# Patient Record
Sex: MALE | Race: WHITE | NOT HISPANIC OR LATINO | Employment: OTHER | URBAN - METROPOLITAN AREA
[De-identification: names, ages, dates, MRNs, and addresses within clinical notes are randomized per-mention and may not be internally consistent; named-entity substitution may affect disease eponyms.]

---

## 2018-04-02 ENCOUNTER — HISTORICAL (OUTPATIENT)
Dept: LAB | Facility: HOSPITAL | Age: 83
End: 2018-04-02

## 2018-04-02 LAB
ALBUMIN SERPL-MCNC: 3.3 GM/DL (ref 3.4–5)
ALP SERPL-CCNC: 84 UNIT/L (ref 46–116)
ALT SERPL-CCNC: 18 UNIT/L (ref 12–78)
AST SERPL-CCNC: 16 UNIT/L (ref 15–37)
BILIRUB SERPL-MCNC: 0.6 MG/DL (ref 0.2–1)
BILIRUBIN DIRECT+TOT PNL SERPL-MCNC: 0.19 MG/DL (ref 0–0.2)
BILIRUBIN DIRECT+TOT PNL SERPL-MCNC: 0.41 MG/DL (ref 0–0.8)
CHOLEST SERPL-MCNC: 168 MG/DL (ref 0–200)
CHOLEST/HDLC SERPL: 2.4 {RATIO} (ref 0–5)
HDLC SERPL-MCNC: 71 MG/DL (ref 40–60)
LDLC SERPL CALC-MCNC: 90 MG/DL (ref 0–129)
PROT SERPL-MCNC: 6.5 GM/DL (ref 6.4–8.2)
TRIGL SERPL-MCNC: 33 MG/DL
VLDLC SERPL CALC-MCNC: 7 MG/DL

## 2022-11-08 ENCOUNTER — HOSPITAL ENCOUNTER (EMERGENCY)
Facility: HOSPITAL | Age: 87
Discharge: HOME | End: 2022-11-08
Attending: FAMILY MEDICINE
Payer: MEDICARE

## 2022-11-08 VITALS
HEART RATE: 94 BPM | DIASTOLIC BLOOD PRESSURE: 77 MMHG | BODY MASS INDEX: 22.4 KG/M2 | RESPIRATION RATE: 18 BRPM | TEMPERATURE: 97 F | OXYGEN SATURATION: 97 % | WEIGHT: 160 LBS | SYSTOLIC BLOOD PRESSURE: 113 MMHG | HEIGHT: 71 IN

## 2022-11-08 DIAGNOSIS — S32.010A COMPRESSION FRACTURE OF L1 VERTEBRA, INITIAL ENCOUNTER: Primary | ICD-10-CM

## 2022-11-08 DIAGNOSIS — M47.9 SPINE DEGENERATION: Primary | ICD-10-CM

## 2022-11-08 LAB
APPEARANCE UR: CLEAR
BACTERIA #/AREA URNS AUTO: NORMAL /HPF
BILIRUB UR QL STRIP.AUTO: ABNORMAL MG/DL
COLOR UR AUTO: ABNORMAL
GLUCOSE UR QL STRIP.AUTO: NEGATIVE MG/DL
KETONES UR QL STRIP.AUTO: 15 MG/DL
LEUKOCYTE ESTERASE UR QL STRIP.AUTO: NEGATIVE UNIT/L
NITRITE UR QL STRIP.AUTO: NEGATIVE
PH UR STRIP.AUTO: 5.5 [PH]
PROT UR QL STRIP.AUTO: NEGATIVE MG/DL
RBC #/AREA URNS AUTO: NORMAL /HPF
RBC UR QL AUTO: NEGATIVE UNIT/L
SP GR UR STRIP.AUTO: >=1.03
SQUAMOUS #/AREA URNS AUTO: NORMAL /HPF
UROBILINOGEN UR STRIP-ACNC: 1 MG/DL
WBC #/AREA URNS AUTO: NORMAL /HPF

## 2022-11-08 PROCEDURE — 96372 THER/PROPH/DIAG INJ SC/IM: CPT | Performed by: FAMILY MEDICINE

## 2022-11-08 PROCEDURE — 63600175 PHARM REV CODE 636 W HCPCS: Performed by: FAMILY MEDICINE

## 2022-11-08 PROCEDURE — 99284 EMERGENCY DEPT VISIT MOD MDM: CPT | Mod: 25

## 2022-11-08 PROCEDURE — 81001 URINALYSIS AUTO W/SCOPE: CPT | Performed by: FAMILY MEDICINE

## 2022-11-08 PROCEDURE — 81003 URINALYSIS AUTO W/O SCOPE: CPT | Performed by: FAMILY MEDICINE

## 2022-11-08 RX ORDER — DICLOFENAC SODIUM 50 MG/1
50 TABLET, DELAYED RELEASE ORAL 3 TIMES DAILY
Qty: 9 TABLET | Refills: 0 | Status: SHIPPED | OUTPATIENT
Start: 2022-11-08 | End: 2022-11-11

## 2022-11-08 RX ORDER — KETOROLAC TROMETHAMINE 30 MG/ML
30 INJECTION, SOLUTION INTRAMUSCULAR; INTRAVENOUS
Status: COMPLETED | OUTPATIENT
Start: 2022-11-08 | End: 2022-11-08

## 2022-11-08 RX ORDER — CYCLOBENZAPRINE HCL 10 MG
10 TABLET ORAL 3 TIMES DAILY PRN
Qty: 15 TABLET | Refills: 0 | Status: SHIPPED | OUTPATIENT
Start: 2022-11-08 | End: 2022-11-13

## 2022-11-08 RX ADMIN — KETOROLAC TROMETHAMINE 30 MG: 30 INJECTION, SOLUTION INTRAMUSCULAR at 11:11

## 2022-11-08 NOTE — ED NOTES
Pt c/o lower back pain that has been going on for the past 7-8mths, Pt states the pain does not radiate, pt denies any recent falls, Pt describes the pain as constant/aching pain. Pt rates the pain as a  7/10, Pt not on blood thinners.

## 2022-11-09 NOTE — ED NOTES
11/09/22 1149Called Dr Alejandra office for referral since pt family called the office and they said they did not get the referal yet as they told Lillie Brooke/ I spoke to Carolin and she looked it up and said they just got the referral and will be calling for an appt / I spoke to Lillie Brooke and she is calling the family to inform

## 2022-11-14 ENCOUNTER — TELEPHONE (OUTPATIENT)
Dept: PRIMARY CARE CLINIC | Facility: CLINIC | Age: 87
End: 2022-11-14
Payer: MEDICARE

## 2022-11-18 DIAGNOSIS — M47.814 THORACIC SPONDYLOSIS: Primary | ICD-10-CM

## 2022-11-18 DIAGNOSIS — M47.816 LUMBAR SPONDYLOSIS: ICD-10-CM

## 2022-11-21 ENCOUNTER — HOSPITAL ENCOUNTER (OUTPATIENT)
Dept: RADIOLOGY | Facility: HOSPITAL | Age: 87
Discharge: HOME | End: 2022-11-21
Attending: NEUROLOGICAL SURGERY
Payer: MEDICARE

## 2022-11-21 DIAGNOSIS — M47.816 LUMBAR SPONDYLOSIS: ICD-10-CM

## 2022-11-21 DIAGNOSIS — M47.814 THORACIC SPONDYLOSIS: ICD-10-CM

## 2022-11-21 PROCEDURE — 72148 MRI LUMBAR SPINE W/O DYE: CPT | Mod: TC

## 2022-11-21 PROCEDURE — 72146 MRI CHEST SPINE W/O DYE: CPT | Mod: TC

## 2022-11-22 ENCOUNTER — OFFICE VISIT (OUTPATIENT)
Dept: PRIMARY CARE CLINIC | Facility: CLINIC | Age: 87
End: 2022-11-22
Payer: MEDICARE

## 2022-11-22 VITALS
OXYGEN SATURATION: 99 % | WEIGHT: 152 LBS | DIASTOLIC BLOOD PRESSURE: 88 MMHG | BODY MASS INDEX: 21.28 KG/M2 | HEIGHT: 71 IN | RESPIRATION RATE: 20 BRPM | TEMPERATURE: 97 F | HEART RATE: 98 BPM | SYSTOLIC BLOOD PRESSURE: 138 MMHG

## 2022-11-22 DIAGNOSIS — S22.000A COMPRESSION FRACTURE OF BODY OF THORACIC VERTEBRA: ICD-10-CM

## 2022-11-22 DIAGNOSIS — Z13.29 SCREENING FOR THYROID DISORDER: ICD-10-CM

## 2022-11-22 DIAGNOSIS — Z00.00 MEDICARE ANNUAL WELLNESS VISIT, SUBSEQUENT: ICD-10-CM

## 2022-11-22 DIAGNOSIS — Z85.46 HISTORY OF PROSTATE CANCER: ICD-10-CM

## 2022-11-22 DIAGNOSIS — R41.3 MEMORY IMPAIRMENT: ICD-10-CM

## 2022-11-22 DIAGNOSIS — R53.1 GENERALIZED WEAKNESS: ICD-10-CM

## 2022-11-22 DIAGNOSIS — C80.1 MALIGNANT NEOPLASM METASTATIC TO LUMBAR SPINE WITH UNKNOWN PRIMARY SITE: Primary | ICD-10-CM

## 2022-11-22 DIAGNOSIS — C79.51 MALIGNANT NEOPLASM METASTATIC TO LUMBAR SPINE WITH UNKNOWN PRIMARY SITE: Primary | ICD-10-CM

## 2022-11-22 DIAGNOSIS — Z23 NEED FOR INFLUENZA VACCINATION: ICD-10-CM

## 2022-11-22 PROCEDURE — 90694 FLU VACCINE - QUADRIVALENT - ADJUVANTED: ICD-10-PCS | Mod: ,,, | Performed by: STUDENT IN AN ORGANIZED HEALTH CARE EDUCATION/TRAINING PROGRAM

## 2022-11-22 PROCEDURE — 99204 OFFICE O/P NEW MOD 45 MIN: CPT | Mod: ,,, | Performed by: STUDENT IN AN ORGANIZED HEALTH CARE EDUCATION/TRAINING PROGRAM

## 2022-11-22 PROCEDURE — G0008 ADMIN INFLUENZA VIRUS VAC: HCPCS | Mod: ,,, | Performed by: STUDENT IN AN ORGANIZED HEALTH CARE EDUCATION/TRAINING PROGRAM

## 2022-11-22 PROCEDURE — G0008 FLU VACCINE - QUADRIVALENT - ADJUVANTED: ICD-10-PCS | Mod: ,,, | Performed by: STUDENT IN AN ORGANIZED HEALTH CARE EDUCATION/TRAINING PROGRAM

## 2022-11-22 PROCEDURE — 90694 VACC AIIV4 NO PRSRV 0.5ML IM: CPT | Mod: ,,, | Performed by: STUDENT IN AN ORGANIZED HEALTH CARE EDUCATION/TRAINING PROGRAM

## 2022-11-22 PROCEDURE — 99204 PR OFFICE/OUTPT VISIT, NEW, LEVL IV, 45-59 MIN: ICD-10-PCS | Mod: ,,, | Performed by: STUDENT IN AN ORGANIZED HEALTH CARE EDUCATION/TRAINING PROGRAM

## 2022-11-22 NOTE — PROGRESS NOTES
"Subjective:       Patient ID: Anthony Ibarra is a 88 y.o. male.    ----------------------------  Cancer     Chief Complaint: Establish Care and Facial Injury    Presents to establish care. Accompanied by daughter. Pt hasn't had primary care in many years. Only significant medical history reported is prostate cancer which patient reports "they took out". Daughter is concerned about his memory. Says when she arrived at his house (first time in months), she found unpaid bills, house hadn't been cleaned, pt hadn't shaved or been caring for himself, etc.     He recently veronica to the ED for back pain which was found to be a compression fracture on x-ray. Seems that the pt has had progressive generalized weakness which may have lead to a fall. He was referred to Neurosurgery (Dr. Mirza). Saw NP in that clinic who ordered MRI lumbar and thoracic spine. That MRI lumbar spine shows suspected metastatic disease; the pt is not aware yet.    ROS (+) for weight loss, about 20 lbs over past 1-2 years per pt. No tobacco use. Wine 3-4 times per week, 1-2 glasses each time.     Review of Systems   Constitutional:  Negative for chills and fever.   HENT:  Negative for sinus pressure/congestion and sore throat.    Eyes:  Negative for pain and redness.   Respiratory:  Negative for cough, shortness of breath and wheezing.    Cardiovascular:  Negative for chest pain and leg swelling.   Gastrointestinal:  Negative for abdominal pain, nausea and vomiting.   Endocrine: Negative for polydipsia and polyuria.   Genitourinary:  Negative for dysuria and hematuria.   Musculoskeletal:  Positive for back pain. Negative for arthralgias and myalgias.   Integumentary:  Negative for rash and mole/lesion.   Neurological:  Positive for weakness. Negative for dizziness, syncope and headaches.   Hematological:  Negative for adenopathy.   Psychiatric/Behavioral:  Positive for confusion. The patient is not nervous/anxious.          Objective:      Physical " Exam  Vitals and nursing note reviewed.   Constitutional:       General: He is not in acute distress.     Appearance: He is not ill-appearing.   HENT:      Head: Normocephalic.      Nose: No rhinorrhea.      Mouth/Throat:      Mouth: Mucous membranes are moist.      Pharynx: No oropharyngeal exudate or posterior oropharyngeal erythema.   Eyes:      Extraocular Movements: Extraocular movements intact.      Conjunctiva/sclera: Conjunctivae normal.      Pupils: Pupils are equal, round, and reactive to light.   Cardiovascular:      Rate and Rhythm: Normal rate and regular rhythm.   Pulmonary:      Effort: Pulmonary effort is normal.      Breath sounds: Normal breath sounds.   Abdominal:      Palpations: Abdomen is soft. There is no mass.      Tenderness: There is no abdominal tenderness.   Musculoskeletal:         General: No deformity.   Skin:     General: Skin is warm and dry.   Neurological:      General: No focal deficit present.      Mental Status: He is alert. Mental status is at baseline.   Psychiatric:         Mood and Affect: Mood normal.         Behavior: Behavior normal.         Assessment & Plan:   1. 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   Source is not obvious, but pt does have h/o prostate cancer which does not seem has been monitored recently.  Weight loss is concerning.  We'll search for primary neoplasm with CT chest/abd/pelvis w/contrast as well as CT head - especially in setting of confusion.  Will contact pt as soon as we have all results and likely will need referral to Oncology    Orders:  -     Cancel: CT Chest Abdomen Pelvis With Contrast (xpd); Future; Expected date: 11/22/2022  -     CBC Auto Differential; Future; Expected date: 11/22/2022  -     Comprehensive Metabolic Panel; Future; Expected date: 11/22/2022  -     Cancel: CT Head Without Contrast; Future; Expected date: 11/22/2022    2. Compression  fracture of body of thoracic vertebra  Assessment & Plan:  Following Dr. Mirza (neurosurgery). Pt was told he'd be called with an appointment date based on his MRI      3. Memory impairment  Assessment & Plan:  Daughter expressed concern. MMSE today 26/30, however pt is highly educated () so this may represent a mild cognitive deficit. He does seem particularly forgetful of recent events (doesn't recall getting MRI yesterday). Will await CT head results (see above) and labs to r/o other causes of memory impairment. If unrevealing will discuss treatment options at next visit      4. Generalized weakness  Assessment & Plan:  With recent fall  Consult home health for eval and treat and for home PT       5. Need for influenza vaccination  -     Influenza (FLUAD) - Quadrivalent (Adjuvanted) *Preferred* (65+) (PF)    6. Medicare annual wellness visit, subsequent  Comments:  ordered labs in prep for wellness at next visit  Orders:  -     Lipid Panel; Future; Expected date: 11/22/2022  -     TSH; Future; Expected date: 11/22/2022  -     Urinalysis, Reflex to Urine Culture Urine, Clean Catch    7. History of prostate cancer  -     PSA, Total (Diagnostic); Future; Expected date: 11/22/2022    8. Screening for thyroid disorder  -     TSH; Future; Expected date: 11/22/2022        Follow up in about 1 month (around 12/22/2022) for Wellness. In addition to their scheduled follow up, the patient has also been instructed to follow up on as needed basis.

## 2022-11-23 ENCOUNTER — PATIENT MESSAGE (OUTPATIENT)
Dept: PRIMARY CARE CLINIC | Facility: CLINIC | Age: 87
End: 2022-11-23
Payer: MEDICARE

## 2022-11-23 ENCOUNTER — LAB VISIT (OUTPATIENT)
Dept: LAB | Facility: HOSPITAL | Age: 87
End: 2022-11-23
Attending: STUDENT IN AN ORGANIZED HEALTH CARE EDUCATION/TRAINING PROGRAM
Payer: MEDICARE

## 2022-11-23 DIAGNOSIS — C80.1 MALIGNANT NEOPLASM METASTATIC TO LUMBAR SPINE WITH UNKNOWN PRIMARY SITE: ICD-10-CM

## 2022-11-23 DIAGNOSIS — Z85.46 HISTORY OF PROSTATE CANCER: ICD-10-CM

## 2022-11-23 DIAGNOSIS — C79.51 MALIGNANT NEOPLASM METASTATIC TO LUMBAR SPINE WITH UNKNOWN PRIMARY SITE: ICD-10-CM

## 2022-11-23 DIAGNOSIS — C80.1 MALIGNANT NEOPLASM METASTATIC TO LUMBAR SPINE WITH UNKNOWN PRIMARY SITE: Primary | ICD-10-CM

## 2022-11-23 DIAGNOSIS — Z00.00 MEDICARE ANNUAL WELLNESS VISIT, SUBSEQUENT: ICD-10-CM

## 2022-11-23 DIAGNOSIS — Z13.29 SCREENING FOR THYROID DISORDER: ICD-10-CM

## 2022-11-23 DIAGNOSIS — C79.51 MALIGNANT NEOPLASM METASTATIC TO LUMBAR SPINE WITH UNKNOWN PRIMARY SITE: Primary | ICD-10-CM

## 2022-11-23 PROBLEM — S22.000A COMPRESSION FRACTURE OF BODY OF THORACIC VERTEBRA: Status: ACTIVE | Noted: 2022-11-23

## 2022-11-23 PROBLEM — R53.1 GENERALIZED WEAKNESS: Status: ACTIVE | Noted: 2022-11-23

## 2022-11-23 PROBLEM — R41.3 MEMORY IMPAIRMENT: Status: ACTIVE | Noted: 2022-11-23

## 2022-11-23 LAB
ALBUMIN SERPL-MCNC: 2.9 GM/DL (ref 3.4–4.8)
ALBUMIN/GLOB SERPL: 0.7 RATIO (ref 1.1–2)
ALP SERPL-CCNC: 160 UNIT/L (ref 40–150)
ALT SERPL-CCNC: 9 UNIT/L (ref 0–55)
APPEARANCE UR: CLEAR
AST SERPL-CCNC: 15 UNIT/L (ref 5–34)
BACTERIA #/AREA URNS AUTO: NORMAL /HPF
BASOPHILS # BLD AUTO: 0.04 X10(3)/MCL (ref 0–0.2)
BASOPHILS NFR BLD AUTO: 0.6 %
BILIRUB UR QL STRIP.AUTO: NEGATIVE MG/DL
BILIRUBIN DIRECT+TOT PNL SERPL-MCNC: 0.6 MG/DL
BUN SERPL-MCNC: 11.3 MG/DL (ref 8.4–25.7)
CALCIUM SERPL-MCNC: 9.4 MG/DL (ref 8.8–10)
CHLORIDE SERPL-SCNC: 100 MMOL/L (ref 98–107)
CHOLEST SERPL-MCNC: 134 MG/DL
CHOLEST/HDLC SERPL: 2 {RATIO} (ref 0–5)
CO2 SERPL-SCNC: 31 MMOL/L (ref 23–31)
COLOR UR AUTO: YELLOW
CREAT SERPL-MCNC: 0.79 MG/DL (ref 0.73–1.18)
EOSINOPHIL # BLD AUTO: 0.04 X10(3)/MCL (ref 0–0.9)
EOSINOPHIL NFR BLD AUTO: 0.6 %
ERYTHROCYTE [DISTWIDTH] IN BLOOD BY AUTOMATED COUNT: 13.4 % (ref 11.5–17)
GFR SERPLBLD CREATININE-BSD FMLA CKD-EPI: >60 MLS/MIN/1.73/M2
GLOBULIN SER-MCNC: 4.4 GM/DL (ref 2.4–3.5)
GLUCOSE SERPL-MCNC: 86 MG/DL (ref 82–115)
GLUCOSE UR QL STRIP.AUTO: NEGATIVE MG/DL
HCT VFR BLD AUTO: 43 % (ref 42–52)
HDLC SERPL-MCNC: 56 MG/DL (ref 35–60)
HGB BLD-MCNC: 13.5 GM/DL (ref 14–18)
IMM GRANULOCYTES # BLD AUTO: 0.01 X10(3)/MCL (ref 0–0.04)
IMM GRANULOCYTES NFR BLD AUTO: 0.2 %
KETONES UR QL STRIP.AUTO: ABNORMAL MG/DL
LDLC SERPL CALC-MCNC: 66 MG/DL (ref 50–140)
LEUKOCYTE ESTERASE UR QL STRIP.AUTO: NEGATIVE UNIT/L
LYMPHOCYTES # BLD AUTO: 1.51 X10(3)/MCL (ref 0.6–4.6)
LYMPHOCYTES NFR BLD AUTO: 24.5 %
MCH RBC QN AUTO: 30.1 PG (ref 27–31)
MCHC RBC AUTO-ENTMCNC: 31.4 MG/DL (ref 33–36)
MCV RBC AUTO: 95.8 FL (ref 80–94)
MONOCYTES # BLD AUTO: 0.76 X10(3)/MCL (ref 0.1–1.3)
MONOCYTES NFR BLD AUTO: 12.3 %
NEUTROPHILS # BLD AUTO: 3.8 X10(3)/MCL (ref 2.1–9.2)
NEUTROPHILS NFR BLD AUTO: 61.8 %
NITRITE UR QL STRIP.AUTO: NEGATIVE
PH UR STRIP.AUTO: 5.5 [PH]
PLATELET # BLD AUTO: 196 X10(3)/MCL (ref 130–400)
PMV BLD AUTO: 10 FL (ref 7.4–10.4)
POTASSIUM SERPL-SCNC: 4.3 MMOL/L (ref 3.5–5.1)
PROT SERPL-MCNC: 7.3 GM/DL (ref 5.8–7.6)
PROT UR QL STRIP.AUTO: ABNORMAL MG/DL
PSA SERPL-MCNC: <0.02 NG/ML
RBC # BLD AUTO: 4.49 X10(6)/MCL (ref 4.7–6.1)
RBC #/AREA URNS AUTO: NORMAL /HPF
RBC UR QL AUTO: ABNORMAL UNIT/L
SODIUM SERPL-SCNC: 139 MMOL/L (ref 136–145)
SP GR UR STRIP.AUTO: 1.02
SQUAMOUS #/AREA URNS AUTO: NORMAL /HPF
TRIGL SERPL-MCNC: 58 MG/DL (ref 34–140)
TSH SERPL-ACNC: 0.93 UIU/ML (ref 0.35–4.94)
UROBILINOGEN UR STRIP-ACNC: 1 MG/DL
VLDLC SERPL CALC-MCNC: 12 MG/DL
WBC # SPEC AUTO: 6.2 X10(3)/MCL (ref 4.5–11.5)
WBC #/AREA URNS AUTO: NORMAL /HPF

## 2022-11-23 PROCEDURE — 80053 COMPREHEN METABOLIC PANEL: CPT

## 2022-11-23 PROCEDURE — 84443 ASSAY THYROID STIM HORMONE: CPT

## 2022-11-23 PROCEDURE — 84153 ASSAY OF PSA TOTAL: CPT

## 2022-11-23 PROCEDURE — 36415 COLL VENOUS BLD VENIPUNCTURE: CPT

## 2022-11-23 PROCEDURE — 80061 LIPID PANEL: CPT

## 2022-11-23 PROCEDURE — 85025 COMPLETE CBC W/AUTO DIFF WBC: CPT

## 2022-11-23 NOTE — ASSESSMENT & PLAN NOTE
Incidentally noted on lumbar MRI.   Source is not obvious, but pt does have h/o prostate cancer which does not seem has been monitored recently.  Weight loss is concerning.  We'll search for primary neoplasm with CT chest/abd/pelvis w/contrast as well as CT head - especially in setting of confusion.  Will contact pt as soon as we have all results and likely will need referral to Oncology

## 2022-11-23 NOTE — ASSESSMENT & PLAN NOTE
Daughter expressed concern. MMSE today 26/30, however pt is highly educated () so this may represent a mild cognitive deficit. He does seem particularly forgetful of recent events (doesn't recall getting MRI yesterday). Will await CT head results (see above) and labs to r/o other causes of memory impairment. If unrevealing will discuss treatment options at next visit

## 2022-11-23 NOTE — ASSESSMENT & PLAN NOTE
Following Dr. Mirza (neurosurgery). Pt was told he'd be called with an appointment date based on his MRI

## 2022-11-28 ENCOUNTER — TELEPHONE (OUTPATIENT)
Dept: HEMATOLOGY/ONCOLOGY | Facility: CLINIC | Age: 87
End: 2022-11-28
Payer: MEDICARE

## 2022-11-30 ENCOUNTER — ANESTHESIA (OUTPATIENT)
Dept: INTERVENTIONAL RADIOLOGY/VASCULAR | Facility: HOSPITAL | Age: 87
End: 2022-11-30
Payer: MEDICARE

## 2022-11-30 ENCOUNTER — HOSPITAL ENCOUNTER (OUTPATIENT)
Dept: INTERVENTIONAL RADIOLOGY/VASCULAR | Facility: HOSPITAL | Age: 87
Discharge: HOME | End: 2022-11-30
Attending: RADIOLOGY
Payer: MEDICARE

## 2022-11-30 VITALS
OXYGEN SATURATION: 100 % | WEIGHT: 160 LBS | TEMPERATURE: 98 F | HEIGHT: 71 IN | DIASTOLIC BLOOD PRESSURE: 79 MMHG | HEART RATE: 66 BPM | BODY MASS INDEX: 22.4 KG/M2 | RESPIRATION RATE: 15 BRPM | SYSTOLIC BLOOD PRESSURE: 144 MMHG

## 2022-11-30 DIAGNOSIS — S22.000A COMPRESSION FRACTURE OF THORACIC VERTEBRA, UNSPECIFIED THORACIC VERTEBRAL LEVEL, INITIAL ENCOUNTER: ICD-10-CM

## 2022-11-30 DIAGNOSIS — Z01.818 PRE-OP EVALUATION: ICD-10-CM

## 2022-11-30 LAB
ALBUMIN SERPL-MCNC: 2.9 GM/DL (ref 3.4–4.8)
ALBUMIN/GLOB SERPL: 0.7 RATIO (ref 1.1–2)
ALP SERPL-CCNC: 139 UNIT/L (ref 40–150)
ALT SERPL-CCNC: 14 UNIT/L (ref 0–55)
AST SERPL-CCNC: 16 UNIT/L (ref 5–34)
BASOPHILS # BLD AUTO: 0.03 X10(3)/MCL (ref 0–0.2)
BASOPHILS NFR BLD AUTO: 0.5 %
BILIRUBIN DIRECT+TOT PNL SERPL-MCNC: 0.5 MG/DL
BUN SERPL-MCNC: 12.2 MG/DL (ref 8.4–25.7)
CALCIUM SERPL-MCNC: 8.9 MG/DL (ref 8.8–10)
CHLORIDE SERPL-SCNC: 105 MMOL/L (ref 98–107)
CO2 SERPL-SCNC: 27 MMOL/L (ref 23–31)
CREAT SERPL-MCNC: 0.75 MG/DL (ref 0.73–1.18)
EOSINOPHIL # BLD AUTO: 0.07 X10(3)/MCL (ref 0–0.9)
EOSINOPHIL NFR BLD AUTO: 1.3 %
ERYTHROCYTE [DISTWIDTH] IN BLOOD BY AUTOMATED COUNT: 13.8 % (ref 11.5–17)
GFR SERPLBLD CREATININE-BSD FMLA CKD-EPI: >60 MLS/MIN/1.73/M2
GLOBULIN SER-MCNC: 4 GM/DL (ref 2.4–3.5)
GLUCOSE SERPL-MCNC: 90 MG/DL (ref 82–115)
HCT VFR BLD AUTO: 40.4 % (ref 42–52)
HGB BLD-MCNC: 13.2 GM/DL (ref 14–18)
IMM GRANULOCYTES # BLD AUTO: 0.02 X10(3)/MCL (ref 0–0.04)
IMM GRANULOCYTES NFR BLD AUTO: 0.4 %
INR BLD: 1.07 (ref 0–1.3)
LYMPHOCYTES # BLD AUTO: 0.96 X10(3)/MCL (ref 0.6–4.6)
LYMPHOCYTES NFR BLD AUTO: 17.1 %
MCH RBC QN AUTO: 31.3 PG (ref 27–31)
MCHC RBC AUTO-ENTMCNC: 32.7 MG/DL (ref 33–36)
MCV RBC AUTO: 95.7 FL (ref 80–94)
MONOCYTES # BLD AUTO: 0.65 X10(3)/MCL (ref 0.1–1.3)
MONOCYTES NFR BLD AUTO: 11.6 %
NEUTROPHILS # BLD AUTO: 3.9 X10(3)/MCL (ref 2.1–9.2)
NEUTROPHILS NFR BLD AUTO: 69.1 %
NRBC BLD AUTO-RTO: 0 %
PLATELET # BLD AUTO: 212 X10(3)/MCL (ref 130–400)
PMV BLD AUTO: 10.3 FL (ref 7.4–10.4)
POTASSIUM SERPL-SCNC: 3.7 MMOL/L (ref 3.5–5.1)
PROT SERPL-MCNC: 6.9 GM/DL (ref 5.8–7.6)
PROTHROMBIN TIME: 13.8 SECONDS (ref 12.5–14.5)
RBC # BLD AUTO: 4.22 X10(6)/MCL (ref 4.7–6.1)
SODIUM SERPL-SCNC: 139 MMOL/L (ref 136–145)
WBC # SPEC AUTO: 5.6 X10(3)/MCL (ref 4.5–11.5)

## 2022-11-30 PROCEDURE — 63600175 PHARM REV CODE 636 W HCPCS: Performed by: NURSE ANESTHETIST, CERTIFIED REGISTERED

## 2022-11-30 PROCEDURE — 88304 TISSUE EXAM BY PATHOLOGIST: CPT | Performed by: RADIOLOGY

## 2022-11-30 PROCEDURE — 25000003 PHARM REV CODE 250: Performed by: ANESTHESIOLOGY

## 2022-11-30 PROCEDURE — 80053 COMPREHEN METABOLIC PANEL: CPT | Performed by: RADIOLOGY

## 2022-11-30 PROCEDURE — 37000009 HC ANESTHESIA EA ADD 15 MINS: Performed by: RADIOLOGY

## 2022-11-30 PROCEDURE — 36415 COLL VENOUS BLD VENIPUNCTURE: CPT | Performed by: RADIOLOGY

## 2022-11-30 PROCEDURE — 93010 ELECTROCARDIOGRAM REPORT: CPT | Mod: GW,,, | Performed by: INTERNAL MEDICINE

## 2022-11-30 PROCEDURE — 22513 PERQ VERTEBRAL AUGMENTATION: CPT | Mod: GA

## 2022-11-30 PROCEDURE — 25000003 PHARM REV CODE 250: Performed by: NURSE ANESTHETIST, CERTIFIED REGISTERED

## 2022-11-30 PROCEDURE — 25000003 PHARM REV CODE 250: Performed by: RADIOLOGY

## 2022-11-30 PROCEDURE — 85610 PROTHROMBIN TIME: CPT | Performed by: RADIOLOGY

## 2022-11-30 PROCEDURE — 37000008 HC ANESTHESIA 1ST 15 MINUTES: Performed by: RADIOLOGY

## 2022-11-30 PROCEDURE — 93010 EKG 12-LEAD: ICD-10-PCS | Mod: GW,,, | Performed by: INTERNAL MEDICINE

## 2022-11-30 PROCEDURE — 85025 COMPLETE CBC W/AUTO DIFF WBC: CPT | Performed by: RADIOLOGY

## 2022-11-30 RX ORDER — HYDROMORPHONE HYDROCHLORIDE 2 MG/ML
0.2 INJECTION, SOLUTION INTRAMUSCULAR; INTRAVENOUS; SUBCUTANEOUS EVERY 5 MIN PRN
Status: CANCELLED | OUTPATIENT
Start: 2022-11-30

## 2022-11-30 RX ORDER — LIDOCAINE HYDROCHLORIDE 20 MG/ML
INJECTION INTRAVENOUS
Status: DISCONTINUED | OUTPATIENT
Start: 2022-11-30 | End: 2022-11-30

## 2022-11-30 RX ORDER — LIDOCAINE HYDROCHLORIDE 20 MG/ML
INJECTION, SOLUTION INFILTRATION; PERINEURAL
Status: COMPLETED | OUTPATIENT
Start: 2022-11-30 | End: 2022-11-30

## 2022-11-30 RX ORDER — DIPHENHYDRAMINE HYDROCHLORIDE 50 MG/ML
25 INJECTION INTRAMUSCULAR; INTRAVENOUS EVERY 6 HOURS PRN
Status: CANCELLED | OUTPATIENT
Start: 2022-11-30

## 2022-11-30 RX ORDER — ONDANSETRON 2 MG/ML
4 INJECTION INTRAMUSCULAR; INTRAVENOUS DAILY PRN
Status: CANCELLED | OUTPATIENT
Start: 2022-11-30

## 2022-11-30 RX ORDER — PROPOFOL 10 MG/ML
VIAL (ML) INTRAVENOUS
Status: DISCONTINUED | OUTPATIENT
Start: 2022-11-30 | End: 2022-11-30

## 2022-11-30 RX ORDER — ROCURONIUM BROMIDE 10 MG/ML
INJECTION, SOLUTION INTRAVENOUS
Status: DISCONTINUED | OUTPATIENT
Start: 2022-11-30 | End: 2022-11-30

## 2022-11-30 RX ORDER — LIDOCAINE HYDROCHLORIDE 10 MG/ML
1 INJECTION, SOLUTION EPIDURAL; INFILTRATION; INTRACAUDAL; PERINEURAL ONCE
Status: DISCONTINUED | OUTPATIENT
Start: 2022-11-30 | End: 2022-12-01 | Stop reason: HOSPADM

## 2022-11-30 RX ORDER — ACETAMINOPHEN 10 MG/ML
1000 INJECTION, SOLUTION INTRAVENOUS ONCE
Status: CANCELLED | OUTPATIENT
Start: 2022-11-30 | End: 2022-11-30

## 2022-11-30 RX ORDER — ETOMIDATE 2 MG/ML
INJECTION INTRAVENOUS
Status: DISCONTINUED | OUTPATIENT
Start: 2022-11-30 | End: 2022-11-30

## 2022-11-30 RX ORDER — SODIUM CHLORIDE, SODIUM GLUCONATE, SODIUM ACETATE, POTASSIUM CHLORIDE AND MAGNESIUM CHLORIDE 30; 37; 368; 526; 502 MG/100ML; MG/100ML; MG/100ML; MG/100ML; MG/100ML
INJECTION, SOLUTION INTRAVENOUS CONTINUOUS
Status: DISCONTINUED | OUTPATIENT
Start: 2022-11-30 | End: 2022-12-01 | Stop reason: HOSPADM

## 2022-11-30 RX ADMIN — PROPOFOL 30 MG: 10 INJECTION, EMULSION INTRAVENOUS at 09:11

## 2022-11-30 RX ADMIN — SUGAMMADEX 200 MG: 100 INJECTION, SOLUTION INTRAVENOUS at 10:11

## 2022-11-30 RX ADMIN — SODIUM CHLORIDE 1 G: 9 INJECTION, SOLUTION INTRAVENOUS at 09:11

## 2022-11-30 RX ADMIN — ROCURONIUM BROMIDE 50 MG: 50 INJECTION INTRAVENOUS at 09:11

## 2022-11-30 RX ADMIN — ETOMIDATE 20 MG: 2 INJECTION INTRAVENOUS at 09:11

## 2022-11-30 RX ADMIN — LIDOCAINE HYDROCHLORIDE 40 MG: 20 INJECTION INTRAVENOUS at 09:11

## 2022-11-30 RX ADMIN — LIDOCAINE HYDROCHLORIDE 5 ML: 20 INJECTION, SOLUTION INFILTRATION; PERINEURAL at 10:11

## 2022-11-30 RX ADMIN — SODIUM CHLORIDE, SODIUM GLUCONATE, SODIUM ACETATE, POTASSIUM CHLORIDE AND MAGNESIUM CHLORIDE 100 ML/HR: 526; 502; 368; 37; 30 INJECTION, SOLUTION INTRAVENOUS at 09:11

## 2022-11-30 NOTE — ANESTHESIA PREPROCEDURE EVALUATION
11/30/2022  Anthony Ibarra is a 88 y.o., male with multiple vertebral compression fractures and resultant back pain.  He does not remember any specific incident of injury.  He denies SOB, denies chest pain.  He answers questions appropriately.  He does not follow regularly with any medical provider.  NO EKG in chart.  Ordered 12 lead, pending...  Update: EKG is WNL.      Pre-op Assessment    I have reviewed the Patient Summary Reports.     I have reviewed the Nursing Notes. I have reviewed the NPO Status.   I have reviewed the Medications.     Review of Systems  Anesthesia Hx:  Denies Family Hx of Anesthesia complications.   Denies Personal Hx of Anesthesia complications.   Cardiovascular:  Cardiovascular Normal Exercise tolerance: poor     Pulmonary:  Pulmonary Normal        Physical Exam  General: Cooperative, Alert, Oriented and Cachexia    Airway:  Mallampati: II   Mouth Opening: Normal  TM Distance: Normal  Tongue: Normal  Neck ROM: Normal ROM    Dental:  Intact    Chest/Lungs:  Clear to auscultation, Normal Respiratory Rate    Heart:  Rate: Normal  Rhythm: Regular Rhythm        Anesthesia Plan  Type of Anesthesia, risks & benefits discussed:    Anesthesia Type: Gen ETT  Intra-op Monitoring Plan: Standard ASA Monitors  Post Op Pain Control Plan: multimodal analgesia and IV/PO Opioids PRN  Induction:  IV  Airway Plan: Direct, Post-Induction  Informed Consent: Informed consent signed with the Patient and all parties understand the risks and agree with anesthesia plan.  All questions answered.   ASA Score: 3  Day of Surgery Review of History & Physical: H&P Update referred to the surgeon/provider.    Ready For Surgery From Anesthesia Perspective.     .

## 2022-11-30 NOTE — DISCHARGE SUMMARY
Radiology Post-Procedure Note    Pre Op Diagnosis: T6 Compression Fracture    Post Op Diagnosis:  As Above    Procedure: Kyphoplasty and biopsy    Procedure performed by: Hector Zuleta M.D.    Written Informed Consent Obtained: Yes    Specimen Removed: YES     Estimated Blood Loss: Minimal    Findings:   Standard Kyphoplasty    Patient tolerated procedure well.    Hector Zuleta MD

## 2022-11-30 NOTE — H&P
Radiology History & Physical      SUBJECTIVE:     Chief Complaint: T6 Compression fracture    History of Present Illness:  Anthony Ibarra is a 88 y.o. male who presents for Kyphoplasty  Past Medical History:   Diagnosis Date    Cancer     prostate    Compression fracture of body of thoracic vertebra      Past Surgical History:   Procedure Laterality Date    PROSTATE SURGERY N/A        Home Meds:   Prior to Admission medications    Not on File     Anticoagulants/Antiplatelets: no anticoagulation    Allergies: Review of patient's allergies indicates:  No Known Allergies  Sedation History:  no adverse reactions    Review of Systems:   Hematological: no known coagulopathies  Respiratory: no shortness of breath  Cardiovascular: no chest pain  Gastrointestinal: no abdominal pain  Genito-Urinary: no dysuria  Musculoskeletal: negative  Neurological: no TIA or stroke symptoms         OBJECTIVE:     Vital Signs (Most Recent)  Temp: 98.5 °F (36.9 °C) (11/30/22 0811)  Pulse: 79 (11/30/22 0811)  BP: 133/82 (11/30/22 0811)  SpO2: 95 % (11/30/22 0811)    Physical Exam:  ASA: 2    General: no acute distress  Mental Status: alert and oriented to person, place and time  HEENT: normocephalic, atraumatic  Chest: unlabored breathing  Heart: regular heart rate  Abdomen: nondistended  Extremity: moves all extremities    Laboratory  Lab Results   Component Value Date    INR 1.07 11/30/2022       Lab Results   Component Value Date    WBC 5.6 11/30/2022    HGB 13.2 (L) 11/30/2022    HCT 40.4 (L) 11/30/2022    MCV 95.7 (H) 11/30/2022     11/30/2022      Lab Results   Component Value Date     11/30/2022    K 3.7 11/30/2022    CO2 27 11/30/2022    BUN 12.2 11/30/2022    CREATININE 0.75 11/30/2022    CALCIUM 8.9 11/30/2022    ALT 14 11/30/2022    AST 16 11/30/2022    ALBUMIN 2.9 (L) 11/30/2022    BILITOT 0.5 11/30/2022    BILIDIR 0.19 04/02/2018       ASSESSMENT/PLAN:     Sedation Plan: General  Patient will undergo  Kyphoplasty.    Hector Zuleta MD

## 2022-11-30 NOTE — ANESTHESIA PROCEDURE NOTES
Intubation    Date/Time: 11/30/2022 9:41 AM  Performed by: Jessica Kirby CRNA  Authorized by: Jessica Kirby CRNA     Intubation:     Induction:  Intravenous    Intubated:  Postinduction    Mask Ventilation:  Easy mask    Attempts:  1    Attempted By:  Staff anesthesiologist    Method of Intubation:  Direct    Blade:  Ponce 2    Laryngeal View Grade: Grade I - full view of cords      Difficult Airway Encountered?: No      Complications:  None    Airway Device:  Oral endotracheal tube    Airway Device Size:  7.5    Style/Cuff Inflation:  Cuffed (inflated to minimal occlusive pressure)    Inflation Amount (mL):  9    Tube secured:  20    Secured at:  The lips    Placement Verified By:  Capnometry    Complicating Factors:  None    Findings Post-Intubation:  BS equal bilateral

## 2022-11-30 NOTE — TRANSFER OF CARE
"Anesthesia Transfer of Care Note    Patient: Anthony Iabrra    Procedure(s) Performed: * No procedures listed *    Patient location: PACU    Anesthesia Type: general    Transport from OR: Transported from OR on room air with adequate spontaneous ventilation. Transported from OR on 2-3 L/min O2 by NC with adequate spontaneous ventilation    Post pain: adequate analgesia    Post assessment: no apparent anesthetic complications and tolerated procedure well    Post vital signs: stable    Level of consciousness: responds to stimulation    Nausea/Vomiting: no nausea/vomiting    Complications: none    Transfer of care protocol was followed      Last vitals:   Visit Vitals  /82   Pulse 79   Temp 36.9 °C (98.5 °F) (Oral)   Ht 5' 11" (1.803 m)   Wt 72.6 kg (160 lb)   SpO2 95%   BMI 22.32 kg/m²     "

## 2022-12-01 ENCOUNTER — PATIENT MESSAGE (OUTPATIENT)
Dept: HEMATOLOGY/ONCOLOGY | Facility: CLINIC | Age: 87
End: 2022-12-01
Payer: MEDICARE

## 2022-12-01 NOTE — ANESTHESIA POSTPROCEDURE EVALUATION
Anesthesia Post Evaluation    Patient: Anthony Ibarra    Procedure(s) Performed: * No procedures listed *    Final Anesthesia Type: general      Patient location during evaluation: PACU  Patient participation: Yes- Able to Participate  Level of consciousness: awake and alert and oriented  Post-procedure vital signs: reviewed and stable  Pain management: adequate  Airway patency: patent  BRIAN mitigation strategies: Intraoperative administration of CPAP, nasopharyngeal airway, or oral appliance during sedation, Multimodal analgesia, Verification of full reversal of neuromuscular block, Extubation and recovery carried out in lateral, semiupright, or other nonsupine position and Postoperative administration of CPAP, nasopharyngeal airway, or oral appliance in the postanesthesia care unit (PACU)  PONV status at discharge: No PONV  Anesthetic complications: no      Cardiovascular status: hemodynamically stable  Respiratory status: nasal cannula  Hydration status: euvolemic  Follow-up not needed.          Vitals Value Taken Time   /79 11/30/22 1121   Temp 36.6 °C (97.9 °F) 11/30/22 1100   Pulse 66 11/30/22 1130   Resp 15 11/30/22 1130   SpO2 100 % 11/30/22 1130         Event Time   Out of Recovery 11:23:00         Pain/Nini Score: Nini Score: 9 (11/30/2022 11:23 AM)

## 2022-12-02 LAB
ESTROGEN SERPL-MCNC: NORMAL PG/ML
INSULIN SERPL-ACNC: NORMAL U[IU]/ML
LAB AP CLINICAL INFORMATION: NORMAL
LAB AP GROSS DESCRIPTION: NORMAL
LAB AP REPORT FOOTNOTES: NORMAL
T3RU NFR SERPL: NORMAL %

## 2022-12-06 ENCOUNTER — PATIENT MESSAGE (OUTPATIENT)
Dept: PRIMARY CARE CLINIC | Facility: CLINIC | Age: 87
End: 2022-12-06
Payer: MEDICARE

## 2022-12-09 ENCOUNTER — PATIENT MESSAGE (OUTPATIENT)
Dept: HEMATOLOGY/ONCOLOGY | Facility: CLINIC | Age: 87
End: 2022-12-09
Payer: MEDICARE

## 2022-12-23 ENCOUNTER — DOCUMENTATION ONLY (OUTPATIENT)
Dept: HEMATOLOGY/ONCOLOGY | Facility: CLINIC | Age: 87
End: 2022-12-23
Payer: MEDICARE

## 2022-12-23 ENCOUNTER — PATIENT MESSAGE (OUTPATIENT)
Dept: PRIMARY CARE CLINIC | Facility: CLINIC | Age: 87
End: 2022-12-23
Payer: MEDICARE

## 2022-12-27 ENCOUNTER — OFFICE VISIT (OUTPATIENT)
Dept: HEMATOLOGY/ONCOLOGY | Facility: CLINIC | Age: 87
End: 2022-12-27
Payer: MEDICARE

## 2022-12-27 VITALS
WEIGHT: 158.81 LBS | OXYGEN SATURATION: 97 % | SYSTOLIC BLOOD PRESSURE: 125 MMHG | HEART RATE: 72 BPM | DIASTOLIC BLOOD PRESSURE: 79 MMHG | TEMPERATURE: 98 F | HEIGHT: 71 IN | BODY MASS INDEX: 22.23 KG/M2 | RESPIRATION RATE: 18 BRPM

## 2022-12-27 DIAGNOSIS — C79.51 MALIGNANT NEOPLASM METASTATIC TO LUMBAR SPINE WITH UNKNOWN PRIMARY SITE: ICD-10-CM

## 2022-12-27 DIAGNOSIS — R53.1 GENERALIZED WEAKNESS: ICD-10-CM

## 2022-12-27 DIAGNOSIS — S22.000A COMPRESSION FRACTURE OF BODY OF THORACIC VERTEBRA: ICD-10-CM

## 2022-12-27 DIAGNOSIS — R41.3 MEMORY IMPAIRMENT: Primary | ICD-10-CM

## 2022-12-27 DIAGNOSIS — C80.1 MALIGNANT NEOPLASM METASTATIC TO LUMBAR SPINE WITH UNKNOWN PRIMARY SITE: ICD-10-CM

## 2022-12-27 LAB
ALBUMIN SERPL-MCNC: 3.1 G/DL (ref 3.4–4.8)
ALBUMIN/GLOB SERPL: 0.8 RATIO (ref 1.1–2)
ALP SERPL-CCNC: 192 UNIT/L (ref 40–150)
ALT SERPL-CCNC: 11 UNIT/L (ref 0–55)
AST SERPL-CCNC: 15 UNIT/L (ref 5–34)
BASOPHILS # BLD AUTO: 0.04 X10(3)/MCL (ref 0–0.2)
BASOPHILS NFR BLD AUTO: 0.7 %
BILIRUBIN DIRECT+TOT PNL SERPL-MCNC: 0.4 MG/DL
BUN SERPL-MCNC: 14.2 MG/DL (ref 8.4–25.7)
CALCIUM SERPL-MCNC: 9.3 MG/DL (ref 8.8–10)
CHLORIDE SERPL-SCNC: 105 MMOL/L (ref 98–107)
CO2 SERPL-SCNC: 29 MMOL/L (ref 23–31)
CREAT SERPL-MCNC: 0.89 MG/DL (ref 0.73–1.18)
EOSINOPHIL # BLD AUTO: 0.03 X10(3)/MCL (ref 0–0.9)
EOSINOPHIL NFR BLD AUTO: 0.5 %
ERYTHROCYTE [DISTWIDTH] IN BLOOD BY AUTOMATED COUNT: 13.5 % (ref 11.6–14.4)
GFR SERPLBLD CREATININE-BSD FMLA CKD-EPI: >60 MLS/MIN/1.73/M2
GLOBULIN SER-MCNC: 3.8 GM/DL (ref 2.4–3.5)
GLUCOSE SERPL-MCNC: 95 MG/DL (ref 82–115)
HCT VFR BLD AUTO: 41.7 % (ref 42–52)
HGB BLD-MCNC: 13.3 GM/DL (ref 14–18)
IGA SERPL-MCNC: 114 MG/DL (ref 101–645)
IGG SERPL-MCNC: 911 MG/DL (ref 540–1822)
IGM SERPL-MCNC: 2019 MG/DL (ref 22–240)
IMM GRANULOCYTES # BLD AUTO: 0.02 X10(3)/MCL (ref 0–0.04)
IMM GRANULOCYTES NFR BLD AUTO: 0.3 %
LDH SERPL-CCNC: 122 U/L (ref 125–220)
LYMPHOCYTES # BLD AUTO: 1.88 X10(3)/MCL (ref 0.6–4.6)
LYMPHOCYTES NFR BLD AUTO: 31.7 %
MCH RBC QN AUTO: 31 PG
MCHC RBC AUTO-ENTMCNC: 31.9 MG/DL (ref 33–36)
MCV RBC AUTO: 97.2 FL (ref 80–94)
MONOCYTES # BLD AUTO: 0.66 X10(3)/MCL (ref 0.1–1.3)
MONOCYTES NFR BLD AUTO: 11.1 %
NEUTROPHILS # BLD AUTO: 3.3 X10(3)/MCL (ref 2.1–9.2)
NEUTROPHILS NFR BLD AUTO: 55.7 %
PLATELET # BLD AUTO: 192 X10(3)/MCL (ref 140–371)
PMV BLD AUTO: 9.5 FL (ref 9.4–12.4)
POTASSIUM SERPL-SCNC: 4.2 MMOL/L (ref 3.5–5.1)
PROT SERPL-MCNC: 6.9 GM/DL (ref 5.8–7.6)
PSA SERPL-MCNC: <0.1 NG/ML
RBC # BLD AUTO: 4.29 X10(6)/MCL (ref 4.7–6.1)
SODIUM SERPL-SCNC: 143 MMOL/L (ref 136–145)
WBC # SPEC AUTO: 5.9 X10(3)/MCL (ref 4.5–11.5)

## 2022-12-27 PROCEDURE — 99999 PR PBB SHADOW E&M-EST. PATIENT-LVL III: CPT | Mod: PBBFAC,,, | Performed by: STUDENT IN AN ORGANIZED HEALTH CARE EDUCATION/TRAINING PROGRAM

## 2022-12-27 PROCEDURE — 36415 COLL VENOUS BLD VENIPUNCTURE: CPT | Performed by: STUDENT IN AN ORGANIZED HEALTH CARE EDUCATION/TRAINING PROGRAM

## 2022-12-27 PROCEDURE — 84153 ASSAY OF PSA TOTAL: CPT | Performed by: STUDENT IN AN ORGANIZED HEALTH CARE EDUCATION/TRAINING PROGRAM

## 2022-12-27 PROCEDURE — 80053 COMPREHEN METABOLIC PANEL: CPT | Performed by: STUDENT IN AN ORGANIZED HEALTH CARE EDUCATION/TRAINING PROGRAM

## 2022-12-27 PROCEDURE — 83615 LACTATE (LD) (LDH) ENZYME: CPT | Performed by: STUDENT IN AN ORGANIZED HEALTH CARE EDUCATION/TRAINING PROGRAM

## 2022-12-27 PROCEDURE — 83521 IG LIGHT CHAINS FREE EACH: CPT | Mod: 59 | Performed by: STUDENT IN AN ORGANIZED HEALTH CARE EDUCATION/TRAINING PROGRAM

## 2022-12-27 PROCEDURE — 82784 ASSAY IGA/IGD/IGG/IGM EACH: CPT | Performed by: STUDENT IN AN ORGANIZED HEALTH CARE EDUCATION/TRAINING PROGRAM

## 2022-12-27 PROCEDURE — 99999 PR PBB SHADOW E&M-EST. PATIENT-LVL III: ICD-10-PCS | Mod: PBBFAC,,, | Performed by: STUDENT IN AN ORGANIZED HEALTH CARE EDUCATION/TRAINING PROGRAM

## 2022-12-27 PROCEDURE — 99213 OFFICE O/P EST LOW 20 MIN: CPT | Mod: PBBFAC | Performed by: STUDENT IN AN ORGANIZED HEALTH CARE EDUCATION/TRAINING PROGRAM

## 2022-12-27 PROCEDURE — 86334 IMMUNOFIX E-PHORESIS SERUM: CPT | Performed by: STUDENT IN AN ORGANIZED HEALTH CARE EDUCATION/TRAINING PROGRAM

## 2022-12-27 PROCEDURE — 99205 OFFICE O/P NEW HI 60 MIN: CPT | Mod: S$PBB,GV,ICN, | Performed by: STUDENT IN AN ORGANIZED HEALTH CARE EDUCATION/TRAINING PROGRAM

## 2022-12-27 PROCEDURE — 85025 COMPLETE CBC W/AUTO DIFF WBC: CPT | Performed by: STUDENT IN AN ORGANIZED HEALTH CARE EDUCATION/TRAINING PROGRAM

## 2022-12-27 PROCEDURE — 99205 PR OFFICE/OUTPT VISIT, NEW, LEVL V, 60-74 MIN: ICD-10-PCS | Mod: S$PBB,GV,ICN, | Performed by: STUDENT IN AN ORGANIZED HEALTH CARE EDUCATION/TRAINING PROGRAM

## 2022-12-27 NOTE — PROGRESS NOTES
Subjective:       Patient ID: Anthony Ibarra is a 88 y.o. male.    Chief Complaint: No chief complaint on file.    Diagnosis: Osseous Lumbar Lesions    Current Treatment: None    Treatment History: N/A    HPI:   88-year-old male who presents for evaluation of lumbar osseous lesions found in November 2022.  He had a fall at home, with subsequent imaging revealing concern for metastatic osseous lesions in the lumbar spine.  He underwent CT CAP as well as CT head which did not reveal any abnormalities or evidence of primary malignancy.  He had a bone biopsy of this lesion on 11/30/2022 which was unrevealing for malignancy.  He also had a kyphoplasty of that area done at that time.  He presents today for initial evaluation.  He is accompanied by his daughter as he appears very forgetful of his previous life events.  Per daughter he is at his baseline mentally.  He lives alone and is able to perform majority of his ADLs, although he does have people check in on him.  Daughter feels he has lost some weight over the last year but otherwise appears to be functionally okay.  Denies any large decrease in energy.  Eating and drinking without issue.  Denied any fevers chills chest pain, shortness breast.  He has a history of prostate cancer status post seed radiation.  PSA from November is undetectable.  We discussed his imaging, laboratory work, and recent pathology.  We will evaluate for a few other things that can cause metastatic osseous lesions and otherwise keep a close eye on him.  They were comfortable with this plan.  All questions were answered at the time of the visit.    Past Medical History:   Diagnosis Date    Cancer     prostate    Compression fracture of body of thoracic vertebra       Past Surgical History:   Procedure Laterality Date    PROSTATE SURGERY N/A      Social History     Socioeconomic History    Marital status:    Tobacco Use    Smoking status: Never     Passive exposure: Never     Smokeless tobacco: Never   Substance and Sexual Activity    Alcohol use: Yes     Alcohol/week: 2.0 standard drinks     Types: 2 Cans of beer per week     Comment: socially    Drug use: Not Currently    Sexual activity: Not Currently      Family History   Problem Relation Age of Onset    Diabetes Brother       Review of patient's allergies indicates:  No Known Allergies   Review of Systems   Constitutional:  Negative for activity change, appetite change, fatigue and fever.   HENT:  Negative for sore throat.    Eyes:  Negative for visual disturbance.   Respiratory:  Negative for shortness of breath.    Cardiovascular:  Negative for chest pain.   Gastrointestinal:  Negative for abdominal pain, nausea and vomiting.   Genitourinary:  Negative for dysuria.   Musculoskeletal:  Positive for back pain.   Integumentary:  Negative for rash.   Neurological:  Negative for dizziness and weakness.   Hematological:  Negative for adenopathy. Does not bruise/bleed easily.   Psychiatric/Behavioral:  Positive for confusion.        Objective:      Vitals:    12/27/22 1411   BP: 125/79   Pulse: 72   Resp: 18   Temp: 97.6 °F (36.4 °C)       Physical Exam  Constitutional:       General: He is not in acute distress.     Appearance: Normal appearance. He is not ill-appearing or toxic-appearing.      Comments: Slightly disheveled. Using rolling walker.    HENT:      Head: Normocephalic and atraumatic.      Nose: Nose normal.      Mouth/Throat:      Mouth: Mucous membranes are moist.      Pharynx: Oropharynx is clear.   Eyes:      Extraocular Movements: Extraocular movements intact.      Conjunctiva/sclera: Conjunctivae normal.      Pupils: Pupils are equal, round, and reactive to light.   Cardiovascular:      Rate and Rhythm: Normal rate and regular rhythm.      Pulses: Normal pulses.      Heart sounds: Normal heart sounds.   Pulmonary:      Effort: Pulmonary effort is normal. No respiratory distress.      Breath sounds: Normal breath sounds.    Abdominal:      General: There is no distension.      Palpations: Abdomen is soft.   Musculoskeletal:         General: No swelling.      Cervical back: Normal range of motion and neck supple.      Right lower leg: No edema.      Left lower leg: No edema.   Lymphadenopathy:      Cervical: No cervical adenopathy.   Skin:     General: Skin is warm and dry.   Neurological:      Mental Status: Mental status is at baseline.       LABS AND IMAGING REVIEWED IN EPIC    11/21/22 MRI Thoracic  Impression:  Acute compression fracture T6 with no significant posterior extension  Superior endplate compression at the level of T9-T11 which has some acute components  Old compression fracture T7    11/21/22 MRI Lumbar  Impression:  1. Osseous metastatic disease of the L3 and L4 vertebral bodies without evidence for pathologic fracture.  2. Questionable compression deformity of the L2 vertebral body which may be a compression deformity.  No overt evidence for metastatic disease.  Query with point tenderness.  3. Multilevel spondylotic changes of the lumbar spine with level by level discussion as above.    11/22/22 CT AP  Impression:  No primary malignancy or soft tissue metastatic disease identified at the abdomen or pelvis.  Chronic findings as detailed above including prostate gland radiation seeds in place.  Lower thoracic and lumbar compression fracture deformities.  Correlate with the recent lumbar spine MRI exam.    11/22/22 CT Chest  Impression:  No primary malignancy or soft tissue metastatic disease identified at the chest.  Indeterminate mild to moderate sclerosis diffusely throughout the T6 vertebral body where there is a compression fracture.  This could be related to healing response or marrow replacement process.    11/30/22 Lumbar Biopsy Pathology  T6 VERTEBRAL BODY:  CONSISTENT WITH HEMORRHAGIC BIOPSY SITE, FOCALLY ORGANIZING.  NO EVIDENCE OF MALIGNANCY.  IMMUNOREACTION RESULT:  CYTOKERATIN AE1/AE3:   NEGATIVE.  CONTROL REACTIONS ARE APPROPRIATE.    Assessment:   Lumbar Osseous Lesions - Discovered inicidentally in November '22. No evidence of malignancy on bone biopsy or on CT CAP. Has a hx of prostate cancer s/p prostate seed placement, however PSA is undetectable. Will screen for Multiple myeloma. Labs look okay - alk phos okay. While he has lost some weight over the last year per daughter he is overall functionally doing okay. Energy appears stable.       Plan:       - Labs today including screening for multiple myleoma.   - RTC 3 months for MD visit, labs      Elizabeth Kennedy LeJeune, MD  Hematology/Oncology   [facility]

## 2022-12-28 LAB
ALBUMIN % SPEP (OHS): 44.82
ALBUMIN SERPL-MCNC: 3.1 G/DL (ref 3.4–4.8)
ALBUMIN/GLOB SERPL: 0.8 RATIO (ref 1.1–2)
ALPHA 1 GLOB (OHS): 0.23 GM/DL
ALPHA 1 GLOB% (OHS): 3.32
ALPHA 2 GLOB % (OHS): 9.36
ALPHA 2 GLOB (OHS): 0.64 GM/DL
BETA GLOB (OHS): 0.73 GM/DL
BETA GLOB% (OHS): 10.77
GAMMA GLOBULIN % (OHS): 31.73
GAMMA GLOBULIN (OHS): 2.16 GM/DL
GLOBULIN SER-MCNC: 3.7 GM/DL (ref 2.4–3.5)
KAPPA LC FREE SER-MCNC: 3.43 MG/DL (ref 0.33–1.94)
KAPPA LC FREE/LAMBDA FREE SER: 1.75 {RATIO} (ref 0.26–1.65)
LAMBDA LC FREE SERPL-MCNC: 1.96 MG/DL (ref 0.57–2.63)
M SPIKE % (OHS): 14.24
M SPIKE (OHS): 0.97 GM/DL
PATH REV: NORMAL
PROT SERPL-MCNC: 6.8 GM/DL (ref 5.8–7.6)

## 2023-01-03 ENCOUNTER — TELEPHONE (OUTPATIENT)
Dept: HEMATOLOGY/ONCOLOGY | Facility: CLINIC | Age: 88
End: 2023-01-03
Payer: MEDICARE

## 2023-01-03 DIAGNOSIS — D47.2 MGUS (MONOCLONAL GAMMOPATHY OF UNKNOWN SIGNIFICANCE): Primary | ICD-10-CM

## 2023-01-03 NOTE — TELEPHONE ENCOUNTER
PET CT scheduled for 3/22/23 @ 0945, pt to arrive @ 0915.     Attempted to contact pt daughter with appt info, no answer, left detailed message with appt info and advised her I will put appt info and instructions he should follow prior to PET CT in the mail and instructed her to call office with any questions.      Pt daughter called back and requested information be emailed to her.

## 2023-01-03 NOTE — TELEPHONE ENCOUNTER
Pt daughter called requesting lab results. Please review results and advise me on what to information to give to daughter, thanks.

## 2023-02-06 ENCOUNTER — HOSPITAL ENCOUNTER (INPATIENT)
Facility: HOSPITAL | Age: 88
LOS: 3 days | Discharge: NURSING, REHAB OR LONG-TERM CARE | DRG: 536 | End: 2023-02-09
Attending: EMERGENCY MEDICINE | Admitting: FAMILY MEDICINE
Payer: MEDICARE

## 2023-02-06 DIAGNOSIS — R53.1 GENERALIZED WEAKNESS: ICD-10-CM
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
## 2023-02-06 DIAGNOSIS — R07.9 CHEST PAIN: ICD-10-CM

## 2023-02-06 DIAGNOSIS — S32.302A CLOSED FRACTURE OF LEFT ILIAC WING, INITIAL ENCOUNTER: Primary | ICD-10-CM

## 2023-02-06 LAB
ALBUMIN SERPL-MCNC: 3.1 G/DL (ref 3.4–4.8)
ALBUMIN/GLOB SERPL: 0.7 RATIO (ref 1.1–2)
ALP SERPL-CCNC: 151 UNIT/L (ref 40–150)
ALT SERPL-CCNC: 26 UNIT/L (ref 0–55)
AST SERPL-CCNC: 98 UNIT/L (ref 5–34)
BASOPHILS # BLD AUTO: 0.01 X10(3)/MCL (ref 0–0.2)
BASOPHILS NFR BLD AUTO: 0.1 %
BILIRUBIN DIRECT+TOT PNL SERPL-MCNC: 0.9 MG/DL
BUN SERPL-MCNC: 28.5 MG/DL (ref 8.4–25.7)
CALCIUM SERPL-MCNC: 10 MG/DL (ref 8.8–10)
CHLORIDE SERPL-SCNC: 103 MMOL/L (ref 98–107)
CK SERPL-CCNC: 3112 U/L (ref 30–200)
CO2 SERPL-SCNC: 26 MMOL/L (ref 23–31)
CREAT SERPL-MCNC: 1.09 MG/DL (ref 0.73–1.18)
EOSINOPHIL # BLD AUTO: 0.01 X10(3)/MCL (ref 0–0.9)
EOSINOPHIL NFR BLD AUTO: 0.1 %
ERYTHROCYTE [DISTWIDTH] IN BLOOD BY AUTOMATED COUNT: 13 % (ref 11.5–17)
GFR SERPLBLD CREATININE-BSD FMLA CKD-EPI: >60 MLS/MIN/1.73/M2
GLOBULIN SER-MCNC: 4.4 GM/DL (ref 2.4–3.5)
GLUCOSE SERPL-MCNC: 103 MG/DL (ref 82–115)
HCT VFR BLD AUTO: 44.9 % (ref 42–52)
HGB BLD-MCNC: 14.4 GM/DL (ref 14–18)
IMM GRANULOCYTES # BLD AUTO: 0.11 X10(3)/MCL (ref 0–0.04)
IMM GRANULOCYTES NFR BLD AUTO: 0.6 %
LYMPHOCYTES # BLD AUTO: 1.36 X10(3)/MCL (ref 0.6–4.6)
LYMPHOCYTES NFR BLD AUTO: 7.1 %
MCH RBC QN AUTO: 30.3 PG
MCHC RBC AUTO-ENTMCNC: 32.1 MG/DL (ref 33–36)
MCV RBC AUTO: 94.3 FL (ref 80–94)
MONOCYTES # BLD AUTO: 1.63 X10(3)/MCL (ref 0.1–1.3)
MONOCYTES NFR BLD AUTO: 8.5 %
NEUTROPHILS # BLD AUTO: 16.16 X10(3)/MCL (ref 2.1–9.2)
NEUTROPHILS NFR BLD AUTO: 83.6 %
PLATELET # BLD AUTO: 158 X10(3)/MCL (ref 130–400)
PMV BLD AUTO: 10.8 FL (ref 7.4–10.4)
POTASSIUM SERPL-SCNC: 4.8 MMOL/L (ref 3.5–5.1)
PROT SERPL-MCNC: 7.5 GM/DL (ref 5.8–7.6)
RBC # BLD AUTO: 4.76 X10(6)/MCL (ref 4.7–6.1)
SODIUM SERPL-SCNC: 143 MMOL/L (ref 136–145)
WBC # SPEC AUTO: 19.3 X10(3)/MCL (ref 4.5–11.5)

## 2023-02-06 PROCEDURE — 85025 COMPLETE CBC W/AUTO DIFF WBC: CPT | Performed by: EMERGENCY MEDICINE

## 2023-02-06 PROCEDURE — 82550 ASSAY OF CK (CPK): CPT | Performed by: EMERGENCY MEDICINE

## 2023-02-06 PROCEDURE — 80053 COMPREHEN METABOLIC PANEL: CPT | Performed by: EMERGENCY MEDICINE

## 2023-02-06 PROCEDURE — 99285 EMERGENCY DEPT VISIT HI MDM: CPT | Mod: 25

## 2023-02-06 PROCEDURE — 11000001 HC ACUTE MED/SURG PRIVATE ROOM

## 2023-02-06 PROCEDURE — 25000003 PHARM REV CODE 250: Performed by: EMERGENCY MEDICINE

## 2023-02-06 RX ORDER — TRAMADOL HYDROCHLORIDE 50 MG/1
50 TABLET ORAL EVERY 6 HOURS PRN
Status: DISCONTINUED | OUTPATIENT
Start: 2023-02-06 | End: 2023-02-08

## 2023-02-06 RX ADMIN — TRAMADOL HYDROCHLORIDE 50 MG: 50 TABLET, COATED ORAL at 08:02

## 2023-02-06 NOTE — ED PROVIDER NOTES
Encounter Date: 2/6/2023       History     Chief Complaint   Patient presents with    Fall     Pt fell at home/. Hospice nurse found him on the floor and waited 1 hr for aasi to get to home to get through window/ pt denies pain/ nausea/ weakness/ he says he was picking something up off the floor when he fell down/ rt eye  swollen/ back pain/  marilynn elbow  abrasions/ redness noted to  rt forehead      Awake alert 88-year-old male presents to the ER by [facility] for possible fall.  Patient reports that he sat down on the ground and was unable to get up pain to left hip right elbow back.    The history is provided by the patient. No  was used.   Review of patient's allergies indicates:  No Known Allergies  Past Medical History:   Diagnosis Date    Cancer     prostate    Compression fracture of body of thoracic vertebra      Past Surgical History:   Procedure Laterality Date    PROSTATE SURGERY N/A      Family History   Problem Relation Age of Onset    Diabetes Brother      Social History     Tobacco Use    Smoking status: Never     Passive exposure: Never    Smokeless tobacco: Never   Substance Use Topics    Alcohol use: Yes     Alcohol/week: 2.0 standard drinks     Types: 2 Cans of beer per week     Comment: socially    Drug use: Not Currently     Review of Systems   Constitutional: Negative.    HENT: Negative.     Eyes:         Bruising to the right eye   Respiratory: Negative.     Cardiovascular: Negative.    Gastrointestinal: Negative.    Endocrine: Negative.    Genitourinary: Negative.    Musculoskeletal:  Positive for back pain.        Back pain, left hip pain, right elbow pain.     Skin:  Positive for wound.        Right elbow abrasion.  Bruising to right eye.    Allergic/Immunologic: Negative.    Neurological:         Dementia   Hematological: Negative.    Psychiatric/Behavioral: Negative.     All other systems reviewed and are negative.    Physical Exam     Initial Vitals [02/06/23  1050]   BP Pulse Resp Temp SpO2   (!) 126/90 98 20 98 °F (36.7 °C) 98 %      MAP       --         Physical Exam    Constitutional: He appears well-developed and well-nourished.   HENT:   Head: Normocephalic and atraumatic.   Right Ear: External ear normal.   Left Ear: External ear normal.   Nose: Nose normal.   Mouth/Throat: Oropharynx is clear and moist.   Eyes: Conjunctivae and EOM are normal. Pupils are equal, round, and reactive to light.   Neck: Neck supple.   Normal range of motion.  Cardiovascular:  Normal rate, regular rhythm, normal heart sounds and intact distal pulses.           Pulmonary/Chest: Breath sounds normal.   Abdominal: Abdomen is soft. Bowel sounds are normal.   Musculoskeletal:         General: Tenderness present.      Cervical back: Normal range of motion and neck supple.     Neurological: He is alert. He has normal strength and normal reflexes.   Skin: Skin is warm and dry. Capillary refill takes less than 2 seconds.   Psychiatric: He has a normal mood and affect.       ED Course   Procedures  Labs Reviewed   CBC W/ AUTO DIFFERENTIAL    Narrative:     The following orders were created for panel order CBC auto differential.  Procedure                               Abnormality         Status                     ---------                               -----------         ------                     CBC with Differential[[id number]]                            In process                   Please view results for these tests on the individual orders.   COMPREHENSIVE METABOLIC PANEL   CBC WITH DIFFERENTIAL          Imaging Results              CT Chest Abdomen Pelvis Without Contrast (XPD) (Final result)  Result time 02/06/23 14:32:07      Final result by Jenny Lama MD (02/06/23 14:32:07)                   Impression:      Acute fracture of the iliac wing on the left side    Multiple compression fractures seen in the spine which are mostly stable when compared to prior examination with  slight increase in the inferior compression of L5 when compared to the prior.    Other incidental findings as outlined above      Electronically signed by: Jenny Lama  Date:    02/06/2023  Time:    14:32               Narrative:    EXAMINATION:  CT CHEST ABDOMEN PELVIS WITHOUT CONTRAST(XPD)    CLINICAL HISTORY:  fall;    TECHNIQUE:  Low dose axial images, sagittal and coronal reformations were obtained from the thoracic inlet to the pubic symphysis without contrast administration..  Automatic exposure control is utilized to reduce patient radiation exposure.    COMPARISON:  11/22/2022    FINDINGS:  There are changes seen consistent with COPD and emphysema in the lungs bilaterally.  Some scarring is seen in the lung bases bilaterally.  No pneumothorax is seen.  No pulmonary contusion is seen..  No mass is seen.  No nodule is seen.  No pleural thickening is seen.  No pleural effusion is seen.  No infiltrate is seen.    The thoracic aorta is normal in caliber..    No mediastinal adenopathy is seen.  No obvious retrosternal hematoma is seen.    The heart appears normal in size..    The liver appears normal.  There is a simple cyst in segment 2 of the liver.  No obvious liver laceration is seen..  Portal and hepatic veins are not well visualized on this noncontrast CT...    The gallbladder appears normal.  No gallstones are seen.    The spleen appears normal.  No splenic mass or lesion is seen.  No obvious splenic laceration is seen.    The pancreas appears grossly unremarkable.  No pancreatic mass or lesion is seen.  No inflammation is seen.    No adrenal abnormality is seen.  No adrenal nodule is seen.    The kidneys are normal in size.  There is a simple appearing cyst in the midpole the left kidney.  No hydronephrosis is seen.  No hydroureter is seen.  No retroperitoneal abnormality is seen..    Visualized portions of the bowel shows no acute abnormality.  No colitis is seen.  No diverticulitis is seen.  No  colonic mass is seen.    No free air is seen.  No free fluid is seen.    Urinary bladder appears unremarkable.  There are multiple brachytherapy seeds in the prostate.    There is a comminuted fracture of the iliac wing on the left side.  Ischium is intact bilaterally.  Iliac bone on the right side is intact.  Sacrum is intact.  There are multiple vertebral body compression fracture seen.  Compression fracture seen at the level of L1 through L5 as well as T12 and T11.  Similar changes were seen previously however the L5 compression appears to be slightly more prominent than the prior examination from 11/22/2022.  The main fracture component is seen along the inferior endplate.  There is a compression fracture at the level of T9 and T7.  There is also vertebroplasty defects seen at T6.  These were seen on the prior examination as well.                                       X-Ray Lumbar Spine Ap And Lateral (Final result)  Result time 02/06/23 13:10:00      Final result by Larry Marlow MD (02/06/23 13:10:00)                   Impression:      Mild central L4 vertebral body compression deformity appears new since 11/08/2022.      Electronically signed by: Larry Marlow  Date:    02/06/2023  Time:    13:10               Narrative:    EXAMINATION:  XR LUMBAR SPINE AP AND LATERAL    CLINICAL HISTORY:  pain;    TECHNIQUE:  Frontal and lateral radiographs of the lumbar spine    COMPARISON:  Radiography 11/08/2022    FINDINGS:  For the purposes of this report, there are 5 lumbar vertebral bodies. Mild central compression deformity of the L4 vertebral body not appreciated on prior radiographs.  Compression deformities of other lumbar vertebral bodies appear similar since November.  No significant listhesis.  Lower lumbar facet arthropathy.  Aortic calcifications noted.                                       X-Ray Elbow Complete Right (Final result)  Result time 02/06/23 12:41:27      Final result by Gokul Turner MD  (02/06/23 12:41:27)                   Impression:      No acute osseous abnormality.      Electronically signed by: Gokul Turner  Date:    02/06/2023  Time:    12:41               Narrative:    EXAMINATION:  XR ELBOW COMPLETE 3 VIEW RIGHT    CLINICAL HISTORY:  Unspecified fall, initial encounter    COMPARISON:  None.    FINDINGS:  No acute displaced fractures or dislocations.    Joint spaces preserved.    No blastic or lytic lesions.    Soft tissues within normal limits.                                       X-Ray Hip 2 or 3 views Left (with Pelvis when performed) (Final result)  Result time 02/06/23 12:52:45      Final result by Gokul Turner MD (02/06/23 12:52:45)                   Impression:      Degenerative changes.      Electronically signed by: Gokul Turner  Date:    02/06/2023  Time:    12:52               Narrative:    EXAMINATION:  XR HIP WITH PELVIS WHEN PERFORMED, 2 OR 3 VIEWS LEFT    CLINICAL HISTORY:  Unspecified fall, initial encounter    COMPARISON:  None.    FINDINGS:  No acute displaced fractures or dislocations.    There is some narrowing of the inferior medial aspect of the left hip joint with some degenerative changes of the lung post spine articular spaces small articular surfaces    No blastic or lytic lesions.    Soft tissues within normal limits.                                       CT Head Without Contrast (Final result)  Result time 02/06/23 11:51:41      Final result by Jenny Lama MD (02/06/23 11:51:41)                   Impression:      Chronic age-related changes.  No acute process      Electronically signed by: Jenny Lama  Date:    02/06/2023  Time:    11:51               Narrative:    EXAMINATION:  CT HEAD WITHOUT CONTRAST    CLINICAL HISTORY:  Head trauma, minor, normal mental status (Age 19-64y);    TECHNIQUE:  Multiple axial images were obtained from the base of the brain to the vertex without contrast administration.  Sagittal and coronal  reconstructions were performed..Automatic exposure control is utilized to reduce patient radiation exposure.    COMPARISON:  11/22/2022    FINDINGS:  There is no intracranial mass or lesion seen.  No hemorrhage is seen.  No acute infarct is seen.  There is some cerebral atrophy seen.  There is some compensatory ventricular dilatation and periventricular white matter changes consistent with the patient's age.  Calvarium is intact.  The posterior fossa is unremarkable..  The visualized portions of the paranasal sinuses show no acute abnormality.                                       Medications - No data to display  Medical Decision Making:   Initial Assessment:   88-year-old male presents to the ER by [facility] for possible fall. Normal mental status per family member. Skin cool.   Patient reports that he placed himself on the ground but was unable to get up.  Patient elicits pain with rotation of left leg to the hip.  Right elbow, without obvious deformity or soft tissue swelling reports pain. Normal flexion and equal  strength.  abrasion noted.  Right orbital bruising no tenderness or crepitus appreciated.  No pain or tenderness with palpation of head.  PERRLA.  No nystagmus or ptosis.  Bilateral ear canals clear with patent TMs.  Face symmetrical, sensation noted to touch.  MMM, posterior oropharynx clear. Neck supple, FROM.  Chest CTA, abd non tender, non distended.   Differential Diagnosis:   Fall  Left hip fx.    Clinical Tests:   Radiological Study: Ordered  Attempted to ambulate patient, pt unable to put any pressure on the left hip.  Dr. Sanchez at bedside for evaluation and agree on a CT scan.  Pt will be turned over to Dr. Sanchez.       APC / Resident Notes:   Care assumed from from Kinga Clemons RN at 13:45. This 87 y/o fell this AM and injured his left side. He was unable to get up and was found by home health aide. On arrival he is unable to walk. Xrays were negative but CT shows a left iliac  wing fracture. I discussed the patient with Ian Harrison MD (Ortho) who reviewed the CT and confirmed it was non operative. I also discussed the patient with DR Dickson [facility] medicine and a decision was made to admit the patient for rehabilitation. The patient has been on hospice however the Oncology notes at present suggest [facility]. He does have some early dementia. He lives alone. His daughter is in [place]. I have examined the patient and agree with the history and physical.                    Clinical Impression:   Final diagnoses:  [W19.XXXA] Fall  [S32.302A] Closed fracture of left iliac wing, initial encounter (Primary)        ED Disposition Condition    Admit Stable                Clarke Sanchez MD  02/06/23 1907

## 2023-02-07 LAB
ANION GAP SERPL CALC-SCNC: 9 MEQ/L
BASOPHILS # BLD AUTO: 0.01 X10(3)/MCL (ref 0–0.2)
BASOPHILS NFR BLD AUTO: 0.1 %
BUN SERPL-MCNC: 43.1 MG/DL (ref 8.4–25.7)
CALCIUM SERPL-MCNC: 9.2 MG/DL (ref 8.8–10)
CHLORIDE SERPL-SCNC: 105 MMOL/L (ref 98–107)
CK SERPL-CCNC: 1670 U/L (ref 30–200)
CO2 SERPL-SCNC: 27 MMOL/L (ref 23–31)
CREAT SERPL-MCNC: 1.67 MG/DL (ref 0.73–1.18)
CREAT/UREA NIT SERPL: 26
EOSINOPHIL # BLD AUTO: 0 X10(3)/MCL (ref 0–0.9)
EOSINOPHIL NFR BLD AUTO: 0 %
ERYTHROCYTE [DISTWIDTH] IN BLOOD BY AUTOMATED COUNT: 13.2 % (ref 11.5–17)
GFR SERPLBLD CREATININE-BSD FMLA CKD-EPI: 39 MLS/MIN/1.73/M2
GLUCOSE SERPL-MCNC: 119 MG/DL (ref 82–115)
HCT VFR BLD AUTO: 41.6 % (ref 42–52)
HGB BLD-MCNC: 13.5 GM/DL (ref 14–18)
IMM GRANULOCYTES # BLD AUTO: 0.06 X10(3)/MCL (ref 0–0.04)
IMM GRANULOCYTES NFR BLD AUTO: 0.4 %
LYMPHOCYTES # BLD AUTO: 1.17 X10(3)/MCL (ref 0.6–4.6)
LYMPHOCYTES NFR BLD AUTO: 7.4 %
MCH RBC QN AUTO: 30.2 PG
MCHC RBC AUTO-ENTMCNC: 32.5 MG/DL (ref 33–36)
MCV RBC AUTO: 93.1 FL (ref 80–94)
MONOCYTES # BLD AUTO: 1.51 X10(3)/MCL (ref 0.1–1.3)
MONOCYTES NFR BLD AUTO: 9.6 %
NEUTROPHILS # BLD AUTO: 13.03 X10(3)/MCL (ref 2.1–9.2)
NEUTROPHILS NFR BLD AUTO: 82.5 %
PLATELET # BLD AUTO: 149 X10(3)/MCL (ref 130–400)
PMV BLD AUTO: 10.8 FL (ref 7.4–10.4)
POTASSIUM SERPL-SCNC: 4.9 MMOL/L (ref 3.5–5.1)
RBC # BLD AUTO: 4.47 X10(6)/MCL (ref 4.7–6.1)
SODIUM SERPL-SCNC: 141 MMOL/L (ref 136–145)
WBC # SPEC AUTO: 15.8 X10(3)/MCL (ref 4.5–11.5)

## 2023-02-07 PROCEDURE — 25000003 PHARM REV CODE 250: Performed by: FAMILY MEDICINE

## 2023-02-07 PROCEDURE — 80048 BASIC METABOLIC PNL TOTAL CA: CPT | Performed by: FAMILY MEDICINE

## 2023-02-07 PROCEDURE — 85025 COMPLETE CBC W/AUTO DIFF WBC: CPT | Performed by: FAMILY MEDICINE

## 2023-02-07 PROCEDURE — 11000001 HC ACUTE MED/SURG PRIVATE ROOM

## 2023-02-07 PROCEDURE — 97166 OT EVAL MOD COMPLEX 45 MIN: CPT

## 2023-02-07 PROCEDURE — 25000003 PHARM REV CODE 250: Performed by: EMERGENCY MEDICINE

## 2023-02-07 PROCEDURE — 82550 ASSAY OF CK (CPK): CPT | Performed by: FAMILY MEDICINE

## 2023-02-07 PROCEDURE — 97110 THERAPEUTIC EXERCISES: CPT

## 2023-02-07 PROCEDURE — 97162 PT EVAL MOD COMPLEX 30 MIN: CPT

## 2023-02-07 RX ORDER — SODIUM CHLORIDE 450 MG/100ML
INJECTION, SOLUTION INTRAVENOUS CONTINUOUS
Status: DISCONTINUED | OUTPATIENT
Start: 2023-02-07 | End: 2023-02-08

## 2023-02-07 RX ADMIN — TRAMADOL HYDROCHLORIDE 50 MG: 50 TABLET, COATED ORAL at 09:02

## 2023-02-07 RX ADMIN — TRAMADOL HYDROCHLORIDE 50 MG: 50 TABLET, COATED ORAL at 03:02

## 2023-02-07 RX ADMIN — TRAMADOL HYDROCHLORIDE 50 MG: 50 TABLET, COATED ORAL at 10:02

## 2023-02-07 RX ADMIN — SODIUM CHLORIDE: 4.5 INJECTION, SOLUTION INTRAVENOUS at 03:02

## 2023-02-07 NOTE — PLAN OF CARE
Problem: Adult Inpatient Plan of Care  Goal: Plan of Care Review  Outcome: Ongoing, Progressing  Flowsheets (Taken 2/7/2023 1600)  Plan of Care Reviewed With: patient  Goal: Patient-Specific Goal (Individualized)  Outcome: Ongoing, Progressing  Goal: Absence of Hospital-Acquired Illness or Injury  Outcome: Ongoing, Progressing  Intervention: Identify and Manage Fall Risk  Flowsheets (Taken 2/7/2023 1600)  Safety Promotion/Fall Prevention:   assistive device/personal item within reach   bed alarm set   nonskid shoes/socks when out of bed   side rails raised x 3   instructed to call staff for mobility  Intervention: Prevent Skin Injury  Flowsheets (Taken 2/7/2023 1600)  Body Position:   position changed independently   sitting up in bed  Skin Protection:   adhesive use limited   incontinence pads utilized  Intervention: Prevent and Manage VTE (Venous Thromboembolism) Risk  Flowsheets (Taken 2/7/2023 1600)  Activity Management: Up in chair - L3  VTE Prevention/Management:   ambulation promoted   bleeding precations maintained   fluids promoted  Range of Motion: active ROM (range of motion) encouraged  Intervention: Prevent Infection  Flowsheets (Taken 2/7/2023 1600)  Infection Prevention:   equipment surfaces disinfected   hand hygiene promoted   single patient room provided   rest/sleep promoted   personal protective equipment utilized  Goal: Optimal Comfort and Wellbeing  Outcome: Ongoing, Progressing  Intervention: Monitor Pain and Promote Comfort  Flowsheets (Taken 2/7/2023 1600)  Pain Management Interventions:   medication offered   quiet environment facilitated  Intervention: Provide Person-Centered Care  Flowsheets (Taken 2/7/2023 1600)  Trust Relationship/Rapport:   care explained   questions answered   reassurance provided  Goal: Readiness for Transition of Care  Outcome: Ongoing, Progressing     Problem: Fall Injury Risk  Goal: Absence of Fall and Fall-Related Injury  Outcome: Ongoing,  Progressing  Intervention: Identify and Manage Contributors  Flowsheets (Taken 2/7/2023 1600)  Self-Care Promotion: independence encouraged  Medication Review/Management: medications reviewed  Intervention: Promote Injury-Free Environment  Flowsheets (Taken 2/7/2023 1600)  Safety Promotion/Fall Prevention:   assistive device/personal item within reach   bed alarm set   nonskid shoes/socks when out of bed   side rails raised x 3   instructed to call staff for mobility     Problem: Skin Injury Risk Increased  Goal: Skin Health and Integrity  Outcome: Ongoing, Progressing  Intervention: Optimize Skin Protection  Flowsheets (Taken 2/7/2023 1600)  Pressure Reduction Techniques:   frequent weight shift encouraged   weight shift assistance provided  Pressure Reduction Devices: positioning supports utilized  Skin Protection:   adhesive use limited   incontinence pads utilized  Head of Bed (HOB) Positioning: HOB at 20-30 degrees  Intervention: Promote and Optimize Oral Intake  Flowsheets (Taken 2/7/2023 1600)  Oral Nutrition Promotion:   calorie-dense foods provided   calorie-dense liquids provided     Problem: Pain Acute  Goal: Acceptable Pain Control and Functional Ability  Outcome: Ongoing, Progressing  Intervention: Develop Pain Management Plan  Flowsheets (Taken 2/7/2023 1600)  Pain Management Interventions:   medication offered   quiet environment facilitated  Intervention: Prevent or Manage Pain  Flowsheets (Taken 2/7/2023 1600)  Sleep/Rest Enhancement:   awakenings minimized   consistent schedule promoted   regular sleep/rest pattern promoted  Sensory Stimulation Regulation: television on  Bowel Elimination Promotion:   adequate fluid intake promoted   ambulation promoted  Medication Review/Management: medications reviewed  Intervention: Optimize Psychosocial Wellbeing  Flowsheets (Taken 2/7/2023 1600)  Supportive Measures: self-care encouraged  Diversional Activities: television     Problem: Impaired Wound  Healing  Goal: Optimal Wound Healing  Outcome: Ongoing, Progressing  Intervention: Promote Wound Healing  Flowsheets (Taken 2/7/2023 1600)  Oral Nutrition Promotion:   calorie-dense foods provided   calorie-dense liquids provided  Sleep/Rest Enhancement:   awakenings minimized   consistent schedule promoted   regular sleep/rest pattern promoted  Activity Management: Up in chair - L3  Pain Management Interventions:   medication offered   quiet environment facilitated

## 2023-02-07 NOTE — PLAN OF CARE
[facility]  Initial Discharge Assessment       Primary Care Provider: Jossue Martinez MD    Admission Diagnosis: Fall [W19.XXXA]  Closed fracture of left iliac wing, initial encounter [S32.302A]    Admission Date: 2/6/2023  Expected Discharge Date:     Discharge Barriers Identified: None    Payor: MEDICARE / Plan: MEDICARE PART A & B / Product Type: Government /     Extended Emergency Contact Information  Primary Emergency Contact: Sarah Pringle  Mobile Phone: [phone]  Relation: Daughter   needed? No  Secondary Emergency Contact: CHRIS PRINGLE  Mobile Phone: [phone]  Relation: Relative  Preferred language: English   needed? No    Discharge Plan A: Home with family, Home Health         [facility] [address] [facility] [address] [facility]  [address] [place] [id number]  Phone: [phone] Fax: [phone]      Initial Assessment (most recent)       Adult Discharge Assessment - 02/07/23 0821          Discharge Assessment    Assessment Type Discharge Planning Assessment     Confirmed/corrected address, phone number and insurance Yes     Confirmed Demographics Correct on Facesheet     Source of Information family     If unable to respond/provide information was family/caregiver contacted? Yes     Contact Name/Number Sarah handy      Communicated MAUREEN with patient/caregiver Date not available/Unable to determine     Reason For Admission Fall     People in Home alone     Facility Arrived From: home     Do you expect to return to your current living situation? Yes     Do you have help at home or someone to help you manage your care at home? Yes     Who are your caregiver(s) and their phone number(s)? Sarah handy      Prior to hospitilization cognitive status: Alert/Oriented     Current cognitive status: Alert/Oriented     Walking or Climbing Stairs ambulation difficulty, requires equipment      Mobility Management walker     Dressing/Bathing bathing difficulty, requires equipment     Home Accessibility wheelchair accessible     Home Layout Able to live on 1st floor     Equipment Currently Used at Home bedside commode     Readmission within 30 days? No     Patient currently being followed by outpatient case management? No     Do you currently have service(s) that help you manage your care at home? No     Do you take prescription medications? Yes     Do you have prescription coverage? Yes     Do you have any problems affording any of your prescribed medications? TBD     Is the patient taking medications as prescribed? yes     Who is going to help you get home at discharge? Sarah Ibarra daughter      How do you get to doctors appointments? family or friend will provide     Are you on dialysis? No     Do you take coumadin? No     Discharge Plan A Home with family;Home Health     DME Needed Upon Discharge  wheelchair;walker, standard     Discharge Plan discussed with: Adult children     Discharge Barriers Identified None        Physical Activity    On average, how many days per week do you engage in moderate to strenuous exercise (like a brisk walk)? 0 days     On average, how many minutes do you engage in exercise at this level? 0 min        Financial Resource Strain    How hard is it for you to pay for the very basics like food, housing, medical care, and heating? Not hard at all        Housing Stability    In the last 12 months, was there a time when you were not able to pay the mortgage or rent on time? No     In the last 12 months, how many places have you lived? 1     In the last 12 months, was there a time when you did not have a steady place to sleep or slept in a shelter (including now)? No        Transportation Needs    In the past 12 months, has lack of transportation kept you from medical appointments or from getting medications? No     In the past 12 months, has lack of transportation  kept you from meetings, work, or from getting things needed for daily living? No        Food Insecurity    Within the past 12 months, you worried that your food would run out before you got the money to buy more. Never true     Within the past 12 months, the food you bought just didn't last and you didn't have money to get more. Never true        Stress    Do you feel stress - tense, restless, nervous, or anxious, or unable to sleep at night because your mind is troubled all the time - these days? Only a little        Social Connections    In a typical week, how many times do you talk on the phone with family, friends, or neighbors? More than three times a week     How often do you get together with friends or relatives? More than three times a week     How often do you attend Druze or Buddhism services? More than 4 times per year     Do you belong to any clubs or organizations such as Druze groups, unions, fraternal or athletic groups, or school groups? No     How often do you attend meetings of the clubs or organizations you belong to? 1 to 4 times per year     Are you , , , , never , or living with a partner?         Alcohol Use    Q1: How often do you have a drink containing alcohol? 2-4 times a month     Q2: How many drinks containing alcohol do you have on a typical day when you are drinking? 3 or 4     Q3: How often do you have six or more drinks on one occasion? Monthly

## 2023-02-07 NOTE — H&P
Hospital Medicine  History & Physical Examination       Patient: Anthony Ibarra  MRN: [id number]  STATUS: IP- Inpatient   DOS: 2/7/2023   PCP: Jossue Martinez MD      CC: fall       HISTORY OF PRESENT ILLNESS   88 y.o. male presented to ED with c/o of fall and being found down at home by friend. C/O pelviclow back pain found to have acute left iliac wing fx non displaced and nonoperative by othropedic surgery dr antonio. T11-12 and lumbar 1-5 compression fractures. CPK > 3,000.       REVIEW OF SYSTEMS   Except as documented, all other systems reviewed and negative       PAST MEDICAL HISTORY     Past Medical History:   Diagnosis Date    Cancer     prostate    Compression fracture of body of thoracic vertebra           PAST SURGICAL HISTORY     Past Surgical History:   Procedure Laterality Date    PROSTATE SURGERY N/A           FAMILY HISTORY   Reviewed, negative in relation to patient's current condition.      SOCIAL HISTORY     Social History     Tobacco Use    Smoking status: Never     Passive exposure: Never    Smokeless tobacco: Never   Substance Use Topics    Alcohol use: Yes     Alcohol/week: 2.0 standard drinks     Types: 2 Cans of beer per week     Comment: socially          ALLERGIES   Patient has no known allergies.      HOME MEDICATIONS   No current outpatient medications       PHYSICAL EXAM   VITALS: T 97.8 °F (36.6 °C)   /76   P 98   RR 16   O2 (!) 93 %    GENERAL: Awake and in NAD  HEENT: NC/AT, EOMI, PERRL.  NECK: Supple,  No JVD  LUNGS: CTA B/L  CVS: RRR, S1S2 positive  GI/: Soft, NT/ND, bowel sounds positive.  EXTREMITIES: Peripheral pulses 2+, no peripheral edema low back and pelvic pain with palpation any movement makes worse   DERM: Warm, dry.  No rashes or lesions noted.  NEURO: AAOx3, no focal neurologic deficit  PSYCH: Cooperative, appropriate mood and affect       DIAGNOSTICS     Recent Labs     02/06/23  1549   WBC 19.3*   RBC 4.76   HGB 14.4   HCT 44.9   MCV 94.3*   MCH 30.3    MCHC 32.1*   RDW 13.0        No results for input(s): LACTIC in the last 72 hours.  No results for input(s): INR, APTT, D-DIMER in the last 72 hours.  No results for input(s): HGBA1C, CHOL, TRIG, LDL, VLDL, HDL in the last 72 hours.   Recent Labs     02/06/23  1549      K 4.8   CHLORIDE 103   CO2 26   BUN 28.5*   CREATININE 1.09   GLUCOSE 103   CALCIUM 10.0   ALBUMIN 3.1*   GLOBULIN 4.4*   ALKPHOS 151*   ALT 26   AST 98*   BILITOT 0.9     Recent Labs     02/06/23  1708   CPK 3,112*          CT Chest Abdomen Pelvis Without Contrast (XPD)  Narrative: EXAMINATION:  CT CHEST ABDOMEN PELVIS WITHOUT CONTRAST(XPD)    CLINICAL HISTORY:  fall;    TECHNIQUE:  Low dose axial images, sagittal and coronal reformations were obtained from the thoracic inlet to the pubic symphysis without contrast administration..  Automatic exposure control is utilized to reduce patient radiation exposure.    COMPARISON:  11/22/2022    FINDINGS:  There are changes seen consistent with COPD and emphysema in the lungs bilaterally.  Some scarring is seen in the lung bases bilaterally.  No pneumothorax is seen.  No pulmonary contusion is seen..  No mass is seen.  No nodule is seen.  No pleural thickening is seen.  No pleural effusion is seen.  No infiltrate is seen.    The thoracic aorta is normal in caliber..    No mediastinal adenopathy is seen.  No obvious retrosternal hematoma is seen.    The heart appears normal in size..    The liver appears normal.  There is a simple cyst in segment 2 of the liver.  No obvious liver laceration is seen..  Portal and hepatic veins are not well visualized on this noncontrast CT...    The gallbladder appears normal.  No gallstones are seen.    The spleen appears normal.  No splenic mass or lesion is seen.  No obvious splenic laceration is seen.    The pancreas appears grossly unremarkable.  No pancreatic mass or lesion is seen.  No inflammation is seen.    No adrenal abnormality is seen.  No adrenal  nodule is seen.    The kidneys are normal in size.  There is a simple appearing cyst in the midpole the left kidney.  No hydronephrosis is seen.  No hydroureter is seen.  No retroperitoneal abnormality is seen..    Visualized portions of the bowel shows no acute abnormality.  No colitis is seen.  No diverticulitis is seen.  No colonic mass is seen.    No free air is seen.  No free fluid is seen.    Urinary bladder appears unremarkable.  There are multiple brachytherapy seeds in the prostate.    There is a comminuted fracture of the iliac wing on the left side.  Ischium is intact bilaterally.  Iliac bone on the right side is intact.  Sacrum is intact.  There are multiple vertebral body compression fracture seen.  Compression fracture seen at the level of L1 through L5 as well as T12 and T11.  Similar changes were seen previously however the L5 compression appears to be slightly more prominent than the prior examination from 11/22/2022.  The main fracture component is seen along the inferior endplate.  There is a compression fracture at the level of T9 and T7.  There is also vertebroplasty defects seen at T6.  These were seen on the prior examination as well.  Impression: Acute fracture of the iliac wing on the left side    Multiple compression fractures seen in the spine which are mostly stable when compared to prior examination with slight increase in the inferior compression of L5 when compared to the prior.    Other incidental findings as outlined above    Electronically signed by: Jenny Lama  Date:    02/06/2023  Time:    14:32  X-Ray Lumbar Spine Ap And Lateral  Narrative: EXAMINATION:  XR LUMBAR SPINE AP AND LATERAL    CLINICAL HISTORY:  pain;    TECHNIQUE:  Frontal and lateral radiographs of the lumbar spine    COMPARISON:  Radiography 11/08/2022    FINDINGS:  For the purposes of this report, there are 5 lumbar vertebral bodies. Mild central compression deformity of the L4 vertebral body not  appreciated on prior radiographs.  Compression deformities of other lumbar vertebral bodies appear similar since November.  No significant listhesis.  Lower lumbar facet arthropathy.  Aortic calcifications noted.  Impression: Mild central L4 vertebral body compression deformity appears new since 11/08/2022.    Electronically signed by: Larry Marlow  Date:    02/06/2023  Time:    13:10  X-Ray Hip 2 or 3 views Left (with Pelvis when performed)  Narrative: EXAMINATION:  XR HIP WITH PELVIS WHEN PERFORMED, 2 OR 3 VIEWS LEFT    CLINICAL HISTORY:  Unspecified fall, initial encounter    COMPARISON:  None.    FINDINGS:  No acute displaced fractures or dislocations.    There is some narrowing of the inferior medial aspect of the left hip joint with some degenerative changes of the lung post spine articular spaces small articular surfaces    No blastic or lytic lesions.    Soft tissues within normal limits.  Impression: Degenerative changes.    Electronically signed by: Gokul Turner  Date:    02/06/2023  Time:    12:52  X-Ray Elbow Complete Right  Narrative: EXAMINATION:  XR ELBOW COMPLETE 3 VIEW RIGHT    CLINICAL HISTORY:  Unspecified fall, initial encounter    COMPARISON:  None.    FINDINGS:  No acute displaced fractures or dislocations.    Joint spaces preserved.    No blastic or lytic lesions.    Soft tissues within normal limits.  Impression: No acute osseous abnormality.    Electronically signed by: Gokul Turner  Date:    02/06/2023  Time:    12:41  CT Head Without Contrast  Narrative: EXAMINATION:  CT HEAD WITHOUT CONTRAST    CLINICAL HISTORY:  Head trauma, minor, normal mental status (Age 19-64y);    TECHNIQUE:  Multiple axial images were obtained from the base of the brain to the vertex without contrast administration.  Sagittal and coronal reconstructions were performed..Automatic exposure control is utilized to reduce patient radiation exposure.    COMPARISON:  11/22/2022    FINDINGS:  There is no  intracranial mass or lesion seen.  No hemorrhage is seen.  No acute infarct is seen.  There is some cerebral atrophy seen.  There is some compensatory ventricular dilatation and periventricular white matter changes consistent with the patient's age.  Calvarium is intact.  The posterior fossa is unremarkable..  The visualized portions of the paranasal sinuses show no acute abnormality.  Impression: Chronic age-related changes.  No acute process    Electronically signed by: Jenny Lama  Date:    02/06/2023  Time:    11:51        ASSESSMENT   Acute iliac wing fracture left- non surgical  L1-L5 compression fractures  T11-T12 compression fractures  Rhabdomyolisis  Leukocytosis    PLAN   Admit to observation  PT/OT eval and tx  Pain control  Case mgmt for d/c planning   Get morning labs    Prophylaxis: lovenox   Code Status: full code      Noe Jung MD  Hospital Medicine        If the patient has been admitted under observation status, it is at my discretion and under our care with hospital medicine services. [TWA]

## 2023-02-07 NOTE — PROGRESS NOTES
Hospital Medicine  Progress Note    Patient Name: Anthony Ibarra  MRN: [id number]  Status: IP- Inpatient   Admission Date: 2/6/2023  Length of Stay: 1      CC: hospital follow-up for        SUBJECTIVE   88 y.o. male presented to ED with c/o of fall and being found down at home by friend. C/O pelviclow back pain found to have acute left iliac wing fx non displaced and nonoperative by othropedic surgery dr antonio. T11-12 and lumbar 1-5 compression fractures. CPK > 3,000.      02/07/2023 tolerated some PT this AM, RW out of bed to chair balance some assistance   CPK dwon to 1600    Today: Patient seen and examined at bedside, and chart reviewed.       MEDICATIONS   Scheduled    Continuous Infusions   sodium chloride 0.45%           PHYSICAL EXAM   VITALS: T 97.6 °F (36.4 °C)   /74   P 98   RR 18   O2 95 %    GENERAL: Awake and in NAD  LUNGS: CTA B/L  CVS: Normal rate  GI/: Soft, NT, bowel sounds positive.  EXTREMITIES: No peripheral edema  NEURO: AAOx3  PSYCH: Cooperative      LABS   CBC  Recent Labs     02/06/23  1549 02/07/23  1057   WBC 19.3* 15.8*   RBC 4.76 4.47*   HGB 14.4 13.5*   HCT 44.9 41.6*   MCV 94.3* 93.1   MCH 30.3 30.2   MCHC 32.1* 32.5*   RDW 13.0 13.2    149     CHEM  Recent Labs     02/06/23  1549 02/07/23  1057    141   K 4.8 4.9   CHLORIDE 103 105   CO2 26 27   BUN 28.5* 43.1*   CREATININE 1.09 1.67*   GLUCOSE 103 119*   CALCIUM 10.0 9.2   ALBUMIN 3.1*  --    GLOBULIN 4.4*  --    ALKPHOS 151*  --    ALT 26  --    AST 98*  --    BILITOT 0.9  --          MICROBIOLOGY     Microbiology Results (last 7 days)       ** No results found for the last 168 hours. **              DIAGNOSTICS   CT Chest Abdomen Pelvis Without Contrast (XPD)  Narrative: EXAMINATION:  CT CHEST ABDOMEN PELVIS WITHOUT CONTRAST(XPD)    CLINICAL HISTORY:  fall;    TECHNIQUE:  Low dose axial images, sagittal and coronal reformations were obtained from the thoracic inlet to the pubic symphysis without contrast  administration..  Automatic exposure control is utilized to reduce patient radiation exposure.    COMPARISON:  11/22/2022    FINDINGS:  There are changes seen consistent with COPD and emphysema in the lungs bilaterally.  Some scarring is seen in the lung bases bilaterally.  No pneumothorax is seen.  No pulmonary contusion is seen..  No mass is seen.  No nodule is seen.  No pleural thickening is seen.  No pleural effusion is seen.  No infiltrate is seen.    The thoracic aorta is normal in caliber..    No mediastinal adenopathy is seen.  No obvious retrosternal hematoma is seen.    The heart appears normal in size..    The liver appears normal.  There is a simple cyst in segment 2 of the liver.  No obvious liver laceration is seen..  Portal and hepatic veins are not well visualized on this noncontrast CT...    The gallbladder appears normal.  No gallstones are seen.    The spleen appears normal.  No splenic mass or lesion is seen.  No obvious splenic laceration is seen.    The pancreas appears grossly unremarkable.  No pancreatic mass or lesion is seen.  No inflammation is seen.    No adrenal abnormality is seen.  No adrenal nodule is seen.    The kidneys are normal in size.  There is a simple appearing cyst in the midpole the left kidney.  No hydronephrosis is seen.  No hydroureter is seen.  No retroperitoneal abnormality is seen..    Visualized portions of the bowel shows no acute abnormality.  No colitis is seen.  No diverticulitis is seen.  No colonic mass is seen.    No free air is seen.  No free fluid is seen.    Urinary bladder appears unremarkable.  There are multiple brachytherapy seeds in the prostate.    There is a comminuted fracture of the iliac wing on the left side.  Ischium is intact bilaterally.  Iliac bone on the right side is intact.  Sacrum is intact.  There are multiple vertebral body compression fracture seen.  Compression fracture seen at the level of L1 through L5 as well as T12 and T11.   Similar changes were seen previously however the L5 compression appears to be slightly more prominent than the prior examination from 11/22/2022.  The main fracture component is seen along the inferior endplate.  There is a compression fracture at the level of T9 and T7.  There is also vertebroplasty defects seen at T6.  These were seen on the prior examination as well.  Impression: Acute fracture of the iliac wing on the left side    Multiple compression fractures seen in the spine which are mostly stable when compared to prior examination with slight increase in the inferior compression of L5 when compared to the prior.    Other incidental findings as outlined above    Electronically signed by: Jenny Lama  Date:    02/06/2023  Time:    14:32  X-Ray Lumbar Spine Ap And Lateral  Narrative: EXAMINATION:  XR LUMBAR SPINE AP AND LATERAL    CLINICAL HISTORY:  pain;    TECHNIQUE:  Frontal and lateral radiographs of the lumbar spine    COMPARISON:  Radiography 11/08/2022    FINDINGS:  For the purposes of this report, there are 5 lumbar vertebral bodies. Mild central compression deformity of the L4 vertebral body not appreciated on prior radiographs.  Compression deformities of other lumbar vertebral bodies appear similar since November.  No significant listhesis.  Lower lumbar facet arthropathy.  Aortic calcifications noted.  Impression: Mild central L4 vertebral body compression deformity appears new since 11/08/2022.    Electronically signed by: Larry Marlow  Date:    02/06/2023  Time:    13:10  X-Ray Hip 2 or 3 views Left (with Pelvis when performed)  Narrative: EXAMINATION:  XR HIP WITH PELVIS WHEN PERFORMED, 2 OR 3 VIEWS LEFT    CLINICAL HISTORY:  Unspecified fall, initial encounter    COMPARISON:  None.    FINDINGS:  No acute displaced fractures or dislocations.    There is some narrowing of the inferior medial aspect of the left hip joint with some degenerative changes of the lung post spine articular  spaces small articular surfaces    No blastic or lytic lesions.    Soft tissues within normal limits.  Impression: Degenerative changes.    Electronically signed by: Gokul Turner  Date:    02/06/2023  Time:    12:52  X-Ray Elbow Complete Right  Narrative: EXAMINATION:  XR ELBOW COMPLETE 3 VIEW RIGHT    CLINICAL HISTORY:  Unspecified fall, initial encounter    COMPARISON:  None.    FINDINGS:  No acute displaced fractures or dislocations.    Joint spaces preserved.    No blastic or lytic lesions.    Soft tissues within normal limits.  Impression: No acute osseous abnormality.    Electronically signed by: Gokul Turner  Date:    02/06/2023  Time:    12:41  CT Head Without Contrast  Narrative: EXAMINATION:  CT HEAD WITHOUT CONTRAST    CLINICAL HISTORY:  Head trauma, minor, normal mental status (Age 19-64y);    TECHNIQUE:  Multiple axial images were obtained from the base of the brain to the vertex without contrast administration.  Sagittal and coronal reconstructions were performed..Automatic exposure control is utilized to reduce patient radiation exposure.    COMPARISON:  11/22/2022    FINDINGS:  There is no intracranial mass or lesion seen.  No hemorrhage is seen.  No acute infarct is seen.  There is some cerebral atrophy seen.  There is some compensatory ventricular dilatation and periventricular white matter changes consistent with the patient's age.  Calvarium is intact.  The posterior fossa is unremarkable..  The visualized portions of the paranasal sinuses show no acute abnormality.  Impression: Chronic age-related changes.  No acute process    Electronically signed by: Jenny Lama  Date:    02/06/2023  Time:    11:51        ASSESSMENT   Acute iliac wing fracture left- non surgical  L1-L5 compression fractures  T11-T12 compression fractures  Rhabdomyolysis   Leukocytosis  COLLIN- r/o retention    PLAN   Admit to observation  Continue PT/OT eval and tx  IVF hydration 1/2 NS  Pain control  Case mgmt  follow for d/c planning   Get morning labs     Prophylaxis: lovenox   Code Status: full code        Noe Jung MD  [facility]

## 2023-02-07 NOTE — CONSULTS
Inpatient Nutrition Assessment     Admit Date: 2/6/2023   Total duration of encounter: 1 day      Nutrition Recommendation/Prescription      Continue regular diet. 2. Add Arginaid 1 pk daily for wound healing.      Communication of Recommendations: reviewed with physician  And patient.   Nutrition Assessment      Malnutrition Assessment/Nutrition-Focused Physical Exam     Malnutrition in the context of acute illness or injury  Degree of Malnutrition: non-severe (moderate) malnutrition  Energy Intake: unable to obtain  Interpretation of Weight Loss: unable to obtain  Body Fat:unable to obtain  Area of Body Fat Loss: unable to obtain  Muscle Mass Loss: unable to obtain  Area of Muscle Mass Loss: unable to obtain  Fluid Accumulation: unable to obtain  Edema: unable to obtain   Reduced  Strength: unable to obtain  A minimum of two characteristics is recommended for diagnosis of either severe or non-severe malnutrition.     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 11/23/2022       Compression fracture of body of thoracic vertebra 11/23/2022       Memory impairment 11/23/2022       Generalized weakness           Relevant Medical History:   Cancer   Date Unknown  Compression fracture of body of thoracic vertebra  Nutrition-Related Medications: tramadol  Calorie Containing IV Medications: no significant kcals from medications at this time and 0.45%NaCl infusion 1,000 mL IV at 100mL/hr     Nutrition-Related Labs:    Latest Reference Range & Units 02/07/23 10:57   Sodium 136 - 145 mmol/L 141   Potassium 3.5 - 5.1 mmol/L 4.9   Chloride 98 - 107 mmol/L 105   CO2 23 - 31 mmol/L 27   Anion Gap mEq/L 9.0   BUN 8.4 - 25.7 mg/dL  "43.1 (H)   Creatinine 0.73 - 1.18 mg/dL 1.67 (H)   BUN/CREAT RATIO   26   eGFR mls/min/1.73/m2 39   Glucose 82 - 115 mg/dL 119 (H)   Calcium 8.8 - 10.0 mg/dL 9.2   (H): Data is abnormally high     Diet/PN Order: Diet Adult Regular  Oral Supplement Order:  Arginaid  Tube Feeding Order: none  Appetite/Oral Intake: fair/% of meals  Factors Affecting Nutritional Intake: decreased appetite and wound on back  Food/[employer]ist/Cultural Preferences: none reported  Food Allergies: none reported     Skin Integrity: abrasion, wound  Wound(s):        Comments     Pt reports intake "so,so." Discussed food preferences. Pt likes sandwich. Nursing reports pt has wound to back. Discussed nutrition supplement for wound healing. Notified MD. Provide verbal order to ordered the supplement, arginaid. Marked menus with patient. Pt ate 100% of ham sandwich at lunch.      Anthropometrics     Height: 5' 11" (180.3 cm) Height Method: Stated  Last Weight: 65.5 kg (144 lb 6.4 oz) (02/06/23 1715) Weight Method: Bed Scale  BMI (Calculated): 20.1  BMI Classification: normal (BMI 18.5-24.9)  Ideal Body Weight (IBW), Male: 172 lb  % Ideal Body Weight, Male (lb): 83.95 %  Usual Weight Provided By: unable to obtain usual weight         Wt Readings from Last 5 Encounters:   02/06/23 65.5 kg (144 lb 6.4 oz)   12/27/22 72 kg (158 lb 12.8 oz)   11/30/22 72.6 kg (160 lb)   11/22/22 68.9 kg (152 lb)   11/08/22 72.6 kg (160 lb)      Weight Change(s) Since Admission:  Admit Weight: 72.6 kg (160 lb) (02/06/23 1050)        Estimated Needs      1965 kcal (30 kcal/kg/CBW)  85 gm protein (1.3 gm/kg/CBW)  1,965 mL (1 mL/kcal)  Temp: 97.3 °F (36.3 °C)     Enteral Nutrition     Patient not receiving enteral nutrition at this time.     Parenteral Nutrition     Patient not receiving parenteral nutrition support at this time.     Evaluation of Received Nutrient Intake           Patient Education     Not applicable.     Nutrition Diagnosis      PES: Increased " nutrient needs related to wound healing as evidenced by wound to back area. (new)     Interventions/Goals      Intervention(s): general/healthful diet, commercial beverage, and collaboration with other providers  Goal: Consume % of meals/snacks by follow-up. (new)     Monitoring & Evaluation      Dietitian will monitor food and beverage intake, weight, weight change, electrolyte/renal panel, glucose/endocrine profile, and gastrointestinal profile.  Nutrition Risk/Follow-Up: moderate (follow-up in 3-5 days)   Please consult if re-assessment needed sooner.

## 2023-02-07 NOTE — PT/OT/SLP PROGRESS
Occupational Therapy  Treatment    Name: Anthony Ibarra    : 1934 (88 y.o.)  MRN: [id number]           TREATMENT SUMMARY AND RECOMMENDATIONS:      OT Date of Treatment: 23  OT Start Time: 1440  OT Stop Time: 1500  OT Total Time (min): 20 min      Subjective Assessment:   No complaints  Lethargic   x Awake, alert, cooperative  Impulsive    Uncooperative   Flat affect    Agitated x c/o pain    Appropriate x c/o fatigue    Confused x Treated at bedside     Emotionally labile  Treated in gym/dept.     x Other: pt refusing to get up at this time however agreed to bed exercises       Therapy Precautions:    Cognitive deficits  Spinal precautions    Collar - hard  Sternal precautions    Collar - soft   TLSO   x Fall risk  LSO    Hip precautions - posterior  Knee immobilizer    Hip precautions - anterior  WBAT    Impaired communication x Partial weightbearing    Oxygen  TTWB    PEG tube  NWB    Visual deficits      Hearing deficits   Other:        Treatment Objectives:     Therapeutic Exercise:   Exercise Sets Reps Comments   BUE strengthening 1 10 Chest press, straight arm raises, bicep curls  *Pt c/o pain in abdominal area therefore performed to pain tolerance   BLE exercises 1 10 LLE AAROM - Hip abd/add, SLR; RLE AROM  AROM BLEs - ankle pumps, quad sets                    Additional Comments:      Assessment: Patient tolerated session fair.    OT Plan: Continue POC  Revisions made to plan of care: No    GOALS:   Multidisciplinary Problems       Occupational Therapy Goals          Problem: Occupational Therapy    Goal Priority Disciplines Outcome Interventions   Occupational Therapy Goal     OT, PT/OT Ongoing, Progressing    Description: Goals to be met by: 2/10/23     Patient will increase functional independence with ADLs by performing:    Toileting from bedside commode with Contact Guard Assistance for hygiene and clothing management.   Toilet transfer to bedside commode with Contact Guard  Assistance.                         Skilled OT Minutes Provided: 20  Communication with Treatment Team:     Equipment recommendations:       At end of treatment, patient remained:   Up in chair     Up in wheelchair in room   x In bed   x With alarm activated   x Bed rails up   x Call bell in reach     Family/friends present    Restraints secured properly    In bathroom with CNA/RN notified    In gym with PT/PTA/tech    Nurse aware    Other:

## 2023-02-07 NOTE — PT/OT/SLP EVAL
Physical Therapy Evaluation    Patient Name:  Anthony Ibarra   MRN:  [id number]    Recommendations:     Discharge Recommendations: nursing facility, basic   Discharge Equipment Recommendations:     Barriers to discharge: Decreased caregiver support    Assessment:     Anthony Ibarra is a 88 y.o. male admitted with a medical diagnosis of <principal problem not specified>.  He presents with the following impairments/functional limitations: weakness, impaired endurance, impaired functional mobility, gait instability, impaired balance, impaired cognition, decreased lower extremity function, pain, orthopedic precautions . Patient may be poor historian as noted during cognitive testing.     Rehab Prognosis: Fair; patient would benefit from acute skilled PT services to address these deficits and reach maximum level of function.    Recent Surgery: * No surgery found *      Plan:     During this hospitalization, patient to be seen 6 x/week to address the identified rehab impairments via gait training, therapeutic activities, therapeutic exercises and progress toward the following goals:    Plan of Care Expires:  03/03/23    Subjective     Chief Complaint: pain in left hip area   Patient/Family Comments/goals: return home to [facility]   Pain/Comfort:  Pain Rating 1: 8/10  Location - Side 1: Left  Location - Orientation 1: upper  Location 1: leg  Pain Addressed 1: Pre-medicate for activity, Nurse notified  Pain Rating Post-Intervention 1: 7/10    Patients cultural, spiritual, Druze conflicts given the current situation:      Living Environment:  Lives alone in single story home . Nephews check on him weekly   Prior to admission, patients level of function was IND .  Equipment used at home: walker, rolling, shower chair.  DME owned (not currently used): none.  Upon discharge, patient will have assistance from hospice .    Objective:     Communicated with nurse Coronado  prior to session.  Patient found supine with    upon  PT entry to room.    General Precautions: Standard, fall  Orthopedic Precautions:LLE partial weight bearing   Braces:    Respiratory Status: Room air    Exams:  Cognitive Exam:  Patient is oriented to Person and Place  RLE ROM: WFL  RLE Strength: WFL  LLE ROM: WFL except hip flexion limited by pain   LLE Strength: Hip:  2+/5   Knee : 3/5    Functional Mobility:  Bed Mobility:     Supine to Sit: moderate assistance  Sit to Supine: moderate assistance  Transfers:     Bed to Chair: minimum assistance with  rolling walker  using  Stand Pivot  Gait: n/a  Balance: sit : good at bedside Stand : fair with RW and Assist x 2      AM-PAC 6 CLICK MOBILITY  Total Score:11       Treatment & Education:  - fall precautions     Patient left up in chair with call button in reach, chair alarm on, nurse  notified, and friend  present.    GOALS:   Multidisciplinary Problems       Physical Therapy Goals          Problem: Physical Therapy    Goal Priority Disciplines Outcome Goal Variances Interventions   Physical Therapy Goal     PT, PT/OT Ongoing, Progressing     Description: Goals to be met by: 3/3/23     Patient will increase functional independence with mobility by performin. Supine to sit with Modified [place]  2. Sit to supine with Modified [place]  3. Sit to stand transfer with Modified [place]  4. Bed to chair transfer with Modified [place] using Rolling Walker  5. Gait  x 100 feet with Supervision using Rolling Walker.                          History:     Past Medical History:   Diagnosis Date    Cancer     prostate    Compression fracture of body of thoracic vertebra        Past Surgical History:   Procedure Laterality Date    PROSTATE SURGERY N/A        Time Tracking:     PT Received On:    PT Start Time: 0900     PT Stop Time: 30  PT Total Time (min): 30 min     Billable Minutes: Evaluation MOD       2023

## 2023-02-07 NOTE — PLAN OF CARE
Problem: Occupational Therapy  Goal: Occupational Therapy Goal  Description: Goals to be met by: 2/10/23     Patient will increase functional independence with ADLs by performing:    Toileting from bedside commode with Contact Guard Assistance for hygiene and clothing management.   Toilet transfer to bedside commode with Contact Guard Assistance.    Outcome: Ongoing, Progressing

## 2023-02-07 NOTE — PLAN OF CARE
Problem: Physical Therapy  Goal: Physical Therapy Goal  Description: Goals to be met by: 3/3/23     Patient will increase functional independence with mobility by performin. Supine to sit with Modified [place]  2. Sit to supine with Modified [place]  3. Sit to stand transfer with Modified [place]  4. Bed to chair transfer with Modified [place] using Rolling Walker  5. Gait  x 100 feet with Supervision using Rolling Walker.     Outcome: Ongoing, Progressing

## 2023-02-07 NOTE — PLAN OF CARE
Problem: Adult Inpatient Plan of Care  Goal: Plan of Care Review  2/7/2023 0132 by Iván Saucedo RN  Outcome: Ongoing, Progressing  Flowsheets (Taken 2/7/2023 0132)  Plan of Care Reviewed With: patient  2/7/2023 0131 by Iván Saucedo RN  Outcome: Ongoing, Progressing  Goal: Patient-Specific Goal (Individualized)  2/7/2023 0132 by Iván Saucedo RN  Outcome: Ongoing, Progressing  Flowsheets (Taken 2/7/2023 0132)  Anxieties, Fears or Concerns: pain and discomfort  Individualized Care Needs: pain control  2/7/2023 0131 by Iván Saucedo RN  Outcome: Ongoing, Progressing     Problem: Pain Acute  Goal: Acceptable Pain Control and Functional Ability  Outcome: Ongoing, Progressing  Intervention: Develop Pain Management Plan  Flowsheets (Taken 2/7/2023 0132)  Pain Management Interventions:   pain management plan reviewed with patient/caregiver   medication offered

## 2023-02-07 NOTE — PROGRESS NOTES
Inpatient Nutrition Assessment    Admit Date: 2/6/2023   Total duration of encounter: 1 day     Nutrition Recommendation/Prescription     Continue regular diet. 2. Add Arginaid 1 pk daily for wound healing.     Communication of Recommendations: reviewed with physician  And patient.   Nutrition Assessment     Malnutrition Assessment/Nutrition-Focused Physical Exam    Malnutrition in the context of acute illness or injury  Degree of Malnutrition: non-severe (moderate) malnutrition  Energy Intake: unable to obtain  Interpretation of Weight Loss: unable to obtain  Body Fat:unable to obtain  Area of Body Fat Loss: unable to obtain  Muscle Mass Loss: unable to obtain  Area of Muscle Mass Loss: unable to obtain  Fluid Accumulation: unable to obtain  Edema: unable to obtain   Reduced  Strength: unable to obtain  A minimum of two characteristics is recommended for diagnosis of either severe or non-severe malnutrition.    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 11/23/2022      Compression fracture of body of thoracic vertebra 11/23/2022      Memory impairment 11/23/2022      Generalized weakness        Relevant Medical History:   Cancer   Date Unknown  Compression fracture of body of thoracic vertebra  Nutrition-Related Medications: tramadol  Calorie Containing IV Medications: no significant kcals from medications at this time and 0.45%NaCl infusion 1,000 mL IV at 100mL/hr    Nutrition-Related Labs:   Latest Reference Range & Units 02/07/23 10:57   Sodium 136 - 145 mmol/L 141   Potassium 3.5 - 5.1 mmol/L 4.9   Chloride 98 - 107 mmol/L 105   CO2 23 - 31 mmol/L 27   Anion Gap mEq/L 9.0   BUN 8.4 - 25.7 mg/dL 43.1 (H)   Creatinine  "0.73 - 1.18 mg/dL 1.67 (H)   BUN/CREAT RATIO  26   eGFR mls/min/1.73/m2 39   Glucose 82 - 115 mg/dL 119 (H)   Calcium 8.8 - 10.0 mg/dL 9.2   (H): Data is abnormally high    Diet/PN Order: Diet Adult Regular  Oral Supplement Order:  Arginaid  Tube Feeding Order: none  Appetite/Oral Intake: fair/% of meals  Factors Affecting Nutritional Intake: decreased appetite and wound on back  Food/Holiness/Cultural Preferences: none reported  Food Allergies: none reported    Skin Integrity: abrasion, wound  Wound(s):       Comments    Pt reports intake "so,so." Discussed food preferences. Pt likes sandwich. Nursing reports pt has wound to back. Discussed nutrition supplement for wound healing. Notified MD. Provide verbal order to ordered the supplement, arginaid. Marked menus with patient. Pt ate 100% of ham sandwich at lunch.     Anthropometrics    Height: 5' 11" (180.3 cm) Height Method: Stated  Last Weight: 65.5 kg (144 lb 6.4 oz) (02/06/23 1715) Weight Method: Bed Scale  BMI (Calculated): 20.1  BMI Classification: normal (BMI 18.5-24.9)        Ideal Body Weight (IBW), Male: 172 lb     % Ideal Body Weight, Male (lb): 83.95 %                          Usual Weight Provided By: unable to obtain usual weight    Wt Readings from Last 5 Encounters:   02/06/23 65.5 kg (144 lb 6.4 oz)   12/27/22 72 kg (158 lb 12.8 oz)   11/30/22 72.6 kg (160 lb)   11/22/22 68.9 kg (152 lb)   11/08/22 72.6 kg (160 lb)     Weight Change(s) Since Admission:  Admit Weight: 72.6 kg (160 lb) (02/06/23 1050)      Estimated Needs     1965 kcal (30 kcal/kg/CBW)  85 gm protein (1.3 gm/kg/CBW)  1,965 mL (1 mL/kcal)                 Temp: 97.3 °F (36.3 °C)       Enteral Nutrition    Patient not receiving enteral nutrition at this time.    Parenteral Nutrition    Patient not receiving parenteral nutrition support at this time.    Evaluation of Received Nutrient Intake        Patient Education    Not applicable.    Nutrition Diagnosis     PES: Increased " nutrient needs related to wound healing as evidenced by wound to back area. (new)    Interventions/Goals     Intervention(s): general/healthful diet, commercial beverage, and collaboration with other providers  Goal: Consume % of meals/snacks by follow-up. (new)    Monitoring & Evaluation     Dietitian will monitor food and beverage intake, weight, weight change, electrolyte/renal panel, glucose/endocrine profile, and gastrointestinal profile.  Nutrition Risk/Follow-Up: moderate (follow-up in 3-5 days)   Please consult if re-assessment needed sooner.

## 2023-02-07 NOTE — PT/OT/SLP EVAL
Occupational Therapy   Acute Care Evaluation    Name: Anthony Ibarra  MRN: [id number]  Admitting Diagnosis:  <principal problem not specified>      History:   Anthony Ibarra is a 88 y.o. male with a medical diagnosis of <principal problem not specified>.    Past Medical History:   Diagnosis Date    Cancer     prostate    Compression fracture of body of thoracic vertebra          Past Surgical History:   Procedure Laterality Date    PROSTATE SURGERY N/A        Subjective     Chief Complaint: LLE pain with movement   Patient/Family Comments/goals: Safe return to PLOF     Occupational Profile:  Lives with: alone   Home Environment: single level home with walk in shower and shower chair  Previous level of function: Pt reports he was Mod I for all ADL using a RW for mobility.   Equipment Used at Home:  walker, rolling, shower chair  ** OT noted Pt to be possible poor historian comparing to history reported in ED versus Pt's report. Pt reports he does not have     Pain/Comfort:  Pain Rating 1: 8/10  Location - Side 1: Left  Location 1: hip  Pain Addressed 1: Cessation of Activity, Pre-medicate for activity, Nurse notified    Objective:     Communicated with: Nursing prior to session.  Patient found supine with bed alarm, peripheral IV upon OT entry to room.    General Precautions: Standard, fall   Orthopedic Precautions:LLE partial weight bearing   Braces: N/A  Respiratory Status: Room air    Occupational Performance:    Mobility  Assist level Comments    Bed mobility moderate assist From supine to sit with Pt presenting extremely fearful to move LLE due pain.   Transfer minimal assist x2 With use of RW from EOB to bedside chair   Functional mobility N/A    Sit to stand transitions minimal assist From EOB    Other:          Activities of Daily Living Assist level Comments    Feeding independent    Grooming/hygiene independent    Bathing moderate assist    Upper body dressing supervision    Lower body dressing  maximal assist    Toileting maximal assist    Toilet transfer minimal assistx2  Would benefit from BSC transfers at this time    Adaptive equipment training     Other:       Physical Exam:  Upper Extremity Strength:    -       Right Upper Extremity: 4-/5  -       Left Upper Extremity: 4-/5    Treatment & Education:  Pt educated to use call bell when wanting to get back to bed for decreased fall risk.     Patient left up in chair with call button in reach, chair alarm on, and Pt's friend present    Assessment:     He presents with decreased BUE strength, poor safety awareness, decreased functional mobility, and decreased self-care ability. .      Rehab Prognosis: Good; patient would benefit from acute skilled OT services to address these deficits and reach maximum level of function.       Plan:     Patient to be seen 6 x/week, 5 x/week to address the above listed problems via self-care/home management, therapeutic exercises, therapeutic activities  Plan of Care Reviewed with: patient      GOALS:   Multidisciplinary Problems       Occupational Therapy Goals          Problem: Occupational Therapy    Goal Priority Disciplines Outcome Interventions   Occupational Therapy Goal     OT, PT/OT Ongoing, Progressing    Description: Goals to be met by: 2/10/23     Patient will increase functional independence with ADLs by performing:    Toileting from bedside commode with Contact Guard Assistance for hygiene and clothing management.   Toilet transfer to bedside commode with Contact Guard Assistance.                           Recommendations:     Discharge Recommendations: nursing facility, basic  Discharge Equipment Recommendations:  none      Time Tracking:     OT Date of Treatment: 02/07/23  OT Start Time: 0900  OT Stop Time: 0930  OT Total Time (min): 30 min    Billable Minutes:Evaluation of 30 minute Mod Complexity with PT Reid    2/7/2023

## 2023-02-07 NOTE — PLAN OF CARE
Problem: Adult Inpatient Plan of Care  Goal: Plan of Care Review  Outcome: Ongoing, Progressing  Flowsheets (Taken 2/6/2023 1816)  Plan of Care Reviewed With: patient  Goal: Patient-Specific Goal (Individualized)  Outcome: Ongoing, Progressing  Goal: Absence of Hospital-Acquired Illness or Injury  Outcome: Ongoing, Progressing  Intervention: Identify and Manage Fall Risk  Flowsheets (Taken 2/6/2023 1816)  Safety Promotion/Fall Prevention:   assistive device/personal item within reach   bed alarm set   nonskid shoes/socks when out of bed   side rails raised x 3   instructed to call staff for mobility  Intervention: Prevent Skin Injury  Flowsheets (Taken 2/6/2023 1816)  Body Position: sitting up in bed  Skin Protection:   adhesive use limited   incontinence pads utilized  Intervention: Prevent and Manage VTE (Venous Thromboembolism) Risk  Flowsheets (Taken 2/6/2023 1816)  Activity Management: Patient unable to perform activities  VTE Prevention/Management: fluids promoted  Range of Motion: active ROM (range of motion) encouraged  Intervention: Prevent Infection  Flowsheets (Taken 2/6/2023 1816)  Infection Prevention:   equipment surfaces disinfected   hand hygiene promoted   single patient room provided   rest/sleep promoted   personal protective equipment utilized  Goal: Optimal Comfort and Wellbeing  Outcome: Ongoing, Progressing  Intervention: Monitor Pain and Promote Comfort  Flowsheets (Taken 2/6/2023 1816)  Pain Management Interventions: quiet environment facilitated  Intervention: Provide Person-Centered Care  Flowsheets (Taken 2/6/2023 1816)  Trust Relationship/Rapport:   care explained   questions answered   reassurance provided  Goal: Readiness for Transition of Care  Outcome: Ongoing, Progressing     Problem: Fall Injury Risk  Goal: Absence of Fall and Fall-Related Injury  Outcome: Ongoing, Progressing  Intervention: Identify and Manage Contributors  Flowsheets (Taken 2/6/2023 1816)  Self-Care Promotion:  independence encouraged  Medication Review/Management: medications reviewed  Intervention: Promote Injury-Free Environment  Flowsheets (Taken 2/6/2023 1816)  Safety Promotion/Fall Prevention:   assistive device/personal item within reach   bed alarm set   nonskid shoes/socks when out of bed   side rails raised x 3   instructed to call staff for mobility     Problem: Skin Injury Risk Increased  Goal: Skin Health and Integrity  Outcome: Ongoing, Progressing  Intervention: Optimize Skin Protection  Flowsheets (Taken 2/6/2023 1816)  Pressure Reduction Techniques: frequent weight shift encouraged  Pressure Reduction Devices: positioning supports utilized  Skin Protection:   adhesive use limited   incontinence pads utilized  Head of Bed (HOB) Positioning: HOB at 30-45 degrees  Intervention: Promote and Optimize Oral Intake  Flowsheets (Taken 2/6/2023 1816)  Oral Nutrition Promotion:   calorie-dense foods provided   calorie-dense liquids provided

## 2023-02-08 PROBLEM — M62.82 RHABDOMYOLYSIS: Status: ACTIVE | Noted: 2023-02-08

## 2023-02-08 PROBLEM — S32.302A CLOSED FRACTURE OF LEFT ILIAC WING: Status: ACTIVE | Noted: 2023-02-08

## 2023-02-08 LAB
ANION GAP SERPL CALC-SCNC: 7 MEQ/L
BASOPHILS # BLD AUTO: 0.02 X10(3)/MCL (ref 0–0.2)
BASOPHILS NFR BLD AUTO: 0.2 %
BUN SERPL-MCNC: 46.1 MG/DL (ref 8.4–25.7)
CALCIUM SERPL-MCNC: 8.5 MG/DL (ref 8.8–10)
CHLORIDE SERPL-SCNC: 103 MMOL/L (ref 98–107)
CO2 SERPL-SCNC: 30 MMOL/L (ref 23–31)
CREAT SERPL-MCNC: 1.26 MG/DL (ref 0.73–1.18)
CREAT/UREA NIT SERPL: 37
EOSINOPHIL # BLD AUTO: 0.03 X10(3)/MCL (ref 0–0.9)
EOSINOPHIL NFR BLD AUTO: 0.3 %
ERYTHROCYTE [DISTWIDTH] IN BLOOD BY AUTOMATED COUNT: 13.3 % (ref 11.5–17)
GFR SERPLBLD CREATININE-BSD FMLA CKD-EPI: 55 MLS/MIN/1.73/M2
GLUCOSE SERPL-MCNC: 91 MG/DL (ref 82–115)
HCT VFR BLD AUTO: 38.5 % (ref 42–52)
HGB BLD-MCNC: 12.4 GM/DL (ref 14–18)
IMM GRANULOCYTES # BLD AUTO: 0.02 X10(3)/MCL (ref 0–0.04)
IMM GRANULOCYTES NFR BLD AUTO: 0.2 %
LYMPHOCYTES # BLD AUTO: 1.91 X10(3)/MCL (ref 0.6–4.6)
LYMPHOCYTES NFR BLD AUTO: 18 %
MCH RBC QN AUTO: 30.6 PG
MCHC RBC AUTO-ENTMCNC: 32.2 MG/DL (ref 33–36)
MCV RBC AUTO: 95.1 FL (ref 80–94)
MONOCYTES # BLD AUTO: 0.97 X10(3)/MCL (ref 0.1–1.3)
MONOCYTES NFR BLD AUTO: 9.2 %
NEUTROPHILS # BLD AUTO: 7.64 X10(3)/MCL (ref 2.1–9.2)
NEUTROPHILS NFR BLD AUTO: 72.1 %
PLATELET # BLD AUTO: 125 X10(3)/MCL (ref 130–400)
PMV BLD AUTO: 11.1 FL (ref 7.4–10.4)
POTASSIUM SERPL-SCNC: 4.9 MMOL/L (ref 3.5–5.1)
RBC # BLD AUTO: 4.05 X10(6)/MCL (ref 4.7–6.1)
SODIUM SERPL-SCNC: 140 MMOL/L (ref 136–145)
WBC # SPEC AUTO: 10.6 X10(3)/MCL (ref 4.5–11.5)

## 2023-02-08 PROCEDURE — 80048 BASIC METABOLIC PNL TOTAL CA: CPT | Performed by: FAMILY MEDICINE

## 2023-02-08 PROCEDURE — 85025 COMPLETE CBC W/AUTO DIFF WBC: CPT | Performed by: FAMILY MEDICINE

## 2023-02-08 PROCEDURE — 11000001 HC ACUTE MED/SURG PRIVATE ROOM

## 2023-02-08 PROCEDURE — 25000003 PHARM REV CODE 250: Performed by: FAMILY MEDICINE

## 2023-02-08 PROCEDURE — 25000003 PHARM REV CODE 250: Performed by: EMERGENCY MEDICINE

## 2023-02-08 PROCEDURE — 25000003 PHARM REV CODE 250: Performed by: STUDENT IN AN ORGANIZED HEALTH CARE EDUCATION/TRAINING PROGRAM

## 2023-02-08 PROCEDURE — 63600175 PHARM REV CODE 636 W HCPCS: Performed by: STUDENT IN AN ORGANIZED HEALTH CARE EDUCATION/TRAINING PROGRAM

## 2023-02-08 PROCEDURE — 94760 N-INVAS EAR/PLS OXIMETRY 1: CPT

## 2023-02-08 RX ORDER — NALOXONE HCL 0.4 MG/ML
0.02 VIAL (ML) INJECTION
Status: DISCONTINUED | OUTPATIENT
Start: 2023-02-08 | End: 2023-02-09 | Stop reason: HOSPADM

## 2023-02-08 RX ORDER — GLUCAGON 1 MG
1 KIT INJECTION
Status: DISCONTINUED | OUTPATIENT
Start: 2023-02-08 | End: 2023-02-09 | Stop reason: HOSPADM

## 2023-02-08 RX ORDER — SIMETHICONE 80 MG
1 TABLET,CHEWABLE ORAL 4 TIMES DAILY PRN
Status: DISCONTINUED | OUTPATIENT
Start: 2023-02-08 | End: 2023-02-09 | Stop reason: HOSPADM

## 2023-02-08 RX ORDER — BISACODYL 10 MG
10 SUPPOSITORY, RECTAL RECTAL DAILY PRN
Status: DISCONTINUED | OUTPATIENT
Start: 2023-02-08 | End: 2023-02-09 | Stop reason: HOSPADM

## 2023-02-08 RX ORDER — HYDROCODONE BITARTRATE AND ACETAMINOPHEN 5; 325 MG/1; MG/1
1 TABLET ORAL EVERY 6 HOURS PRN
Status: DISCONTINUED | OUTPATIENT
Start: 2023-02-08 | End: 2023-02-09 | Stop reason: HOSPADM

## 2023-02-08 RX ORDER — TALC
9 POWDER (GRAM) TOPICAL NIGHTLY PRN
Status: DISCONTINUED | OUTPATIENT
Start: 2023-02-08 | End: 2023-02-09 | Stop reason: HOSPADM

## 2023-02-08 RX ORDER — MAG HYDROX/ALUMINUM HYD/SIMETH 200-200-20
30 SUSPENSION, ORAL (FINAL DOSE FORM) ORAL 4 TIMES DAILY PRN
Status: DISCONTINUED | OUTPATIENT
Start: 2023-02-08 | End: 2023-02-09 | Stop reason: HOSPADM

## 2023-02-08 RX ORDER — POLYETHYLENE GLYCOL 3350 17 G/17G
17 POWDER, FOR SOLUTION ORAL 3 TIMES DAILY PRN
Status: DISCONTINUED | OUTPATIENT
Start: 2023-02-08 | End: 2023-02-09 | Stop reason: HOSPADM

## 2023-02-08 RX ORDER — MORPHINE SULFATE 4 MG/ML
2 INJECTION, SOLUTION INTRAMUSCULAR; INTRAVENOUS EVERY 6 HOURS PRN
Status: DISCONTINUED | OUTPATIENT
Start: 2023-02-08 | End: 2023-02-09 | Stop reason: HOSPADM

## 2023-02-08 RX ORDER — IPRATROPIUM BROMIDE AND ALBUTEROL SULFATE 2.5; .5 MG/3ML; MG/3ML
3 SOLUTION RESPIRATORY (INHALATION) EVERY 6 HOURS PRN
Status: DISCONTINUED | OUTPATIENT
Start: 2023-02-08 | End: 2023-02-09 | Stop reason: HOSPADM

## 2023-02-08 RX ORDER — ONDANSETRON 4 MG/1
8 TABLET, ORALLY DISINTEGRATING ORAL EVERY 8 HOURS PRN
Status: DISCONTINUED | OUTPATIENT
Start: 2023-02-08 | End: 2023-02-09 | Stop reason: HOSPADM

## 2023-02-08 RX ORDER — ENOXAPARIN SODIUM 100 MG/ML
40 INJECTION SUBCUTANEOUS EVERY 24 HOURS
Status: DISCONTINUED | OUTPATIENT
Start: 2023-02-08 | End: 2023-02-09 | Stop reason: HOSPADM

## 2023-02-08 RX ORDER — IBUPROFEN 200 MG
24 TABLET ORAL
Status: DISCONTINUED | OUTPATIENT
Start: 2023-02-08 | End: 2023-02-09 | Stop reason: HOSPADM

## 2023-02-08 RX ORDER — ACETAMINOPHEN 325 MG/1
650 TABLET ORAL EVERY 4 HOURS PRN
Status: DISCONTINUED | OUTPATIENT
Start: 2023-02-08 | End: 2023-02-09 | Stop reason: HOSPADM

## 2023-02-08 RX ORDER — SODIUM CHLORIDE 0.9 % (FLUSH) 0.9 %
10 SYRINGE (ML) INJECTION
Status: DISCONTINUED | OUTPATIENT
Start: 2023-02-08 | End: 2023-02-09 | Stop reason: HOSPADM

## 2023-02-08 RX ORDER — ONDANSETRON 2 MG/ML
4 INJECTION INTRAMUSCULAR; INTRAVENOUS EVERY 8 HOURS PRN
Status: DISCONTINUED | OUTPATIENT
Start: 2023-02-08 | End: 2023-02-09 | Stop reason: HOSPADM

## 2023-02-08 RX ORDER — SODIUM CHLORIDE, SODIUM LACTATE, POTASSIUM CHLORIDE, CALCIUM CHLORIDE 600; 310; 30; 20 MG/100ML; MG/100ML; MG/100ML; MG/100ML
INJECTION, SOLUTION INTRAVENOUS CONTINUOUS
Status: ACTIVE | OUTPATIENT
Start: 2023-02-08 | End: 2023-02-09

## 2023-02-08 RX ORDER — IBUPROFEN 200 MG
16 TABLET ORAL
Status: DISCONTINUED | OUTPATIENT
Start: 2023-02-08 | End: 2023-02-09 | Stop reason: HOSPADM

## 2023-02-08 RX ADMIN — HYDROCODONE BITARTRATE AND ACETAMINOPHEN 1 TABLET: 5; 325 TABLET ORAL at 11:02

## 2023-02-08 RX ADMIN — SODIUM CHLORIDE: 4.5 INJECTION, SOLUTION INTRAVENOUS at 10:02

## 2023-02-08 RX ADMIN — TRAMADOL HYDROCHLORIDE 50 MG: 50 TABLET, COATED ORAL at 02:02

## 2023-02-08 RX ADMIN — SODIUM CHLORIDE, POTASSIUM CHLORIDE, SODIUM LACTATE AND CALCIUM CHLORIDE: 600; 310; 30; 20 INJECTION, SOLUTION INTRAVENOUS at 06:02

## 2023-02-08 RX ADMIN — SODIUM CHLORIDE: 4.5 INJECTION, SOLUTION INTRAVENOUS at 12:02

## 2023-02-08 RX ADMIN — ENOXAPARIN SODIUM 40 MG: 40 INJECTION SUBCUTANEOUS at 05:02

## 2023-02-08 NOTE — SUBJECTIVE & OBJECTIVE
Interval History: Complaining of low back pain    Review of Systems   Musculoskeletal:  Positive for back pain.   Objective:     Vital Signs (Most Recent):  Temp: 97.5 °F (36.4 °C) (02/08/23 0943)  Pulse: 96 (02/08/23 1455)  Resp: 20 (02/08/23 1431)  BP: 94/63 (02/08/23 1455)  SpO2: (!) 94 % (02/08/23 1455)   Vital Signs (24h Range):  Temp:  [97.5 °F (36.4 °C)] 97.5 °F (36.4 °C)  Pulse:  [71-96] 96  Resp:  [16-20] 20  SpO2:  [93 %-95 %] 94 %  BP: ()/(63-79) 94/63     Weight: 65.5 kg (144 lb 6.4 oz)  Body mass index is 20.14 kg/m².    Intake/Output Summary (Last 24 hours) at 2/8/2023 1721  Last data filed at 2/8/2023 1522  Gross per 24 hour   Intake 720 ml   Output 650 ml   Net 70 ml      Physical Exam  Constitutional:       General: He is not in acute distress.     Appearance: Normal appearance. He is cachectic.   HENT:      Head: Normocephalic and atraumatic.      Nose: No congestion or rhinorrhea.      Mouth/Throat:      Mouth: Mucous membranes are moist.   Eyes:      Conjunctiva/sclera: Conjunctivae normal.      Pupils: Pupils are equal, round, and reactive to light.   Cardiovascular:      Rate and Rhythm: Normal rate and regular rhythm.      Heart sounds: No murmur heard.  Pulmonary:      Effort: Pulmonary effort is normal.      Breath sounds: Normal breath sounds. No wheezing.   Abdominal:      General: Bowel sounds are normal. There is no distension.      Palpations: Abdomen is soft.      Tenderness: There is no abdominal tenderness.   Musculoskeletal:         General: No swelling. Normal range of motion.      Cervical back: Normal range of motion and neck supple.      Right lower leg: No edema.      Left lower leg: No edema.   Skin:     General: Skin is warm and dry.      Findings: No rash.   Neurological:      General: No focal deficit present.      Mental Status: He is alert and oriented to person, place, and time.   Psychiatric:         Mood and Affect: Mood normal.         Behavior: Behavior normal.        Significant Labs: All pertinent labs within the past 24 hours have been reviewed.    Significant Imaging: I have reviewed all pertinent imaging results/findings within the past 24 hours.

## 2023-02-08 NOTE — PROGRESS NOTES
[facility] - [facility]  [facility] Medicine  Progress Note    Patient Name: Anthony Ibarra  MRN: [id number]  Patient Class: IP- Inpatient   Admission Date: 2/6/2023  Length of Stay: 2 days  Attending Physician: Jossue Martinez MD  Primary Care Provider: Jossue Martinez MD        Subjective:     Principal Problem:Closed fracture of left iliac wing        HPI:  88 y.o. male presented to ED with c/o of fall and being found down at home by friend. C/O pelvic and low back pain found to have acute left iliac wing fx non displaced and nonoperative by othropedic surgery Dr. Harrison. T11-12 and lumbar 1-5 compression fractures. CPK > 3,000.          Overview/Hospital Course:  Admitted for rehab after a fall resulting in L iliac fx. Pt has been refusing therapy.       Interval History: Complaining of low back pain    Review of Systems   Musculoskeletal:  Positive for back pain.   Objective:     Vital Signs (Most Recent):  Temp: 97.5 °F (36.4 °C) (02/08/23 0943)  Pulse: 96 (02/08/23 1455)  Resp: 20 (02/08/23 1431)  BP: 94/63 (02/08/23 1455)  SpO2: (!) 94 % (02/08/23 1455)   Vital Signs (24h Range):  Temp:  [97.5 °F (36.4 °C)] 97.5 °F (36.4 °C)  Pulse:  [71-96] 96  Resp:  [16-20] 20  SpO2:  [93 %-95 %] 94 %  BP: ()/(63-79) 94/63     Weight: 65.5 kg (144 lb 6.4 oz)  Body mass index is 20.14 kg/m².    Intake/Output Summary (Last 24 hours) at 2/8/2023 1721  Last data filed at 2/8/2023 1522  Gross per 24 hour   Intake 720 ml   Output 650 ml   Net 70 ml      Physical Exam  Constitutional:       General: He is not in acute distress.     Appearance: Normal appearance. He is cachectic.   HENT:      Head: Normocephalic and atraumatic.      Nose: No congestion or rhinorrhea.      Mouth/Throat:      Mouth: Mucous membranes are moist.   Eyes:      Conjunctiva/sclera: Conjunctivae normal.      Pupils: Pupils are equal, round, and reactive to light.   Cardiovascular:      Rate and Rhythm: Normal rate and  regular rhythm.      Heart sounds: No murmur heard.  Pulmonary:      Effort: Pulmonary effort is normal.      Breath sounds: Normal breath sounds. No wheezing.   Abdominal:      General: Bowel sounds are normal. There is no distension.      Palpations: Abdomen is soft.      Tenderness: There is no abdominal tenderness.   Musculoskeletal:         General: No swelling. Normal range of motion.      Cervical back: Normal range of motion and neck supple.      Right lower leg: No edema.      Left lower leg: No edema.   Skin:     General: Skin is warm and dry.      Findings: No rash.   Neurological:      General: No focal deficit present.      Mental Status: He is alert and oriented to person, place, and time.   Psychiatric:         Mood and Affect: Mood normal.         Behavior: Behavior normal.       Significant Labs: All pertinent labs within the past 24 hours have been reviewed.    Significant Imaging: I have reviewed all pertinent imaging results/findings within the past 24 hours.      Assessment/Plan:      * Closed fracture of left iliac wing  -non-operative  -PT/OT  -pain control      Generalized weakness  -PT/OT      Rhabdomyolysis  -downtrending CK        VTE Risk Mitigation (From admission, onward)         Ordered     enoxaparin injection 40 mg  Daily         02/08/23 1718     IP VTE HIGH RISK PATIENT  Once         02/08/23 1718     Place sequential compression device  Until discontinued         02/08/23 1718     Place NISHA hose  Until discontinued         02/06/23 1742                Discharge Planning   MAUREEN:      Code Status: Full Code   Is the patient medically ready for discharge?:     Reason for patient still in hospital (select all that apply): Treatment and PT / OT recommendations  Discharge Plan A: Home with family, Home Health                  Thairy G Reyes, DO  Department of Hospital Medicine   [facility]

## 2023-02-08 NOTE — PROGRESS NOTES
Name: Anthony Ibarra    : 1934 (88 y.o.)  MRN: [id number]           Patient Unavailable      Patient unable to be seen at this time secondary to: Attempted multiple times to work with patient today and patient refusing each attempt. Provided encouragement and education about importance of participating - pt still adamantly refusing. Will attempt again tomorrow.

## 2023-02-08 NOTE — HOSPITAL COURSE
Admitted for rehabilitation after a fall resulting in L iliac fracture. Pt has been refusing therapy. Prior to this admission patient was previously on hospice, reason is unclear to me. He did have osseous lumbar lesions found on November 2022 that were concerning for metastasis. Subsequent biopsy was unrevealing for malignancy and he did have kyphoplasty of that region. He is undergoing outpatient screening for multiple myeloma with Dr. Elizabeth LeJeune. Patient has poor insight, he can not recall having had a kyphoplasty and does not know why he was on hospice. He is full code at this point in time however really does need a POA to make that determination given his poor insight.

## 2023-02-08 NOTE — PLAN OF CARE
Problem: Adult Inpatient Plan of Care  Goal: Plan of Care Review  Outcome: Ongoing, Progressing  Flowsheets (Taken 2/7/2023 2242)  Plan of Care Reviewed With: patient  Goal: Patient-Specific Goal (Individualized)  Outcome: Ongoing, Progressing  Flowsheets (Taken 2/7/2023 2242)  Anxieties, Fears or Concerns: pain  Individualized Care Needs: maintain pain control     Problem: Pain Acute  Goal: Acceptable Pain Control and Functional Ability  Outcome: Ongoing, Progressing  Intervention: Develop Pain Management Plan  Flowsheets (Taken 2/7/2023 2242)  Pain Management Interventions: medication offered

## 2023-02-08 NOTE — HPI
88 y.o. male presented to ED with c/o of fall and being found down at home by friend. C/O pelvic and low back pain found to have acute left iliac wing fx non displaced and nonoperative by othropedic surgery Dr. Harrison. T11-12 and lumbar 1-5 compression fractures. CPK > 3,000.

## 2023-02-08 NOTE — PT/OT/SLP PROGRESS
Name: Anthony Ibarra    : 1934 (88 y.o.)  MRN: [id number]           Patient Unavailable      Patient unable to be seen at this time secondary to: OT attempted Pt x4 today with Pt refusing all attempts. Pt reports he is just fine watching TV and does not need therapy due to his ears working just fine. OT provided education that Pt's reported back pain is a probable cause of remaining in bed, despite max encouragement Pt continued to refuse.

## 2023-02-09 ENCOUNTER — HOSPITAL ENCOUNTER (INPATIENT)
Facility: HOSPITAL | Age: 88
LOS: 15 days | Discharge: NURSING, REHAB OR LONG-TERM CARE | DRG: 536 | End: 2023-02-24
Attending: STUDENT IN AN ORGANIZED HEALTH CARE EDUCATION/TRAINING PROGRAM | Admitting: STUDENT IN AN ORGANIZED HEALTH CARE EDUCATION/TRAINING PROGRAM
Payer: MEDICARE

## 2023-02-09 VITALS
RESPIRATION RATE: 18 BRPM | BODY MASS INDEX: 20.21 KG/M2 | HEIGHT: 71 IN | WEIGHT: 144.38 LBS | TEMPERATURE: 97 F | HEART RATE: 76 BPM | DIASTOLIC BLOOD PRESSURE: 64 MMHG | SYSTOLIC BLOOD PRESSURE: 102 MMHG | OXYGEN SATURATION: 97 %

## 2023-02-09 DIAGNOSIS — S32.302A: ICD-10-CM

## 2023-02-09 DIAGNOSIS — S32.302A CLOSED FRACTURE OF LEFT ILIAC WING, INITIAL ENCOUNTER: ICD-10-CM

## 2023-02-09 DIAGNOSIS — R07.9 CHEST PAIN: ICD-10-CM

## 2023-02-09 DIAGNOSIS — R53.1 GENERALIZED WEAKNESS: ICD-10-CM

## 2023-02-09 PROBLEM — M62.82 RHABDOMYOLYSIS: Status: RESOLVED | Noted: 2023-02-08 | Resolved: 2023-02-09

## 2023-02-09 LAB
ANION GAP SERPL CALC-SCNC: 5 MEQ/L
BUN SERPL-MCNC: 37.7 MG/DL (ref 8.4–25.7)
CALCIUM SERPL-MCNC: 8 MG/DL (ref 8.8–10)
CHLORIDE SERPL-SCNC: 103 MMOL/L (ref 98–107)
CK SERPL-CCNC: 381 U/L (ref 30–200)
CO2 SERPL-SCNC: 30 MMOL/L (ref 23–31)
CREAT SERPL-MCNC: 1.04 MG/DL (ref 0.73–1.18)
CREAT/UREA NIT SERPL: 36
GFR SERPLBLD CREATININE-BSD FMLA CKD-EPI: >60 MLS/MIN/1.73/M2
GLUCOSE SERPL-MCNC: 85 MG/DL (ref 82–115)
POCT GLUCOSE: 98 MG/DL (ref 70–110)
POTASSIUM SERPL-SCNC: 4.5 MMOL/L (ref 3.5–5.1)
SODIUM SERPL-SCNC: 138 MMOL/L (ref 136–145)

## 2023-02-09 PROCEDURE — 97116 GAIT TRAINING THERAPY: CPT | Mod: CQ

## 2023-02-09 PROCEDURE — 97530 THERAPEUTIC ACTIVITIES: CPT

## 2023-02-09 PROCEDURE — 25000003 PHARM REV CODE 250: Performed by: STUDENT IN AN ORGANIZED HEALTH CARE EDUCATION/TRAINING PROGRAM

## 2023-02-09 PROCEDURE — 63600175 PHARM REV CODE 636 W HCPCS: Performed by: STUDENT IN AN ORGANIZED HEALTH CARE EDUCATION/TRAINING PROGRAM

## 2023-02-09 PROCEDURE — 97116 GAIT TRAINING THERAPY: CPT

## 2023-02-09 PROCEDURE — 94760 N-INVAS EAR/PLS OXIMETRY 1: CPT

## 2023-02-09 PROCEDURE — 80048 BASIC METABOLIC PNL TOTAL CA: CPT | Performed by: STUDENT IN AN ORGANIZED HEALTH CARE EDUCATION/TRAINING PROGRAM

## 2023-02-09 PROCEDURE — 97162 PT EVAL MOD COMPLEX 30 MIN: CPT

## 2023-02-09 PROCEDURE — 97110 THERAPEUTIC EXERCISES: CPT | Mod: CQ

## 2023-02-09 PROCEDURE — 97166 OT EVAL MOD COMPLEX 45 MIN: CPT

## 2023-02-09 PROCEDURE — 11000004 HC SNF PRIVATE

## 2023-02-09 PROCEDURE — 82550 ASSAY OF CK (CPK): CPT | Performed by: STUDENT IN AN ORGANIZED HEALTH CARE EDUCATION/TRAINING PROGRAM

## 2023-02-09 RX ORDER — ONDANSETRON 2 MG/ML
4 INJECTION INTRAMUSCULAR; INTRAVENOUS EVERY 8 HOURS PRN
Status: CANCELLED | OUTPATIENT
Start: 2023-02-09

## 2023-02-09 RX ORDER — POLYETHYLENE GLYCOL 3350 17 G/17G
17 POWDER, FOR SOLUTION ORAL 3 TIMES DAILY PRN
Status: DISCONTINUED | OUTPATIENT
Start: 2023-02-09 | End: 2023-02-24 | Stop reason: HOSPADM

## 2023-02-09 RX ORDER — GLUCAGON 1 MG
1 KIT INJECTION
Status: DISCONTINUED | OUTPATIENT
Start: 2023-02-09 | End: 2023-02-24 | Stop reason: HOSPADM

## 2023-02-09 RX ORDER — ACETAMINOPHEN 325 MG/1
650 TABLET ORAL EVERY 4 HOURS PRN
Status: DISCONTINUED | OUTPATIENT
Start: 2023-02-09 | End: 2023-02-24 | Stop reason: HOSPADM

## 2023-02-09 RX ORDER — GLUCAGON 1 MG
1 KIT INJECTION
Status: CANCELLED | OUTPATIENT
Start: 2023-02-09

## 2023-02-09 RX ORDER — MORPHINE SULFATE 4 MG/ML
2 INJECTION, SOLUTION INTRAMUSCULAR; INTRAVENOUS EVERY 6 HOURS PRN
Status: CANCELLED | OUTPATIENT
Start: 2023-02-09

## 2023-02-09 RX ORDER — TALC
9 POWDER (GRAM) TOPICAL NIGHTLY PRN
Status: DISCONTINUED | OUTPATIENT
Start: 2023-02-09 | End: 2023-02-24 | Stop reason: HOSPADM

## 2023-02-09 RX ORDER — ENOXAPARIN SODIUM 100 MG/ML
40 INJECTION SUBCUTANEOUS EVERY 24 HOURS
Status: DISCONTINUED | OUTPATIENT
Start: 2023-02-09 | End: 2023-02-24 | Stop reason: HOSPADM

## 2023-02-09 RX ORDER — NALOXONE HCL 0.4 MG/ML
0.02 VIAL (ML) INJECTION
Status: DISCONTINUED | OUTPATIENT
Start: 2023-02-09 | End: 2023-02-24 | Stop reason: HOSPADM

## 2023-02-09 RX ORDER — SODIUM CHLORIDE 0.9 % (FLUSH) 0.9 %
10 SYRINGE (ML) INJECTION
Status: CANCELLED | OUTPATIENT
Start: 2023-02-09

## 2023-02-09 RX ORDER — TALC
9 POWDER (GRAM) TOPICAL NIGHTLY PRN
Status: CANCELLED | OUTPATIENT
Start: 2023-02-09

## 2023-02-09 RX ORDER — IBUPROFEN 200 MG
16 TABLET ORAL
Status: CANCELLED | OUTPATIENT
Start: 2023-02-09

## 2023-02-09 RX ORDER — IBUPROFEN 200 MG
24 TABLET ORAL
Status: CANCELLED | OUTPATIENT
Start: 2023-02-09

## 2023-02-09 RX ORDER — MAG HYDROX/ALUMINUM HYD/SIMETH 200-200-20
30 SUSPENSION, ORAL (FINAL DOSE FORM) ORAL 4 TIMES DAILY PRN
Status: CANCELLED | OUTPATIENT
Start: 2023-02-09

## 2023-02-09 RX ORDER — ACETAMINOPHEN 325 MG/1
650 TABLET ORAL EVERY 4 HOURS PRN
Status: CANCELLED | OUTPATIENT
Start: 2023-02-09

## 2023-02-09 RX ORDER — HYDROCODONE BITARTRATE AND ACETAMINOPHEN 5; 325 MG/1; MG/1
1 TABLET ORAL EVERY 6 HOURS PRN
Status: CANCELLED | OUTPATIENT
Start: 2023-02-09

## 2023-02-09 RX ORDER — BISACODYL 10 MG
10 SUPPOSITORY, RECTAL RECTAL DAILY PRN
Status: DISCONTINUED | OUTPATIENT
Start: 2023-02-09 | End: 2023-02-24 | Stop reason: HOSPADM

## 2023-02-09 RX ORDER — IPRATROPIUM BROMIDE AND ALBUTEROL SULFATE 2.5; .5 MG/3ML; MG/3ML
3 SOLUTION RESPIRATORY (INHALATION) EVERY 6 HOURS PRN
Status: DISCONTINUED | OUTPATIENT
Start: 2023-02-09 | End: 2023-02-13

## 2023-02-09 RX ORDER — SODIUM CHLORIDE 0.9 % (FLUSH) 0.9 %
10 SYRINGE (ML) INJECTION
Status: DISCONTINUED | OUTPATIENT
Start: 2023-02-09 | End: 2023-02-24 | Stop reason: HOSPADM

## 2023-02-09 RX ORDER — ONDANSETRON 4 MG/1
8 TABLET, ORALLY DISINTEGRATING ORAL EVERY 8 HOURS PRN
Status: CANCELLED | OUTPATIENT
Start: 2023-02-09

## 2023-02-09 RX ORDER — ONDANSETRON 2 MG/ML
4 INJECTION INTRAMUSCULAR; INTRAVENOUS EVERY 8 HOURS PRN
Status: DISCONTINUED | OUTPATIENT
Start: 2023-02-09 | End: 2023-02-24 | Stop reason: HOSPADM

## 2023-02-09 RX ORDER — HYDROCODONE BITARTRATE AND ACETAMINOPHEN 5; 325 MG/1; MG/1
1 TABLET ORAL EVERY 6 HOURS PRN
Status: DISCONTINUED | OUTPATIENT
Start: 2023-02-09 | End: 2023-02-24 | Stop reason: HOSPADM

## 2023-02-09 RX ORDER — IBUPROFEN 200 MG
16 TABLET ORAL
Status: DISCONTINUED | OUTPATIENT
Start: 2023-02-09 | End: 2023-02-24 | Stop reason: HOSPADM

## 2023-02-09 RX ORDER — ONDANSETRON 4 MG/1
8 TABLET, ORALLY DISINTEGRATING ORAL EVERY 8 HOURS PRN
Status: DISCONTINUED | OUTPATIENT
Start: 2023-02-09 | End: 2023-02-24 | Stop reason: HOSPADM

## 2023-02-09 RX ORDER — ENOXAPARIN SODIUM 100 MG/ML
40 INJECTION SUBCUTANEOUS EVERY 24 HOURS
Status: CANCELLED | OUTPATIENT
Start: 2023-02-09

## 2023-02-09 RX ORDER — SIMETHICONE 80 MG
1 TABLET,CHEWABLE ORAL 4 TIMES DAILY PRN
Status: DISCONTINUED | OUTPATIENT
Start: 2023-02-09 | End: 2023-02-24 | Stop reason: HOSPADM

## 2023-02-09 RX ORDER — BISACODYL 10 MG
10 SUPPOSITORY, RECTAL RECTAL DAILY PRN
Status: CANCELLED | OUTPATIENT
Start: 2023-02-09

## 2023-02-09 RX ORDER — IPRATROPIUM BROMIDE AND ALBUTEROL SULFATE 2.5; .5 MG/3ML; MG/3ML
3 SOLUTION RESPIRATORY (INHALATION) EVERY 6 HOURS PRN
Status: CANCELLED | OUTPATIENT
Start: 2023-02-09

## 2023-02-09 RX ORDER — MORPHINE SULFATE 4 MG/ML
2 INJECTION, SOLUTION INTRAMUSCULAR; INTRAVENOUS EVERY 6 HOURS PRN
Status: DISCONTINUED | OUTPATIENT
Start: 2023-02-09 | End: 2023-02-24 | Stop reason: HOSPADM

## 2023-02-09 RX ORDER — MAG HYDROX/ALUMINUM HYD/SIMETH 200-200-20
30 SUSPENSION, ORAL (FINAL DOSE FORM) ORAL 4 TIMES DAILY PRN
Status: DISCONTINUED | OUTPATIENT
Start: 2023-02-09 | End: 2023-02-24 | Stop reason: HOSPADM

## 2023-02-09 RX ORDER — POLYETHYLENE GLYCOL 3350 17 G/17G
17 POWDER, FOR SOLUTION ORAL 3 TIMES DAILY PRN
Status: CANCELLED | OUTPATIENT
Start: 2023-02-09

## 2023-02-09 RX ORDER — NALOXONE HCL 0.4 MG/ML
0.02 VIAL (ML) INJECTION
Status: CANCELLED | OUTPATIENT
Start: 2023-02-09

## 2023-02-09 RX ORDER — SIMETHICONE 80 MG
1 TABLET,CHEWABLE ORAL 4 TIMES DAILY PRN
Status: CANCELLED | OUTPATIENT
Start: 2023-02-09

## 2023-02-09 RX ORDER — IBUPROFEN 200 MG
24 TABLET ORAL
Status: DISCONTINUED | OUTPATIENT
Start: 2023-02-09 | End: 2023-02-24 | Stop reason: HOSPADM

## 2023-02-09 RX ADMIN — HYDROCODONE BITARTRATE AND ACETAMINOPHEN 1 TABLET: 5; 325 TABLET ORAL at 01:02

## 2023-02-09 RX ADMIN — ENOXAPARIN SODIUM 40 MG: 40 INJECTION SUBCUTANEOUS at 05:02

## 2023-02-09 RX ADMIN — SODIUM CHLORIDE, POTASSIUM CHLORIDE, SODIUM LACTATE AND CALCIUM CHLORIDE: 600; 310; 30; 20 INJECTION, SOLUTION INTRAVENOUS at 02:02

## 2023-02-09 NOTE — SUBJECTIVE & OBJECTIVE
Past Medical History:   Diagnosis Date    Cancer     prostate    Compression fracture of body of thoracic vertebra        Past Surgical History:   Procedure Laterality Date    PROSTATE SURGERY N/A        Review of patient's allergies indicates:  No Known Allergies    Current Facility-Administered Medications on File Prior to Encounter   Medication    [] lactated ringers infusion    [DISCONTINUED] 0.45% NaCl infusion    [DISCONTINUED] acetaminophen tablet 650 mg    [DISCONTINUED] albuterol-ipratropium 2.5 mg-0.5 mg/3 mL nebulizer solution 3 mL    [DISCONTINUED] aluminum-magnesium hydroxide-simethicone 200-200-20 mg/5 mL suspension 30 mL    [DISCONTINUED] bisacodyL suppository 10 mg    [DISCONTINUED] dextrose 50% injection 12.5 g    [DISCONTINUED] dextrose 50% injection 25 g    [DISCONTINUED] enoxaparin injection 40 mg    [DISCONTINUED] glucagon (human recombinant) injection 1 mg    [DISCONTINUED] glucose chewable tablet 16 g    [DISCONTINUED] glucose chewable tablet 24 g    [DISCONTINUED] HYDROcodone-acetaminophen 5-325 mg per tablet 1 tablet    [DISCONTINUED] melatonin tablet 9 mg    [DISCONTINUED] morphine injection 2 mg    [DISCONTINUED] naloxone 0.4 mg/mL injection 0.02 mg    [DISCONTINUED] ondansetron disintegrating tablet 8 mg    [DISCONTINUED] ondansetron injection 4 mg    [DISCONTINUED] polyethylene glycol packet 17 g    [DISCONTINUED] simethicone chewable tablet 80 mg    [DISCONTINUED] sodium chloride 0.9% flush 10 mL    [DISCONTINUED] traMADoL tablet 50 mg     No current outpatient medications on file prior to encounter.     Family History       Problem Relation (Age of Onset)    Diabetes Brother          Tobacco Use    Smoking status: Never     Passive exposure: Never    Smokeless tobacco: Never   Substance and Sexual Activity    Alcohol use: Yes     Alcohol/week: 2.0 standard drinks     Types: 2 Cans of beer per week     Comment: socially    Drug use: Not Currently    Sexual activity: Not Currently      Review of Systems   Musculoskeletal:  Positive for arthralgias and back pain.   Neurological:  Positive for weakness.   Objective:     Vital Signs (Most Recent):    Vital Signs (24h Range):  Temp:  [97.4 °F (36.3 °C)-98.2 °F (36.8 °C)] 97.4 °F (36.3 °C)  Pulse:  [] 76  Resp:  [16-20] 18  SpO2:  [93 %-97 %] 97 %  BP: ()/(63-95) 102/64        There is no height or weight on file to calculate BMI.    Physical Exam  Constitutional:       General: He is not in acute distress.     Appearance: Normal appearance. He is underweight.   HENT:      Mouth/Throat:      Mouth: Mucous membranes are moist.      Pharynx: Oropharynx is clear. No oropharyngeal exudate or posterior oropharyngeal erythema.   Eyes:      Extraocular Movements: Extraocular movements intact.      Conjunctiva/sclera: Conjunctivae normal.      Pupils: Pupils are equal, round, and reactive to light.   Neck:      Thyroid: No thyromegaly.   Cardiovascular:      Rate and Rhythm: Normal rate and regular rhythm.      Heart sounds: No murmur heard.    No friction rub. No gallop.   Pulmonary:      Breath sounds: Normal breath sounds.   Abdominal:      General: Bowel sounds are normal. There is no distension.      Palpations: Abdomen is soft.      Tenderness: There is no abdominal tenderness.   Lymphadenopathy:      Cervical: No cervical adenopathy.   Skin:     General: Skin is warm.      Findings: No rash.   Neurological:      General: No focal deficit present.      Mental Status: Mental status is at baseline. He is confused.      Cranial Nerves: No cranial nerve deficit.   Psychiatric:         Mood and Affect: Mood is not anxious or depressed. Affect is not inappropriate.         Cognition and Memory: Memory is impaired.         CRANIAL NERVES     CN III, IV, VI   Pupils are equal, round, and reactive to light.     Significant Labs: All pertinent labs within the past 24 hours have been reviewed.    Significant Imaging: I have reviewed all pertinent  imaging results/findings within the past 24 hours.

## 2023-02-09 NOTE — PT/OT/SLP PROGRESS
Physical Therapy Treatment Note           Name: Anthony Ibarra    : 1934 (88 y.o.)  MRN: [id number]           TREATMENT SUMMARY AND RECOMMENDATIONS:    PT Received On: 23  PT Start Time: 930     PT Stop Time: 955  PT Total Time (min): 25 min     Subjective Assessment:   No complaints  Lethargic    Awake, alert, cooperative  Uncooperative    Agitated  c/o pain    Appropriate  c/o fatigue    Confused x Treated at bedside     Emotionally labile  Treated in gym/dept.    Impulsive  Other:    Flat affect       Therapy Precautions:    Cognitive deficits  Spinal precautions    Collar - hard  Sternal precautions    Collar - soft   TLSO   x Fall risk  LSO    Hip precautions - posterior  Knee immobilizer    Hip precautions - anterior  WBAT    Impaired communication x Partial weightbearing    Oxygen  TTWB    PEG tube  NWB    Visual deficits  Other:    Hearing deficits          Treatment Objectives:     Mobility Training:   Assist level Comments    Bed mobility     Transfer     Gait [place] Amb on firm surface with RW 5 ft    Sit to stand transitions [place] Sit-stand x 5 with B UE use   Sitting balance     Standing balance      Wheelchair mobility     Car transfer     Other:          Therapeutic Exercise:   Exercise Sets Reps Comments   B LE exer short sitting  10 reps x 2  Ankle pumps, TKE, abd/add, knee lifts                          Additional Comments:  No c/o today    Assessment: Patient tolerated session better    PT Plan: continue  Revisions made to plan of care: No    GOALS:   Multidisciplinary Problems       Physical Therapy Goals          Problem: Physical Therapy    Goal Priority Disciplines Outcome Goal Variances Interventions   Physical Therapy Goal     PT, PT/OT Ongoing, Progressing     Description: Goals to be met by: 3/3/23     Patient will increase functional independence with mobility by performin. Supine to sit with Modified [place]  2. Sit to supine with Modified  [place]  3. Sit to stand transfer with Modified [place]  4. Bed to chair transfer with Modified [place] using Rolling Walker  5. Gait  x 100 feet with Supervision using Rolling Walker.                          Skilled PT Minutes Provided: 25   Communication with Treatment Team:     Equipment recommendations:       At end of treatment, patient remained:  x Up in chair     Up in wheelchair in room    In bed   x With alarm activated    Bed rails up   x Call bell in reach     Family/friends present    Restraints secured properly    In bathroom with CNA/RN notified    Nurse aware    In gym with therapist/tech    Other:

## 2023-02-09 NOTE — PROGRESS NOTES
[facility] - Medical Surgical Unit  Wound Care    Patient Name:  Anthony Ibarra   MRN:  [id number]  Date: 2/9/2023  Diagnosis: Closed fracture of left iliac wing    History:     Past Medical History:   Diagnosis Date    Cancer     prostate    Compression fracture of body of thoracic vertebra        Social History     Socioeconomic History    Marital status:    Tobacco Use    Smoking status: Never     Passive exposure: Never    Smokeless tobacco: Never   Substance and Sexual Activity    Alcohol use: Yes     Alcohol/week: 2.0 standard drinks     Types: 2 Cans of beer per week     Comment: socially    Drug use: Not Currently    Sexual activity: Not Currently     Social Determinants of Health     Financial Resource Strain: Low Risk     Difficulty of Paying Living Expenses: Not hard at all   Food Insecurity: No Food Insecurity    Worried About Running Out of Food in the Last Year: Never true    Ran Out of Food in the Last Year: Never true   Transportation Needs: No Transportation Needs    Lack of Transportation (Medical): No    Lack of Transportation (Non-Medical): No   Physical Activity: Inactive    Days of Exercise per Week: 0 days    Minutes of Exercise per Session: 0 min   Stress: No Stress Concern Present    Feeling of Stress : Only a little   Social Connections: Moderately Integrated    Frequency of Communication with Friends and Family: More than three times a week    Frequency of Social Gatherings with Friends and Family: More than three times a week    Attends Congregation Services: More than 4 times per year    Active Member of Clubs or Organizations: No    Attends Club or Organization Meetings: 1 to 4 times per year    Marital Status:    Housing Stability: Low Risk     Unable to Pay for Housing in the Last Year: No    Number of Places Lived in the Last Year: 1    Unstable Housing in the Last Year: No       Precautions:     Allergies as of 02/06/2023    (No Known Allergies)       [facility]  Assessment Details/Treatment     Wound care consult obtained for back abrasion.  Pt admitted on 2/6/22 after a fall (non operable) resulting in iliac fracture.   Kyphoplasty of T6 area performed in Nov of 2022 per pt and family member report at bedside during assessment.   Upon examination, family reported this was the area operated on in the kyphoplasty, records are unavailable to confirm.   Small open area noted with ecchymotic tissue/superficial in appearance.  No fluctuance or depth noted with probing or palpation.   Can't rule out non healing surgical site vs open abrasion s/p falls.  Recommend Aquacel  foam every other day to site.   Dry adherent scab noted to left arm, open to air.   Cachexia noted.   Recommend pressure prevention measures to prevent skin breakdown during hospitalization.  Discussed importance of repositioning with patient, moisture management, nutrition for healing.  Both patient and family member verbalized understanding.      02/08/23 1541        Altered Skin Integrity 02/07/23 1011 Right upper Thoracic spine #1 Abrasion(s) Partial thickness tissue loss. Shallow open ulcer with a red or pink wound bed, without slough. Intact or Open/Ruptured Serum-filled blister.   Date First Assessed/Time First Assessed: 02/07/23 1011   Altered Skin Integrity Present on Admission: yes  Side: Right  Orientation: upper  Location: Thoracic spine  Wound Number: #1  Is this injury device related?: No  Primary Wound Type: Abrasion(s)...   Wound Image    Dressing Appearance Intact;Moist drainage   Drainage Amount Small   Drainage Characteristics/Odor Tan;No odor   Appearance Maroon;Man;Not granulating   Periwound Area Denuded   Wound Edges Defined   Wound Length (cm) 3.8 cm   Wound Width (cm) 2.5 cm   Wound Depth (cm) 0.1 cm   Wound Volume (cm^3) 0.95 cm^3   Wound Surface Area (cm^2) 9.5 cm^2   Care Cleansed with:;Sterile normal saline   Dressing Applied;Foam;Other (comment)  (Aquacel Foam)          Recommendations made to primary team for Aquacel Foam to back every other day and pressure prevention management per protocol. Orders placed.     02/09/2023

## 2023-02-09 NOTE — PT/OT/SLP EVAL
Physical Therapy Swingbed Evaluation      Patient Name:  Anthony Ibarra   MRN:  [id number]    History:     Past Medical History:   Diagnosis Date    Cancer     prostate    Compression fracture of body of thoracic vertebra     Rhabdomyolysis 2/8/2023       Past Surgical History:   Procedure Laterality Date    PROSTATE SURGERY N/A          Subjective     Chief Complaint: Pain  Patient/Family Comments/goals: Return home  Pain/Comfort:  Pain Rating 1: 8/10  Location - Side 1: Bilateral  Location - Orientation 1: lower  Location 1: back  Pain Addressed 1: Nurse notified, Cessation of Activity, Reposition  Pain Rating Post-Intervention 1: 8/10      Living Environment:  Lives with: Alone  Home Environment: [facility], 4 inch threshold to enter home. Walk in shower where he was standing.   Previous level of function: MOD I  Equipment used at home: walker, rolling.  DME owned (not currently used): none.        Objective:     Communicated with Nursing prior to session.  Patient found up in chair with    chair alarm on upon PT entry to room.    General Precautions: Standard,     Orthopedic Precautions:LLE partial weight bearing   Braces:    Respiratory Status: Room air     Vitals Value    Room air      Oxygen (L)     Blood pressure     Heart rate         Exams:  Cognitive Exam:  Patient is oriented to Person, Place, Time, and Situation  RLE Strength: WNL  LLE Strength: Deficits: 2+/5 demonstrated globally, impacted by pain.     Functional Mobility:  Transfers:     Sit to Stand:  moderate assistance and of 2 persons with rolling walker  Gait: Attempted using RW x 2 assistance, but pt was only able to slide feet forward x 1 foot, impacted by 8/10 pain. Nursing was called in for medication.   Balance: Poor+ in standing with RW, impacted by pain.   Bed Mobility: MAX A x 2    Therapeutic Activities and Exercises:   B LE isometrics completed in long sitting; quad sets, hip abd and hip add. Along with B ankle pumps, all x 20 reps.      Additional information: Pt very limited by pain this PM. Pt appeared to be tolerating movement better with OT due to pain medication kicking in. PT to continue progressing as able. PT checked with MD regarding use of back brace, but It was reported compression fractures were chronic.     Patient left up in chair with chair alarm on and OT present.      Assessment:     Anthony Ibarra is a 88 y.o. male admitted with a medical diagnosis of fall at home, leading to acute left iliac wing fx non displaced and nonoperative by othropedic surgery Dr. Harrison. T11-12 and lumbar 1-5 compression fractures per H&P.  He presents with the following impairments/functional limitations:  weakness, impaired endurance, impaired functional mobility, gait instability, decreased lower extremity function, pain, orthopedic precautions .    Rehab Prognosis: Fair; patient would benefit from acute skilled PT services to address these deficits and reach maximum level of function.    Recent Surgery: * No surgery found *        Recommendations:     Discharge Recommendations:  nursing facility, basic, nursing facility, skilled   Discharge Equipment Recommendations:   Pending progress with PT      Plan:     During this hospitalization, patient to be seen 6 x/week to address the identified rehab impairments via gait training, therapeutic activities, therapeutic exercises and progress toward the following goals:    GOALS:   Multidisciplinary Problems       Physical Therapy Goals          Problem: Physical Therapy    Goal Priority Disciplines Outcome Goal Variances Interventions   Physical Therapy Goal     PT, PT/OT Ongoing, Progressing     Description: Goals to be met by: Discharge    Patient will increase functional independence with mobility by performin. Supine to sit with Modified [place]  2. Sit to supine with Modified [place]  3. Sit to stand transfer with Modified [place]  4. Bed to chair transfer with Modified  [place] using Rolling Walker  5. Gait  x 100 feet with Supervision using Rolling Walker.                        Time Tracking:       PT Start Time: 1328     PT Stop Time: 1353  PT Total Time (min): 25 min     Billable Minutes: Evaluation Moderate complexity       02/09/2023

## 2023-02-09 NOTE — PT/OT/SLP PROGRESS
Occupational Therapy  Treatment    Name: Anthony Ibarra    : 1934 (88 y.o.)  MRN: [id number]           TREATMENT SUMMARY AND RECOMMENDATIONS:      OT Date of Treatment: 23  OT Start Time: 910  OT Stop Time: 930  OT Total Time (min): 20 min      Subjective Assessment:   No complaints  Lethargic   X Awake, alert, cooperative  Impulsive    Uncooperative   Flat affect    Agitated  c/o pain    Appropriate  c/o fatigue    Confused X Treated at bedside     Emotionally labile  Treated in gym/dept.      Other:        Therapy Precautions:    Cognitive deficits  Spinal precautions    Collar - hard  Sternal precautions    Collar - soft   TLSO   X Fall risk  LSO    Hip precautions - posterior  Knee immobilizer    Hip precautions - anterior  WBAT    Impaired communication X Partial weightbearing    Oxygen  TTWB    PEG tube  NWB    Visual deficits      Hearing deficits   Other:        Treatment Objectives:     Mobility Training:    Mobility task Assist level Comments    Bed mobility Mod A  OT assisted with moving LLE to EOB. Pt used OT to pull self up into upright sitting.   Transfer Min A  From EOB to bedside chair with use of RW   Sit to stands transitions CGA  From EOB    Functional mobility     Sitting balance     Standing balance      Other:            Additional Comments:  Pt attempted to refuse OT reporting he was fine remaining in bed. OT educated Pt that he refused x4 yesterday and would highly benefit from getting OOB to prepare for discharge. With max encouragement Pt agreed to transfer into bedside chair     Assessment: Patient tolerated session fair.    OT Plan: Continue with current POC   Revisions made to plan of care: No    GOALS:   Multidisciplinary Problems       Occupational Therapy Goals          Problem: Occupational Therapy    Goal Priority Disciplines Outcome Interventions   Occupational Therapy Goal     OT, PT/OT Ongoing, Progressing    Description: Goals to be met by: 2/10/23     Patient  will increase functional independence with ADLs by performing:    Toileting from bedside commode with Contact Guard Assistance for hygiene and clothing management.   Toilet transfer to bedside commode with Contact Guard Assistance.                         Skilled OT Minutes Provided: 20  Communication with Treatment Team:     Equipment recommendations:       At end of treatment, patient remained:  x Up in chair     Up in wheelchair in room    In bed    With alarm activated    Bed rails up    Call bell in reach     Family/friends present    Restraints secured properly    In bathroom with CNA/RN notified    In gym with PT/PTA/tech    Nurse aware   X Other: with tech to begin leg exercises

## 2023-02-09 NOTE — DISCHARGE SUMMARY
[facility] - [facility]  Hospital Medicine  Discharge Summary      Patient Name: Anthony Ibarra  MRN: [id number]  TONE: [id number]  Patient Class: IP- Inpatient  Admission Date: 2/6/2023  Hospital Length of Stay: 3 days  Discharge Date and Time:  02/09/2023 10:25 AM  Attending Physician: Jossue Martinez MD   Discharging Provider: Thairy G Reyes, DO  Primary Care Provider: Jossue Martinez MD    Primary Care Team: Networked reference to record PCT     HPI:   88 y.o. male presented to ED with c/o of fall and being found down at home by friend. C/O pelvic and low back pain found to have acute left iliac wing fx non displaced and nonoperative by othropedic surgery Dr. Harrison. T11-12 and lumbar 1-5 compression fractures. CPK > 3,000.          * No surgery found *      Hospital Course:   Admitted for rehabilitation after a fall resulting in L iliac fracture. Pt has been refusing therapy. Prior to this admission patient was previously on hospice, reason is unclear to me. He did have osseous lumbar lesions found on November 2022 that were concerning for metastasis. Subsequent biopsy was unrevealing for malignancy and he did have kyphoplasty of that region. He is undergoing outpatient screening for multiple myeloma with Dr. Elizabeth LeJeune. Patient has poor insight, he can not recall having had a kyphoplasty and does not know why he was on hospice. He is full code at this point in time however really does need a POA to make that determination given his poor insight.        Goals of Care Treatment Preferences:  Code Status: Full Code      Consults:   Consults (From admission, onward)        Status Ordering Provider     Inpatient consult to Registered Dietitian/Nutritionist  Once        Provider:  (Not yet assigned)    Completed PAMELA HURST R          * Closed fracture of left iliac wing  -non-operative  -PT/OT  -pain control  -pending evaluation with Dr. Harrison on Monday      Generalized  weakness  -PT/OT      Rhabdomyolysis  -resolving        Final Active Diagnoses:    Diagnosis Date Noted POA    PRINCIPAL PROBLEM:  Closed fracture of left iliac wing [S32.302A] 02/08/2023 Yes    Generalized weakness [R53.1] 11/23/2022 Yes    Rhabdomyolysis [M62.82] 02/08/2023 Yes      Problems Resolved During this Admission:       Discharged Condition: stable    Disposition: Skilled Nursing Facility    Follow Up:    Patient Instructions:   No discharge procedures on file.    Significant Diagnostic Studies: Labs:   BMP:   Recent Labs   Lab 02/07/23  1057 02/08/23  0320 02/09/23  0330    140 138   K 4.9 4.9 4.5   CO2 27 30 30   BUN 43.1* 46.1* 37.7*   CREATININE 1.67* 1.26* 1.04   CALCIUM 9.2 8.5* 8.0*       Pending Diagnostic Studies:     None         Medications:  Transfer Medications (for Discharge Readmit only):   Current Facility-Administered Medications   Medication Dose Route Frequency Provider Last Rate Last Admin    acetaminophen tablet 650 mg  650 mg Oral Q4H PRN Thairy G Reyes, DO        albuterol-ipratropium 2.5 mg-0.5 mg/3 mL nebulizer solution 3 mL  3 mL Nebulization Q6H PRN Thairy G Reyes, DO        aluminum-magnesium hydroxide-simethicone 200-200-20 mg/5 mL suspension 30 mL  30 mL Oral QID PRN Thairy G Reyes, DO        bisacodyL suppository 10 mg  10 mg Rectal Daily PRN Thairy G Reyes, DO        dextrose 50% injection 12.5 g  12.5 g Intravenous PRN Thairy G Reyes, DO        dextrose 50% injection 25 g  25 g Intravenous PRN Thairy G Reyes, DO        enoxaparin injection 40 mg  40 mg Subcutaneous Daily Thairy G Reyes, DO   40 mg at 02/08/23 1736    glucagon (human recombinant) injection 1 mg  1 mg Intramuscular PRN Thairy G Reyes, DO        glucose chewable tablet 16 g  16 g Oral PRN Thairy G Reyes, DO        glucose chewable tablet 24 g  24 g Oral PRN Thairy G Reyes, DO        HYDROcodone-acetaminophen 5-325 mg per tablet 1 tablet  1 tablet Oral Q6H PRN Thairy G Reyes, DO   1 tablet  at 02/08/23 2355    melatonin tablet 9 mg  9 mg Oral Nightly PRN Thairy G Reyes, DO        morphine injection 2 mg  2 mg Intravenous Q6H PRN Thairy G Reyes, DO        naloxone 0.4 mg/mL injection 0.02 mg  0.02 mg Intravenous PRN Thairy G Reyes, DO        ondansetron disintegrating tablet 8 mg  8 mg Oral Q8H PRN Thairy G Reyes, DO        ondansetron injection 4 mg  4 mg Intravenous Q8H PRN Thairy G Reyes, DO        polyethylene glycol packet 17 g  17 g Oral TID PRN Thairy G Reyes, DO        simethicone chewable tablet 80 mg  1 tablet Oral QID PRN Thairy G Reyes, DO        sodium chloride 0.9% flush 10 mL  10 mL Intravenous PRN Thairy G Reyes, DO           Indwelling Lines/Drains at time of discharge:   Lines/Drains/Airways     None                 Time spent on the discharge of patient: 40 minutes         Thairy G Reyes, DO  Department of Hospital Medicine  [facility] - [facility]

## 2023-02-09 NOTE — PLAN OF CARE
Problem: Adult Inpatient Plan of Care  Goal: Plan of Care Review  Outcome: Ongoing, Progressing  Flowsheets (Taken 2/9/2023 1134)  Plan of Care Reviewed With: patient  Goal: Patient-Specific Goal (Individualized)  Outcome: Ongoing, Progressing  Flowsheets (Taken 2/9/2023 1134)  Anxieties, Fears or Concerns: Pain with mobility  Individualized Care Needs: Pain control and PT/OT  Goal: Absence of Hospital-Acquired Illness or Injury  Outcome: Ongoing, Progressing  Intervention: Identify and Manage Fall Risk  Flowsheets (Taken 2/9/2023 1134)  Safety Promotion/Fall Prevention:   assistive device/personal item within reach   bed alarm set   gait belt with ambulation   lighting adjusted   room near unit station  Intervention: Prevent Skin Injury  Flowsheets (Taken 2/9/2023 1134)  Body Position: (Sitting in chair) --  Skin Protection:   adhesive use limited   incontinence pads utilized  Intervention: Prevent and Manage VTE (Venous Thromboembolism) Risk  Flowsheets (Taken 2/9/2023 1134)  Activity Management: Up in chair - L3  VTE Prevention/Management:   ambulation promoted   fluids promoted  Range of Motion: active ROM (range of motion) encouraged  Intervention: Prevent Infection  Flowsheets (Taken 2/9/2023 1134)  Infection Prevention:   equipment surfaces disinfected   hand hygiene promoted   personal protective equipment utilized   rest/sleep promoted   single patient room provided   environmental surveillance performed  Goal: Optimal Comfort and Wellbeing  Outcome: Ongoing, Progressing  Intervention: Monitor Pain and Promote Comfort  Flowsheets (Taken 2/9/2023 1134)  Pain Management Interventions: care clustered  Intervention: Provide Person-Centered Care  Flowsheets (Taken 2/9/2023 1134)  Trust Relationship/Rapport:   care explained   choices provided   emotional support provided  Goal: Readiness for Transition of Care  Outcome: Ongoing, Progressing     Problem: Fall Injury Risk  Goal: Absence of Fall and Fall-Related  Injury  Outcome: Ongoing, Progressing  Intervention: Identify and Manage Contributors  Flowsheets (Taken 2/9/2023 1134)  Self-Care Promotion: independence encouraged  Medication Review/Management: medications reviewed  Intervention: Promote Injury-Free Environment  Flowsheets (Taken 2/9/2023 1134)  Safety Promotion/Fall Prevention:   assistive device/personal item within reach   bed alarm set   gait belt with ambulation   lighting adjusted   room near unit station     Problem: Skin Injury Risk Increased  Goal: Skin Health and Integrity  Outcome: Ongoing, Progressing  Intervention: Optimize Skin Protection  Flowsheets (Taken 2/9/2023 1134)  Pressure Reduction Techniques:   frequent weight shift encouraged   positioned off wounds   weight shift assistance provided  Pressure Reduction Devices: foam padding utilized  Skin Protection:   adhesive use limited   incontinence pads utilized  Head of Bed (HOB) Positioning: HOB elevated  Intervention: Promote and Optimize Oral Intake  Flowsheets (Taken 2/9/2023 1134)  Oral Nutrition Promotion: physical activity promoted     Problem: Pain Acute  Goal: Acceptable Pain Control and Functional Ability  Outcome: Ongoing, Progressing  Intervention: Develop Pain Management Plan  Flowsheets (Taken 2/9/2023 1134)  Pain Management Interventions: care clustered  Intervention: Prevent or Manage Pain  Flowsheets (Taken 2/9/2023 1134)  Sensory Stimulation Regulation: care clustered  Bowel Elimination Promotion:   adequate fluid intake promoted   ambulation promoted  Medication Review/Management: medications reviewed  Intervention: Optimize Psychosocial Wellbeing  Flowsheets (Taken 2/9/2023 1134)  Supportive Measures:   active listening utilized   self-care encouraged   verbalization of feelings encouraged  Diversional Activities: television     Problem: Impaired Wound Healing  Goal: Optimal Wound Healing  Outcome: Ongoing, Progressing  Intervention: Promote Wound Healing  Flowsheets (Taken  2/9/2023 1134)  Oral Nutrition Promotion: physical activity promoted  Activity Management: Up in chair - L3  Pain Management Interventions: care clustered     Problem: Infection  Goal: Absence of Infection Signs and Symptoms  Outcome: Ongoing, Progressing  Intervention: Prevent or Manage Infection  Flowsheets (Taken 2/9/2023 1134)  Fever Reduction/Comfort Measures: fluid intake increased  Infection Management: aseptic technique maintained  Isolation Precautions:   protective   precautions maintained

## 2023-02-09 NOTE — PLAN OF CARE
Problem: Occupational Therapy  Goal: Occupational Therapy Goal  Description: Goals to be met by: 02/23/23     Patient will increase functional independence with ADLs by performing:    LE Dressing with Supervision.  Grooming while standing at sink with Stand-by Assistance.  Toileting from bedside commode with Contact Guard Assistance for hygiene and clothing management.   Bathing from  shower chair/bench with Minimal Assistance.  Toilet transfer to toilet with Stand-by Assistance.    Outcome: Ongoing, Progressing

## 2023-02-09 NOTE — PLAN OF CARE
Problem: Adult Inpatient Plan of Care  Goal: Plan of Care Review  Outcome: Ongoing, Progressing  Goal: Patient-Specific Goal (Individualized)  Outcome: Ongoing, Progressing  Goal: Absence of Hospital-Acquired Illness or Injury  Outcome: Ongoing, Progressing  Goal: Optimal Comfort and Wellbeing  Outcome: Ongoing, Progressing  Goal: Readiness for Transition of Care  Outcome: Ongoing, Progressing     Problem: Fall Injury Risk  Goal: Absence of Fall and Fall-Related Injury  Outcome: Ongoing, Progressing  Intervention: Identify and Manage Contributors  Flowsheets (Taken 2/8/2023 1850)  Self-Care Promotion:   independence encouraged   safe use of adaptive equipment encouraged  Medication Review/Management: medications reviewed  Intervention: Promote Injury-Free Environment  Flowsheets (Taken 2/8/2023 1850)  Safety Promotion/Fall Prevention:   assistive device/personal item within reach   bed alarm set   room near unit station   instructed to call staff for mobility     Problem: Skin Injury Risk Increased  Goal: Skin Health and Integrity  Outcome: Ongoing, Progressing  Intervention: Optimize Skin Protection  Flowsheets (Taken 2/8/2023 1850)  Pressure Reduction Techniques:   frequent weight shift encouraged   pressure points protected   weight shift assistance provided  Pressure Reduction Devices: positioning supports utilized  Skin Protection:   adhesive use limited   skin sealant/moisture barrier applied   skin-to-device areas padded   skin-to-skin areas padded   tubing/devices free from skin contact  Head of Bed (HOB) Positioning: HOB elevated  Intervention: Promote and Optimize Oral Intake  Flowsheets (Taken 2/8/2023 1850)  Oral Nutrition Promotion: rest periods promoted     Problem: Pain Acute  Goal: Acceptable Pain Control and Functional Ability  Outcome: Ongoing, Progressing  Intervention: Develop Pain Management Plan  Flowsheets (Taken 2/8/2023 1850)  Pain Management Interventions: care clustered  Intervention:  Prevent or Manage Pain  Flowsheets (Taken 2/8/2023 1850)  Sleep/Rest Enhancement: relaxation techniques promoted  Sensory Stimulation Regulation: care clustered  Bowel Elimination Promotion:   adequate fluid intake promoted   ambulation promoted  Medication Review/Management: medications reviewed  Intervention: Optimize Psychosocial Wellbeing  Flowsheets (Taken 2/8/2023 1850)  Supportive Measures:   active listening utilized   self-responsibility promoted   self-care encouraged   verbalization of feelings encouraged  Diversional Activities: television     Problem: Impaired Wound Healing  Goal: Optimal Wound Healing  Outcome: Ongoing, Progressing  Intervention: Promote Wound Healing  Flowsheets (Taken 2/8/2023 1850)  Oral Nutrition Promotion: rest periods promoted  Sleep/Rest Enhancement: relaxation techniques promoted  Activity Management: Rolling - L1  Pain Management Interventions: care clustered     Problem: Infection  Goal: Absence of Infection Signs and Symptoms  Outcome: Ongoing, Progressing  Intervention: Prevent or Manage Infection  Flowsheets (Taken 2/8/2023 1850)  Fever Reduction/Comfort Measures: lightweight bedding  Infection Management: aseptic technique maintained  Isolation Precautions:   protective   precautions maintained

## 2023-02-09 NOTE — PT/OT/SLP PROGRESS
Physical Therapy Treatment Note           Name: Anthony Ibarra    : 1934 (88 y.o.)  MRN: [id number]           TREATMENT SUMMARY AND RECOMMENDATIONS:    PT Received On: 23  PT Start Time: 1445     PT Stop Time: 1525  PT Total Time (min): 40 min     Subjective Assessment:   No complaints  Lethargic   x Awake, alert, cooperative  Uncooperative    Agitated x c/o pain    Appropriate x c/o fatigue    Confused x Treated at bedside     Emotionally labile  Treated in gym/dept.    Impulsive  Other:    Flat affect       Therapy Precautions:    Cognitive deficits  Spinal precautions    Collar - hard  Sternal precautions    Collar - soft   TLSO   x Fall risk  LSO    Hip precautions - posterior  Knee immobilizer    Hip precautions - anterior  WBAT    Impaired communication x Partial weightbearing LLE    Oxygen  TTWB    PEG tube  NWB    Visual deficits  Other:    Hearing deficits          Treatment Objectives:     Mobility Training:   Assist level Comments    Bed mobility MAX A x 2 Scooting and rolling   Transfer MAX A x 2 Stand pivot from recliner>BSC and BSC>Bed   Gait MAX A x 2 RW with lateral, posterior and anterior steps x 4 each direction.    Sit to stand transitions MAX A x 2 From recliner, BSC and EOB   Sitting balance     Standing balance  MIN A x 2 Prolonged standing x 5 min   Wheelchair mobility     Car transfer     Other:          Therapeutic Exercise:   Exercise Sets Reps Comments                               Additional Comments:  Pt needed extra time for all tasks this afternoon, constantly yelling wait. Pt would become anxious and report pain with all movement, asking PT to please go slow. Pt refused to let PT move the blankets in the bed or move his legs so nursing was informed on why the pt looked how he did in the bed. PT to progress as able. Not recommending pt go home alone.   Also, pt did not use the bathroom on BSC.     Assessment: Patient tolerated session well.    PT Plan:  Continue per POC  Revisions made to plan of care: No    GOALS:   Multidisciplinary Problems       Physical Therapy Goals          Problem: Physical Therapy    Goal Priority Disciplines Outcome Goal Variances Interventions   Physical Therapy Goal     PT, PT/OT Ongoing, Progressing     Description: Goals to be met by: Discharge    Patient will increase functional independence with mobility by performin. Supine to sit with Modified [facility]  2. Sit to supine with Modified [facility]  3. Sit to stand transfer with Modified [facility]  4. Bed to chair transfer with Modified [facility] using Rolling Walker  5. Gait  x 100 feet with Supervision using Rolling Walker.                        Skilled PT Minutes Provided: 40   Communication with Treatment Team:     Equipment recommendations:       At end of treatment, patient remained:   Up in chair     Up in wheelchair in room   x In bed   x With alarm activated   x Bed rails up   x Call bell in reach     Family/friends present    Restraints secured properly    In bathroom with CNA/RN notified   x Nurse aware    In gym with therapist/tech    Other:

## 2023-02-09 NOTE — PT/OT/SLP EVAL
Occupational Therapy   Swingbed Evaluation    Name: Anthony Ibarra  MRN: [id number]  Admitting Diagnosis:  s/p fall       History:   Anthony Ibarra is a 88 y.o. male who presented to ED with c/o of fall and being found down at home by friend. C/O pelvic and low back pain found to have acute left iliac wing fx non displaced and nonoperative by othropedic surgery Dr. Harrison. T11-12 and lumbar 1-5 compression fractures.    Past Medical History:   Diagnosis Date    Cancer     prostate    Compression fracture of body of thoracic vertebra     Rhabdomyolysis 2/8/2023         Past Surgical History:   Procedure Laterality Date    PROSTATE SURGERY N/A        Subjective     Chief Complaint: pain in back  Patient/Family Comments/goals: return to PLOF    Occupational Profile:  Lives with: alone   Home Environment: single level home with walk in shower and shower chair  Previous level of function: Pt reports he was Mod I for all ADL using a RW for mobility.   Equipment Used at Home:  walker, rolling, shower chair    Pain/Comfort:  Pain Rating 1: 8/10  Location 1: back  Pain Addressed 1: Pre-medicate for activity  Pain Rating Post-Intervention 1: 4/10  Location - Side 2: Left  Location 2: leg  Pain Addressed 2: Pre-medicate for activity      Objective:     Communicated with: PT prior to session.  Patient found up in chair in gym environment upon OT entry to room.    General Precautions: Standard, fall   Orthopedic Precautions:LLE partial weight bearing   Braces: N/A  Respiratory Status: Room air    Occupational Performance:    Mobility  Assist level Comments    Bed mobility Moderate assist     Transfer Minimal assist x2    Functional mobility N/A    Sit to stand transitions Minimal assist x2 Pt very fearful in anticipation of pain in back/LLE   Other:          Activities of Daily Living Assist level Comments    Feeding set up    Grooming/hygiene set up    Bathing moderate assist    Upper body dressing set up    Lower body  dressing minimal assist Demo'd use of reacher to jemal pants; pt able to thread BLEs with touching assist and able to manage over hips in standing using 1UE at a time with balance assist   Toileting moderate assist    Toilet transfer minimal assist x2    Adaptive equipment training     Other:       Physical Exam:  Upper Extremity Range of Motion:     -       Right Upper Extremity: WFL  -       Left Upper Extremity: WFL  Upper Extremity Strength:    -       Right Upper Extremity: 4-/5  -       Left Upper Extremity: 4-/5      Patient left up in chair with call button in reach and chair alarm on    Assessment:     He presents with decreased strength and endurance affecting his ADL performance. Performance deficits affecting function: weakness, impaired endurance, orthopedic precautions, impaired self care skills, decreased upper extremity function, impaired functional mobility, decreased lower extremity function, pain, impaired balance.      Rehab Prognosis: Fair; patient would benefit from acute skilled OT services to address these deficits and reach maximum level of function.       Plan:     Patient to be seen 5 x/week, 6 x/week to address the above listed problems via self-care/home management, therapeutic activities, therapeutic exercises  Plan of Care Reviewed with: patient      GOALS:   Multidisciplinary Problems       Occupational Therapy Goals          Problem: Occupational Therapy    Goal Priority Disciplines Outcome Interventions   Occupational Therapy Goal     OT, PT/OT Ongoing, Progressing    Description: Goals to be met by: 02/23/23     Patient will increase functional independence with ADLs by performing:    LE Dressing with Supervision.  Grooming while standing at sink with Stand-by Assistance.  Toileting from bedside commode with Contact Guard Assistance for hygiene and clothing management.   Bathing from  shower chair/bench with Minimal Assistance.  Toilet transfer to toilet with Stand-by Assistance.                            Recommendations:     Discharge Recommendations: nursing facility, skilled  Discharge Equipment Recommendations:  other (see comments) (pending placement)      Time Tracking:     OT Date of Treatment: 02/09/23  OT Start Time: 1358  OT Stop Time: 1420  OT Total Time (min): 22 min    Billable Minutes:Evaluation 22 min    2/9/2023

## 2023-02-09 NOTE — HOSPITAL COURSE
Admitted for rehabilitation after a fall resulting in L iliac fracture. Pt has been refusing therapy. Prior to this admission patient was previously on hospice, reason is unclear to me. He did have osseous lumbar lesions found on November 2022 that were concerning for metastasis. Subsequent biopsy was unrevealing for malignancy and he did have kyphoplasty of that region. He is undergoing outpatient screening for multiple myeloma with Dr. Elizabeth LeJeune. Patient has poor insight, he can not recall having had a kyphoplasty and does not know why he was on hospice. Discussed with pt's daughter 2/9 she revealed pt was on hospice secondary to dementia and was able to provide a living will that requests comfort measures, DNR and DNI.  Patient was admitted to swing bed on 02/09/2023 for continued rehabilitation however refusal has been a problem. Had an extensive conversation with pt's daughter today regarding his cognition and how that is contributing the most to his ability to safely live alone. Pt has poor insight and safety awareness in addition to his physical deficits. She agrees with placement inpatient is being discharged to nursing home today.

## 2023-02-09 NOTE — H&P
[facility] Medicine  History & Physical    Patient Name: Anthony Ibarra  MRN: [id number]  Patient Class: IP- Swing  Admission Date: 2023  Attending Physician: Thairy G Reyes, DO   Primary Care Provider: Jossue Martinez MD         Patient information was obtained from patient, past medical records and ER records.     Subjective:     Principal Problem:<principal problem not specified>    Chief Complaint: No chief complaint on file.       HPI: 88 y.o. male presented to ED with c/o of fall and being found down at home by friend. C/O pelvic and low back pain found to have acute left iliac wing fx non displaced and nonoperative by othropedic surgery Dr. Harrison. T11-12 and lumbar 1-5 compression fractures. CPK > 3,000.          Past Medical History:   Diagnosis Date    Cancer     prostate    Compression fracture of body of thoracic vertebra        Past Surgical History:   Procedure Laterality Date    PROSTATE SURGERY N/A        Review of patient's allergies indicates:  No Known Allergies    Current Facility-Administered Medications on File Prior to Encounter   Medication    [] lactated ringers infusion    [DISCONTINUED] 0.45% NaCl infusion    [DISCONTINUED] acetaminophen tablet 650 mg    [DISCONTINUED] albuterol-ipratropium 2.5 mg-0.5 mg/3 mL nebulizer solution 3 mL    [DISCONTINUED] aluminum-magnesium hydroxide-simethicone 200-200-20 mg/5 mL suspension 30 mL    [DISCONTINUED] bisacodyL suppository 10 mg    [DISCONTINUED] dextrose 50% injection 12.5 g    [DISCONTINUED] dextrose 50% injection 25 g    [DISCONTINUED] enoxaparin injection 40 mg    [DISCONTINUED] glucagon (human recombinant) injection 1 mg    [DISCONTINUED] glucose chewable tablet 16 g    [DISCONTINUED] glucose chewable tablet 24 g    [DISCONTINUED] HYDROcodone-acetaminophen 5-325 mg per tablet 1 tablet    [DISCONTINUED] melatonin tablet 9 mg    [DISCONTINUED] morphine injection 2 mg     [DISCONTINUED] naloxone 0.4 mg/mL injection 0.02 mg    [DISCONTINUED] ondansetron disintegrating tablet 8 mg    [DISCONTINUED] ondansetron injection 4 mg    [DISCONTINUED] polyethylene glycol packet 17 g    [DISCONTINUED] simethicone chewable tablet 80 mg    [DISCONTINUED] sodium chloride 0.9% flush 10 mL    [DISCONTINUED] traMADoL tablet 50 mg     No current outpatient medications on file prior to encounter.     Family History       Problem Relation (Age of Onset)    Diabetes Brother          Tobacco Use    Smoking status: Never     Passive exposure: Never    Smokeless tobacco: Never   Substance and Sexual Activity    Alcohol use: Yes     Alcohol/week: 2.0 standard drinks     Types: 2 Cans of beer per week     Comment: socially    Drug use: Not Currently    Sexual activity: Not Currently     Review of Systems   Musculoskeletal:  Positive for arthralgias and back pain.   Neurological:  Positive for weakness.   Objective:     Vital Signs (Most Recent):    Vital Signs (24h Range):  Temp:  [97.4 °F (36.3 °C)-98.2 °F (36.8 °C)] 97.4 °F (36.3 °C)  Pulse:  [] 76  Resp:  [16-20] 18  SpO2:  [93 %-97 %] 97 %  BP: ()/(63-95) 102/64        There is no height or weight on file to calculate BMI.    Physical Exam  Constitutional:       General: He is not in acute distress.     Appearance: Normal appearance. He is underweight.   HENT:      Mouth/Throat:      Mouth: Mucous membranes are moist.      Pharynx: Oropharynx is clear. No oropharyngeal exudate or posterior oropharyngeal erythema.   Eyes:      Extraocular Movements: Extraocular movements intact.      Conjunctiva/sclera: Conjunctivae normal.      Pupils: Pupils are equal, round, and reactive to light.   Neck:      Thyroid: No thyromegaly.   Cardiovascular:      Rate and Rhythm: Normal rate and regular rhythm.      Heart sounds: No murmur heard.    No friction rub. No gallop.   Pulmonary:      Breath sounds: Normal breath sounds.   Abdominal:       General: Bowel sounds are normal. There is no distension.      Palpations: Abdomen is soft.      Tenderness: There is no abdominal tenderness.   Lymphadenopathy:      Cervical: No cervical adenopathy.   Skin:     General: Skin is warm.      Findings: No rash.   Neurological:      General: No focal deficit present.      Mental Status: Mental status is at baseline. He is confused.      Cranial Nerves: No cranial nerve deficit.   Psychiatric:         Mood and Affect: Mood is not anxious or depressed. Affect is not inappropriate.         Cognition and Memory: Memory is impaired.         CRANIAL NERVES     CN III, IV, VI   Pupils are equal, round, and reactive to light.     Significant Labs: All pertinent labs within the past 24 hours have been reviewed.    Significant Imaging: I have reviewed all pertinent imaging results/findings within the past 24 hours.    Assessment/Plan:     Closed fracture of left iliac wing  -non-operative  -PT/OT  -pain control  -pending evaluation with Dr. Harrison on Monday      Generalized weakness  -PT/OT        VTE Risk Mitigation (From admission, onward)         Ordered     enoxaparin injection 40 mg  Daily         02/09/23 1202     Place NISHA hose  Until discontinued         02/09/23 1202     IP VTE HIGH RISK PATIENT  Once         02/09/23 1202     Place sequential compression device  Until discontinued         02/09/23 1202                   Thairy G Reyes, DO  Department of Hospital Medicine   [facility]

## 2023-02-09 NOTE — PLAN OF CARE
Problem: Adult Inpatient Plan of Care  Goal: Plan of Care Review  Outcome: Ongoing, Progressing  Flowsheets (Taken 2/8/2023 2139)  Plan of Care Reviewed With: patient  Goal: Patient-Specific Goal (Individualized)  Outcome: Ongoing, Progressing  Flowsheets (Taken 2/8/2023 2139)  Anxieties, Fears or Concerns: none  Individualized Care Needs: pain control     Problem: Pain Acute  Goal: Acceptable Pain Control and Functional Ability  Outcome: Ongoing, Progressing  Intervention: Develop Pain Management Plan  Flowsheets (Taken 2/8/2023 2139)  Pain Management Interventions: pain management plan reviewed with patient/caregiver

## 2023-02-09 NOTE — PLAN OF CARE
[facility]  Discharge Final Note    Primary Care Provider: oJssue Martinez MD    Expected Discharge Date:     Final Discharge Note (most recent)       Final Note - 02/09/23 0759          Final Note    Assessment Type Final Discharge Note     Anticipated Discharge Disposition [facility] Hospital with Planned Readmission     What phone number can be called within the next 1-3 days to see how you are doing after discharge? [phone]     Hospital Resources/Appts/Education Provided Provided patient/caregiver with written discharge plan information        Post-Acute Status    Post-Acute Authorization Hospice;Home Health     Home Health Status Pending medical clearance/testing     Hospice Status Pending medical clearance/testing     Discharge Delays None known at this time                     Important Message from Medicare

## 2023-02-09 NOTE — PLAN OF CARE
Goals to be met by: Discharge    Patient will increase functional independence with mobility by performin. Supine to sit with Modified [place]  2. Sit to supine with Modified [place]  3. Sit to stand transfer with Modified [place]  4. Bed to chair transfer with Modified [place] using Rolling Walker  5. Gait  x 100 feet with Supervision using Rolling Walker.

## 2023-02-10 PROBLEM — F03.918 DEMENTIA WITH BEHAVIORAL DISTURBANCE: Status: ACTIVE | Noted: 2023-02-10

## 2023-02-10 LAB
POCT GLUCOSE: 110 MG/DL (ref 70–110)
POCT GLUCOSE: 111 MG/DL (ref 70–110)
POCT GLUCOSE: 127 MG/DL (ref 70–110)
POCT GLUCOSE: 97 MG/DL (ref 70–110)

## 2023-02-10 PROCEDURE — 97530 THERAPEUTIC ACTIVITIES: CPT

## 2023-02-10 PROCEDURE — 94760 N-INVAS EAR/PLS OXIMETRY 1: CPT

## 2023-02-10 PROCEDURE — 97535 SELF CARE MNGMENT TRAINING: CPT

## 2023-02-10 PROCEDURE — 97110 THERAPEUTIC EXERCISES: CPT

## 2023-02-10 PROCEDURE — 11000004 HC SNF PRIVATE

## 2023-02-10 PROCEDURE — 25000003 PHARM REV CODE 250: Performed by: STUDENT IN AN ORGANIZED HEALTH CARE EDUCATION/TRAINING PROGRAM

## 2023-02-10 PROCEDURE — 63600175 PHARM REV CODE 636 W HCPCS: Performed by: STUDENT IN AN ORGANIZED HEALTH CARE EDUCATION/TRAINING PROGRAM

## 2023-02-10 RX ADMIN — ACETAMINOPHEN 650 MG: 325 TABLET, FILM COATED ORAL at 08:02

## 2023-02-10 RX ADMIN — ENOXAPARIN SODIUM 40 MG: 40 INJECTION SUBCUTANEOUS at 04:02

## 2023-02-10 NOTE — PLAN OF CARE
Problem: Adult Inpatient Plan of Care  Goal: Plan of Care Review  Outcome: Ongoing, Progressing  Flowsheets (Taken 2/10/2023 0500)  Plan of Care Reviewed With: patient  Goal: Patient-Specific Goal (Individualized)  Outcome: Ongoing, Progressing  Goal: Absence of Hospital-Acquired Illness or Injury  Outcome: Ongoing, Progressing  Intervention: Identify and Manage Fall Risk  Flowsheets (Taken 2/10/2023 0500)  Safety Promotion/Fall Prevention:   assistive device/personal item within reach   bed alarm set   nonskid shoes/socks when out of bed   side rails raised x 3  Intervention: Prevent Skin Injury  Flowsheets (Taken 2/10/2023 0500)  Body Position: position changed independently  Skin Protection:   adhesive use limited   incontinence pads utilized  Intervention: Prevent and Manage VTE (Venous Thromboembolism) Risk  Flowsheets (Taken 2/10/2023 0500)  Activity Management: Patient unable to perform activities  VTE Prevention/Management:   bleeding precations maintained   bleeding risk assessed  Range of Motion: active ROM (range of motion) encouraged  Intervention: Prevent Infection  Flowsheets (Taken 2/10/2023 0500)  Infection Prevention:   cohorting utilized   hand hygiene promoted   rest/sleep promoted   equipment surfaces disinfected  Goal: Optimal Comfort and Wellbeing  Outcome: Ongoing, Progressing  Intervention: Monitor Pain and Promote Comfort  Flowsheets (Taken 2/10/2023 0500)  Pain Management Interventions: position adjusted  Intervention: Provide Person-Centered Care  Flowsheets (Taken 2/10/2023 0500)  Trust Relationship/Rapport:   care explained   choices provided   questions answered   questions encouraged  Goal: Readiness for Transition of Care  Outcome: Ongoing, Progressing     Problem: Infection  Goal: Absence of Infection Signs and Symptoms  Outcome: Ongoing, Progressing  Intervention: Prevent or Manage Infection  Flowsheets (Taken 2/10/2023 0500)  Infection Management: aseptic technique  maintained  Isolation Precautions: protective     Problem: Impaired Wound Healing  Goal: Optimal Wound Healing  Outcome: Ongoing, Progressing  Intervention: Promote Wound Healing  Flowsheets (Taken 2/10/2023 0500)  Activity Management: Patient unable to perform activities  Pain Management Interventions: position adjusted     Problem: Fall Injury Risk  Goal: Absence of Fall and Fall-Related Injury  Outcome: Ongoing, Progressing  Intervention: Identify and Manage Contributors  Flowsheets (Taken 2/10/2023 0500)  Self-Care Promotion: independence encouraged  Medication Review/Management: medications reviewed  Intervention: Promote Injury-Free Environment  Flowsheets (Taken 2/10/2023 0500)  Safety Promotion/Fall Prevention:   assistive device/personal item within reach   bed alarm set   nonskid shoes/socks when out of bed   side rails raised x 3     Problem: Skin Injury Risk Increased  Goal: Skin Health and Integrity  Outcome: Ongoing, Progressing  Intervention: Optimize Skin Protection  Flowsheets (Taken 2/10/2023 0500)  Pressure Reduction Techniques:   frequent weight shift encouraged   pressure points protected   positioned off wounds  Pressure Reduction Devices: positioning supports utilized  Skin Protection:   adhesive use limited   incontinence pads utilized  Head of Bed (HOB) Positioning: HOB at 20-30 degrees

## 2023-02-10 NOTE — PLAN OF CARE
Problem: Physical Therapy  Goal: Physical Therapy Goal  Description: Goals to be met by: Discharge    Patient will increase functional independence with mobility by performin. Supine to sit with Modified [place]  2. Sit to supine with Modified [place]  3. Sit to stand transfer with Modified [place]  4. Bed to chair transfer with Modified [place] using Rolling Walker  5. Gait  x 100 feet with Supervision using Rolling Walker.   Outcome: Ongoing, Progressing

## 2023-02-10 NOTE — PROGRESS NOTES
"Inpatient Nutrition Evaluation    Admit Date: 2/9/2023   Total duration of encounter: 1 day    Nutrition Recommendation/Prescription     Continue regular diet. 2. Continue arginaid daily. 3. Continue daily weight as ordered.     Nutrition Assessment     Chart Review    Reason Seen: continuous nutrition monitoring, length of stay, and follow-up    Malnutrition Screening Tool Results   Have you recently lost weight without trying?: No  Have you been eating poorly because of a decreased appetite?: No   MST Score: 0     Diagnosis:    Generalized weakness 11/23/2022      Closed fracture of left iliac wing 2/8/2023      Dementia with behavioral disturbance      Relevant Medical History:   Rhabdomyolysis  Date Unknown  Cancer   Date Unknown  Compression fracture of body of thoracic vertebra  Nutrition-Related Medications:     Nutrition-Related Labs:   Latest Reference Range & Units 02/09/23 03:30   Sodium 136 - 145 mmol/L 138   Potassium 3.5 - 5.1 mmol/L 4.5   Chloride 98 - 107 mmol/L 103   CO2 23 - 31 mmol/L 30   Anion Gap mEq/L 5.0   BUN 8.4 - 25.7 mg/dL 37.7 (H)   Creatinine 0.73 - 1.18 mg/dL 1.04   BUN/CREAT RATIO  36   eGFR mls/min/1.73/m2 >60   Glucose 82 - 115 mg/dL 85   Calcium 8.8 - 10.0 mg/dL 8.0 (L)   (H): Data is abnormally high  (L): Data is abnormally low      Diet Order: Diet Adult Regular  Oral Supplement Order:  Arginaid  Appetite/Oral Intake: good/% of meals  Factors Affecting Nutritional Intake: none identified  Food/Episcopalian/Cultural Preferences: none reported  Food Allergies: none reported    Skin Integrity: abrasion  Wound(s):       Comments    Pt intake is good. 100% of lunch. Marked menus with patient. Will monitor. Pt drinking arginaid.     Anthropometrics    Height: 5' 11" (180.3 cm) Height Method: Stated  Last Weight: 65.5 kg (144 lb 6.4 oz) (02/09/23 1234) Weight Method: Bed Scale  BMI (Calculated): 20.1  BMI Classification: underweight (BMI less than 22 if >65 years of age)        Ideal " Body Weight (IBW), Male: 172 lb     % Ideal Body Weight, Male (lb): 83.95 %                          Usual Weight Provided By: unable to obtain usual weight    Wt Readings from Last 3 Encounters:   02/09/23 1234 65.5 kg (144 lb 6.4 oz)   02/06/23 1715 65.5 kg (144 lb 6.4 oz)   02/06/23 1050 72.6 kg (160 lb)   12/27/22 1411 72 kg (158 lb 12.8 oz)      Weight Change(s) Since Admission:  Admit Weight: 65.5 kg (144 lb 6.4 oz) (02/09/23 1234)      Patient Education    Not applicable.    Monitoring & Evaluation     Dietitian will monitor food and beverage intake, weight, weight change, glucose/endocrine profile, and gastrointestinal profile.  Nutrition Risk/Follow-Up: low (follow-up in 5-7 days)  Patients assigned 'low nutrition risk' status do not qualify for a full nutritional assessment but will be monitored and re-evaluated in a 5-7 day time period. Please consult if re-evaluation needed sooner.

## 2023-02-10 NOTE — PLAN OF CARE
Problem: Adult Inpatient Plan of Care  Goal: Plan of Care Review  Outcome: Ongoing, Progressing  Flowsheets (Taken 2/10/2023 1630)  Plan of Care Reviewed With: patient  Goal: Patient-Specific Goal (Individualized)  Outcome: Ongoing, Progressing  Goal: Absence of Hospital-Acquired Illness or Injury  Outcome: Ongoing, Progressing  Intervention: Identify and Manage Fall Risk  Flowsheets (Taken 2/10/2023 1630)  Safety Promotion/Fall Prevention:   assistive device/personal item within reach   bed alarm set   chair alarm set   gait belt with ambulation   in recliner, wheels locked   nonskid shoes/socks when out of bed   room near unit station   instructed to call staff for mobility  Intervention: Prevent Skin Injury  Flowsheets (Taken 2/10/2023 1630)  Body Position: position maintained  Skin Protection:   adhesive use limited   incontinence pads utilized  Intervention: Prevent and Manage VTE (Venous Thromboembolism) Risk  Flowsheets (Taken 2/10/2023 1630)  Activity Management: Up in chair - L3  VTE Prevention/Management:   bleeding precations maintained   bleeding risk assessed  Range of Motion: active ROM (range of motion) encouraged  Goal: Optimal Comfort and Wellbeing  Outcome: Ongoing, Progressing  Goal: Readiness for Transition of Care  Outcome: Ongoing, Progressing     Problem: Infection  Goal: Absence of Infection Signs and Symptoms  Outcome: Ongoing, Progressing     Problem: Impaired Wound Healing  Goal: Optimal Wound Healing  Outcome: Ongoing, Progressing     Problem: Fall Injury Risk  Goal: Absence of Fall and Fall-Related Injury  Outcome: Ongoing, Progressing     Problem: Skin Injury Risk Increased  Goal: Skin Health and Integrity  Outcome: Ongoing, Progressing

## 2023-02-10 NOTE — PT/OT/SLP PROGRESS
Occupational Therapy  Treatment    Name: Anthony Ibarra    : 1934 (88 y.o.)  MRN: [id number]           TREATMENT SUMMARY AND RECOMMENDATIONS:      OT Date of Treatment: 02/10/23  OT Start Time:   OT Stop Time: 134  OT Total Time (min): 10 min      Subjective Assessment:   No complaints  Lethargic    Awake, alert, cooperative  Impulsive   x Uncooperative   Flat affect    Agitated  c/o pain    Appropriate  c/o fatigue    Confused  Treated at bedside     Emotionally labile x Treated in gym/dept.      Other:        Therapy Precautions:   x Cognitive deficits  Spinal precautions    Collar - hard  Sternal precautions    Collar - soft   TLSO   x Fall risk  LSO    Hip precautions - posterior  Knee immobilizer    Hip precautions - anterior  WBAT    Impaired communication  Partial weightbearing    Oxygen  TTWB    PEG tube  NWB    Visual deficits      Hearing deficits   Other:        Treatment Objectives:     Mobility Training:    Mobility task Assist level Comments    Bed mobility     Transfer     Sit to stands transitions Min A X1 rep from [place]    Functional mobility     Sitting balance     Standing balance      Other:          Therapeutic Exercise:   Exercise Sets Reps Comments   1# dowel 1 12 chest press, straight arm raises, bicep curls, forward rows, backwards rows                         Additional Comments:  Pt refusing to perform any further exercises despite education and motivation.     Assessment: Patient tolerated session fair.    OT Plan: Continue POC  Revisions made to plan of care: No    GOALS:   Multidisciplinary Problems       Occupational Therapy Goals          Problem: Occupational Therapy    Goal Priority Disciplines Outcome Interventions   Occupational Therapy Goal     OT, PT/OT Ongoing, Progressing    Description: Goals to be met by: 23     Patient will increase functional independence with ADLs by performing:    LE Dressing with Supervision.  Grooming while standing at sink with  Stand-by Assistance.  Toileting from bedside commode with Contact Guard Assistance for hygiene and clothing management.   Bathing from  shower chair/bench with Minimal Assistance.  Toilet transfer to toilet with Stand-by Assistance.                         Skilled OT Minutes Provided: 10  Communication with Treatment Team:     Equipment recommendations:       At end of treatment, patient remained:  x Up in chair     Up in wheelchair in room    In bed   x With alarm activated    Bed rails up   x Call bell in reach     Family/friends present    Restraints secured properly    In bathroom with CNA/RN notified    In gym with PT/PTA/tech    Nurse aware    Other:

## 2023-02-10 NOTE — SUBJECTIVE & OBJECTIVE
Interval History: Complaining of low back pain however refuses pain medication. Educated on taking prior to therapy.  Patient is max assistance x2    Review of Systems   Musculoskeletal:  Positive for back pain.   Objective:     Vital Signs (Most Recent):  Temp: 97.6 °F (36.4 °C) (02/10/23 1124)  Pulse: 88 (02/10/23 1124)  Resp: 18 (02/10/23 0329)  BP: 99/67 (02/10/23 1124)  SpO2: 98 % (02/10/23 1124)   Vital Signs (24h Range):  Temp:  [97.4 °F (36.3 °C)-98.6 °F (37 °C)] 97.6 °F (36.4 °C)  Pulse:  [] 88  Resp:  [18-20] 18  SpO2:  [95 %-98 %] 98 %  BP: ()/(57-81) 99/67     Weight: 65.5 kg (144 lb 6.4 oz)  Body mass index is 20.14 kg/m².    Intake/Output Summary (Last 24 hours) at 2/10/2023 1309  Last data filed at 2/10/2023 0914  Gross per 24 hour   Intake 360 ml   Output 650 ml   Net -290 ml        Physical Exam  Constitutional:       General: He is not in acute distress.     Appearance: Normal appearance. He is cachectic.   HENT:      Head: Normocephalic and atraumatic.      Nose: No congestion or rhinorrhea.      Mouth/Throat:      Mouth: Mucous membranes are moist.   Eyes:      Conjunctiva/sclera: Conjunctivae normal.      Pupils: Pupils are equal, round, and reactive to light.   Cardiovascular:      Rate and Rhythm: Normal rate and regular rhythm.      Heart sounds: No murmur heard.  Pulmonary:      Effort: Pulmonary effort is normal.      Breath sounds: Normal breath sounds. No wheezing.   Chest:      Comments:  Pectus excavatum  Abdominal:      General: Bowel sounds are normal. There is no distension.      Palpations: Abdomen is soft.      Tenderness: There is no abdominal tenderness.   Musculoskeletal:         General: No swelling. Normal range of motion.      Cervical back: Normal range of motion and neck supple.      Right lower leg: No edema.      Left lower leg: No edema.   Skin:     General: Skin is warm and dry.      Findings: No rash.   Neurological:      General: No focal deficit present.       Mental Status: He is alert and oriented to person, place, and time.   Psychiatric:         Mood and Affect: Mood normal.         Behavior: Behavior normal.       Significant Labs: All pertinent labs within the past 24 hours have been reviewed.    Significant Imaging: I have reviewed all pertinent imaging results/findings within the past 24 hours.

## 2023-02-10 NOTE — PT/OT/SLP PROGRESS
Physical Therapy Treatment Note           Name: Anthony Ibarra    : 1934 (88 y.o.)  MRN: [id number]           TREATMENT SUMMARY AND RECOMMENDATIONS:    PT Received On: 02/10/23  PT Start Time: 1035     PT Stop Time: 1057  PT Total Time (min): 22 min     Subjective Assessment:   No complaints  Lethargic   x Awake, alert, cooperative  Uncooperative    Agitated x c/o pain    Appropriate  c/o fatigue    Confused  Treated at bedside     Emotionally labile x Treated in gym/dept.    Impulsive  Other:    Flat affect       Therapy Precautions:    Cognitive deficits  Spinal precautions    Collar - hard  Sternal precautions    Collar - soft   TLSO   x Fall risk  LSO    Hip precautions - posterior  Knee immobilizer    Hip precautions - anterior  WBAT    Impaired communication x Partial weightbearing    Oxygen  TTWB    PEG tube  NWB    Visual deficits  Other:    Hearing deficits          Treatment Objectives:     Mobility Training:   Assist level Comments    Bed mobility     Transfer     Gait     Sit to stand transitions     Sitting balance     Standing balance      Wheelchair mobility     Car transfer     Other:          Therapeutic Exercise:   Exercise Sets Reps Comments   LE exercises 1 20 Performed short sitting  Assistance with RLE   Chair puships 1 5 Performed from bedside chair surface; unable to fully lift bottom from chair                   Additional Comments:      Assessment: Patient tolerated session fair. Patient is refusing to take medicine for pain, thus hindering his tolerance to therapy.     PT Plan: continue POC  Revisions made to plan of care: No    GOALS:   Multidisciplinary Problems       Physical Therapy Goals          Problem: Physical Therapy    Goal Priority Disciplines Outcome Goal Variances Interventions   Physical Therapy Goal     PT, PT/OT Ongoing, Progressing     Description: Goals to be met by: Discharge    Patient will increase functional independence with mobility by  performin. Supine to sit with Modified [place]  2. Sit to supine with Modified [place]  3. Sit to stand transfer with Modified [place]  4. Bed to chair transfer with Modified [place] using Rolling Walker  5. Gait  x 100 feet with Supervision using Rolling Walker.                        Skilled PT Minutes Provided: 22 minutes   Communication with Treatment Team:     Equipment recommendations:       At end of treatment, patient remained:  x Up in chair     Up in wheelchair in room    In bed   x With alarm activated    Bed rails up   x Call bell in reach     Family/friends present    Restraints secured properly    In bathroom with CNA/RN notified    Nurse aware    In gym with therapist/tech    Other:

## 2023-02-10 NOTE — PLAN OF CARE
[facility]  Initial Discharge Assessment       Primary Care Provider: Jossue Martinez MD    Admission Diagnosis: Fracture of left iliac wing [S32.302A]    Admission Date: 2/9/2023  Expected Discharge Date: 2/9/2023    Discharge Barriers Identified: None    Payor: MEDICARE / Plan: MEDICARE PART A & B / Product Type: Government /     Extended Emergency Contact Information  Primary Emergency Contact: jadon Calzada  Mobile Phone: [phone]  Relation: Friend  Secondary Emergency Contact: Sarah Ibarra  Mobile Phone: [phone]  Relation: Daughter   needed? No    Discharge Plan A: Home Health, Home with family  Discharge Plan B: Hospice/home      [facility] [address] [place] [address] [address] [facility]  [address] [facility] [address]  Phone: [phone] Fax: [phone]      Initial Assessment (most recent)       Adult Discharge Assessment - 02/10/23 0649          Discharge Assessment    Assessment Type Discharge Planning Assessment     Confirmed/corrected address, phone number and insurance Yes     Confirmed Demographics Correct on Facesheet     Source of Information family     If unable to respond/provide information was family/caregiver contacted? Yes     Contact Name/Number Sarah Ibarra daughter [phone]     Communicated MAUREEN with patient/caregiver Date not available/Unable to determine     Reason For Admission S/p Fall FX     People in Home alone     Facility Arrived From: home     Do you expect to return to your current living situation? Yes     Do you have help at home or someone to help you manage your care at home? Yes     Who are your caregiver(s) and their phone number(s)? Sarah handy [phone]     Prior to hospitilization cognitive status: Alert/Oriented     Current cognitive status: Alert/Oriented     Walking or Climbing Stairs ambulation difficulty, requires equipment     Mobility Management walker     Dressing/Bathing bathing  difficulty, requires equipment     Home Accessibility wheelchair accessible     Home Layout Able to live on 1st floor     Equipment Currently Used at Home bedside commode     Readmission within 30 days? No     Patient currently being followed by outpatient case management? No     Do you currently have service(s) that help you manage your care at home? No     Do you take prescription medications? Yes     Do you have prescription coverage? Yes     Do you have any problems affording any of your prescribed medications? TBD     Is the patient taking medications as prescribed? yes     Who is going to help you get home at discharge? Sarah Ibarra daughter [phone]     How do you get to doctors appointments? family or friend will provide     Are you on dialysis? No     Do you take coumadin? No     Discharge Plan A Home Health;Home with family     Discharge Plan B Hospice/home     DME Needed Upon Discharge  walker, rolling     Discharge Plan discussed with: Adult children     Discharge Barriers Identified None        Physical Activity    On average, how many days per week do you engage in moderate to strenuous exercise (like a brisk walk)? 0 days     On average, how many minutes do you engage in exercise at this level? 0 min        Financial Resource Strain    How hard is it for you to pay for the very basics like food, housing, medical care, and heating? Not hard at all        Housing Stability    In the last 12 months, was there a time when you were not able to pay the mortgage or rent on time? No     In the last 12 months, how many places have you lived? 1     In the last 12 months, was there a time when you did not have a steady place to sleep or slept in a shelter (including now)? No        Transportation Needs    In the past 12 months, has lack of transportation kept you from medical appointments or from getting medications? No     In the past 12 months, has lack of transportation kept you from meetings, work, or  from getting things needed for daily living? No        Food Insecurity    Within the past 12 months, you worried that your food would run out before you got the money to buy more. Never true     Within the past 12 months, the food you bought just didn't last and you didn't have money to get more. Never true        Stress    Do you feel stress - tense, restless, nervous, or anxious, or unable to sleep at night because your mind is troubled all the time - these days? Only a little        Social Connections    In a typical week, how many times do you talk on the phone with family, friends, or neighbors? More than three times a week     How often do you get together with friends or relatives? More than three times a week     How often do you attend Hindu or Lutheran services? More than 4 times per year     Do you belong to any clubs or organizations such as Hindu groups, unions, fraternal or athletic groups, or school groups? No     How often do you attend meetings of the clubs or organizations you belong to? 1 to 4 times per year     Are you , , , , never , or living with a partner?         Alcohol Use    Q1: How often do you have a drink containing alcohol? Never     Q2: How many drinks containing alcohol do you have on a typical day when you are drinking? Patient does not drink     Q3: How often do you have six or more drinks on one occasion? Never

## 2023-02-10 NOTE — PROGRESS NOTES
[facility] Medicine  Progress Note    Patient Name: Anthony Ibarra  MRN: [id number]  Patient Class: IP- Swing   Admission Date: 2/9/2023  Length of Stay: 1 days  Attending Physician: Thairy G Reyes, DO  Primary Care Provider: Jossue Martinez MD        Subjective:     Principal Problem:<principal problem not specified>        HPI:  88 y.o. male presented to ED with c/o of fall and being found down at home by friend. C/O pelvic and low back pain found to have acute left iliac wing fx non displaced and nonoperative by othropedic surgery Dr. Harrison. T11-12 and lumbar 1-5 compression fractures. CPK > 3,000.          Overview/Hospital Course:  Admitted for rehabilitation after a fall resulting in L iliac fracture. Pt has been refusing therapy. Prior to this admission patient was previously on hospice, reason is unclear to me. He did have osseous lumbar lesions found on November 2022 that were concerning for metastasis. Subsequent biopsy was unrevealing for malignancy and he did have kyphoplasty of that region. He is undergoing outpatient screening for multiple myeloma with Dr. Elizabeth LeJeune. Patient has poor insight, he can not recall having had a kyphoplasty and does not know why he was on hospice. Discussed with pt's daughter 2/9 she revealed pt was on hospice secondary to dementia and was able to provide a living will that requests comfort measures, DNR and DNI.  Patient was admitted to swing bed on 02/09/2023 for continued rehabilitation.      Interval History: Complaining of low back pain however refuses pain medication. Educated on taking prior to therapy.  Patient is max assistance x2    Review of Systems   Musculoskeletal:  Positive for back pain.   Objective:     Vital Signs (Most Recent):  Temp: 97.6 °F (36.4 °C) (02/10/23 1124)  Pulse: 88 (02/10/23 1124)  Resp: 18 (02/10/23 0329)  BP: 99/67 (02/10/23 1124)  SpO2: 98 % (02/10/23 1124)   Vital Signs (24h  Range):  Temp:  [97.4 °F (36.3 °C)-98.6 °F (37 °C)] 97.6 °F (36.4 °C)  Pulse:  [] 88  Resp:  [18-20] 18  SpO2:  [95 %-98 %] 98 %  BP: ()/(57-81) 99/67     Weight: 65.5 kg (144 lb 6.4 oz)  Body mass index is 20.14 kg/m².    Intake/Output Summary (Last 24 hours) at 2/10/2023 1309  Last data filed at 2/10/2023 0914  Gross per 24 hour   Intake 360 ml   Output 650 ml   Net -290 ml        Physical Exam  Constitutional:       General: He is not in acute distress.     Appearance: Normal appearance. He is cachectic.   HENT:      Head: Normocephalic and atraumatic.      Nose: No congestion or rhinorrhea.      Mouth/Throat:      Mouth: Mucous membranes are moist.   Eyes:      Conjunctiva/sclera: Conjunctivae normal.      Pupils: Pupils are equal, round, and reactive to light.   Cardiovascular:      Rate and Rhythm: Normal rate and regular rhythm.      Heart sounds: No murmur heard.  Pulmonary:      Effort: Pulmonary effort is normal.      Breath sounds: Normal breath sounds. No wheezing.   Chest:      Comments:  Pectus excavatum  Abdominal:      General: Bowel sounds are normal. There is no distension.      Palpations: Abdomen is soft.      Tenderness: There is no abdominal tenderness.   Musculoskeletal:         General: No swelling. Normal range of motion.      Cervical back: Normal range of motion and neck supple.      Right lower leg: No edema.      Left lower leg: No edema.   Skin:     General: Skin is warm and dry.      Findings: No rash.   Neurological:      General: No focal deficit present.      Mental Status: He is alert and oriented to person, place, and time.   Psychiatric:         Mood and Affect: Mood normal.         Behavior: Behavior normal.       Significant Labs: All pertinent labs within the past 24 hours have been reviewed.    Significant Imaging: I have reviewed all pertinent imaging results/findings within the past 24 hours.      Assessment/Plan:      Closed fracture of left iliac  wing  -non-operative  -PT/OT  -pain control  -pending evaluation with Dr. Harrison on Monday      Generalized weakness  -PT/OT      Dementia with behavioral disturbance   -patient was on hospice secondary to advancing dementia           VTE Risk Mitigation (From admission, onward)         Ordered     enoxaparin injection 40 mg  Daily         02/09/23 1202     Place NISHA hose  Until discontinued         02/09/23 1202     IP VTE HIGH RISK PATIENT  Once         02/09/23 1202     Place sequential compression device  Until discontinued         02/09/23 1202                Discharge Planning   MAUREEN: 2/9/2023     Code Status: DNR   Is the patient medically ready for discharge?:     Reason for patient still in hospital (select all that apply): Treatment and PT / OT recommendations  Discharge Plan A: Home Health, Home with family                  Thairy G Reyes, DO  Department of Hospital Medicine   [facility]

## 2023-02-10 NOTE — PT/OT/SLP PROGRESS
Occupational Therapy  Treatment    Name: Anthony Ibarra    : 1934 (88 y.o.)  MRN: [id number]           TREATMENT SUMMARY AND RECOMMENDATIONS:      OT Date of Treatment: 02/10/23  OT Start Time: 1000  OT Stop Time: 1030  OT Total Time (min): 30 min      Subjective Assessment:   No complaints  Lethargic   x Awake, alert, cooperative  Impulsive    Uncooperative   Flat affect    Agitated  c/o pain    Appropriate  c/o fatigue    Confused x Treated at bedside     Emotionally labile x Treated in gym/dept.     x Other: Pt c/o 9/10 pain in back however refusing pain meds       Therapy Precautions:   x Cognitive deficits  Spinal precautions    Collar - hard  Sternal precautions    Collar - soft   TLSO   x Fall risk  LSO    Hip precautions - posterior  Knee immobilizer    Hip precautions - anterior  WBAT    Impaired communication  Partial weightbearing    Oxygen  TTWB    PEG tube  NWB    Visual deficits      Hearing deficits   Other:        Treatment Objectives:     Mobility Training:    Mobility task Assist level Comments    Bed mobility Min A  Supine>EOB min A for LLE management; very slow and Pt very fearful with even just touching leg   Transfer Min A Stand/pivot t/f with RW EOB>BSchair   Sit to stands transitions     Functional mobility     Sitting balance     Standing balance      Other:        ADL Training:    ADL Assist level Comments    Feeding     Grooming/hygiene     Bathing     Upper body dressing CGA Pt donned pullover shirt sitting EOB with CGA d/t R lateral lean   Lower body dressing Max A  Pt required assist to jemal briefs/pants with  no reacher. Assist to manage over R hip; pt able to do L side.    Toileting Mod A  Pt diaper soiled on entry; changed in standing. Pt able to wipe anterior; OT assisted with posterior. Required assist for clothing management and balance.    Toilet transfer     Adaptive equipment training     Other:           Therapeutic Exercise:   Exercise Sets Reps Comments   1# dowel  1 15 Chest press, straight arm raises                         Additional Comments: Pt demo'd cognitive deficits during session and was unable to recall going to therapy gym yesterday. Upon sitting EOB, Pt attempted to lie back down already forgetting we were getting up to go back to therapy gym this session. Pt is very unsafe to return home alone. Recommend NH placement or 24 hour sitters on DC.       Assessment: Patient tolerated session fair.    OT Plan: Continue POC  Revisions made to plan of care: No    GOALS:   Multidisciplinary Problems       Occupational Therapy Goals          Problem: Occupational Therapy    Goal Priority Disciplines Outcome Interventions   Occupational Therapy Goal     OT, PT/OT Ongoing, Progressing    Description: Goals to be met by: 02/23/23     Patient will increase functional independence with ADLs by performing:    LE Dressing with Supervision.  Grooming while standing at sink with Stand-by Assistance.  Toileting from bedside commode with Contact Guard Assistance for hygiene and clothing management.   Bathing from  shower chair/bench with Minimal Assistance.  Toilet transfer to toilet with Stand-by Assistance.                         Skilled OT Minutes Provided: 30  Communication with Treatment Team:     Equipment recommendations:       At end of treatment, patient remained:   Up in chair     Up in wheelchair in room    In bed    With alarm activated    Bed rails up    Call bell in reach     Family/friends present    Restraints secured properly    In bathroom with CNA/RN notified   x In gym with PT/PTA/tech    Nurse aware    Other:

## 2023-02-11 LAB
ANION GAP SERPL CALC-SCNC: 9 MEQ/L
BASOPHILS # BLD AUTO: 0.03 X10(3)/MCL (ref 0–0.2)
BASOPHILS NFR BLD AUTO: 0.5 %
BUN SERPL-MCNC: 22.6 MG/DL (ref 8.4–25.7)
CALCIUM SERPL-MCNC: 8.4 MG/DL (ref 8.8–10)
CHLORIDE SERPL-SCNC: 103 MMOL/L (ref 98–107)
CO2 SERPL-SCNC: 24 MMOL/L (ref 23–31)
CREAT SERPL-MCNC: 0.67 MG/DL (ref 0.73–1.18)
CREAT/UREA NIT SERPL: 34
EOSINOPHIL # BLD AUTO: 0.09 X10(3)/MCL (ref 0–0.9)
EOSINOPHIL NFR BLD AUTO: 1.5 %
ERYTHROCYTE [DISTWIDTH] IN BLOOD BY AUTOMATED COUNT: 13 % (ref 11.5–17)
GFR SERPLBLD CREATININE-BSD FMLA CKD-EPI: >60 MLS/MIN/1.73/M2
GLUCOSE SERPL-MCNC: 92 MG/DL (ref 82–115)
HCT VFR BLD AUTO: 35.6 % (ref 42–52)
HGB BLD-MCNC: 11.5 GM/DL (ref 14–18)
IMM GRANULOCYTES # BLD AUTO: 0.03 X10(3)/MCL (ref 0–0.04)
IMM GRANULOCYTES NFR BLD AUTO: 0.5 %
LYMPHOCYTES # BLD AUTO: 1.57 X10(3)/MCL (ref 0.6–4.6)
LYMPHOCYTES NFR BLD AUTO: 26.8 %
MCH RBC QN AUTO: 30.5 PG
MCHC RBC AUTO-ENTMCNC: 32.3 MG/DL (ref 33–36)
MCV RBC AUTO: 94.4 FL (ref 80–94)
MONOCYTES # BLD AUTO: 0.76 X10(3)/MCL (ref 0.1–1.3)
MONOCYTES NFR BLD AUTO: 13 %
NEUTROPHILS # BLD AUTO: 3.38 X10(3)/MCL (ref 2.1–9.2)
NEUTROPHILS NFR BLD AUTO: 57.7 %
PLATELET # BLD AUTO: 139 X10(3)/MCL (ref 130–400)
PMV BLD AUTO: 10.8 FL (ref 7.4–10.4)
POCT GLUCOSE: 108 MG/DL (ref 70–110)
POCT GLUCOSE: 89 MG/DL (ref 70–110)
POCT GLUCOSE: 96 MG/DL (ref 70–110)
POTASSIUM SERPL-SCNC: 3.8 MMOL/L (ref 3.5–5.1)
RBC # BLD AUTO: 3.77 X10(6)/MCL (ref 4.7–6.1)
SODIUM SERPL-SCNC: 136 MMOL/L (ref 136–145)
WBC # SPEC AUTO: 5.9 X10(3)/MCL (ref 4.5–11.5)

## 2023-02-11 PROCEDURE — 80048 BASIC METABOLIC PNL TOTAL CA: CPT | Performed by: STUDENT IN AN ORGANIZED HEALTH CARE EDUCATION/TRAINING PROGRAM

## 2023-02-11 PROCEDURE — 63600175 PHARM REV CODE 636 W HCPCS: Performed by: STUDENT IN AN ORGANIZED HEALTH CARE EDUCATION/TRAINING PROGRAM

## 2023-02-11 PROCEDURE — 11000004 HC SNF PRIVATE

## 2023-02-11 PROCEDURE — 94760 N-INVAS EAR/PLS OXIMETRY 1: CPT

## 2023-02-11 PROCEDURE — 25000003 PHARM REV CODE 250: Performed by: STUDENT IN AN ORGANIZED HEALTH CARE EDUCATION/TRAINING PROGRAM

## 2023-02-11 PROCEDURE — 85025 COMPLETE CBC W/AUTO DIFF WBC: CPT | Performed by: STUDENT IN AN ORGANIZED HEALTH CARE EDUCATION/TRAINING PROGRAM

## 2023-02-11 RX ADMIN — ENOXAPARIN SODIUM 40 MG: 40 INJECTION SUBCUTANEOUS at 04:02

## 2023-02-11 RX ADMIN — HYDROCODONE BITARTRATE AND ACETAMINOPHEN 1 TABLET: 5; 325 TABLET ORAL at 09:02

## 2023-02-11 RX ADMIN — ACETAMINOPHEN 650 MG: 325 TABLET, FILM COATED ORAL at 04:02

## 2023-02-11 RX ADMIN — ACETAMINOPHEN 650 MG: 325 TABLET, FILM COATED ORAL at 08:02

## 2023-02-11 RX ADMIN — HYDROCODONE BITARTRATE AND ACETAMINOPHEN 1 TABLET: 5; 325 TABLET ORAL at 08:02

## 2023-02-11 NOTE — PT/OT/SLP PROGRESS
Physical Therapy Treatment Note           Name: Anthony Ibarra    : 1934 (88 y.o.)  MRN: [id number]           TREATMENT SUMMARY AND RECOMMENDATIONS:    PT Received On: 02/10/23  PT Start Time: 1324     PT Stop Time: 1440  PT Total Time (min): 76 min     Subjective Assessment:   No complaints  Lethargic    Awake, alert, cooperative x Uncooperative    Agitated  c/o pain    Appropriate  c/o fatigue   x Confused  Treated at bedside     Emotionally labile x Treated in gym/dept.   x Impulsive  Other:    Flat affect       Therapy Precautions:    Cognitive deficits  Spinal precautions    Collar - hard  Sternal precautions    Collar - soft   TLSO   x Fall risk  LSO    Hip precautions - posterior  Knee immobilizer    Hip precautions - anterior  WBAT    Impaired communication x Partial  LLE    Oxygen  TTWB    PEG tube  NWB    Visual deficits  Other:    Hearing deficits          Treatment Objectives:     Mobility Training:   Assist level Comments    Bed mobility     Transfer MOD A x 2 Stand pivot with RW   Gait MOD A x 2 3 x 5 feet with max cues using RW   Sit to stand transitions MOD A x 2 X 5 reps from recliner   Sitting balance     Standing balance  MIN A Prolonged standing with RW including postural education and wt shifting   Wheelchair mobility     Car transfer     Other:          Therapeutic Exercise:   Exercise Sets Reps Comments                               Additional Comments:  Pt was very confused this PM, trying to get up in his room/thinking he was at his house. PT brought pt down to the gym to keep him occupied and from getting up on his own. Pt did better with keeping conversation with fellow patients. Overall PT treated pt for 25 min throughout his time in the gym. Pt had a visitor upon return to the room so he wanted to stay in the chair, ileana pad placed to make transfers with staff easier.     Assessment: Patient tolerated session Fair, with increased confusion. .    PT Plan: Continue per  POC  Revisions made to plan of care: No    GOALS:   Multidisciplinary Problems       Physical Therapy Goals          Problem: Physical Therapy    Goal Priority Disciplines Outcome Goal Variances Interventions   Physical Therapy Goal     PT, PT/OT Ongoing, Progressing     Description: Goals to be met by: Discharge    Patient will increase functional independence with mobility by performin. Supine to sit with Modified [place]  2. Sit to supine with Modified [place]  3. Sit to stand transfer with Modified [place]  4. Bed to chair transfer with Modified [place]undy using Rolling Walker  5. Gait  x 100 feet with Supervision using Rolling Walker.                        Skilled PT Minutes Provided: 25   Communication with Treatment Team:     Equipment recommendations:       At end of treatment, patient remained:  x Up in chair     Up in wheelchair in room    In bed    With alarm activated    Bed rails up   x Call bell in reach    x Family/friends present    Restraints secured properly    In bathroom with CNA/RN notified    Nurse aware    In gym with therapist/tech   x Other: Vani pad in place for staff

## 2023-02-11 NOTE — NURSING
19:05 pt reported pain, refused pain medication, agreed to tylenol at bedtime for pain, pt gave permission for all 4 railing to up and locked position, flushed IV, leaked, removed iv, reported to phy., per phy. Do not restart iv access.

## 2023-02-11 NOTE — PLAN OF CARE
Problem: Adult Inpatient Plan of Care  Goal: Plan of Care Review  Outcome: Ongoing, Progressing  Flowsheets (Taken 2/11/2023 1650)  Plan of Care Reviewed With: patient  Goal: Patient-Specific Goal (Individualized)  Outcome: Ongoing, Progressing  Flowsheets (Taken 2/11/2023 1650)  Anxieties, Fears or Concerns: pain with mobility  Individualized Care Needs: patient high fall risk/impulsive,  in place  Goal: Absence of Hospital-Acquired Illness or Injury  Outcome: Ongoing, Progressing  Intervention: Identify and Manage Fall Risk  Flowsheets (Taken 2/11/2023 1650)  Safety Promotion/Fall Prevention:   assistive device/personal item within reach   bed alarm set   lighting adjusted   nonskid shoes/socks when out of bed   side rails raised x 3   supervised activity   Supervised toileting - stay within arms reach   toileting scheduled   gait belt with ambulation   Fall Risk reviewed with patient/family   high risk medications identified   muscle strengthening facilitated   medications reviewed  Intervention: Prevent Skin Injury  Flowsheets (Taken 2/11/2023 1650)  Body Position: right  Skin Protection: transparent dressing maintained  Intervention: Prevent and Manage VTE (Venous Thromboembolism) Risk  Flowsheets (Taken 2/11/2023 1650)  VTE Prevention/Management: remove, assess skin, and reapply sequential compression device  Intervention: Prevent Infection  Flowsheets (Taken 2/11/2023 1650)  Infection Prevention:   hand hygiene promoted   rest/sleep promoted  Goal: Optimal Comfort and Wellbeing  Outcome: Ongoing, Progressing  Intervention: Monitor Pain and Promote Comfort  Flowsheets (Taken 2/11/2023 1650)  Pain Management Interventions:   care clustered   position adjusted   premedicated for activity   quiet environment facilitated   relaxation techniques promoted   medication offered  Intervention: Provide Person-Centered Care  Flowsheets (Taken 2/11/2023 1650)  Trust Relationship/Rapport:   care explained   choices  provided   emotional support provided  Goal: Readiness for Transition of Care  Outcome: Ongoing, Progressing     Problem: Infection  Goal: Absence of Infection Signs and Symptoms  Outcome: Ongoing, Progressing     Problem: Impaired Wound Healing  Goal: Optimal Wound Healing  Outcome: Ongoing, Progressing     Problem: Fall Injury Risk  Goal: Absence of Fall and Fall-Related Injury  Outcome: Ongoing, Progressing  Intervention: Identify and Manage Contributors  Flowsheets (Taken 2/11/2023 1650)  Self-Care Promotion:   BADL personal objects within reach   safe use of adaptive equipment encouraged   meal set-up provided  Medication Review/Management:   medications reviewed   high-risk medications identified  Intervention: Promote Injury-Free Environment  Flowsheets (Taken 2/11/2023 1650)  Safety Promotion/Fall Prevention:   assistive device/personal item within reach   bed alarm set   lighting adjusted   nonskid shoes/socks when out of bed   side rails raised x 3   supervised activity   Supervised toileting - stay within arms reach   toileting scheduled   gait belt with ambulation   Fall Risk reviewed with patient/family   high risk medications identified   muscle strengthening facilitated   medications reviewed     Problem: Skin Injury Risk Increased  Goal: Skin Health and Integrity  Outcome: Ongoing, Progressing  Intervention: Optimize Skin Protection  Flowsheets (Taken 2/11/2023 1650)  Pressure Reduction Techniques: rest period provided between sit times  Skin Protection: transparent dressing maintained  Head of Bed (HOB) Positioning: HOB at 30 degrees

## 2023-02-11 NOTE — PROGRESS NOTES
[facility] Medicine  Progress Note    Patient Name: Anthony Ibarra  MRN: [id number]  Patient Class: IP- Swing   Admission Date: 2/9/2023  Length of Stay: 2 days  Attending Physician: Thairy G Reyes, DO  Primary Care Provider: Jossue Martinez MD        Subjective:     Principal Problem:<principal problem not specified>        HPI:  88 y.o. male presented to ED with c/o of fall and being found down at home by friend. C/O pelvic and low back pain found to have acute left iliac wing fx non displaced and nonoperative by othropedic surgery Dr. Harrison. T11-12 and lumbar 1-5 compression fractures. CPK > 3,000.          Overview/Hospital Course:  Admitted for rehabilitation after a fall resulting in L iliac fracture. Pt has been refusing therapy. Prior to this admission patient was previously on hospice, reason is unclear to me. He did have osseous lumbar lesions found on November 2022 that were concerning for metastasis. Subsequent biopsy was unrevealing for malignancy and he did have kyphoplasty of that region. He is undergoing outpatient screening for multiple myeloma with Dr. Elizabeth LeJeune. Patient has poor insight, he can not recall having had a kyphoplasty and does not know why he was on hospice. Discussed with pt's daughter 2/9 she revealed pt was on hospice secondary to dementia and was able to provide a living will that requests comfort measures, DNR and DNI.  Patient was admitted to swing bed on 02/09/2023 for continued rehabilitation.      Interval History: Complaining of low back pain however refuses pain medication. Educated on taking prior to therapy.  Patient is max assistance x2    Review of Systems   Musculoskeletal:  Positive for back pain.   Objective:     Vital Signs (Most Recent):  Temp: 98 °F (36.7 °C) (02/11/23 0723)  Pulse: 62 (02/11/23 0802)  Resp: 18 (02/11/23 0947)  BP: 109/76 (02/11/23 0723)  SpO2: 96 % (02/11/23 0802)   Vital Signs (24h  Range):  Temp:  [97.5 °F (36.4 °C)-98 °F (36.7 °C)] 98 °F (36.7 °C)  Pulse:  [] 62  Resp:  [18] 18  SpO2:  [95 %-98 %] 96 %  BP: (102-113)/(67-76) 109/76     Weight: 68.4 kg (150 lb 12.7 oz)  Body mass index is 21.03 kg/m².    Intake/Output Summary (Last 24 hours) at 2/11/2023 1139  Last data filed at 2/11/2023 1038  Gross per 24 hour   Intake 840 ml   Output 725 ml   Net 115 ml        Physical Exam  Constitutional:       General: He is not in acute distress.     Appearance: Normal appearance. He is cachectic.   HENT:      Head: Normocephalic and atraumatic.      Nose: No congestion or rhinorrhea.      Mouth/Throat:      Mouth: Mucous membranes are moist.   Eyes:      Conjunctiva/sclera: Conjunctivae normal.      Pupils: Pupils are equal, round, and reactive to light.   Cardiovascular:      Rate and Rhythm: Normal rate and regular rhythm.      Heart sounds: No murmur heard.  Pulmonary:      Effort: Pulmonary effort is normal.      Breath sounds: Normal breath sounds. No wheezing.   Chest:      Comments:  Pectus excavatum  Abdominal:      General: Bowel sounds are normal. There is no distension.      Palpations: Abdomen is soft.      Tenderness: There is no abdominal tenderness.   Musculoskeletal:         General: No swelling. Normal range of motion.      Cervical back: Normal range of motion and neck supple.      Right lower leg: No edema.      Left lower leg: No edema.   Skin:     General: Skin is warm and dry.      Findings: No rash.   Neurological:      General: No focal deficit present.      Mental Status: He is alert and oriented to person, place, and time.   Psychiatric:         Mood and Affect: Mood normal.         Behavior: Behavior normal.       Significant Labs: All pertinent labs within the past 24 hours have been reviewed.    Significant Imaging: I have reviewed all pertinent imaging results/findings within the past 24 hours.      Assessment/Plan:      Closed fracture of left iliac  wing  -non-operative  -PT/OT  -pain control  -pending evaluation with Dr. Harrison on Monday      Generalized weakness  -PT/OT      Dementia with behavioral disturbance  -patient was on hospice secondary to advancing dementia           VTE Risk Mitigation (From admission, onward)         Ordered     enoxaparin injection 40 mg  Daily         02/09/23 1202     Place NISHA hose  Until discontinued         02/09/23 1202     IP VTE HIGH RISK PATIENT  Once         02/09/23 1202     Place sequential compression device  Until discontinued         02/09/23 1202                Discharge Planning   MAUREEN: 2/9/2023     Code Status: DNR   Is the patient medically ready for discharge?:     Reason for patient still in hospital (select all that apply): Treatment and PT / OT recommendations  Discharge Plan A: Home Health, Home with family                  Thairy G Reyes, DO  Department of Hospital Medicine   [facility]

## 2023-02-11 NOTE — SUBJECTIVE & OBJECTIVE
Interval History: Complaining of low back pain however refuses pain medication. Educated on taking prior to therapy.  Patient is max assistance x2    Review of Systems   Musculoskeletal:  Positive for back pain.   Objective:     Vital Signs (Most Recent):  Temp: 98 °F (36.7 °C) (02/11/23 0723)  Pulse: 62 (02/11/23 0802)  Resp: 18 (02/11/23 0947)  BP: 109/76 (02/11/23 0723)  SpO2: 96 % (02/11/23 0802)   Vital Signs (24h Range):  Temp:  [97.5 °F (36.4 °C)-98 °F (36.7 °C)] 98 °F (36.7 °C)  Pulse:  [] 62  Resp:  [18] 18  SpO2:  [95 %-98 %] 96 %  BP: (102-113)/(67-76) 109/76     Weight: 68.4 kg (150 lb 12.7 oz)  Body mass index is 21.03 kg/m².    Intake/Output Summary (Last 24 hours) at 2/11/2023 1139  Last data filed at 2/11/2023 1038  Gross per 24 hour   Intake 840 ml   Output 725 ml   Net 115 ml        Physical Exam  Constitutional:       General: He is not in acute distress.     Appearance: Normal appearance. He is cachectic.   HENT:      Head: Normocephalic and atraumatic.      Nose: No congestion or rhinorrhea.      Mouth/Throat:      Mouth: Mucous membranes are moist.   Eyes:      Conjunctiva/sclera: Conjunctivae normal.      Pupils: Pupils are equal, round, and reactive to light.   Cardiovascular:      Rate and Rhythm: Normal rate and regular rhythm.      Heart sounds: No murmur heard.  Pulmonary:      Effort: Pulmonary effort is normal.      Breath sounds: Normal breath sounds. No wheezing.   Chest:      Comments:  Pectus excavatum  Abdominal:      General: Bowel sounds are normal. There is no distension.      Palpations: Abdomen is soft.      Tenderness: There is no abdominal tenderness.   Musculoskeletal:         General: No swelling. Normal range of motion.      Cervical back: Normal range of motion and neck supple.      Right lower leg: No edema.      Left lower leg: No edema.   Skin:     General: Skin is warm and dry.      Findings: No rash.   Neurological:      General: No focal deficit present.       Mental Status: He is alert and oriented to person, place, and time.   Psychiatric:         Mood and Affect: Mood normal.         Behavior: Behavior normal.       Significant Labs: All pertinent labs within the past 24 hours have been reviewed.    Significant Imaging: I have reviewed all pertinent imaging results/findings within the past 24 hours.

## 2023-02-11 NOTE — PLAN OF CARE
Problem: Impaired Wound Healing  Goal: Optimal Wound Healing  Outcome: Ongoing, Progressing  Intervention: Promote Wound Healing  Flowsheets (Taken 2/11/2023 0022)  Oral Nutrition Promotion:   rest periods promoted   medicated  Sleep/Rest Enhancement: awakenings minimized  Activity Management: Arm raise - L1  Pain Management Interventions:   care clustered   medication offered     Problem: Fall Injury Risk  Goal: Absence of Fall and Fall-Related Injury  Outcome: Ongoing, Progressing  Intervention: Identify and Manage Contributors  Flowsheets (Taken 2/11/2023 0022)  Self-Care Promotion:   independence encouraged   BADL personal objects within reach   BADL personal routines maintained  Medication Review/Management: medications reviewed  Intervention: Promote Injury-Free Environment  Flowsheets (Taken 2/11/2023 0022)  Safety Promotion/Fall Prevention:   assistive device/personal item within reach   bed alarm set

## 2023-02-11 NOTE — PROGRESS NOTES
PT had held tx at 10 AM due to pain and getting pain medication. Upon second attempt pt was sleeping in his bed, refusing to get up, stating he was still in pain and didn't want to move. OT will be checking on pt later. Nursing is aware.

## 2023-02-12 LAB
GLUCOSE SERPL-MCNC: 101 MG/DL (ref 70–110)
GLUCOSE SERPL-MCNC: 87 MG/DL (ref 70–110)
POCT GLUCOSE: 101 MG/DL (ref 70–110)
POCT GLUCOSE: 114 MG/DL (ref 70–110)
POCT GLUCOSE: 121 MG/DL (ref 70–110)
POCT GLUCOSE: 77 MG/DL (ref 70–110)
POCT GLUCOSE: 87 MG/DL (ref 70–110)

## 2023-02-12 PROCEDURE — 25000003 PHARM REV CODE 250: Performed by: STUDENT IN AN ORGANIZED HEALTH CARE EDUCATION/TRAINING PROGRAM

## 2023-02-12 PROCEDURE — 94760 N-INVAS EAR/PLS OXIMETRY 1: CPT

## 2023-02-12 PROCEDURE — 11000004 HC SNF PRIVATE

## 2023-02-12 PROCEDURE — 63600175 PHARM REV CODE 636 W HCPCS: Performed by: STUDENT IN AN ORGANIZED HEALTH CARE EDUCATION/TRAINING PROGRAM

## 2023-02-12 RX ADMIN — HYDROCODONE BITARTRATE AND ACETAMINOPHEN 1 TABLET: 5; 325 TABLET ORAL at 08:02

## 2023-02-12 RX ADMIN — ENOXAPARIN SODIUM 40 MG: 40 INJECTION SUBCUTANEOUS at 04:02

## 2023-02-12 NOTE — PLAN OF CARE
Problem: Adult Inpatient Plan of Care  Goal: Plan of Care Review  Outcome: Ongoing, Progressing  Flowsheets (Taken 2/11/2023 1952)  Plan of Care Reviewed With: patient  Goal: Patient-Specific Goal (Individualized)  Outcome: Ongoing, Progressing  Flowsheets (Taken 2/11/2023 1952)  Individualized Care Needs: Pt will be fall free by discharge     Problem: Infection  Goal: Absence of Infection Signs and Symptoms  Outcome: Ongoing, Progressing  Intervention: Prevent or Manage Infection  Flowsheets (Taken 2/11/2023 1952)  Infection Management: aseptic technique maintained     Problem: Impaired Wound Healing  Goal: Optimal Wound Healing  Outcome: Ongoing, Progressing  Intervention: Promote Wound Healing  Flowsheets (Taken 2/11/2023 1952)  Sleep/Rest Enhancement: awakenings minimized     Problem: Fall Injury Risk  Goal: Absence of Fall and Fall-Related Injury  Outcome: Ongoing, Progressing  Intervention: Identify and Manage Contributors  Flowsheets (Taken 2/11/2023 1952)  Self-Care Promotion: independence encouraged  Medication Review/Management: medications reviewed     Problem: Skin Injury Risk Increased  Goal: Skin Health and Integrity  Outcome: Ongoing, Progressing  Intervention: Optimize Skin Protection  Flowsheets (Taken 2/11/2023 1952)  Pressure Reduction Techniques: frequent weight shift encouraged      Report received from night rn. Patient is c/o sharp left chest pain that worsens when coughing and taking deep breaths. Tender in the area. Patient given IS and educated on how to use it. patient correctly demonstrated use. Pain medication to be adminstered per MAR. Bed in low locked position, call bell within reach. Continue to monitor.

## 2023-02-12 NOTE — PROGRESS NOTES
[facility] Medicine  Progress Note    Patient Name: Anthony Ibarra  MRN: [id number]  Patient Class: IP- Swing   Admission Date: 2/9/2023  Length of Stay: 3 days  Attending Physician: Thairy G Reyes, DO  Primary Care Provider: Jossue Martinez MD        Subjective:     Principal Problem:<principal problem not specified>        HPI:  88 y.o. male presented to ED with c/o of fall and being found down at home by friend. C/O pelvic and low back pain found to have acute left iliac wing fx non displaced and nonoperative by othropedic surgery Dr. Harrison. T11-12 and lumbar 1-5 compression fractures. CPK > 3,000.          Overview/Hospital Course:  Admitted for rehabilitation after a fall resulting in L iliac fracture. Pt has been refusing therapy. Prior to this admission patient was previously on hospice, reason is unclear to me. He did have osseous lumbar lesions found on November 2022 that were concerning for metastasis. Subsequent biopsy was unrevealing for malignancy and he did have kyphoplasty of that region. He is undergoing outpatient screening for multiple myeloma with Dr. Elizabeth LeJeune. Patient has poor insight, he can not recall having had a kyphoplasty and does not know why he was on hospice. Discussed with pt's daughter 2/9 she revealed pt was on hospice secondary to dementia and was able to provide a living will that requests comfort measures, DNR and DNI.  Patient was admitted to swing bed on 02/09/2023 for continued rehabilitation.      Interval History: Complaining of low back pain however refuses pain medication. Educated on taking prior to therapy.  Patient is max assistance x2    Review of Systems   Musculoskeletal:  Positive for back pain.   Objective:     Vital Signs (Most Recent):  Temp: 97.6 °F (36.4 °C) (02/12/23 0735)  Pulse: 104 (02/12/23 0735)  Resp: 18 (02/12/23 0819)  BP: 128/68 (02/12/23 0735)  SpO2: 98 % (02/12/23 0735)   Vital Signs (24h  Range):  Temp:  [97.2 °F (36.2 °C)-98.3 °F (36.8 °C)] 97.6 °F (36.4 °C)  Pulse:  [] 104  Resp:  [18] 18  SpO2:  [95 %-98 %] 98 %  BP: (102-128)/(66-75) 128/68     Weight: 66.7 kg (147 lb 0.8 oz)  Body mass index is 20.51 kg/m².    Intake/Output Summary (Last 24 hours) at 2/12/2023 1102  Last data filed at 2/12/2023 0814  Gross per 24 hour   Intake 1800 ml   Output 1100 ml   Net 700 ml        Physical Exam  Constitutional:       General: He is not in acute distress.     Appearance: Normal appearance. He is cachectic.   HENT:      Head: Normocephalic and atraumatic.      Nose: No congestion or rhinorrhea.      Mouth/Throat:      Mouth: Mucous membranes are moist.   Eyes:      Conjunctiva/sclera: Conjunctivae normal.      Pupils: Pupils are equal, round, and reactive to light.   Cardiovascular:      Rate and Rhythm: Normal rate and regular rhythm.      Heart sounds: No murmur heard.  Pulmonary:      Effort: Pulmonary effort is normal.      Breath sounds: Normal breath sounds. No wheezing.   Chest:      Comments:  Pectus excavatum  Abdominal:      General: Bowel sounds are normal. There is no distension.      Palpations: Abdomen is soft.      Tenderness: There is no abdominal tenderness.   Musculoskeletal:         General: No swelling. Normal range of motion.      Cervical back: Normal range of motion and neck supple.      Right lower leg: No edema.      Left lower leg: No edema.   Skin:     General: Skin is warm and dry.      Findings: No rash.   Neurological:      General: No focal deficit present.      Mental Status: He is alert and oriented to person, place, and time.   Psychiatric:         Mood and Affect: Mood normal.         Behavior: Behavior normal.       Significant Labs: All pertinent labs within the past 24 hours have been reviewed.    Significant Imaging: I have reviewed all pertinent imaging results/findings within the past 24 hours.      Assessment/Plan:      Closed fracture of left iliac  wing  -non-operative  -PT/OT  -pain control  -pending evaluation with Dr. Harrison on Monday      Generalized weakness  -PT/OT      Dementia with behavioral disturbance  -patient was on hospice secondary to advancing dementia           VTE Risk Mitigation (From admission, onward)         Ordered     enoxaparin injection 40 mg  Daily         02/09/23 1202     Place NISHA hose  Until discontinued         02/09/23 1202     IP VTE HIGH RISK PATIENT  Once         02/09/23 1202     Place sequential compression device  Until discontinued         02/09/23 1202                Discharge Planning   MAUREEN: 2/9/2023     Code Status: DNR   Is the patient medically ready for discharge?:     Reason for patient still in hospital (select all that apply): Treatment and PT / OT recommendations  Discharge Plan A: Home Health, Home with family                  Thairy G Reyes, DO  Department of Hospital Medicine   [facility]

## 2023-02-12 NOTE — SUBJECTIVE & OBJECTIVE
Interval History: Complaining of low back pain however refuses pain medication. Educated on taking prior to therapy.  Patient is max assistance x2    Review of Systems   Musculoskeletal:  Positive for back pain.   Objective:     Vital Signs (Most Recent):  Temp: 97.6 °F (36.4 °C) (02/12/23 0735)  Pulse: 104 (02/12/23 0735)  Resp: 18 (02/12/23 0819)  BP: 128/68 (02/12/23 0735)  SpO2: 98 % (02/12/23 0735)   Vital Signs (24h Range):  Temp:  [97.2 °F (36.2 °C)-98.3 °F (36.8 °C)] 97.6 °F (36.4 °C)  Pulse:  [] 104  Resp:  [18] 18  SpO2:  [95 %-98 %] 98 %  BP: (102-128)/(66-75) 128/68     Weight: 66.7 kg (147 lb 0.8 oz)  Body mass index is 20.51 kg/m².    Intake/Output Summary (Last 24 hours) at 2/12/2023 1102  Last data filed at 2/12/2023 0814  Gross per 24 hour   Intake 1800 ml   Output 1100 ml   Net 700 ml        Physical Exam  Constitutional:       General: He is not in acute distress.     Appearance: Normal appearance. He is cachectic.   HENT:      Head: Normocephalic and atraumatic.      Nose: No congestion or rhinorrhea.      Mouth/Throat:      Mouth: Mucous membranes are moist.   Eyes:      Conjunctiva/sclera: Conjunctivae normal.      Pupils: Pupils are equal, round, and reactive to light.   Cardiovascular:      Rate and Rhythm: Normal rate and regular rhythm.      Heart sounds: No murmur heard.  Pulmonary:      Effort: Pulmonary effort is normal.      Breath sounds: Normal breath sounds. No wheezing.   Chest:      Comments:  Pectus excavatum  Abdominal:      General: Bowel sounds are normal. There is no distension.      Palpations: Abdomen is soft.      Tenderness: There is no abdominal tenderness.   Musculoskeletal:         General: No swelling. Normal range of motion.      Cervical back: Normal range of motion and neck supple.      Right lower leg: No edema.      Left lower leg: No edema.   Skin:     General: Skin is warm and dry.      Findings: No rash.   Neurological:      General: No focal deficit  present.      Mental Status: He is alert and oriented to person, place, and time.   Psychiatric:         Mood and Affect: Mood normal.         Behavior: Behavior normal.       Significant Labs: All pertinent labs within the past 24 hours have been reviewed.    Significant Imaging: I have reviewed all pertinent imaging results/findings within the past 24 hours.

## 2023-02-12 NOTE — PLAN OF CARE
Problem: Adult Inpatient Plan of Care  Goal: Plan of Care Review  2/12/2023 1541 by Diane Lopez RN  Outcome: Ongoing, Progressing  2/12/2023 1504 by Diane Lopez RN  Outcome: Ongoing, Progressing  Goal: Patient-Specific Goal (Individualized)  2/12/2023 1541 by Diane Lopez RN  Outcome: Ongoing, Progressing  2/12/2023 1504 by Diane Lopez RN  Flowsheets (Taken 2/12/2023 0800)  Anxieties, Fears or Concerns: pain associated with mobility  Individualized Care Needs: Pt to remain fall free by discharge  Goal: Absence of Hospital-Acquired Illness or Injury  Outcome: Ongoing, Progressing  Intervention: Identify and Manage Fall Risk  Flowsheets (Taken 2/12/2023 0800)  Safety Promotion/Fall Prevention: ()   assistive device/personal item within reach   bed alarm set   Fall Risk signage in place   side rails raised x 3   room near unit station   /camera at bedside  Intervention: Prevent Skin Injury  Flowsheets (Taken 2/12/2023 0800)  Skin Protection:   transparent dressing maintained   adhesive use limited   incontinence pads utilized  Intervention: Prevent and Manage VTE (Venous Thromboembolism) Risk  2/12/2023 1541 by Diane Lopez RN  Flowsheets (Taken 2/12/2023 0800)  VTE Prevention/Management: (ROM encouraged)   remove, assess skin, and reapply compression stockings   other (see comments)   ambulation promoted   fluids promoted  2/12/2023 1504 by Diane Lopez RN  Flowsheets (Taken 2/12/2023 0800)  VTE Prevention/Management: (ROM encouraged)   remove, assess skin, and reapply compression stockings   other (see comments)  Intervention: Prevent Infection  Flowsheets (Taken 2/12/2023 0800)  Infection Prevention:   cohorting utilized   equipment surfaces disinfected   hand hygiene promoted  Goal: Optimal Comfort and Wellbeing  Outcome: Ongoing, Progressing  Intervention: Monitor Pain and Promote Comfort  Flowsheets (Taken 2/12/2023 0800)  Pain Management  Interventions:   medication offered   medication offered but refused   position adjusted  Goal: Readiness for Transition of Care  Outcome: Ongoing, Progressing     Problem: Infection  Goal: Absence of Infection Signs and Symptoms  Outcome: Ongoing, Progressing  Intervention: Prevent or Manage Infection  Flowsheets (Taken 2/12/2023 0800)  Isolation Precautions: protective     Problem: Impaired Wound Healing  Goal: Optimal Wound Healing  Outcome: Ongoing, Progressing  Intervention: Promote Wound Healing  Flowsheets (Taken 2/12/2023 0800)  Oral Nutrition Promotion:   rest periods promoted   safe use of adaptive equipment encouraged  Sleep/Rest Enhancement:   regular sleep/rest pattern promoted   relaxation techniques promoted  Pain Management Interventions:   medication offered   medication offered but refused   position adjusted     Problem: Fall Injury Risk  Goal: Absence of Fall and Fall-Related Injury  Outcome: Ongoing, Progressing  Intervention: Identify and Manage Contributors  Flowsheets (Taken 2/12/2023 0800)  Self-Care Promotion:   independence encouraged   BADL personal objects within reach   BADL personal routines maintained   meal set-up provided   safe use of adaptive equipment encouraged  Medication Review/Management: medications reviewed  Intervention: Promote Injury-Free Environment  Flowsheets (Taken 2/12/2023 0800)  Safety Promotion/Fall Prevention: ()   assistive device/personal item within reach   bed alarm set   Fall Risk signage in place   side rails raised x 3   room near unit station   /camera at bedside     Problem: Skin Injury Risk Increased  Goal: Skin Health and Integrity  Outcome: Ongoing, Progressing  Intervention: Optimize Skin Protection  Flowsheets (Taken 2/12/2023 0800)  Pressure Reduction Techniques:   frequent weight shift encouraged   weight shift assistance provided  Skin Protection:   transparent dressing maintained   adhesive use limited   incontinence pads  utilized  Intervention: Promote and Optimize Oral Intake  Flowsheets (Taken 2/12/2023 0800)  Oral Nutrition Promotion:   rest periods promoted   safe use of adaptive equipment encouraged

## 2023-02-13 LAB
GLUCOSE SERPL-MCNC: 87 MG/DL (ref 70–110)
POCT GLUCOSE: 105 MG/DL (ref 70–110)
POCT GLUCOSE: 122 MG/DL (ref 70–110)
POCT GLUCOSE: 85 MG/DL (ref 70–110)
POCT GLUCOSE: 87 MG/DL (ref 70–110)

## 2023-02-13 PROCEDURE — 63600175 PHARM REV CODE 636 W HCPCS: Performed by: STUDENT IN AN ORGANIZED HEALTH CARE EDUCATION/TRAINING PROGRAM

## 2023-02-13 PROCEDURE — 97530 THERAPEUTIC ACTIVITIES: CPT

## 2023-02-13 PROCEDURE — 94760 N-INVAS EAR/PLS OXIMETRY 1: CPT

## 2023-02-13 PROCEDURE — 11000004 HC SNF PRIVATE

## 2023-02-13 PROCEDURE — 97535 SELF CARE MNGMENT TRAINING: CPT

## 2023-02-13 RX ADMIN — ENOXAPARIN SODIUM 40 MG: 40 INJECTION SUBCUTANEOUS at 05:02

## 2023-02-13 NOTE — SUBJECTIVE & OBJECTIVE
Interval History: Complaining of low back pain however refuses pain medication. Educated on taking prior to therapy.  Patient is max assistance x2    Review of Systems   Musculoskeletal:  Positive for back pain.   Objective:     Vital Signs (Most Recent):  Temp: 97.6 °F (36.4 °C) (02/13/23 0801)  Pulse: 74 (02/13/23 0801)  Resp: 18 (02/13/23 0307)  BP: 114/76 (02/13/23 0801)  SpO2: 96 % (02/13/23 0801)   Vital Signs (24h Range):  Temp:  [97.3 °F (36.3 °C)-98.7 °F (37.1 °C)] 97.6 °F (36.4 °C)  Pulse:  [] 74  Resp:  [18] 18  SpO2:  [94 %-96 %] 96 %  BP: (102-129)/(69-76) 114/76     Weight: 66.7 kg (147 lb 0.8 oz)  Body mass index is 20.51 kg/m².    Intake/Output Summary (Last 24 hours) at 2/13/2023 1204  Last data filed at 2/12/2023 1755  Gross per 24 hour   Intake 1440 ml   Output 1100 ml   Net 340 ml        Physical Exam  Constitutional:       General: He is not in acute distress.     Appearance: Normal appearance. He is cachectic.   HENT:      Head: Normocephalic and atraumatic.      Nose: No congestion or rhinorrhea.      Mouth/Throat:      Mouth: Mucous membranes are moist.   Eyes:      Conjunctiva/sclera: Conjunctivae normal.      Pupils: Pupils are equal, round, and reactive to light.   Cardiovascular:      Rate and Rhythm: Normal rate and regular rhythm.      Heart sounds: No murmur heard.  Pulmonary:      Effort: Pulmonary effort is normal.      Breath sounds: Normal breath sounds. No wheezing.   Chest:      Comments:  Pectus excavatum  Abdominal:      General: Bowel sounds are normal. There is no distension.      Palpations: Abdomen is soft.      Tenderness: There is no abdominal tenderness.   Musculoskeletal:         General: No swelling. Normal range of motion.      Cervical back: Normal range of motion and neck supple.      Right lower leg: No edema.      Left lower leg: No edema.   Skin:     General: Skin is warm and dry.      Findings: No rash.   Neurological:      General: No focal deficit  present.      Mental Status: He is alert and oriented to person, place, and time.   Psychiatric:         Mood and Affect: Mood normal.         Behavior: Behavior normal.       Significant Labs: All pertinent labs within the past 24 hours have been reviewed.    Significant Imaging: I have reviewed all pertinent imaging results/findings within the past 24 hours.

## 2023-02-13 NOTE — PROGRESS NOTES
Nursing had to help pt back to bed due to him trying to get up on his own. PT added roll belt to chair for next transfers. Pt refused to get back up with therapy this PM despite OT and PT motivation. PT to return tomorrow.

## 2023-02-13 NOTE — PLAN OF CARE
Problem: Adult Inpatient Plan of Care  Goal: Plan of Care Review  Outcome: Ongoing, Progressing  Flowsheets (Taken 2/12/2023 1936)  Plan of Care Reviewed With: patient  Goal: Patient-Specific Goal (Individualized)  Outcome: Ongoing, Progressing  Flowsheets (Taken 2/12/2023 1936)  Individualized Care Needs: pt will remail fall free by discharge     Problem: Infection  Goal: Absence of Infection Signs and Symptoms  Outcome: Ongoing, Progressing  Intervention: Prevent or Manage Infection  Flowsheets (Taken 2/12/2023 1936)  Infection Management: aseptic technique maintained     Problem: Impaired Wound Healing  Goal: Optimal Wound Healing  Outcome: Ongoing, Progressing  Intervention: Promote Wound Healing  Flowsheets (Taken 2/12/2023 1936)  Sleep/Rest Enhancement: awakenings minimized     Problem: Fall Injury Risk  Goal: Absence of Fall and Fall-Related Injury  Outcome: Ongoing, Progressing  Intervention: Identify and Manage Contributors  Flowsheets (Taken 2/12/2023 1936)  Self-Care Promotion: independence encouraged  Medication Review/Management: medications reviewed     Problem: Skin Injury Risk Increased  Goal: Skin Health and Integrity  Outcome: Ongoing, Progressing  Intervention: Optimize Skin Protection  Flowsheets (Taken 2/12/2023 1936)  Pressure Reduction Techniques: frequent weight shift encouraged

## 2023-02-13 NOTE — PROGRESS NOTES
Follow up assessment performed.   Excoriation to back improved from last assessment.  Decreased drainage and improvement with new epithelialization noted.  Recommend continuing with every other day dressing changes to area.   Ecchymosis remains to L hip region.  Area soft to touch.  Due to prominent bony prominences, recommend continued pressure prevention measures while hospitalized.   Pt verbalized understanding.         02/13/23 1125   WOCN Assessment   WOCN Total Time (mins) 10   Visit Date 02/13/23   Visit Time 1125   Consult Type Follow Up   WOCN Speciality Wound   Number of Wounds 1   Intervention assessed   Teaching on-going        Altered Skin Integrity 02/07/23 1011 Right upper Thoracic spine #1 Abrasion(s) Partial thickness tissue loss. Shallow open ulcer with a red or pink wound bed, without slough. Intact or Open/Ruptured Serum-filled blister.   Date First Assessed/Time First Assessed: 02/07/23 1011   Altered Skin Integrity Present on Admission: yes  Side: Right  Orientation: upper  Location: Thoracic spine  Wound Number: #1  Is this injury device related?: No  Primary Wound Type: Abrasion(s)...   Dressing Appearance Moist drainage;Intact   Drainage Amount Moderate   Drainage Characteristics/Odor Serous;No odor   Appearance Pink;Red;Maroon;White;Gray   Periwound Area Redness   Wound Edges Defined   Wound Length (cm) 3.2 cm   Wound Width (cm) 1.9 cm   Wound Depth (cm) 0.1 cm   Wound Volume (cm^3) 0.608 cm^3   Wound Surface Area (cm^2) 6.08 cm^2   Care Cleansed with:;Sterile normal saline   Dressing Applied;Hydrofiber;Silicone;Foam   Dressing Change Due 02/15/23

## 2023-02-13 NOTE — PROGRESS NOTES
"Inpatient Nutrition Evaluation    Admit Date: 2/9/2023   Total duration of encounter: 4 days    Nutrition Recommendation/Prescription     Continue regular diet. 2. Continue Arginaid daily.     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  Diet Order: Diet Adult Regular  Oral Supplement Order: none  Appetite/Oral Intake: good/0-100% of meals  Factors Affecting Nutritional Intake:  wound to back areal  Food/Nondenominational/Cultural Preferences: none reported  Food Allergies: none reported    Skin Integrity: scab  Wound(s):       Comments    Pt reports intake is good. Marked menus and discussed food preferences. Encourage good PO intake. Noted pt -refused lunch, overrall over intakes are % of meals    Anthropometrics    Height: 5' 11" (180.3 cm) Height Method: Stated  Last Weight: 66.7 kg (147 lb 0.8 oz) (02/12/23 0500) Weight Method: Bed Scale  BMI (Calculated): 20.5  BMI Classification: normal (BMI 18.5-24.9)        Ideal Body Weight (IBW), Male: 172 lb     % Ideal Body Weight, Male (lb): 83.95 %                          Usual Weight Provided By: unable to obtain usual weight    Wt Readings from Last 3 " Encounters:   02/12/23 0500 66.7 kg (147 lb 0.8 oz)   02/11/23 0500 68.4 kg (150 lb 12.7 oz)   02/09/23 1234 65.5 kg (144 lb 6.4 oz)   02/06/23 1715 65.5 kg (144 lb 6.4 oz)   02/06/23 1050 72.6 kg (160 lb)   12/27/22 1411 72 kg (158 lb 12.8 oz)      Weight Change(s) Since Admission:  Admit Weight: 65.5 kg (144 lb 6.4 oz) (02/09/23 1234)  Wt stable.    Patient Education    Not applicable.    Monitoring & Evaluation     Dietitian will monitor food and beverage intake, weight, weight change, electrolyte/renal panel, glucose/endocrine profile, and gastrointestinal profile.  Nutrition Risk/Follow-Up: low (follow-up in 5-7 days)  Patients assigned 'low nutrition risk' status do not qualify for a full nutritional assessment but will be monitored and re-evaluated in a 5-7 day time period. Please consult if re-evaluation needed sooner.

## 2023-02-13 NOTE — PROGRESS NOTES
[facility] Medicine  Progress Note    Patient Name: Anthony Ibarra  MRN: [id number]  Patient Class: IP- Swing   Admission Date: 2/9/2023  Length of Stay: 4 days  Attending Physician: Thairy G Reyes, DO  Primary Care Provider: Jossue Martinez MD        Subjective:     Principal Problem:<principal problem not specified>        HPI:  88 y.o. male presented to ED with c/o of fall and being found down at home by friend. C/O pelvic and low back pain found to have acute left iliac wing fx non displaced and nonoperative by othropedic surgery Dr. Harrison. T11-12 and lumbar 1-5 compression fractures. CPK > 3,000.          Overview/Hospital Course:  Admitted for rehabilitation after a fall resulting in L iliac fracture. Pt has been refusing therapy. Prior to this admission patient was previously on hospice, reason is unclear to me. He did have osseous lumbar lesions found on November 2022 that were concerning for metastasis. Subsequent biopsy was unrevealing for malignancy and he did have kyphoplasty of that region. He is undergoing outpatient screening for multiple myeloma with Dr. Elizabeth LeJeune. Patient has poor insight, he can not recall having had a kyphoplasty and does not know why he was on hospice. Discussed with pt's daughter 2/9 she revealed pt was on hospice secondary to dementia and was able to provide a living will that requests comfort measures, DNR and DNI.  Patient was admitted to swing bed on 02/09/2023 for continued rehabilitation.      Interval History: Complaining of low back pain however refuses pain medication. Educated on taking prior to therapy.  Patient is max assistance x2    Review of Systems   Musculoskeletal:  Positive for back pain.   Objective:     Vital Signs (Most Recent):  Temp: 97.6 °F (36.4 °C) (02/13/23 0801)  Pulse: 74 (02/13/23 0801)  Resp: 18 (02/13/23 0307)  BP: 114/76 (02/13/23 0801)  SpO2: 96 % (02/13/23 0801)   Vital Signs (24h  Range):  Temp:  [97.3 °F (36.3 °C)-98.7 °F (37.1 °C)] 97.6 °F (36.4 °C)  Pulse:  [] 74  Resp:  [18] 18  SpO2:  [94 %-96 %] 96 %  BP: (102-129)/(69-76) 114/76     Weight: 66.7 kg (147 lb 0.8 oz)  Body mass index is 20.51 kg/m².    Intake/Output Summary (Last 24 hours) at 2/13/2023 1204  Last data filed at 2/12/2023 1755  Gross per 24 hour   Intake 1440 ml   Output 1100 ml   Net 340 ml        Physical Exam  Constitutional:       General: He is not in acute distress.     Appearance: Normal appearance. He is cachectic.   HENT:      Head: Normocephalic and atraumatic.      Nose: No congestion or rhinorrhea.      Mouth/Throat:      Mouth: Mucous membranes are moist.   Eyes:      Conjunctiva/sclera: Conjunctivae normal.      Pupils: Pupils are equal, round, and reactive to light.   Cardiovascular:      Rate and Rhythm: Normal rate and regular rhythm.      Heart sounds: No murmur heard.  Pulmonary:      Effort: Pulmonary effort is normal.      Breath sounds: Normal breath sounds. No wheezing.   Chest:      Comments:  Pectus excavatum  Abdominal:      General: Bowel sounds are normal. There is no distension.      Palpations: Abdomen is soft.      Tenderness: There is no abdominal tenderness.   Musculoskeletal:         General: No swelling. Normal range of motion.      Cervical back: Normal range of motion and neck supple.      Right lower leg: No edema.      Left lower leg: No edema.   Skin:     General: Skin is warm and dry.      Findings: No rash.   Neurological:      General: No focal deficit present.      Mental Status: He is alert and oriented to person, place, and time.   Psychiatric:         Mood and Affect: Mood normal.         Behavior: Behavior normal.       Significant Labs: All pertinent labs within the past 24 hours have been reviewed.    Significant Imaging: I have reviewed all pertinent imaging results/findings within the past 24 hours.      Assessment/Plan:      Closed fracture of left iliac  wing  -non-operative  -PT/OT  -pain control  -pending evaluation with Dr. Harrison on Monday      Generalized weakness  -PT/OT      Dementia with behavioral disturbance  -patient was on hospice secondary to advancing dementia           VTE Risk Mitigation (From admission, onward)         Ordered     enoxaparin injection 40 mg  Daily         02/09/23 1202     Place NISHA hose  Until discontinued         02/09/23 1202     IP VTE HIGH RISK PATIENT  Once         02/09/23 1202     Place sequential compression device  Until discontinued         02/09/23 1202                Discharge Planning   MAUREEN: 2/9/2023     Code Status: DNR   Is the patient medically ready for discharge?:     Reason for patient still in hospital (select all that apply): Treatment, Consult recommendations and PT / OT recommendations  Discharge Plan A: Home with family, Home Health   Discharge Delays: None known at this time              Thairy G Reyes, DO  Department of Hospital Medicine   [facility]

## 2023-02-13 NOTE — PT/OT/SLP PROGRESS
Occupational Therapy  Treatment    Name: Anthony Ibarra    : 1934 (88 y.o.)  MRN: [id number]           TREATMENT SUMMARY AND RECOMMENDATIONS:      OT Date of Treatment: 23  OT Start Time: 1115  OT Stop Time: 1150  OT Total Time (min): 35 min      Subjective Assessment:   No complaints  Lethargic   x Awake, alert, cooperative  Impulsive    Uncooperative   Flat affect    Agitated x c/o pain    Appropriate  c/o fatigue    Confused x Treated at bedside     Emotionally labile  Treated in gym/dept.      Other:        Therapy Precautions:   x Cognitive deficits  Spinal precautions    Collar - hard  Sternal precautions    Collar - soft   TLSO   x Fall risk  LSO    Hip precautions - posterior  Knee immobilizer    Hip precautions - anterior  WBAT    Impaired communication  Partial weightbearing    Oxygen  TTWB    PEG tube  NWB    Visual deficits      Hearing deficits   Other:        Treatment Objectives:     Mobility Training:    Mobility task Assist level Comments    Bed mobility Min A  Supine>EOB min A for LLE guidance   Transfer Min A Stand/pivot t/f with RW EOB>[place]; pt using chair for transfer rather than RW and required cues for safety however Pt continued to use chair.    Sit to stands transitions     Functional mobility     Sitting balance SBA Pt sat EOB x5 min with SBA; wound care present to change bandage on back   Standing balance      Other:        ADL Training:    ADL Assist level Comments    Feeding     Grooming/hygiene Mod A Pt performed oral hygiene while seated in chair; OT assisted to shave face and wash/comb hair using shower cap. Pt able to comb hair as he did it the other day but insisted on OT performing this session. Max encouragement to complete ADLs.   Bathing     Upper body dressing     Lower body dressing     Toileting     Toilet transfer     Adaptive equipment training     Other:         Additional Comments:  Pt still c/o a lot of pain in LLE and back and movements are very  fearful and guarded. Pt requires maximal encouragement to participate and for motivation. Pt is very content with just remaining in bed.    Assessment: Patient tolerated session fair.    OT Plan: Continue POC  Revisions made to plan of care: No    GOALS:   Multidisciplinary Problems       Occupational Therapy Goals          Problem: Occupational Therapy    Goal Priority Disciplines Outcome Interventions   Occupational Therapy Goal     OT, PT/OT Ongoing, Progressing    Description: Goals to be met by: 02/23/23     Patient will increase functional independence with ADLs by performing:    LE Dressing with Supervision.  Grooming while standing at sink with Stand-by Assistance.  Toileting from bedside commode with Contact Guard Assistance for hygiene and clothing management.   Bathing from  shower chair/bench with Minimal Assistance.  Toilet transfer to toilet with Stand-by Assistance.                         Skilled OT Minutes Provided: 35  Communication with Treatment Team:     Equipment recommendations:       At end of treatment, patient remained:  x Up in chair     Up in wheelchair in room    In bed   x With alarm activated    Bed rails up   x Call bell in reach     Family/friends present    Restraints secured properly    In bathroom with CNA/RN notified    In gym with PT/PTA/tech    Nurse aware   x Other:

## 2023-02-13 NOTE — PLAN OF CARE
[facility]  Discharge Reassessment    Primary Care Provider: Jossue Martinez MD    Expected Discharge Date: 2/9/2023    Reassessment (most recent)       Discharge Reassessment - 02/13/23 0726          Discharge Reassessment    Assessment Type Discharge Planning Reassessment     Did the patient's condition or plan change since previous assessment? No     Discharge Plan discussed with: Adult children     Communicated MAUREEN with patient/caregiver Date not available/Unable to determine     Discharge Plan A Home with family;Home Health     DME Needed Upon Discharge  walker, standard;wheelchair     Discharge Barriers Identified None     Why the patient remains in the hospital Requires continued medical care        Post-Acute Status    Post-Acute Authorization Home Health     Home Health Status Pending medical clearance/testing     Discharge Delays None known at this time

## 2023-02-14 LAB
ANION GAP SERPL CALC-SCNC: 7 MEQ/L
BASOPHILS # BLD AUTO: 0.03 X10(3)/MCL (ref 0–0.2)
BASOPHILS NFR BLD AUTO: 0.5 %
BUN SERPL-MCNC: 15.8 MG/DL (ref 8.4–25.7)
CALCIUM SERPL-MCNC: 8.4 MG/DL (ref 8.8–10)
CHLORIDE SERPL-SCNC: 103 MMOL/L (ref 98–107)
CO2 SERPL-SCNC: 30 MMOL/L (ref 23–31)
CREAT SERPL-MCNC: 0.67 MG/DL (ref 0.73–1.18)
CREAT/UREA NIT SERPL: 24
EOSINOPHIL # BLD AUTO: 0.1 X10(3)/MCL (ref 0–0.9)
EOSINOPHIL NFR BLD AUTO: 1.8 %
ERYTHROCYTE [DISTWIDTH] IN BLOOD BY AUTOMATED COUNT: 13.5 % (ref 11.5–17)
GFR SERPLBLD CREATININE-BSD FMLA CKD-EPI: >60 MLS/MIN/1.73/M2
GLUCOSE SERPL-MCNC: 90 MG/DL (ref 82–115)
HCT VFR BLD AUTO: 37.4 % (ref 42–52)
HGB BLD-MCNC: 11.7 GM/DL (ref 14–18)
IMM GRANULOCYTES # BLD AUTO: 0.04 X10(3)/MCL (ref 0–0.04)
IMM GRANULOCYTES NFR BLD AUTO: 0.7 %
LYMPHOCYTES # BLD AUTO: 1.53 X10(3)/MCL (ref 0.6–4.6)
LYMPHOCYTES NFR BLD AUTO: 27.1 %
MCH RBC QN AUTO: 30.4 PG
MCHC RBC AUTO-ENTMCNC: 31.3 MG/DL (ref 33–36)
MCV RBC AUTO: 97.1 FL (ref 80–94)
MONOCYTES # BLD AUTO: 0.75 X10(3)/MCL (ref 0.1–1.3)
MONOCYTES NFR BLD AUTO: 13.3 %
NEUTROPHILS # BLD AUTO: 3.2 X10(3)/MCL (ref 2.1–9.2)
NEUTROPHILS NFR BLD AUTO: 56.6 %
PLATELET # BLD AUTO: 204 X10(3)/MCL (ref 130–400)
PMV BLD AUTO: 10.8 FL (ref 7.4–10.4)
POCT GLUCOSE: 104 MG/DL (ref 70–110)
POCT GLUCOSE: 149 MG/DL (ref 70–110)
POCT GLUCOSE: 162 MG/DL (ref 70–110)
POCT GLUCOSE: 89 MG/DL (ref 70–110)
POTASSIUM SERPL-SCNC: 3.7 MMOL/L (ref 3.5–5.1)
RBC # BLD AUTO: 3.85 X10(6)/MCL (ref 4.7–6.1)
SODIUM SERPL-SCNC: 140 MMOL/L (ref 136–145)
WBC # SPEC AUTO: 5.7 X10(3)/MCL (ref 4.5–11.5)

## 2023-02-14 PROCEDURE — 11000004 HC SNF PRIVATE

## 2023-02-14 PROCEDURE — 80048 BASIC METABOLIC PNL TOTAL CA: CPT | Performed by: STUDENT IN AN ORGANIZED HEALTH CARE EDUCATION/TRAINING PROGRAM

## 2023-02-14 PROCEDURE — 97535 SELF CARE MNGMENT TRAINING: CPT

## 2023-02-14 PROCEDURE — 97110 THERAPEUTIC EXERCISES: CPT

## 2023-02-14 PROCEDURE — 99223 1ST HOSP IP/OBS HIGH 75: CPT | Mod: ,,, | Performed by: ORTHOPAEDIC SURGERY

## 2023-02-14 PROCEDURE — 85025 COMPLETE CBC W/AUTO DIFF WBC: CPT | Performed by: STUDENT IN AN ORGANIZED HEALTH CARE EDUCATION/TRAINING PROGRAM

## 2023-02-14 PROCEDURE — 63600175 PHARM REV CODE 636 W HCPCS: Performed by: STUDENT IN AN ORGANIZED HEALTH CARE EDUCATION/TRAINING PROGRAM

## 2023-02-14 PROCEDURE — 94760 N-INVAS EAR/PLS OXIMETRY 1: CPT

## 2023-02-14 PROCEDURE — 97530 THERAPEUTIC ACTIVITIES: CPT

## 2023-02-14 PROCEDURE — 99223 PR INITIAL HOSPITAL CARE,LEVL III: ICD-10-PCS | Mod: ,,, | Performed by: ORTHOPAEDIC SURGERY

## 2023-02-14 RX ADMIN — ENOXAPARIN SODIUM 40 MG: 40 INJECTION SUBCUTANEOUS at 04:02

## 2023-02-14 NOTE — PROGRESS NOTES
[facility] Medicine  Progress Note    Patient Name: Anthony Ibarra  MRN: [id number]  Patient Class: IP- Swing   Admission Date: 2/9/2023  Length of Stay: 5 days  Attending Physician: Thairy G Reyes, DO  Primary Care Provider: Jossue Martinez MD        Subjective:     Principal Problem:<principal problem not specified>        HPI:  88 y.o. male presented to ED with c/o of fall and being found down at home by friend. C/O pelvic and low back pain found to have acute left iliac wing fx non displaced and nonoperative by othropedic surgery Dr. Harrison. T11-12 and lumbar 1-5 compression fractures. CPK > 3,000.          Overview/Hospital Course:  Admitted for rehabilitation after a fall resulting in L iliac fracture. Pt has been refusing therapy. Prior to this admission patient was previously on hospice, reason is unclear to me. He did have osseous lumbar lesions found on November 2022 that were concerning for metastasis. Subsequent biopsy was unrevealing for malignancy and he did have kyphoplasty of that region. He is undergoing outpatient screening for multiple myeloma with Dr. Elizabeth LeJeune. Patient has poor insight, he can not recall having had a kyphoplasty and does not know why he was on hospice. Discussed with pt's daughter 2/9 she revealed pt was on hospice secondary to dementia and was able to provide a living will that requests comfort measures, DNR and DNI.  Patient was admitted to swing bed on 02/09/2023 for continued rehabilitation.      Interval History: Complaining of low back pain however refuses pain medication. Educated on taking prior to therapy.  Patient is max assistance x2    Review of Systems   Musculoskeletal:  Positive for back pain.   Objective:     Vital Signs (Most Recent):  Temp: 97.9 °F (36.6 °C) (02/14/23 1319)  Pulse: 85 (02/14/23 1513)  Resp: 18 (02/14/23 1319)  BP: 116/76 (02/14/23 1513)  SpO2: (!) 91 % (02/14/23 1513)   Vital Signs (24h  Range):  Temp:  [97.7 °F (36.5 °C)-98.3 °F (36.8 °C)] 97.9 °F (36.6 °C)  Pulse:  [] 85  Resp:  [18] 18  SpO2:  [91 %-97 %] 91 %  BP: (106-135)/(65-87) 116/76     Weight: 68.8 kg (151 lb 10.8 oz)  Body mass index is 21.15 kg/m².    Intake/Output Summary (Last 24 hours) at 2/14/2023 1522  Last data filed at 2/14/2023 1400  Gross per 24 hour   Intake 360 ml   Output 750 ml   Net -390 ml        Physical Exam  Constitutional:       General: He is not in acute distress.     Appearance: Normal appearance. He is cachectic.   HENT:      Head: Normocephalic and atraumatic.      Nose: No congestion or rhinorrhea.      Mouth/Throat:      Mouth: Mucous membranes are moist.   Eyes:      Conjunctiva/sclera: Conjunctivae normal.      Pupils: Pupils are equal, round, and reactive to light.   Cardiovascular:      Rate and Rhythm: Normal rate and regular rhythm.      Heart sounds: No murmur heard.  Pulmonary:      Effort: Pulmonary effort is normal.      Breath sounds: Normal breath sounds. No wheezing.   Chest:      Comments:  Pectus excavatum  Abdominal:      General: Bowel sounds are normal. There is no distension.      Palpations: Abdomen is soft.      Tenderness: There is no abdominal tenderness.   Musculoskeletal:         General: No swelling. Normal range of motion.      Cervical back: Normal range of motion and neck supple.      Right lower leg: No edema.      Left lower leg: No edema.   Skin:     General: Skin is warm and dry.      Findings: Bruising (left flank) present. No rash.   Neurological:      General: No focal deficit present.      Mental Status: He is alert and oriented to person, place, and time.   Psychiatric:         Mood and Affect: Mood normal.         Behavior: Behavior normal.       Significant Labs: All pertinent labs within the past 24 hours have been reviewed.    Significant Imaging: I have reviewed all pertinent imaging results/findings within the past 24 hours.      Assessment/Plan:      Closed  fracture of left iliac wing  -non-operative  -PT/OT  -pain control  -Patient was evaluated by Dr. Harrison on 02/14 who recommended weight-bearing to the left lower extremity and as tolerated to the right lower extremity, okay to continue physical therapy and plan on repeat x-rays later in the week      Generalized weakness  -PT/OT      Dementia with behavioral disturbance  -patient was on hospice secondary to advancing dementia           VTE Risk Mitigation (From admission, onward)         Ordered     enoxaparin injection 40 mg  Daily         02/09/23 1202     Place NISHA hose  Until discontinued         02/09/23 1202     IP VTE HIGH RISK PATIENT  Once         02/09/23 1202     Place sequential compression device  Until discontinued         02/09/23 1202                Discharge Planning   MAUREEN: 2/9/2023     Code Status: DNR   Is the patient medically ready for discharge?:     Reason for patient still in hospital (select all that apply): Treatment and PT / OT recommendations  Discharge Plan A: Home with family, Home Health   Discharge Delays: None known at this time              Thairy G Reyes, DO  Department of Hospital Medicine   [facility]

## 2023-02-14 NOTE — PT/OT/SLP PROGRESS
Physical Therapy Treatment Note           Name: Anthony Ibarra    : 1934 (88 y.o.)  MRN: [id number]           TREATMENT SUMMARY AND RECOMMENDATIONS:    PT Received On: 23  PT Start Time: 141     PT Stop Time: 1435  PT Total Time (min): 23 min     Subjective Assessment:   No complaints  Lethargic   x Awake, alert, cooperative  Uncooperative    Agitated x c/o pain    Appropriate  c/o fatigue    Confused x Treated at bedside     Emotionally labile  Treated in gym/dept.    Impulsive  Other:    Flat affect       Therapy Precautions:    Cognitive deficits  Spinal precautions    Collar - hard  Sternal precautions    Collar - soft   TLSO   x Fall risk  LSO    Hip precautions - posterior  Knee immobilizer    Hip precautions - anterior  WBAT    Impaired communication x Touch down weightbearing LLE    Oxygen  TTWB    PEG tube  NWB    Visual deficits  Other:    Hearing deficits          Treatment Objectives:     Mobility Training:   Assist level Comments    Bed mobility MIN A Supine<>sit   Transfer MIN A   Stand pivot using RW bed>chair   Gait MIN A Using RW x 2 feet   Sit to stand transitions MIN A  X 2 reps using RW   Sitting balance     Standing balance      Wheelchair mobility     Car transfer     Other:          Therapeutic Exercise:   Exercise Sets Reps Comments   Seated B LE AROM 2 5 Hip flexion, knee ext, ankle PF/DF and hip add pillow squeeze                         Additional Comments:  Pt was sleeping in bed, not realizing it was 2 pm and that he didn't eat lunch. PT assisted pt to recliner to eat lunch with good tolerance. Visitor in room proving encouragement.     Assessment: Patient tolerated session well.    PT Plan: Continue per POC  Revisions made to plan of care: No    GOALS:   Multidisciplinary Problems       Physical Therapy Goals          Problem: Physical Therapy    Goal Priority Disciplines Outcome Goal Variances Interventions   Physical Therapy Goal     PT, PT/OT Ongoing,  Progressing     Description: Goals to be met by: Discharge    Patient will increase functional independence with mobility by performin. Supine to sit with Modified [place]  2. Sit to supine with Modified [place]  3. Sit to stand transfer with Modified [place]  4. Bed to chair transfer with Modified [place] using Rolling Walker  5. Gait  x 100 feet with Supervision using Rolling Walker.                        Skilled PT Minutes Provided: 23   Communication with Treatment Team:     Equipment recommendations:       At end of treatment, patient remained:  x Up in chair     Up in wheelchair in room    In bed   x With alarm activated    Bed rails up   x Call bell in reach     Family/friends present   x Restraints secured properly    In bathroom with CNA/RN notified    Nurse aware    In gym with therapist/tech    Other:

## 2023-02-14 NOTE — PT/OT/SLP PROGRESS
Physical Therapy Treatment Note           Name: Anthony Ibarra    : 1934 (88 y.o.)  MRN: [id number]           TREATMENT SUMMARY AND RECOMMENDATIONS:    PT Received On: 23  PT Start Time: 0950     PT Stop Time: 1014  PT Total Time (min): 24 min     Subjective Assessment:   No complaints  Lethargic   x Awake, alert, cooperative  Uncooperative    Agitated x c/o pain    Appropriate  c/o fatigue    Confused  Treated at bedside     Emotionally labile  Treated in gym/dept.    Impulsive  Other:    Flat affect       Therapy Precautions:    Cognitive deficits  Spinal precautions    Collar - hard  Sternal precautions    Collar - soft   TLSO   x Fall risk  LSO    Hip precautions - posterior  Knee immobilizer    Hip precautions - anterior  WBAT    Impaired communication x Touch down weightbearing LLE    Oxygen  TTWB    PEG tube  NWB    Visual deficits  Other:    Hearing deficits          Treatment Objectives:     Mobility Training:   Assist level Comments    Bed mobility MOD A Supine<>sit   Transfer MIN A  x 2 Stand pivot using RW bed>chair   Gait MIN A Using RW x 5 feet   Sit to stand transitions MIN A x 2 X 2 reps using RW   Sitting balance     Standing balance      Wheelchair mobility     Car transfer     Other:          Therapeutic Exercise:   Exercise Sets Reps Comments                               Additional Comments:  Pt more alert this AM, still having pain but not as limiting. MD in room to see how pt was moving. Overall improved tolerance noted.     Assessment: Patient tolerated session well.    PT Plan: Continue per POC  Revisions made to plan of care: No    GOALS:   Multidisciplinary Problems       Physical Therapy Goals          Problem: Physical Therapy    Goal Priority Disciplines Outcome Goal Variances Interventions   Physical Therapy Goal     PT, PT/OT Ongoing, Progressing     Description: Goals to be met by: Discharge    Patient will increase functional independence with mobility by  performin. Supine to sit with Modified [place]  2. Sit to supine with Modified [place]  3. Sit to stand transfer with Modified [place]  4. Bed to chair transfer with Modified [place] using Rolling Walker  5. Gait  x 100 feet with Supervision using Rolling Walker.                        Skilled PT Minutes Provided: 23   Communication with Treatment Team:     Equipment recommendations:       At end of treatment, patient remained:  x Up in chair     Up in wheelchair in room    In bed    With alarm activated    Bed rails up    Call bell in reach     Family/friends present    Restraints secured properly    In bathroom with CNA/RN notified    Nurse aware   x In gym with therapist/tech    Other:

## 2023-02-14 NOTE — CONSULTS
[facility]  Orthopedics  Consult Note    Patient Name: Anthony Ibarra  MRN: [id number]  Admission Date: 2/9/2023  Hospital Length of Stay: 5 days  Attending Provider: Thairy G Reyes, DO  Primary Care Provider: Jossue Martinez MD    Patient information was obtained from ER records.     Consults  Subjective:  Left iliac wing fracture     Principal Problem:<principal problem not specified>    Chief Complaint: No chief complaint on file.       HPI:  Mr. Ibarra is a 88-year-old male who had a same-level fall, being found down at home by a friend.  Patient complains of pelvic pain.  Patient was seen in the ER last week was noted to have a left iliac wing fracture.  Patient since has been admitted to the swing bed unit.  Patient was also noted to have multiple thoracic and lumbar fractures, orthopedics has been consulted for his left iliac wing fracture.    Past Medical History:   Diagnosis Date    Cancer     prostate    Compression fracture of body of thoracic vertebra     Rhabdomyolysis 2/8/2023       Past Surgical History:   Procedure Laterality Date    PROSTATE SURGERY N/A        Review of patient's allergies indicates:  No Known Allergies    Current Facility-Administered Medications   Medication    acetaminophen tablet 650 mg    aluminum-magnesium hydroxide-simethicone 200-200-20 mg/5 mL suspension 30 mL    bisacodyL suppository 10 mg    dextrose 50% injection 12.5 g    dextrose 50% injection 25 g    enoxaparin injection 40 mg    glucagon (human recombinant) injection 1 mg    glucose chewable tablet 16 g    glucose chewable tablet 24 g    HYDROcodone-acetaminophen 5-325 mg per tablet 1 tablet    melatonin tablet 9 mg    morphine injection 2 mg    naloxone 0.4 mg/mL injection 0.02 mg    ondansetron disintegrating tablet 8 mg    ondansetron injection 4 mg    polyethylene glycol packet 17 g    simethicone chewable tablet 80 mg    sodium chloride 0.9% flush 10 mL     Family History   "     Problem Relation (Age of Onset)    Diabetes Brother          Tobacco Use    Smoking status: Never     Passive exposure: Never    Smokeless tobacco: Never   Substance and Sexual Activity    Alcohol use: Yes     Alcohol/week: 2.0 standard drinks     Types: 2 Cans of beer per week     Comment: socially    Drug use: Not Currently    Sexual activity: Not Currently     ROS  Objective:  Patient is a well-nourished developed male he is awake alert and oriented he is in no apparent stress is pleasant and cooperative.  Examination of the bilateral lower extremities compartment soft and warm.  Skin is intact.  There is no signs symptoms of DVT or infection.  He does have discomfort, tender to to palpation along the left iliac wing area.  He does have some discomfort with log rolling of the left hip.  Positive Stinchfield exam.  There is no obvious long tract signs.  He is neurovascular intact distally.     Vital Signs (Most Recent):  Temp: 97.9 °F (36.6 °C) (02/14/23 0353)  Pulse: 68 (02/14/23 0724)  Resp: 18 (02/14/23 0353)  BP: 111/73 (02/14/23 0724)  SpO2: 95 % (02/14/23 0353) Vital Signs (24h Range):  Temp:  [97.6 °F (36.4 °C)-98.5 °F (36.9 °C)] 97.9 °F (36.6 °C)  Pulse:  [] 68  Resp:  [18-19] 18  SpO2:  [94 %-97 %] 95 %  BP: (105-135)/(70-87) 111/73     Weight: 68.8 kg (151 lb 10.8 oz)  Height: 5' 11" (180.3 cm)  Body mass index is 21.15 kg/m².      Intake/Output Summary (Last 24 hours) at 2/14/2023 0734  Last data filed at 2/14/2023 0149  Gross per 24 hour   Intake 360 ml   Output 500 ml   Net -140 ml       Ortho/SPM Exam    Significant Labs: All pertinent labs within the past 24 hours have been reviewed.    Significant Imaging: I have reviewed and interpreted all pertinent imaging results/findings.    Assessment/Plan:  88-year-old male status post same-level fall with a left iliac wing fracture, without intra-articular involvement.  Plan 1. Patient was seen examined chart reviewed on hospital floor 2.  At " this time from an orthopedic standpoint from his left iliac wing fracture, patient is touchdown weight-bearing to the left lower extremity.  He can be weight-bearing as tolerated to the right lower extremity.  I am okay with physical therapy from this standpoint.  We will plan on repeating his x-rays later in the week for any signs of displacement from previous.  We will continue conservative treatment at this time.     Active Diagnoses:    Diagnosis Date Noted POA    Dementia with behavioral disturbance [F03.918] 02/10/2023 Yes    Closed fracture of left iliac wing [S32.302A] 02/08/2023 Yes    Generalized weakness [R53.1] 11/23/2022 Yes      Problems Resolved During this Admission:    Diagnosis Date Noted Date Resolved POA    Rhabdomyolysis [M62.82] 02/08/2023 02/09/2023 Yes       Thank you for your consult. I will follow-up with patient. Please contact us if you have any additional questions.    Ian Harrison MD  Orthopedics  [facility]

## 2023-02-14 NOTE — SUBJECTIVE & OBJECTIVE
Interval History: Complaining of low back pain however refuses pain medication. Educated on taking prior to therapy.  Patient is max assistance x2    Review of Systems   Musculoskeletal:  Positive for back pain.   Objective:     Vital Signs (Most Recent):  Temp: 97.9 °F (36.6 °C) (02/14/23 1319)  Pulse: 85 (02/14/23 1513)  Resp: 18 (02/14/23 1319)  BP: 116/76 (02/14/23 1513)  SpO2: (!) 91 % (02/14/23 1513)   Vital Signs (24h Range):  Temp:  [97.7 °F (36.5 °C)-98.3 °F (36.8 °C)] 97.9 °F (36.6 °C)  Pulse:  [] 85  Resp:  [18] 18  SpO2:  [91 %-97 %] 91 %  BP: (106-135)/(65-87) 116/76     Weight: 68.8 kg (151 lb 10.8 oz)  Body mass index is 21.15 kg/m².    Intake/Output Summary (Last 24 hours) at 2/14/2023 1522  Last data filed at 2/14/2023 1400  Gross per 24 hour   Intake 360 ml   Output 750 ml   Net -390 ml        Physical Exam  Constitutional:       General: He is not in acute distress.     Appearance: Normal appearance. He is cachectic.   HENT:      Head: Normocephalic and atraumatic.      Nose: No congestion or rhinorrhea.      Mouth/Throat:      Mouth: Mucous membranes are moist.   Eyes:      Conjunctiva/sclera: Conjunctivae normal.      Pupils: Pupils are equal, round, and reactive to light.   Cardiovascular:      Rate and Rhythm: Normal rate and regular rhythm.      Heart sounds: No murmur heard.  Pulmonary:      Effort: Pulmonary effort is normal.      Breath sounds: Normal breath sounds. No wheezing.   Chest:      Comments:  Pectus excavatum  Abdominal:      General: Bowel sounds are normal. There is no distension.      Palpations: Abdomen is soft.      Tenderness: There is no abdominal tenderness.   Musculoskeletal:         General: No swelling. Normal range of motion.      Cervical back: Normal range of motion and neck supple.      Right lower leg: No edema.      Left lower leg: No edema.   Skin:     General: Skin is warm and dry.      Findings: Bruising (left flank) present. No rash.   Neurological:       General: No focal deficit present.      Mental Status: He is alert and oriented to person, place, and time.   Psychiatric:         Mood and Affect: Mood normal.         Behavior: Behavior normal.       Significant Labs: All pertinent labs within the past 24 hours have been reviewed.    Significant Imaging: I have reviewed all pertinent imaging results/findings within the past 24 hours.

## 2023-02-14 NOTE — PLAN OF CARE
Problem: Adult Inpatient Plan of Care  Goal: Plan of Care Review  2/13/2023 2223 by Iván Saucedo RN  Outcome: Ongoing, Progressing  Flowsheets (Taken 2/13/2023 2223)  Plan of Care Reviewed With: patient  2/13/2023 2223 by Iván Saucedo RN  Outcome: Ongoing, Progressing  Goal: Patient-Specific Goal (Individualized)  2/13/2023 2223 by Iván Saucedo RN  Outcome: Ongoing, Progressing  Flowsheets (Taken 2/13/2023 2223)  Anxieties, Fears or Concerns: none  Individualized Care Needs: pain will remain controlled  2/13/2023 2223 by Iván Saucedo RN  Outcome: Ongoing, Progressing     Problem: Pain Acute  Goal: Acceptable Pain Control and Functional Ability  Outcome: Ongoing, Progressing  Intervention: Develop Pain Management Plan  Flowsheets (Taken 2/13/2023 2223)  Pain Management Interventions: medication offered

## 2023-02-14 NOTE — ASSESSMENT & PLAN NOTE
-non-operative  -PT/OT  -pain control  -Patient was evaluated by Dr. Harrison on 02/14 who recommended weight-bearing to the left lower extremity and as tolerated to the right lower extremity, okay to continue physical therapy and plan on repeat x-rays later in the week

## 2023-02-14 NOTE — PT/OT/SLP PROGRESS
Occupational Therapy  Treatment    Name: Anthony Ibarra    : 1934 (88 y.o.)  MRN: [id number]           TREATMENT SUMMARY AND RECOMMENDATIONS:      OT Date of Treatment: 23  OT Start Time: 1015  OT Stop Time: 1047  OT Total Time (min): 32 min      Subjective Assessment:  x No complaints  Lethargic   x Awake, alert, cooperative  Impulsive    Uncooperative   Flat affect    Agitated  c/o pain    Appropriate  c/o fatigue    Confused  Treated at bedside     Emotionally labile x Treated in gym/dept.      Other:        Therapy Precautions:    Cognitive deficits  Spinal precautions    Collar - hard  Sternal precautions    Collar - soft   TLSO    Fall risk  LSO    Hip precautions - posterior  Knee immobilizer    Hip precautions - anterior x WBAT RLE    Impaired communication  Partial weightbearing    Oxygen x TTWB LLE    PEG tube  NWB    Visual deficits      Hearing deficits   Other:        Treatment Objectives:     Mobility Training:    Mobility task Assist level Comments    Bed mobility     Transfer     Sit to stands transitions Mod A From recliner chair to RW with cues for BUE placement and to maintain WB precautions initially   Functional mobility     Sitting balance     Standing balance  CGA/min A  While pulling pants up to waist in standing   Other:        ADL Training:    ADL Assist level Comments    Feeding     Grooming/hygiene     Bathing     Upper body dressing SBA To help orient and then straighten clothing once donned (tshirt o/h)   Lower body dressing Mod A For assist threading RLE over R foot and for standing balance during pulling pants up to waist   Toileting     Toilet transfer     Adaptive equipment training     Other:           Therapeutic Exercise:   Exercise Sets Reps Comments   4# dowel o/h presses 3 10    1# dowel modified situps/concurrent BUE multidirectional reaching 3 5 To facilitate increased core strength and postural stability   1# free weights 2 5 Chest press/fly combo            Assessment: Patient tolerated session very well, good motivation this session.    OT Plan: Cont OT per POC  Revisions made to plan of care: No    GOALS:   Multidisciplinary Problems       Occupational Therapy Goals          Problem: Occupational Therapy    Goal Priority Disciplines Outcome Interventions   Occupational Therapy Goal     OT, PT/OT Ongoing, Progressing    Description: Goals to be met by: 02/23/23     Patient will increase functional independence with ADLs by performing:    LE Dressing with Supervision.  Grooming while standing at sink with Stand-by Assistance.  Toileting from bedside commode with Contact Guard Assistance for hygiene and clothing management.   Bathing from  shower chair/bench with Minimal Assistance.  Toilet transfer to toilet with Stand-by Assistance.                         Skilled OT Minutes Provided: 32  Communication with Treatment Team:     Equipment recommendations:       At end of treatment, patient remained:  x Up in chair     Up in wheelchair in room    In bed   x With alarm activated    Bed rails up   x Call bell in reach     Family/friends present   x Restraints secured properly    In bathroom with CNA/RN notified    In gym with PT/PTA/tech    Nurse aware    Other:

## 2023-02-14 NOTE — PLAN OF CARE
Problem: Adult Inpatient Plan of Care  Goal: Plan of Care Review  Outcome: Ongoing, Progressing  Flowsheets (Taken 2/14/2023 1320)  Plan of Care Reviewed With: patient  Goal: Absence of Hospital-Acquired Illness or Injury  Outcome: Ongoing, Progressing  Intervention: Identify and Manage Fall Risk  Flowsheets (Taken 2/14/2023 1320)  Safety Promotion/Fall Prevention:   assistive device/personal item within reach   bed alarm set   chair alarm set   diversional activities provided   gait belt with ambulation   nonskid shoes/socks when out of bed   instructed to call staff for mobility   /camera at bedside  Intervention: Prevent Skin Injury  Flowsheets (Taken 2/14/2023 1320)  Body Position:   position changed independently   heels elevated  Skin Protection:   adhesive use limited   tubing/devices free from skin contact  Intervention: Prevent and Manage VTE (Venous Thromboembolism) Risk  Flowsheets (Taken 2/14/2023 1320)  VTE Prevention/Management:   bleeding risk assessed   ROM (active) performed  Intervention: Prevent Infection  Flowsheets (Taken 2/14/2023 1320)  Infection Prevention:   cohorting utilized   hand hygiene promoted   equipment surfaces disinfected   environmental surveillance performed   rest/sleep promoted     Problem: Fall Injury Risk  Goal: Absence of Fall and Fall-Related Injury  Outcome: Ongoing, Progressing  Intervention: Identify and Manage Contributors  Flowsheets (Taken 2/14/2023 1320)  Self-Care Promotion:   independence encouraged   BADL personal objects within reach  Medication Review/Management: medications reviewed  Intervention: Promote Injury-Free Environment  Flowsheets (Taken 2/14/2023 1320)  Safety Promotion/Fall Prevention:   assistive device/personal item within reach   bed alarm set   chair alarm set   diversional activities provided   gait belt with ambulation   nonskid shoes/socks when out of bed   instructed to call staff for mobility   /camera at  bedside     Problem: Pain Acute  Goal: Acceptable Pain Control and Functional Ability  Outcome: Ongoing, Progressing  Intervention: Develop Pain Management Plan  Flowsheets (Taken 2/14/2023 1320)  Pain Management Interventions:   quiet environment facilitated   diversional activity provided   premedicated for activity   position adjusted  Intervention: Prevent or Manage Pain  Flowsheets (Taken 2/14/2023 1320)  Sleep/Rest Enhancement:   relaxation techniques promoted   natural light exposure provided   regular sleep/rest pattern promoted  Sensory Stimulation Regulation:   quiet environment promoted   lighting decreased   television on  Medication Review/Management: medications reviewed

## 2023-02-15 LAB
POCT GLUCOSE: 116 MG/DL (ref 70–110)
POCT GLUCOSE: 117 MG/DL (ref 70–110)
POCT GLUCOSE: 90 MG/DL (ref 70–110)
POCT GLUCOSE: 92 MG/DL (ref 70–110)

## 2023-02-15 PROCEDURE — 11000004 HC SNF PRIVATE

## 2023-02-15 PROCEDURE — 63600175 PHARM REV CODE 636 W HCPCS: Performed by: STUDENT IN AN ORGANIZED HEALTH CARE EDUCATION/TRAINING PROGRAM

## 2023-02-15 PROCEDURE — 25000003 PHARM REV CODE 250: Performed by: STUDENT IN AN ORGANIZED HEALTH CARE EDUCATION/TRAINING PROGRAM

## 2023-02-15 PROCEDURE — 97535 SELF CARE MNGMENT TRAINING: CPT

## 2023-02-15 PROCEDURE — 97535 SELF CARE MNGMENT TRAINING: CPT | Mod: CQ

## 2023-02-15 PROCEDURE — 94760 N-INVAS EAR/PLS OXIMETRY 1: CPT

## 2023-02-15 RX ADMIN — ENOXAPARIN SODIUM 40 MG: 40 INJECTION SUBCUTANEOUS at 04:02

## 2023-02-15 RX ADMIN — ACETAMINOPHEN 650 MG: 325 TABLET, FILM COATED ORAL at 09:02

## 2023-02-15 NOTE — PROGRESS NOTES
Pt refused to get to chair, sit EOB or complete supine exercises. PT explained that if he did not get up and participate he would most likely have to go the NH prior to returning home, pt reported that was fine. PT to return in PM.

## 2023-02-15 NOTE — PLAN OF CARE
Problem: Adult Inpatient Plan of Care  Goal: Plan of Care Review  Outcome: Ongoing, Progressing  Flowsheets (Taken 2/14/2023 2341)  Plan of Care Reviewed With: patient  Goal: Patient-Specific Goal (Individualized)  Outcome: Ongoing, Progressing  Flowsheets (Taken 2/14/2023 2341)  Anxieties, Fears or Concerns: none  Individualized Care Needs: pain management     Problem: Pain Acute  Goal: Acceptable Pain Control and Functional Ability  Outcome: Ongoing, Progressing  Intervention: Develop Pain Management Plan  Flowsheets (Taken 2/14/2023 2341)  Pain Management Interventions: medication offered

## 2023-02-15 NOTE — PT/OT/SLP PROGRESS
Name: Anthony Ibarra    : 1934 (88 y.o.)  MRN: [id number]            Interdisciplinary Team Conference     Case conference held with patient/family and care team to discuss progress, plan of care, barriers to be addressed for safe return home, equipment recommendations, and discharge planning. Communicated therapy progress with MD, RN, therapy clinicians and case management. All questions/concerns answered.

## 2023-02-15 NOTE — PROGRESS NOTES
Pt agreed to work with PT, but then every time PT would start to move the blankets he would ask what we were doing. When PT reported that he ok'd to work with us he would deny it and say he was not moving. Pt said he was going to stay in bed and watch his TV, but the TV was off, when PT would try to turn the TV on, pt would say no to TV being on. Max motivation provided to get pt to participate but he refused.

## 2023-02-15 NOTE — PLAN OF CARE
Problem: Adult Inpatient Plan of Care  Goal: Plan of Care Review  Outcome: Ongoing, Progressing  Flowsheets (Taken 2/15/2023 1544)  Plan of Care Reviewed With: patient  Goal: Patient-Specific Goal (Individualized)  Outcome: Ongoing, Progressing  Flowsheets (Taken 2/15/2023 1544)  Anxieties, Fears or Concerns: none  Individualized Care Needs: pain control  Goal: Optimal Comfort and Wellbeing  Outcome: Ongoing, Progressing  Intervention: Monitor Pain and Promote Comfort  Flowsheets (Taken 2/15/2023 1544)  Pain Management Interventions:   relaxation techniques promoted   premedicated for activity   pillow support provided   diversional activity provided   care clustered  Intervention: Provide Person-Centered Care  Flowsheets (Taken 2/15/2023 1544)  Trust Relationship/Rapport:   care explained   questions answered   empathic listening provided   thoughts/feelings acknowledged   emotional support provided  Goal: Readiness for Transition of Care  Outcome: Ongoing, Progressing     Problem: Fall Injury Risk  Goal: Absence of Fall and Fall-Related Injury  Outcome: Ongoing, Progressing  Intervention: Identify and Manage Contributors  Flowsheets (Taken 2/15/2023 1544)  Self-Care Promotion:   independence encouraged   BADL personal objects within reach   safe use of adaptive equipment encouraged     Problem: Skin Injury Risk Increased  Goal: Skin Health and Integrity  Outcome: Ongoing, Progressing  Intervention: Optimize Skin Protection  Flowsheets (Taken 2/15/2023 1544)  Pressure Reduction Techniques:   frequent weight shift encouraged   weight shift assistance provided   heels elevated off bed   positioned off wounds  Skin Protection:   adhesive use limited   tubing/devices free from skin contact  Head of Bed (HOB) Positioning: HOB at 20-30 degrees  Intervention: Promote and Optimize Oral Intake  Flowsheets (Taken 2/15/2023 1544)  Oral Nutrition Promotion:   safe use of adaptive equipment encouraged   calorie-dense foods  provided     Problem: Pain Acute  Goal: Acceptable Pain Control and Functional Ability  Outcome: Ongoing, Progressing  Intervention: Develop Pain Management Plan  Flowsheets (Taken 2/15/2023 1547)  Pain Management Interventions:   relaxation techniques promoted   premedicated for activity   pillow support provided   diversional activity provided   care clustered  Intervention: Prevent or Manage Pain  Flowsheets (Taken 2/15/2023 1546)  Sleep/Rest Enhancement:   regular sleep/rest pattern promoted   noise level reduced   natural light exposure provided  Bowel Elimination Promotion:   adequate fluid intake promoted   ambulation promoted   privacy promoted

## 2023-02-15 NOTE — PROGRESS NOTES
Name: Anthony Ibarra    : 1934 (88 y.o.)  MRN: [id number]           Patient Unavailable      Patient unable to be seen at this time secondary to: Pt initially agreed to get up and then once OT pulled blankets off, Pt began to get upset and agitated yelling no he was not getting up. OT provided encouragement and motivation however Pt getting more agitated and not letting OT pull blankets off. Will attempt later as able.

## 2023-02-16 LAB
POCT GLUCOSE: 107 MG/DL (ref 70–110)
POCT GLUCOSE: 89 MG/DL (ref 70–110)

## 2023-02-16 PROCEDURE — 63600175 PHARM REV CODE 636 W HCPCS: Performed by: STUDENT IN AN ORGANIZED HEALTH CARE EDUCATION/TRAINING PROGRAM

## 2023-02-16 PROCEDURE — 11000004 HC SNF PRIVATE

## 2023-02-16 PROCEDURE — 97535 SELF CARE MNGMENT TRAINING: CPT

## 2023-02-16 PROCEDURE — 94760 N-INVAS EAR/PLS OXIMETRY 1: CPT

## 2023-02-16 RX ADMIN — ENOXAPARIN SODIUM 40 MG: 40 INJECTION SUBCUTANEOUS at 05:02

## 2023-02-16 NOTE — PLAN OF CARE
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    Compression fracture of body of thoracic vertebra    Memory impairment    Generalized weakness    Closed fracture of left iliac wing    Dementia with behavioral disturbance          Team Members Present:       Nursing Present     Yes [x]    No []    Physical Therapy Present    Yes [x]    No []    Occupational Therapy Present     Yes [x]    No []    Speech Therapy Present    Yes []    No []    NA [x]    Dietary Present    Yes [x]    No []        Physician Present   [] Vishal Durand    [x] Thairy Reyes    [] CALLIE Chang    [] MEGHANN Jo     [] LIBIA Love      Family Present    [] Adult Children    [] Spouse    [] POA    [] Friend/ Caregiver    [] Other       Interdisciplinary Meeting Notes:  Staffing daughter Sarah Daughter on the phone patient not doing well with PT OT Freedom of choice sign for Nursing home placement referral sent to [facility]                Please see Documented PT/OT/ST, Dietary, Nursing, and Physician notes for detailed treatment information.

## 2023-02-16 NOTE — PLAN OF CARE
Problem: Adult Inpatient Plan of Care  Goal: Plan of Care Review  Outcome: Ongoing, Progressing  Flowsheets (Taken 2/15/2023 2233)  Plan of Care Reviewed With: patient  Goal: Patient-Specific Goal (Individualized)  Outcome: Ongoing, Progressing  Flowsheets (Taken 2/15/2023 2233)  Anxieties, Fears or Concerns: none  Individualized Care Needs: pain management     Problem: Pain Acute  Goal: Acceptable Pain Control and Functional Ability  Outcome: Ongoing, Progressing  Intervention: Develop Pain Management Plan  Flowsheets (Taken 2/15/2023 2233)  Pain Management Interventions: medication offered

## 2023-02-16 NOTE — PLAN OF CARE
[facility]  Discharge Reassessment    Primary Care Provider: Jossue Martinez MD    Expected Discharge Date: 2/9/2023    Reassessment (most recent)       Discharge Reassessment - 02/16/23 1742          Discharge Reassessment    Assessment Type Discharge Planning Reassessment     Did the patient's condition or plan change since previous assessment? No     Discharge Plan discussed with: Adult children     Communicated MAUREEN with patient/caregiver Date not available/Unable to determine     Discharge Plan A New Nursing Home placement [facility]     DME Needed Upon Discharge  none     Discharge Barriers Identified None     Why the patient remains in the hospital Requires continued medical care        Post-Acute Status    Post-Acute Authorization Placement     Post-Acute Placement Status Referrals Sent     Hospital Resources/Appts/Education Provided Provided patient/caregiver with written discharge plan information     Discharge Delays None known at this time                        Reassured patient.

## 2023-02-16 NOTE — PT/OT/SLP PROGRESS
Name: Anthony Ibarra    : 1934 (88 y.o.)  MRN: [id number]           Patient Unavailable      Patient unable to be seen at this time secondary to: pt politely refused treatment even after attempting to persuade

## 2023-02-16 NOTE — PROGRESS NOTES
[facility]  Progress Note    Patient Name: Anthony Ibarra  MRN: [id number]  Patient Class: IP- Swing   Admission Date: 2/9/2023  Length of Stay: 7 days  Attending Physician: Thairy G Reyes, DO  Primary Care Provider: Jossue Martinez MD        Subjective:     Principal Problem:<principal problem not specified>        HPI:  88 y.o. male presented to ED with c/o of fall and being found down at home by friend. C/O pelvic and low back pain found to have acute left iliac wing fx non displaced and nonoperative by othropedic surgery Dr. Harrison. T11-12 and lumbar 1-5 compression fractures. CPK > 3,000.          Overview/Hospital Course:  Admitted for rehabilitation after a fall resulting in L iliac fracture. Pt has been refusing therapy. Prior to this admission patient was previously on hospice, reason is unclear to me. He did have osseous lumbar lesions found on November 2022 that were concerning for metastasis. Subsequent biopsy was unrevealing for malignancy and he did have kyphoplasty of that region. He is undergoing outpatient screening for multiple myeloma with Dr. Elizabeth LeJeune. Patient has poor insight, he can not recall having had a kyphoplasty and does not know why he was on hospice. Discussed with pt's daughter 2/9 she revealed pt was on hospice secondary to dementia and was able to provide a living will that requests comfort measures, DNR and DNI.  Patient was admitted to swing bed on 02/09/2023 for continued rehabilitation.      Interval History:  Patient is ambulating 5 ft with rolling walker in mid/mod assistance. H is min to mod assistance for ADLs.  Cognition is the patient's most limiting.    Review of Systems   Musculoskeletal:  Positive for back pain.   Objective:     Vital Signs (Most Recent):  Temp: 98 °F (36.7 °C) (02/16/23 1139)  Pulse: 62 (02/16/23 1139)  Resp: 18 (02/16/23 0918)  BP: 121/75 (02/16/23 1139)  SpO2: 95 % (02/16/23 1139)    Vital Signs (24h Range):  Temp:  [97.5 °F (36.4 °C)-98 °F (36.7 °C)] 98 °F (36.7 °C)  Pulse:  [62-84] 62  Resp:  [18] 18  SpO2:  [95 %-98 %] 95 %  BP: (103-152)/(60-87) 121/75     Weight: 69.2 kg (152 lb 8.9 oz)  Body mass index is 21.28 kg/m².    Intake/Output Summary (Last 24 hours) at 2/16/2023 1257  Last data filed at 2/16/2023 1150  Gross per 24 hour   Intake 1200 ml   Output 850 ml   Net 350 ml        Physical Exam  Constitutional:       General: He is not in acute distress.     Appearance: Normal appearance. He is cachectic.   HENT:      Head: Normocephalic and atraumatic.      Nose: No congestion or rhinorrhea.      Mouth/Throat:      Mouth: Mucous membranes are moist.   Eyes:      Conjunctiva/sclera: Conjunctivae normal.      Pupils: Pupils are equal, round, and reactive to light.   Cardiovascular:      Rate and Rhythm: Normal rate and regular rhythm.      Heart sounds: No murmur heard.  Pulmonary:      Effort: Pulmonary effort is normal.      Breath sounds: Normal breath sounds. No wheezing.   Chest:      Comments:  Pectus excavatum  Abdominal:      General: Bowel sounds are normal. There is no distension.      Palpations: Abdomen is soft.      Tenderness: There is no abdominal tenderness.   Musculoskeletal:         General: No swelling. Normal range of motion.      Cervical back: Normal range of motion and neck supple.      Right lower leg: No edema.      Left lower leg: No edema.   Skin:     General: Skin is warm and dry.      Findings: Bruising (left flank) present. No rash.   Neurological:      General: No focal deficit present.      Mental Status: He is alert and oriented to person, place, and time.   Psychiatric:         Mood and Affect: Mood normal.         Behavior: Behavior normal.       Significant Labs: All pertinent labs within the past 24 hours have been reviewed.    Significant Imaging: I have reviewed all pertinent imaging results/findings within the past 24 hours.      Assessment/Plan:       Closed fracture of left iliac wing  -non-operative  -PT/OT  -pain control  -Patient was evaluated by Dr. Harrison on 02/14 who recommended weight-bearing to the left lower extremity and as tolerated to the right lower extremity, okay to continue physical therapy and plan on repeat x-rays later in the week      Generalized weakness  -PT/OT      Dementia with behavioral disturbance  -patient was on hospice secondary to advancing dementia           VTE Risk Mitigation (From admission, onward)         Ordered     enoxaparin injection 40 mg  Daily         02/09/23 1202     Place NISHA hose  Until discontinued         02/09/23 1202     IP VTE HIGH RISK PATIENT  Once         02/09/23 1202     Place sequential compression device  Until discontinued         02/09/23 1202                Discharge Planning   MAUREEN: 2/9/2023     Code Status: DNR   Is the patient medically ready for discharge?:     Reason for patient still in hospital (select all that apply): Treatment, PT / OT recommendations and Pending disposition  Discharge Plan A: Home with family, Home Health   Discharge Delays: None known at this time              Thairy G Reyes, DO  Department of Hospital Medicine   [facility]

## 2023-02-16 NOTE — PT/OT/SLP PROGRESS
Occupational Therapy  Treatment    Name: Anthony Ibarra    : 1934 (88 y.o.)  MRN: [id number]           TREATMENT SUMMARY AND RECOMMENDATIONS:      OT Date of Treatment: 23  OT Start Time: 1300  OT Stop Time: 1323  OT Total Time (min): 23 min      Subjective Assessment:   No complaints  Lethargic   x Awake, alert, cooperative  Impulsive    Uncooperative   Flat affect    Agitated  c/o pain   x Appropriate  c/o fatigue    Confused x Treated at bedside     Emotionally labile  Treated in gym/dept.      Other:        Therapy Precautions:    Cognitive deficits  Spinal precautions    Collar - hard  Sternal precautions    Collar - soft   TLSO   x Fall risk  LSO    Hip precautions - posterior  Knee immobilizer    Hip precautions - anterior  WBAT    Impaired communication  Partial weightbearing    Oxygen  TTWB    PEG tube  NWB    Visual deficits x Touch down weight-bearing of LLE    Hearing deficits   Other:        Treatment Objectives:     Mobility Training:    Mobility task Assist level Comments    Bed mobility Min A Supine<EOB; assist to move LLE over EOB   Transfer CGA Stand step t/f to recliner using RW and GB   Sit to stands transitions CGA EOB<standing using GB and RW   Functional mobility Min A FM in room from bed to BSC in bathroom using RW and GB; min-mod verbal cues to facilitate touch down weight-bearing of LLE   Sitting balance     Standing balance      Other:        ADL Training:    ADL Assist level Comments    Feeding     Grooming/hygiene     Bathing     Upper body dressing     Lower body dressing CGA Doff diaper and don new brief while sitting on BSC   Toileting Min A +BM; able to manage clothing, but required assistance for posterior hygiene to ensure cleaniness   Toilet transfer CGA BSC<>standing using GB, grab bar, and RW    Adaptive equipment training     Other:           Therapeutic Exercise:   Exercise Sets Reps Comments                               Additional Comments:  Pt educated on  touch down weigh-bearing through LLE.     Assessment: Patient tolerated session well. Pt initially did not want to participate and get out of bed, but his nurse convinced him too. OTR encouraged pt to use the bathroom since he was getting up. Pt agreed to use the bathroom.     OT Plan: Continue with POC  Revisions made to plan of care: No    GOALS:   Multidisciplinary Problems       Occupational Therapy Goals          Problem: Occupational Therapy    Goal Priority Disciplines Outcome Interventions   Occupational Therapy Goal     OT, PT/OT Ongoing, Progressing    Description: Goals to be met by: 02/23/23     Patient will increase functional independence with ADLs by performing:    LE Dressing with Supervision.  Grooming while standing at sink with Stand-by Assistance.  Toileting from bedside commode with Contact Guard Assistance for hygiene and clothing management.   Bathing from  shower chair/bench with Minimal Assistance.  Toilet transfer to toilet with Stand-by Assistance.                         Skilled OT Minutes Provided: 23  Communication with Treatment Team:     Equipment recommendations:       At end of treatment, patient remained:  x Up in chair     Up in wheelchair in room    In bed   x With alarm activated    Bed rails up   x Call bell in reach     Family/friends present   x Restraints secured properly    In bathroom with CNA/RN notified    In gym with PT/PTA/tech    Nurse aware    Other:

## 2023-02-16 NOTE — PROGRESS NOTES
[facility] Medicine  Progress Note    Patient Name: Anthony Ibarra  MRN: [id number]  Patient Class: IP- Swing   Admission Date: 2/9/2023  Length of Stay: 6 days  Attending Physician: Thairy G Reyes, DO  Primary Care Provider: Jossue Martinez MD        Subjective:     Principal Problem:<principal problem not specified>        HPI:  88 y.o. male presented to ED with c/o of fall and being found down at home by friend. C/O pelvic and low back pain found to have acute left iliac wing fx non displaced and nonoperative by othropedic surgery Dr. Harrison. T11-12 and lumbar 1-5 compression fractures. CPK > 3,000.          Overview/Hospital Course:  Admitted for rehabilitation after a fall resulting in L iliac fracture. Pt has been refusing therapy. Prior to this admission patient was previously on hospice, reason is unclear to me. He did have osseous lumbar lesions found on November 2022 that were concerning for metastasis. Subsequent biopsy was unrevealing for malignancy and he did have kyphoplasty of that region. He is undergoing outpatient screening for multiple myeloma with Dr. Elizabeth LeJeune. Patient has poor insight, he can not recall having had a kyphoplasty and does not know why he was on hospice. Discussed with pt's daughter 2/9 she revealed pt was on hospice secondary to dementia and was able to provide a living will that requests comfort measures, DNR and DNI.  Patient was admitted to swing bed on 02/09/2023 for continued rehabilitation.      Interval History:  Patient is ambulating 5 ft with rolling walker in mid/mod assistance. H is min to mod assistance for ADLs.  Cognition is the patient's most limiting.    Review of Systems   Musculoskeletal:  Positive for back pain.   Objective:     Vital Signs (Most Recent):  Temp: 97.7 °F (36.5 °C) (02/15/23 1538)  Pulse: 71 (02/15/23 1538)  Resp: 18 (02/15/23 0859)  BP: 109/64 (02/15/23 1538)  SpO2: 98 % (02/15/23 1538)    Vital Signs (24h Range):  Temp:  [97.6 °F (36.4 °C)-98 °F (36.7 °C)] 97.7 °F (36.5 °C)  Pulse:  [71-88] 71  Resp:  [18] 18  SpO2:  [93 %-98 %] 98 %  BP: ()/(57-73) 109/64     Weight: 67.5 kg (148 lb 13 oz)  Body mass index is 20.75 kg/m².    Intake/Output Summary (Last 24 hours) at 2/15/2023 1956  Last data filed at 2/15/2023 1830  Gross per 24 hour   Intake 960 ml   Output 850 ml   Net 110 ml        Physical Exam  Constitutional:       General: He is not in acute distress.     Appearance: Normal appearance. He is cachectic.   HENT:      Head: Normocephalic and atraumatic.      Nose: No congestion or rhinorrhea.      Mouth/Throat:      Mouth: Mucous membranes are moist.   Eyes:      Conjunctiva/sclera: Conjunctivae normal.      Pupils: Pupils are equal, round, and reactive to light.   Cardiovascular:      Rate and Rhythm: Normal rate and regular rhythm.      Heart sounds: No murmur heard.  Pulmonary:      Effort: Pulmonary effort is normal.      Breath sounds: Normal breath sounds. No wheezing.   Chest:      Comments:  Pectus excavatum  Abdominal:      General: Bowel sounds are normal. There is no distension.      Palpations: Abdomen is soft.      Tenderness: There is no abdominal tenderness.   Musculoskeletal:         General: No swelling. Normal range of motion.      Cervical back: Normal range of motion and neck supple.      Right lower leg: No edema.      Left lower leg: No edema.   Skin:     General: Skin is warm and dry.      Findings: Bruising (left flank) present. No rash.   Neurological:      General: No focal deficit present.      Mental Status: He is alert and oriented to person, place, and time.   Psychiatric:         Mood and Affect: Mood normal.         Behavior: Behavior normal.       Significant Labs: All pertinent labs within the past 24 hours have been reviewed.    Significant Imaging: I have reviewed all pertinent imaging results/findings within the past 24 hours.      Assessment/Plan:       Closed fracture of left iliac wing  -non-operative  -PT/OT  -pain control  -Patient was evaluated by Dr. Harrison on 02/14 who recommended weight-bearing to the left lower extremity and as tolerated to the right lower extremity, okay to continue physical therapy and plan on repeat x-rays later in the week      Generalized weakness  -PT/OT      Dementia with behavioral disturbance  -patient was on hospice secondary to advancing dementia           VTE Risk Mitigation (From admission, onward)         Ordered     enoxaparin injection 40 mg  Daily         02/09/23 1202     Place NISHA hose  Until discontinued         02/09/23 1202     IP VTE HIGH RISK PATIENT  Once         02/09/23 1202     Place sequential compression device  Until discontinued         02/09/23 1202                Discharge Planning   MAUREEN: 2/9/2023     Code Status: DNR   Is the patient medically ready for discharge?:     Reason for patient still in hospital (select all that apply): Treatment and PT / OT recommendations  Discharge Plan A: Home with family, Home Health   Discharge Delays: None known at this time              Thairy G Reyes, DO  Department of Hospital Medicine   [facility]

## 2023-02-16 NOTE — SUBJECTIVE & OBJECTIVE
Interval History:  Patient is ambulating 5 ft with rolling walker in mid/mod assistance. H is min to mod assistance for ADLs.  Cognition is the patient's most limiting.    Review of Systems   Musculoskeletal:  Positive for back pain.   Objective:     Vital Signs (Most Recent):  Temp: 97.7 °F (36.5 °C) (02/15/23 1538)  Pulse: 71 (02/15/23 1538)  Resp: 18 (02/15/23 0859)  BP: 109/64 (02/15/23 1538)  SpO2: 98 % (02/15/23 1538)   Vital Signs (24h Range):  Temp:  [97.6 °F (36.4 °C)-98 °F (36.7 °C)] 97.7 °F (36.5 °C)  Pulse:  [71-88] 71  Resp:  [18] 18  SpO2:  [93 %-98 %] 98 %  BP: ()/(57-73) 109/64     Weight: 67.5 kg (148 lb 13 oz)  Body mass index is 20.75 kg/m².    Intake/Output Summary (Last 24 hours) at 2/15/2023 1956  Last data filed at 2/15/2023 1830  Gross per 24 hour   Intake 960 ml   Output 850 ml   Net 110 ml        Physical Exam  Constitutional:       General: He is not in acute distress.     Appearance: Normal appearance. He is cachectic.   HENT:      Head: Normocephalic and atraumatic.      Nose: No congestion or rhinorrhea.      Mouth/Throat:      Mouth: Mucous membranes are moist.   Eyes:      Conjunctiva/sclera: Conjunctivae normal.      Pupils: Pupils are equal, round, and reactive to light.   Cardiovascular:      Rate and Rhythm: Normal rate and regular rhythm.      Heart sounds: No murmur heard.  Pulmonary:      Effort: Pulmonary effort is normal.      Breath sounds: Normal breath sounds. No wheezing.   Chest:      Comments:  Pectus excavatum  Abdominal:      General: Bowel sounds are normal. There is no distension.      Palpations: Abdomen is soft.      Tenderness: There is no abdominal tenderness.   Musculoskeletal:         General: No swelling. Normal range of motion.      Cervical back: Normal range of motion and neck supple.      Right lower leg: No edema.      Left lower leg: No edema.   Skin:     General: Skin is warm and dry.      Findings: Bruising (left flank) present. No rash.    Neurological:      General: No focal deficit present.      Mental Status: He is alert and oriented to person, place, and time.   Psychiatric:         Mood and Affect: Mood normal.         Behavior: Behavior normal.       Significant Labs: All pertinent labs within the past 24 hours have been reviewed.    Significant Imaging: I have reviewed all pertinent imaging results/findings within the past 24 hours.

## 2023-02-16 NOTE — PROGRESS NOTES
Name: Anthony Ibarra    : 1934 (88 y.o.)  MRN: [id number]           Patient Unavailable      Patient unable to be seen at this time secondary to: Pt refused AM session despite encouragement.

## 2023-02-16 NOTE — PLAN OF CARE
Received call from Lacie freed [facility] stating she received referral for NH placement. She is passing referral to her team for review. She stated if Pt admits back with [facility] they do have a contract with them.

## 2023-02-16 NOTE — SUBJECTIVE & OBJECTIVE
Interval History:  Patient is ambulating 5 ft with rolling walker in mid/mod assistance. H is min to mod assistance for ADLs.  Cognition is the patient's most limiting.    Review of Systems   Musculoskeletal:  Positive for back pain.   Objective:     Vital Signs (Most Recent):  Temp: 98 °F (36.7 °C) (02/16/23 1139)  Pulse: 62 (02/16/23 1139)  Resp: 18 (02/16/23 0918)  BP: 121/75 (02/16/23 1139)  SpO2: 95 % (02/16/23 1139)   Vital Signs (24h Range):  Temp:  [97.5 °F (36.4 °C)-98 °F (36.7 °C)] 98 °F (36.7 °C)  Pulse:  [62-84] 62  Resp:  [18] 18  SpO2:  [95 %-98 %] 95 %  BP: (103-152)/(60-87) 121/75     Weight: 69.2 kg (152 lb 8.9 oz)  Body mass index is 21.28 kg/m².    Intake/Output Summary (Last 24 hours) at 2/16/2023 1257  Last data filed at 2/16/2023 1150  Gross per 24 hour   Intake 1200 ml   Output 850 ml   Net 350 ml        Physical Exam  Constitutional:       General: He is not in acute distress.     Appearance: Normal appearance. He is cachectic.   HENT:      Head: Normocephalic and atraumatic.      Nose: No congestion or rhinorrhea.      Mouth/Throat:      Mouth: Mucous membranes are moist.   Eyes:      Conjunctiva/sclera: Conjunctivae normal.      Pupils: Pupils are equal, round, and reactive to light.   Cardiovascular:      Rate and Rhythm: Normal rate and regular rhythm.      Heart sounds: No murmur heard.  Pulmonary:      Effort: Pulmonary effort is normal.      Breath sounds: Normal breath sounds. No wheezing.   Chest:      Comments:  Pectus excavatum  Abdominal:      General: Bowel sounds are normal. There is no distension.      Palpations: Abdomen is soft.      Tenderness: There is no abdominal tenderness.   Musculoskeletal:         General: No swelling. Normal range of motion.      Cervical back: Normal range of motion and neck supple.      Right lower leg: No edema.      Left lower leg: No edema.   Skin:     General: Skin is warm and dry.      Findings: Bruising (left flank) present. No rash.    Neurological:      General: No focal deficit present.      Mental Status: He is alert and oriented to person, place, and time.   Psychiatric:         Mood and Affect: Mood normal.         Behavior: Behavior normal.       Significant Labs: All pertinent labs within the past 24 hours have been reviewed.    Significant Imaging: I have reviewed all pertinent imaging results/findings within the past 24 hours.

## 2023-02-17 LAB
POCT GLUCOSE: 105 MG/DL (ref 70–110)
POCT GLUCOSE: 83 MG/DL (ref 70–110)
POCT GLUCOSE: 88 MG/DL (ref 70–110)
POCT GLUCOSE: 93 MG/DL (ref 70–110)

## 2023-02-17 PROCEDURE — 63600175 PHARM REV CODE 636 W HCPCS: Performed by: STUDENT IN AN ORGANIZED HEALTH CARE EDUCATION/TRAINING PROGRAM

## 2023-02-17 PROCEDURE — 97110 THERAPEUTIC EXERCISES: CPT

## 2023-02-17 PROCEDURE — 11000004 HC SNF PRIVATE

## 2023-02-17 PROCEDURE — 97530 THERAPEUTIC ACTIVITIES: CPT

## 2023-02-17 PROCEDURE — 94760 N-INVAS EAR/PLS OXIMETRY 1: CPT

## 2023-02-17 PROCEDURE — 97116 GAIT TRAINING THERAPY: CPT

## 2023-02-17 PROCEDURE — 25000003 PHARM REV CODE 250: Performed by: STUDENT IN AN ORGANIZED HEALTH CARE EDUCATION/TRAINING PROGRAM

## 2023-02-17 RX ORDER — ESCITALOPRAM OXALATE 5 MG/1
5 TABLET ORAL DAILY
Status: DISCONTINUED | OUTPATIENT
Start: 2023-02-17 | End: 2023-02-23

## 2023-02-17 RX ADMIN — ESCITALOPRAM 5 MG: 5 TABLET, FILM COATED ORAL at 04:02

## 2023-02-17 RX ADMIN — ENOXAPARIN SODIUM 40 MG: 40 INJECTION SUBCUTANEOUS at 04:02

## 2023-02-17 NOTE — PROGRESS NOTES
"Inpatient Nutrition Evaluation    Admit Date: 2/9/2023   Total duration of encounter: 8 days    Nutrition Recommendation/Prescription     Continue regular diet. 2. Continue  Arginaid daily    Nutrition Assessment     Chart Review    Reason Seen: continuous nutrition monitoring, length of stay, and follow-up    Malnutrition Screening Tool Results   Have you recently lost weight without trying?: No  Have you been eating poorly because of a decreased appetite?: No   MST Score: 0     Diagnosis:    Generalized weakness 11/23/2022      Closed fracture of left iliac wing 2/8/2023      Dementia with behavioral disturbance      Relevant Medical History: Rhabdomyolysis  Date Unknown  Cancer   Date Unknown  Compression fracture of body of thoracic vertebra    Nutrition-Related Medications:     Nutrition-Related Labs:CBG's 162 mg/dL      Diet Order: Diet Adult Regular  Oral Supplement Order:  Arginaid  Appetite/Oral Intake: good/% of meals.  Factors Affecting Nutritional Intake: decreased appetite  Food/Jew/Cultural Preferences: none reported  Food Allergies: none reported    Skin Integrity: bruised (ecchymotic)  Wound(s):       Comments    Pt reports intake is good, % of meals. Discussed food preferences. Pt reports wound healing as well, to back.     Anthropometrics    Height: 5' 11" (180.3 cm) Height Method: Stated  Last Weight: 67.1 kg (147 lb 14.9 oz) (02/17/23 0610) Weight Method: Bed Scale  BMI (Calculated): 20.6  BMI Classification: underweight (BMI less than 22 if >65 years of age)        Ideal Body Weight (IBW), Male: 172 lb     % Ideal Body Weight, Male (lb): 83.95 %                          Usual Weight Provided By: unable to obtain usual weight    Wt Readings from Last 3 Encounters:   02/17/23 0610 67.1 kg (147 lb 14.9 oz)   02/16/23 0521 69.2 kg (152 lb 8.9 oz)   02/15/23 0500 67.5 kg (148 lb 13 oz)   02/14/23 0638 68.8 kg (151 lb 10.8 oz)   02/14/23 0353 67.7 kg (149 lb 4 oz)   02/12/23 0500 " 66.7 kg (147 lb 0.8 oz)   02/11/23 0500 68.4 kg (150 lb 12.7 oz)   02/09/23 1234 65.5 kg (144 lb 6.4 oz)   02/06/23 1715 65.5 kg (144 lb 6.4 oz)   02/06/23 1050 72.6 kg (160 lb)   12/27/22 1411 72 kg (158 lb 12.8 oz)      Weight Change(s) Since Admission:  Admit Weight: 65.5 kg (144 lb 6.4 oz) (02/09/23 1234)      Patient Education    Not applicable.    Monitoring & Evaluation     Dietitian will monitor food and beverage intake, electrolyte/renal panel, glucose/endocrine profile, and gastrointestinal profile.  Nutrition Risk/Follow-Up: low (follow-up in 5-7 days)  Patients assigned 'low nutrition risk' status do not qualify for a full nutritional assessment but will be monitored and re-evaluated in a 5-7 day time period. Please consult if re-evaluation needed sooner.

## 2023-02-17 NOTE — PROGRESS NOTES
[facility]  [facility] Medicine  Progress Note    Patient Name: Anthony Ibarra  MRN: [id number]  Patient Class: IP- Swing   Admission Date: 2/9/2023  Length of Stay: 8 days  Attending Physician: Thairy G Reyes, DO  Primary Care Provider: Jossue Martinez MD        Subjective:     Principal Problem:<principal problem not specified>        HPI:  88 y.o. male presented to ED with c/o of fall and being found down at home by friend. C/O pelvic and low back pain found to have acute left iliac wing fx non displaced and nonoperative by othropedic surgery Dr. Harrison. T11-12 and lumbar 1-5 compression fractures. CPK > 3,000.          Overview/Hospital Course:  Admitted for rehabilitation after a fall resulting in L iliac fracture. Pt has been refusing therapy. Prior to this admission patient was previously on hospice, reason is unclear to me. He did have osseous lumbar lesions found on November 2022 that were concerning for metastasis. Subsequent biopsy was unrevealing for malignancy and he did have kyphoplasty of that region. He is undergoing outpatient screening for multiple myeloma with Dr. Elizabeth LeJeune. Patient has poor insight, he can not recall having had a kyphoplasty and does not know why he was on hospice. Discussed with pt's daughter 2/9 she revealed pt was on hospice secondary to dementia and was able to provide a living will that requests comfort measures, DNR and DNI.  Patient was admitted to swing bed on 02/09/2023 for continued rehabilitation however refusal has been a problem. Daughter requesting antidepressant to aid with refusal however antidepressants take several weeks for peak effect but will try.       Interval History:  Patient is ambulating 5 ft with rolling walker in mid/mod assistance. H is min to mod assistance for ADLs.  Cognition is the patient's most limiting.    Review of Systems   Musculoskeletal:  Positive for back pain.   Objective:     Vital Signs  (Most Recent):  Temp: 97.8 °F (36.6 °C) (02/17/23 0339)  Pulse: 81 (02/17/23 1131)  Resp: 18 (02/17/23 0339)  BP: 132/82 (02/17/23 1131)  SpO2: 97 % (02/17/23 0714)   Vital Signs (24h Range):  Temp:  [97.8 °F (36.6 °C)-98.1 °F (36.7 °C)] 97.8 °F (36.6 °C)  Pulse:  [73-81] 81  Resp:  [17-18] 18  SpO2:  [96 %-97 %] 97 %  BP: (109-136)/(68-85) 132/82     Weight: 67.1 kg (147 lb 14.9 oz)  Body mass index is 20.63 kg/m².    Intake/Output Summary (Last 24 hours) at 2/17/2023 1450  Last data filed at 2/17/2023 0927  Gross per 24 hour   Intake 480 ml   Output 450 ml   Net 30 ml        Physical Exam  Constitutional:       General: He is not in acute distress.     Appearance: Normal appearance. He is cachectic.   HENT:      Head: Normocephalic and atraumatic.      Nose: No congestion or rhinorrhea.      Mouth/Throat:      Mouth: Mucous membranes are moist.   Eyes:      Conjunctiva/sclera: Conjunctivae normal.      Pupils: Pupils are equal, round, and reactive to light.   Cardiovascular:      Rate and Rhythm: Normal rate and regular rhythm.      Heart sounds: No murmur heard.  Pulmonary:      Effort: Pulmonary effort is normal.      Breath sounds: Normal breath sounds. No wheezing.   Chest:      Comments:  Pectus excavatum  Abdominal:      General: Bowel sounds are normal. There is no distension.      Palpations: Abdomen is soft.      Tenderness: There is no abdominal tenderness.   Musculoskeletal:         General: No swelling. Normal range of motion.      Cervical back: Normal range of motion and neck supple.      Right lower leg: No edema.      Left lower leg: No edema.   Skin:     General: Skin is warm and dry.      Findings: Bruising (left flank) present. No rash.   Neurological:      General: No focal deficit present.      Mental Status: He is alert and oriented to person, place, and time.   Psychiatric:         Mood and Affect: Mood normal.         Behavior: Behavior normal.       Significant Labs: All pertinent labs  within the past 24 hours have been reviewed.    Significant Imaging: I have reviewed all pertinent imaging results/findings within the past 24 hours.      Assessment/Plan:      Closed fracture of left iliac wing  -non-operative  -PT/OT  -pain control  -Patient was evaluated by Dr. Harrison on 02/14 who recommended weight-bearing to the left lower extremity and as tolerated to the right lower extremity, okay to continue physical therapy and plan on repeat x-rays later in the week; will re-assess with Dr. Harrison on Monday      Generalized weakness  -PT/OT      Dementia with behavioral disturbance  -patient was on hospice secondary to advancing dementia           VTE Risk Mitigation (From admission, onward)         Ordered     enoxaparin injection 40 mg  Daily         02/09/23 1202     Place NISHA hose  Until discontinued         02/09/23 1202     IP VTE HIGH RISK PATIENT  Once         02/09/23 1202     Place sequential compression device  Until discontinued         02/09/23 1202                Discharge Planning   MAUREEN: 2/9/2023     Code Status: DNR   Is the patient medically ready for discharge?:     Reason for patient still in hospital (select all that apply): Pending disposition  Discharge Plan A: New Nursing Home placement - [facility] care facility   Discharge Delays: None known at this time              Thairy G Reyes, DO  Department of Hospital Medicine   [facility]

## 2023-02-17 NOTE — PLAN OF CARE
Problem: Adult Inpatient Plan of Care  Goal: Plan of Care Review  Outcome: Ongoing, Progressing  Goal: Patient-Specific Goal (Individualized)  Outcome: Ongoing, Progressing  Flowsheets (Taken 2/16/2023 2000)  Anxieties, Fears or Concerns: none expressed at this time  Individualized Care Needs:   pain management   PT/OT, monitor CBGs  Goal: Absence of Hospital-Acquired Illness or Injury  Outcome: Ongoing, Progressing  Intervention: Prevent Skin Injury  Flowsheets (Taken 2/16/2023 2000)  Skin Protection:   adhesive use limited   incontinence pads utilized   protective footwear used  Goal: Optimal Comfort and Wellbeing  Outcome: Ongoing, Progressing  Intervention: Monitor Pain and Promote Comfort  Flowsheets (Taken 2/16/2023 2000)  Pain Management Interventions:   pillow support provided   care clustered   quiet environment facilitated  Goal: Readiness for Transition of Care  Outcome: Ongoing, Progressing  Intervention: Mutually Develop Transition Plan  Flowsheets (Taken 2/16/2023 2000)  Communicated MAUREEN with patient/caregiver: Date not available/Unable to determine  Do you expect to return to your current living situation?: Yes  Do you have help at home or someone to help you manage your care at home?: (friend checks on patient as well as hospice) Yes  Readmission within 30 days?: No  Do you currently have service(s) that help you manage your care at home?: No     Problem: Infection  Goal: Absence of Infection Signs and Symptoms  Outcome: Ongoing, Progressing  Intervention: Prevent or Manage Infection  Flowsheets (Taken 2/16/2023 2000)  Fever Reduction/Comfort Measures:   lightweight bedding   lightweight clothing     Problem: Impaired Wound Healing  Goal: Optimal Wound Healing  Outcome: Ongoing, Progressing  Intervention: Promote Wound Healing  Flowsheets (Taken 2/16/2023 2000)  Oral Nutrition Promotion:   safe use of adaptive equipment encouraged   calorie-dense foods provided  Sleep/Rest Enhancement:   awakenings  minimized   noise level reduced   regular sleep/rest pattern promoted   relaxation techniques promoted   room darkened  Pain Management Interventions:   pillow support provided   care clustered   quiet environment facilitated     Problem: Fall Injury Risk  Goal: Absence of Fall and Fall-Related Injury  Outcome: Ongoing, Progressing  Intervention: Identify and Manage Contributors  Flowsheets (Taken 2/16/2023 2000)  Self-Care Promotion:   independence encouraged   BADL personal objects within reach   BADL personal routines maintained   safe use of adaptive equipment encouraged     Problem: Skin Injury Risk Increased  Goal: Skin Health and Integrity  Outcome: Ongoing, Progressing  Intervention: Optimize Skin Protection  Flowsheets (Taken 2/16/2023 2000)  Skin Protection:   adhesive use limited   incontinence pads utilized   protective footwear used  Intervention: Promote and Optimize Oral Intake  Flowsheets (Taken 2/16/2023 2000)  Oral Nutrition Promotion:   safe use of adaptive equipment encouraged   calorie-dense foods provided     Problem: Pain Acute  Goal: Acceptable Pain Control and Functional Ability  Outcome: Ongoing, Progressing  Intervention: Develop Pain Management Plan  Flowsheets (Taken 2/16/2023 2000)  Pain Management Interventions:   pillow support provided   care clustered   quiet environment facilitated  Intervention: Prevent or Manage Pain  Flowsheets (Taken 2/16/2023 2000)  Sleep/Rest Enhancement:   awakenings minimized   noise level reduced   regular sleep/rest pattern promoted   relaxation techniques promoted   room darkened  Bowel Elimination Promotion:   commode/bedpan at bedside   privacy promoted   adequate fluid intake promoted  Intervention: Optimize Psychosocial Wellbeing  Flowsheets (Taken 2/16/2023 2000)  Supportive Measures:   active listening utilized   positive reinforcement provided   self-care encouraged   verbalization of feelings encouraged

## 2023-02-17 NOTE — PLAN OF CARE
[facility]  Discharge Reassessment    Primary Care Provider: Jossue Martinez MD    Expected Discharge Date: 2/9/2023    Reassessment (most recent)       Discharge Reassessment - 02/17/23 1359          Discharge Reassessment    Assessment Type Discharge Planning Reassessment     Did the patient's condition or plan change since previous assessment? No     Communicated MAUREEN with patient/caregiver Date not available/Unable to determine     Discharge Plan A New Nursing Home placement - [facility] care facility     DME Needed Upon Discharge  none     Discharge Barriers Identified None        Post-Acute Status    Post-Acute Authorization Placement     Post-Acute Placement Status Referrals Sent     Hospice Status Set-up Complete/Auth obtained     Hospital Resources/Appts/Education Provided Provided patient/caregiver with written discharge plan information     Discharge Delays None known at this time

## 2023-02-17 NOTE — PROGRESS NOTES
"Name: Anthony Ibarra    : 1934 (88 y.o.)  MRN: [id number]           Patient Unavailable      Patient unable to be seen at this time secondary to: Pt refused PM session despite encouragement stating, "some other time, I'm about to take my afternoon nap"          "

## 2023-02-17 NOTE — PLAN OF CARE
Received call from [facility] stating she is checking with admissions team this morning to check on referral status and she will f/u.    -[facility] called back stating that Nidhi, their evaluation nurse, will come to evaluate pt at 1330 today.     1117- Received call from [facility] stating she attempted to call Pt's daughter,Loni, and had to leave a VM requesting call back to discuss financials. Informed [facility] that Loni is a teacher and is probably in class so to expect later afternoons as best contact time for Loni.

## 2023-02-17 NOTE — ASSESSMENT & PLAN NOTE
-non-operative  -PT/OT  -pain control  -Patient was evaluated by Dr. Harrison on 02/14 who recommended weight-bearing to the left lower extremity and as tolerated to the right lower extremity, okay to continue physical therapy and plan on repeat x-rays later in the week; will re-assess with Dr. Harrison on Monday

## 2023-02-17 NOTE — PT/OT/SLP PROGRESS
Occupational Therapy  Treatment    Name: Anthony Ibarra    : 1934 (88 y.o.)  MRN: [id number]           TREATMENT SUMMARY AND RECOMMENDATIONS:      OT Date of Treatment: 23  OT Start Time: 900  OT Stop Time: 927  OT Total Time (min): 27 min      Subjective Assessment:   No complaints  Lethargic   x Awake, alert, cooperative  Impulsive    Uncooperative   Flat affect    Agitated  c/o pain    Appropriate  c/o fatigue    Confused x Treated at bedside     Emotionally labile x Treated in gym/dept.      Other:        Therapy Precautions:    Cognitive deficits  Spinal precautions    Collar - hard  Sternal precautions    Collar - soft   TLSO   x Fall risk  LSO    Hip precautions - posterior  Knee immobilizer    Hip precautions - anterior  WBAT    Impaired communication  Partial weightbearing    Oxygen  TTWB    PEG tube  NWB    Visual deficits      Hearing deficits   Other:        Treatment Objectives:     Mobility Training:    Mobility task Assist level Comments    Bed mobility SBA Supine>EOB   Transfer Min A Stand/pivot t/f with RW to BSChair   Sit to stands transitions     Functional mobility CGA X20 feet with RW   Sitting balance     Standing balance      Other:          Therapeutic Exercise:   Exercise Sets Reps Comments   Arm bike 2 3 min Level 1; forwards/backwards                         Additional Comments: Pt did no c/o any pain this session.       Assessment: Patient tolerated session well.    OT Plan: Continue POC  Revisions made to plan of care: No  GOALS:   Multidisciplinary Problems       Occupational Therapy Goals          Problem: Occupational Therapy    Goal Priority Disciplines Outcome Interventions   Occupational Therapy Goal     OT, PT/OT Ongoing, Progressing    Description: Goals to be met by: 23     Patient will increase functional independence with ADLs by performing:    LE Dressing with Supervision.  Grooming while standing at sink with Stand-by Assistance.  Toileting from  bedside commode with Contact Guard Assistance for hygiene and clothing management.   Bathing from  shower chair/bench with Minimal Assistance.  Toilet transfer to toilet with Stand-by Assistance.                         Skilled OT Minutes Provided: 27  Communication with Treatment Team:     Equipment recommendations:       At end of treatment, patient remained:   Up in chair     Up in wheelchair in room    In bed    With alarm activated    Bed rails up    Call bell in reach     Family/friends present    Restraints secured properly    In bathroom with CNA/RN notified   x In gym with PT/PTA/tech    Nurse aware    Other:

## 2023-02-17 NOTE — PT/OT/SLP PROGRESS
Physical Therapy Treatment Note           Name: Anthony Ibarra    : 1934 (88 y.o.)  MRN: [id number]           TREATMENT SUMMARY AND RECOMMENDATIONS:    PT Received On: 23  PT Start Time: 925     PT Stop Time: 950  PT Total Time (min): 25 min     Subjective Assessment:   No complaints  Lethargic   x Awake, alert, cooperative  Uncooperative    Agitated x c/o pain    Appropriate  c/o fatigue    Confused  Treated at bedside     Emotionally labile X Treated in gym/dept.    Impulsive  Other:    Flat affect       Therapy Precautions:    Cognitive deficits  Spinal precautions    Collar - hard  Sternal precautions    Collar - soft   TLSO   x Fall risk  LSO    Hip precautions - posterior  Knee immobilizer    Hip precautions - anterior  WBAT    Impaired communication x Touch down weightbearing LLE    Oxygen  TTWB    PEG tube  NWB    Visual deficits  Other:    Hearing deficits          Treatment Objectives:     Mobility Training:   Assist level Comments    Bed mobility     Transfer MIN A   Stand pivot using RW bed>chair   Gait MIN A Using RW x 25/ X 2   Sit to stand transitions MIN A X 3 reps using RW   Sitting balance     Standing balance      Wheelchair mobility     Car transfer     Other:          Therapeutic Exercise:   Exercise Sets Reps Comments   Seated B LE AROM 2 10 Hip flexion, knee ext, ankle PF/DF, HS curls using red theraband                         Additional Comments:  Patient was in a good mood and cooperated well today.  RW lowered to allow patient to use Upper extremities more effectively to reduce WB L LE.  Patient needed constant vc for step-to pattern and to extend elbows in order to better comply with WB status.     Assessment: Patient tolerated session well.    PT Plan: Continue per POC  Revisions made to plan of care: No    GOALS:   Multidisciplinary Problems       Physical Therapy Goals          Problem: Physical Therapy    Goal Priority Disciplines Outcome Goal Variances  Interventions   Physical Therapy Goal     PT, PT/OT Ongoing, Progressing     Description: Goals to be met by: Discharge    Patient will increase functional independence with mobility by performin. Supine to sit with Modified [place]  2. Sit to supine with Modified [place]  3. Sit to stand transfer with Modified [place]  4. Bed to chair transfer with Modified [place] using Rolling Walker  5. Gait  x 100 feet with Supervision using Rolling Walker.                        Skilled PT Minutes Provided: 25   Communication with Treatment Team:     Equipment recommendations:       At end of treatment, patient remained:  x Up in chair     Up in wheelchair in room    In bed   x With alarm activated    Bed rails up   x Call bell in reach     Family/friends present   x Restraints secured properly    In bathroom with CNA/RN notified   x Nurse aware    In gym with therapist/tech    Other:

## 2023-02-17 NOTE — SUBJECTIVE & OBJECTIVE
Interval History:  Patient is ambulating 5 ft with rolling walker in mid/mod assistance. H is min to mod assistance for ADLs.  Cognition is the patient's most limiting.    Review of Systems   Musculoskeletal:  Positive for back pain.   Objective:     Vital Signs (Most Recent):  Temp: 97.8 °F (36.6 °C) (02/17/23 0339)  Pulse: 81 (02/17/23 1131)  Resp: 18 (02/17/23 0339)  BP: 132/82 (02/17/23 1131)  SpO2: 97 % (02/17/23 0714)   Vital Signs (24h Range):  Temp:  [97.8 °F (36.6 °C)-98.1 °F (36.7 °C)] 97.8 °F (36.6 °C)  Pulse:  [73-81] 81  Resp:  [17-18] 18  SpO2:  [96 %-97 %] 97 %  BP: (109-136)/(68-85) 132/82     Weight: 67.1 kg (147 lb 14.9 oz)  Body mass index is 20.63 kg/m².    Intake/Output Summary (Last 24 hours) at 2/17/2023 1457  Last data filed at 2/17/2023 0927  Gross per 24 hour   Intake 480 ml   Output 450 ml   Net 30 ml        Physical Exam  Constitutional:       General: He is not in acute distress.     Appearance: Normal appearance. He is cachectic.   HENT:      Head: Normocephalic and atraumatic.      Nose: No congestion or rhinorrhea.      Mouth/Throat:      Mouth: Mucous membranes are moist.   Eyes:      Conjunctiva/sclera: Conjunctivae normal.      Pupils: Pupils are equal, round, and reactive to light.   Cardiovascular:      Rate and Rhythm: Normal rate and regular rhythm.      Heart sounds: No murmur heard.  Pulmonary:      Effort: Pulmonary effort is normal.      Breath sounds: Normal breath sounds. No wheezing.   Chest:      Comments:  Pectus excavatum  Abdominal:      General: Bowel sounds are normal. There is no distension.      Palpations: Abdomen is soft.      Tenderness: There is no abdominal tenderness.   Musculoskeletal:         General: No swelling. Normal range of motion.      Cervical back: Normal range of motion and neck supple.      Right lower leg: No edema.      Left lower leg: No edema.   Skin:     General: Skin is warm and dry.      Findings: Bruising (left flank) present. No rash.    Neurological:      General: No focal deficit present.      Mental Status: He is alert and oriented to person, place, and time.   Psychiatric:         Mood and Affect: Mood normal.         Behavior: Behavior normal.       Significant Labs: All pertinent labs within the past 24 hours have been reviewed.    Significant Imaging: I have reviewed all pertinent imaging results/findings within the past 24 hours.

## 2023-02-17 NOTE — PLAN OF CARE
Problem: Adult Inpatient Plan of Care  Goal: Patient-Specific Goal (Individualized)  Outcome: Ongoing, Progressing  Flowsheets (Taken 2/17/2023 1045)  Individualized Care Needs: encourage patient to participate in therapy     Problem: Infection  Goal: Absence of Infection Signs and Symptoms  Outcome: Ongoing, Progressing  Intervention: Prevent or Manage Infection  Flowsheets (Taken 2/17/2023 1045)  Fever Reduction/Comfort Measures:   lightweight bedding   lightweight clothing  Isolation Precautions: protective     Problem: Impaired Wound Healing  Goal: Optimal Wound Healing  Outcome: Ongoing, Progressing  Intervention: Promote Wound Healing  Flowsheets (Taken 2/17/2023 1045)  Oral Nutrition Promotion:   calorie-dense foods provided   calorie-dense liquids provided   medicated   rest periods promoted   physical activity promoted   safe use of adaptive equipment encouraged  Sleep/Rest Enhancement:   natural light exposure provided   relaxation techniques promoted  Activity Management: Up in chair - L3  Pain Management Interventions:   care clustered   medication offered but refused   pain management plan reviewed with patient/caregiver   pillow support provided   quiet environment facilitated   warm blanket provided   relaxation techniques promoted   position adjusted     Problem: Fall Injury Risk  Goal: Absence of Fall and Fall-Related Injury  Outcome: Ongoing, Progressing  Intervention: Identify and Manage Contributors  Flowsheets (Taken 2/17/2023 1045)  Self-Care Promotion:   independence encouraged   safe use of adaptive equipment encouraged  Medication Review/Management:   medications reviewed   high-risk medications identified  Intervention: Promote Injury-Free Environment  Flowsheets (Taken 2/17/2023 1045)  Safety Promotion/Fall Prevention:   assistive device/personal item within reach   high risk medications identified   medications reviewed   nonskid shoes/socks when out of bed   Roll belt self-releasing    instructed to call staff for mobility   /camera at bedside

## 2023-02-17 NOTE — PROGRESS NOTES
Name: Anthony Ibarra    : 1934 (88 y.o.)  MRN: [id number]           Patient Unavailable      Patient unable to be seen at this time secondary to: pt refusing PM session d/t wanting to take a nap

## 2023-02-18 LAB
ANION GAP SERPL CALC-SCNC: 6 MEQ/L
BASOPHILS # BLD AUTO: 0.05 X10(3)/MCL (ref 0–0.2)
BASOPHILS NFR BLD AUTO: 0.9 %
BUN SERPL-MCNC: 11.3 MG/DL (ref 8.4–25.7)
CALCIUM SERPL-MCNC: 8.1 MG/DL (ref 8.8–10)
CHLORIDE SERPL-SCNC: 104 MMOL/L (ref 98–107)
CO2 SERPL-SCNC: 30 MMOL/L (ref 23–31)
CREAT SERPL-MCNC: 0.64 MG/DL (ref 0.73–1.18)
CREAT/UREA NIT SERPL: 18
EOSINOPHIL # BLD AUTO: 0.06 X10(3)/MCL (ref 0–0.9)
EOSINOPHIL NFR BLD AUTO: 1.1 %
ERYTHROCYTE [DISTWIDTH] IN BLOOD BY AUTOMATED COUNT: 13.8 % (ref 11.5–17)
GFR SERPLBLD CREATININE-BSD FMLA CKD-EPI: >60 MLS/MIN/1.73/M2
GLUCOSE SERPL-MCNC: 97 MG/DL (ref 82–115)
HCT VFR BLD AUTO: 36.4 % (ref 42–52)
HGB BLD-MCNC: 11.8 G/DL (ref 14–18)
IMM GRANULOCYTES # BLD AUTO: 0.01 X10(3)/MCL (ref 0–0.04)
IMM GRANULOCYTES NFR BLD AUTO: 0.2 %
LYMPHOCYTES # BLD AUTO: 1.26 X10(3)/MCL (ref 0.6–4.6)
LYMPHOCYTES NFR BLD AUTO: 23.5 %
MCH RBC QN AUTO: 30.8 PG
MCHC RBC AUTO-ENTMCNC: 32.4 G/DL (ref 33–36)
MCV RBC AUTO: 95 FL (ref 80–94)
MONOCYTES # BLD AUTO: 0.61 X10(3)/MCL (ref 0.1–1.3)
MONOCYTES NFR BLD AUTO: 11.4 %
NEUTROPHILS # BLD AUTO: 3.37 X10(3)/MCL (ref 2.1–9.2)
NEUTROPHILS NFR BLD AUTO: 62.9 %
PLATELET # BLD AUTO: 262 X10(3)/MCL (ref 130–400)
PMV BLD AUTO: 10 FL (ref 7.4–10.4)
POCT GLUCOSE: 100 MG/DL (ref 70–110)
POCT GLUCOSE: 110 MG/DL (ref 70–110)
POCT GLUCOSE: 135 MG/DL (ref 70–110)
POCT GLUCOSE: 89 MG/DL (ref 70–110)
POTASSIUM SERPL-SCNC: 3.8 MMOL/L (ref 3.5–5.1)
RBC # BLD AUTO: 3.83 X10(6)/MCL (ref 4.7–6.1)
SODIUM SERPL-SCNC: 140 MMOL/L (ref 136–145)
WBC # SPEC AUTO: 5.4 X10(3)/MCL (ref 4.5–11.5)

## 2023-02-18 PROCEDURE — 97110 THERAPEUTIC EXERCISES: CPT | Mod: CQ

## 2023-02-18 PROCEDURE — 11000004 HC SNF PRIVATE

## 2023-02-18 PROCEDURE — 80048 BASIC METABOLIC PNL TOTAL CA: CPT | Performed by: STUDENT IN AN ORGANIZED HEALTH CARE EDUCATION/TRAINING PROGRAM

## 2023-02-18 PROCEDURE — 94760 N-INVAS EAR/PLS OXIMETRY 1: CPT

## 2023-02-18 PROCEDURE — 85025 COMPLETE CBC W/AUTO DIFF WBC: CPT | Performed by: STUDENT IN AN ORGANIZED HEALTH CARE EDUCATION/TRAINING PROGRAM

## 2023-02-18 PROCEDURE — 25000003 PHARM REV CODE 250: Performed by: STUDENT IN AN ORGANIZED HEALTH CARE EDUCATION/TRAINING PROGRAM

## 2023-02-18 RX ADMIN — ESCITALOPRAM 5 MG: 5 TABLET, FILM COATED ORAL at 09:02

## 2023-02-18 NOTE — PLAN OF CARE
Problem: Adult Inpatient Plan of Care  Goal: Plan of Care Review  Outcome: Ongoing, Progressing  Flowsheets (Taken 2/17/2023 2228)  Plan of Care Reviewed With: patient  Goal: Patient-Specific Goal (Individualized)  Outcome: Ongoing, Progressing  Flowsheets (Taken 2/17/2023 2228)  Individualized Care Needs: pt will be fall free by discharge     Problem: Infection  Goal: Absence of Infection Signs and Symptoms  Outcome: Ongoing, Progressing  Intervention: Prevent or Manage Infection  Flowsheets (Taken 2/17/2023 2228)  Infection Management: aseptic technique maintained     Problem: Impaired Wound Healing  Goal: Optimal Wound Healing  Outcome: Ongoing, Progressing  Intervention: Promote Wound Healing  Flowsheets (Taken 2/17/2023 2228)  Sleep/Rest Enhancement: awakenings minimized     Problem: Fall Injury Risk  Goal: Absence of Fall and Fall-Related Injury  Outcome: Ongoing, Progressing  Intervention: Identify and Manage Contributors  Flowsheets (Taken 2/17/2023 2228)  Self-Care Promotion: independence encouraged  Medication Review/Management: medications reviewed     Problem: Skin Injury Risk Increased  Goal: Skin Health and Integrity  Outcome: Ongoing, Progressing  Intervention: Optimize Skin Protection  Flowsheets (Taken 2/17/2023 2228)  Pressure Reduction Techniques: frequent weight shift encouraged     Problem: Pain Acute  Goal: Acceptable Pain Control and Functional Ability  Outcome: Ongoing, Progressing  Intervention: Prevent or Manage Pain  Flowsheets (Taken 2/17/2023 2228)  Sleep/Rest Enhancement: awakenings minimized  Medication Review/Management: medications reviewed

## 2023-02-18 NOTE — PT/OT/SLP PROGRESS
Physical Therapy Treatment Note           Name: Anthony Ibarra    : 1934 (88 y.o.)  MRN: [id number]           TREATMENT SUMMARY AND RECOMMENDATIONS:    PT Received On: 23  PT Start Time: 1000     PT Stop Time: 1010  PT Total Time (min): 10 min     Subjective Assessment:   No complaints  Lethargic   x Awake, alert, cooperative  Uncooperative    Agitated x c/o pain    Appropriate  c/o fatigue    Confused  Treated at bedside     Emotionally labile X Treated in gym/dept.    Impulsive  Other:    Flat affect       Therapy Precautions:    Cognitive deficits  Spinal precautions    Collar - hard  Sternal precautions    Collar - soft   TLSO   x Fall risk  LSO    Hip precautions - posterior  Knee immobilizer    Hip precautions - anterior  WBAT    Impaired communication x Touch down weightbearing LLE    Oxygen  TTWB    PEG tube  NWB    Visual deficits  Other:    Hearing deficits          Treatment Objectives:     Mobility Training:   Assist level Comments    Bed mobility     Transfer     Gait     Sit to stand transitions MIN A X 5 reps using RW   Sitting balance     Standing balance      Wheelchair mobility     Car transfer     Other:          Therapeutic Exercise:   Exercise Sets Reps Comments   Seated B LE AROM 2 10 Hip flexion, knee ext, ankle PF/DF, HS curls using red theraband                         Additional Comments:  Patient was ready to do therapy and had completed a few exercises and suddenly decided he had done enough and refused to participate further.     Assessment: Patient tolerated session well.    PT Plan: Continue per POC  Revisions made to plan of care: No    GOALS:   Multidisciplinary Problems       Physical Therapy Goals          Problem: Physical Therapy    Goal Priority Disciplines Outcome Goal Variances Interventions   Physical Therapy Goal     PT, PT/OT Ongoing, Progressing     Description: Goals to be met by: Discharge    Patient will increase functional independence with  mobility by performin. Supine to sit with Modified [place]  2. Sit to supine with Modified [place]  3. Sit to stand transfer with Modified [place]  4. Bed to chair transfer with Modified [place] using Rolling Walker  5. Gait  x 100 feet with Supervision using Rolling Walker.                        Skilled PT Minutes Provided: 10   Communication with Treatment Team:     Equipment recommendations:       At end of treatment, patient remained:   Up in chair     Up in wheelchair in room   x In bed   x With alarm activated    Bed rails up   x Call bell in reach     Family/friends present   x Restraints secured properly    In bathroom with CNA/RN notified   x Nurse aware    In gym with therapist/tech    Other:

## 2023-02-18 NOTE — PLAN OF CARE
Problem: Adult Inpatient Plan of Care  Goal: Patient-Specific Goal (Individualized)  Outcome: Ongoing, Progressing  Flowsheets (Taken 2/18/2023 1033)  Individualized Care Needs: Patient will remain fall free by discharge     Problem: Infection  Goal: Absence of Infection Signs and Symptoms  Outcome: Ongoing, Progressing  Intervention: Prevent or Manage Infection  Flowsheets (Taken 2/18/2023 1033)  Fever Reduction/Comfort Measures:   lightweight bedding   lightweight clothing  Isolation Precautions: protective     Problem: Fall Injury Risk  Goal: Absence of Fall and Fall-Related Injury  Outcome: Ongoing, Progressing  Intervention: Identify and Manage Contributors  Flowsheets (Taken 2/18/2023 1033)  Self-Care Promotion:   independence encouraged   safe use of adaptive equipment encouraged  Medication Review/Management:   medications reviewed   high-risk medications identified  Intervention: Promote Injury-Free Environment  Flowsheets (Taken 2/18/2023 1033)  Safety Promotion/Fall Prevention:   assistive device/personal item within reach   bed alarm set   instructed to call staff for mobility   /camera at bedside   room near unit station   nonskid shoes/socks when out of bed

## 2023-02-18 NOTE — PROGRESS NOTES
[facility] Medicine  Progress Note    Patient Name: Anthony Ibarra  MRN: [id number]  Patient Class: IP- Swing   Admission Date: 2/9/2023  Length of Stay: 9 days  Attending Physician: Thairy G Reyes, DO  Primary Care Provider: Jossue Martinez MD        Subjective:     Principal Problem:<principal problem not specified>        HPI:  88 y.o. male presented to ED with c/o of fall and being found down at home by friend. C/O pelvic and low back pain found to have acute left iliac wing fx non displaced and nonoperative by othropedic surgery Dr. Harrison. T11-12 and lumbar 1-5 compression fractures. CPK > 3,000.          Overview/Hospital Course:  Admitted for rehabilitation after a fall resulting in L iliac fracture. Pt has been refusing therapy. Prior to this admission patient was previously on hospice, reason is unclear to me. He did have osseous lumbar lesions found on November 2022 that were concerning for metastasis. Subsequent biopsy was unrevealing for malignancy and he did have kyphoplasty of that region. He is undergoing outpatient screening for multiple myeloma with Dr. Elizabeth LeJeune. Patient has poor insight, he can not recall having had a kyphoplasty and does not know why he was on hospice. Discussed with pt's daughter 2/9 she revealed pt was on hospice secondary to dementia and was able to provide a living will that requests comfort measures, DNR and DNI.  Patient was admitted to swing bed on 02/09/2023 for continued rehabilitation however refusal has been a problem. Daughter requesting antidepressant to aid with refusal however antidepressants take several weeks for peak effect but will try.   02/06/2023 non ew c/o  Walked 20 ft today min assist  Doing better       Interval History:  Patient is ambulating 5 ft with rolling walker in mid/mod assistance. H is min to mod assistance for ADLs.  Cognition is the patient's most limiting.    Review of Systems    Musculoskeletal:  Positive for back pain.   Objective:     Vital Signs (Most Recent):  Temp: 97.6 °F (36.4 °C) (02/18/23 0307)  Pulse: 73 (02/18/23 1129)  Resp: 18 (02/18/23 0307)  BP: (!) 144/82 (02/18/23 1129)  SpO2: (!) 93 % (02/18/23 0737)   Vital Signs (24h Range):  Temp:  [97.4 °F (36.3 °C)-98.5 °F (36.9 °C)] 97.6 °F (36.4 °C)  Pulse:  [70-81] 73  Resp:  [18-19] 18  SpO2:  [91 %-97 %] 93 %  BP: (133-151)/(80-90) 144/82     Weight: 67.1 kg (147 lb 14.9 oz)  Body mass index is 20.63 kg/m².    Intake/Output Summary (Last 24 hours) at 2/18/2023 1425  Last data filed at 2/18/2023 0913  Gross per 24 hour   Intake 240 ml   Output 700 ml   Net -460 ml        Physical Exam  Constitutional:       General: He is not in acute distress.     Appearance: Normal appearance. He is cachectic.   HENT:      Head: Normocephalic and atraumatic.      Nose: No congestion or rhinorrhea.      Mouth/Throat:      Mouth: Mucous membranes are moist.   Eyes:      Conjunctiva/sclera: Conjunctivae normal.      Pupils: Pupils are equal, round, and reactive to light.   Cardiovascular:      Rate and Rhythm: Normal rate and regular rhythm.      Heart sounds: No murmur heard.  Pulmonary:      Effort: Pulmonary effort is normal.      Breath sounds: Normal breath sounds. No wheezing.   Chest:      Comments:  Pectus excavatum  Abdominal:      General: Bowel sounds are normal. There is no distension.      Palpations: Abdomen is soft.      Tenderness: There is no abdominal tenderness.   Musculoskeletal:         General: No swelling. Normal range of motion.      Cervical back: Normal range of motion and neck supple.      Right lower leg: No edema.      Left lower leg: No edema.   Skin:     General: Skin is warm and dry.      Findings: Bruising (left flank) present. No rash.   Neurological:      General: No focal deficit present.      Mental Status: He is alert and oriented to person, place, and time.   Psychiatric:         Mood and Affect: Mood  normal.         Behavior: Behavior normal.       Significant Labs: All pertinent labs within the past 24 hours have been reviewed.    Significant Imaging: I have reviewed all pertinent imaging results/findings within the past 24 hours.      Assessment/Plan:      Dementia with behavioral disturbance  -patient was on hospice secondary to advancing dementia         Closed fracture of left iliac wing  -non-operative  -PT/OT  -pain control  -Patient was evaluated by Dr. Harrison on 02/14 who recommended weight-bearing to the left lower extremity and as tolerated to the right lower extremity, okay to continue physical therapy and plan on repeat x-rays later in the week; will re-assess with Dr. Harrison on Monday      Generalized weakness  -PT/OT        VTE Risk Mitigation (From admission, onward)         Ordered     enoxaparin injection 40 mg  Daily         02/09/23 1202     Place NISHA hose  Until discontinued         02/09/23 1202     IP VTE HIGH RISK PATIENT  Once         02/09/23 1202     Place sequential compression device  Until discontinued         02/09/23 1202                Discharge Planning   MAUREEN: 2/9/2023     Code Status: DNR   Is the patient medically ready for discharge?:     Reason for patient still in hospital (select all that apply): Patient trending condition, Laboratory test, Treatment and PT / OT recommendations  Discharge Plan A: New Nursing Home placement - assisted care facility   Discharge Delays: None known at this time              Noe Jung MD  Department of Hospital Medicine   [facility]

## 2023-02-18 NOTE — SUBJECTIVE & OBJECTIVE
Interval History:  Patient is ambulating 5 ft with rolling walker in mid/mod assistance. H is min to mod assistance for ADLs.  Cognition is the patient's most limiting.    Review of Systems   Musculoskeletal:  Positive for back pain.   Objective:     Vital Signs (Most Recent):  Temp: 97.6 °F (36.4 °C) (02/18/23 0307)  Pulse: 73 (02/18/23 1129)  Resp: 18 (02/18/23 0307)  BP: (!) 144/82 (02/18/23 1129)  SpO2: (!) 93 % (02/18/23 0737)   Vital Signs (24h Range):  Temp:  [97.4 °F (36.3 °C)-98.5 °F (36.9 °C)] 97.6 °F (36.4 °C)  Pulse:  [70-81] 73  Resp:  [18-19] 18  SpO2:  [91 %-97 %] 93 %  BP: (133-151)/(80-90) 144/82     Weight: 67.1 kg (147 lb 14.9 oz)  Body mass index is 20.63 kg/m².    Intake/Output Summary (Last 24 hours) at 2/18/2023 1425  Last data filed at 2/18/2023 0913  Gross per 24 hour   Intake 240 ml   Output 700 ml   Net -460 ml        Physical Exam  Constitutional:       General: He is not in acute distress.     Appearance: Normal appearance. He is cachectic.   HENT:      Head: Normocephalic and atraumatic.      Nose: No congestion or rhinorrhea.      Mouth/Throat:      Mouth: Mucous membranes are moist.   Eyes:      Conjunctiva/sclera: Conjunctivae normal.      Pupils: Pupils are equal, round, and reactive to light.   Cardiovascular:      Rate and Rhythm: Normal rate and regular rhythm.      Heart sounds: No murmur heard.  Pulmonary:      Effort: Pulmonary effort is normal.      Breath sounds: Normal breath sounds. No wheezing.   Chest:      Comments:  Pectus excavatum  Abdominal:      General: Bowel sounds are normal. There is no distension.      Palpations: Abdomen is soft.      Tenderness: There is no abdominal tenderness.   Musculoskeletal:         General: No swelling. Normal range of motion.      Cervical back: Normal range of motion and neck supple.      Right lower leg: No edema.      Left lower leg: No edema.   Skin:     General: Skin is warm and dry.      Findings: Bruising (left flank) present.  No rash.   Neurological:      General: No focal deficit present.      Mental Status: He is alert and oriented to person, place, and time.   Psychiatric:         Mood and Affect: Mood normal.         Behavior: Behavior normal.       Significant Labs: All pertinent labs within the past 24 hours have been reviewed.    Significant Imaging: I have reviewed all pertinent imaging results/findings within the past 24 hours.

## 2023-02-19 LAB
POCT GLUCOSE: 104 MG/DL (ref 70–110)
POCT GLUCOSE: 76 MG/DL (ref 70–110)

## 2023-02-19 PROCEDURE — 94760 N-INVAS EAR/PLS OXIMETRY 1: CPT

## 2023-02-19 PROCEDURE — 63600175 PHARM REV CODE 636 W HCPCS: Performed by: STUDENT IN AN ORGANIZED HEALTH CARE EDUCATION/TRAINING PROGRAM

## 2023-02-19 PROCEDURE — 11000004 HC SNF PRIVATE

## 2023-02-19 PROCEDURE — 25000003 PHARM REV CODE 250: Performed by: STUDENT IN AN ORGANIZED HEALTH CARE EDUCATION/TRAINING PROGRAM

## 2023-02-19 RX ADMIN — ENOXAPARIN SODIUM 40 MG: 40 INJECTION SUBCUTANEOUS at 04:02

## 2023-02-19 RX ADMIN — ESCITALOPRAM 5 MG: 5 TABLET, FILM COATED ORAL at 08:02

## 2023-02-19 NOTE — SUBJECTIVE & OBJECTIVE
Interval History:  Patient is ambulating 5 ft with rolling walker in mid/mod assistance. H is min to mod assistance for ADLs.  Cognition is the patient's most limiting.    Review of Systems   Musculoskeletal:  Positive for back pain.   Objective:     Vital Signs (Most Recent):  Temp: 98 °F (36.7 °C) (02/19/23 0320)  Pulse: 80 (02/19/23 1109)  Resp: 18 (02/19/23 0320)  BP: 139/77 (02/19/23 1109)  SpO2: 97 % (02/19/23 0320)   Vital Signs (24h Range):  Temp:  [97.4 °F (36.3 °C)-98.2 °F (36.8 °C)] 98 °F (36.7 °C)  Pulse:  [72-89] 80  Resp:  [17-18] 18  SpO2:  [96 %-97 %] 97 %  BP: (124-150)/(72-84) 139/77     Weight: 68 kg (149 lb 14.6 oz)  Body mass index is 20.91 kg/m².    Intake/Output Summary (Last 24 hours) at 2/19/2023 1336  Last data filed at 2/19/2023 0440  Gross per 24 hour   Intake --   Output 400 ml   Net -400 ml        Physical Exam  Constitutional:       General: He is not in acute distress.     Appearance: Normal appearance. He is cachectic.   HENT:      Head: Normocephalic and atraumatic.      Nose: No congestion or rhinorrhea.      Mouth/Throat:      Mouth: Mucous membranes are moist.   Eyes:      Conjunctiva/sclera: Conjunctivae normal.      Pupils: Pupils are equal, round, and reactive to light.   Cardiovascular:      Rate and Rhythm: Normal rate and regular rhythm.      Heart sounds: No murmur heard.  Pulmonary:      Effort: Pulmonary effort is normal.      Breath sounds: Normal breath sounds. No wheezing.   Chest:      Comments:  Pectus excavatum  Abdominal:      General: Bowel sounds are normal. There is no distension.      Palpations: Abdomen is soft.      Tenderness: There is no abdominal tenderness.   Musculoskeletal:         General: No swelling. Normal range of motion.      Cervical back: Normal range of motion and neck supple.      Right lower leg: No edema.      Left lower leg: No edema.   Skin:     General: Skin is warm and dry.      Findings: Bruising (left flank) present. No rash.    Neurological:      General: No focal deficit present.      Mental Status: He is alert and oriented to person, place, and time.   Psychiatric:         Mood and Affect: Mood normal.         Behavior: Behavior normal.       Significant Labs: All pertinent labs within the past 24 hours have been reviewed.    Significant Imaging: I have reviewed all pertinent imaging results/findings within the past 24 hours.

## 2023-02-19 NOTE — PROGRESS NOTES
[facility] Medicine  Progress Note    Patient Name: Anthony Ibarra  MRN: [id number]  Patient Class: IP- Swing   Admission Date: 2/9/2023  Length of Stay: 10 days  Attending Physician: Thairy G Reyes, DO  Primary Care Provider: Jossue Martinez MD        Subjective:     Principal Problem:<principal problem not specified>        HPI:  88 y.o. male presented to ED with c/o of fall and being found down at home by friend. C/O pelvic and low back pain found to have acute left iliac wing fx non displaced and nonoperative by othropedic surgery Dr. Harrison. T11-12 and lumbar 1-5 compression fractures. CPK > 3,000.          Overview/Hospital Course:  Admitted for rehabilitation after a fall resulting in L iliac fracture. Pt has been refusing therapy. Prior to this admission patient was previously on hospice, reason is unclear to me. He did have osseous lumbar lesions found on November 2022 that were concerning for metastasis. Subsequent biopsy was unrevealing for malignancy and he did have kyphoplasty of that region. He is undergoing outpatient screening for multiple myeloma with Dr. Elizabeth LeJeune. Patient has poor insight, he can not recall having had a kyphoplasty and does not know why he was on hospice. Discussed with pt's daughter 2/9 she revealed pt was on hospice secondary to dementia and was able to provide a living will that requests comfort measures, DNR and DNI.  Patient was admitted to swing bed on 02/09/2023 for continued rehabilitation however refusal has been a problem. Daughter requesting antidepressant to aid with refusal however antidepressants take several weeks for peak effect but will try.   02/06/2023 non ew c/o  Walked 20 ft today min assist  Doing better   02/19/2023 no c/o, awaiting PT/OT       Interval History:  Patient is ambulating 5 ft with rolling walker in mid/mod assistance. H is min to mod assistance for ADLs.  Cognition is the patient's most  limiting.    Review of Systems   Musculoskeletal:  Positive for back pain.   Objective:     Vital Signs (Most Recent):  Temp: 98 °F (36.7 °C) (02/19/23 0320)  Pulse: 80 (02/19/23 1109)  Resp: 18 (02/19/23 0320)  BP: 139/77 (02/19/23 1109)  SpO2: 97 % (02/19/23 0320)   Vital Signs (24h Range):  Temp:  [97.4 °F (36.3 °C)-98.2 °F (36.8 °C)] 98 °F (36.7 °C)  Pulse:  [72-89] 80  Resp:  [17-18] 18  SpO2:  [96 %-97 %] 97 %  BP: (124-150)/(72-84) 139/77     Weight: 68 kg (149 lb 14.6 oz)  Body mass index is 20.91 kg/m².    Intake/Output Summary (Last 24 hours) at 2/19/2023 1336  Last data filed at 2/19/2023 0440  Gross per 24 hour   Intake --   Output 400 ml   Net -400 ml        Physical Exam  Constitutional:       General: He is not in acute distress.     Appearance: Normal appearance. He is cachectic.   HENT:      Head: Normocephalic and atraumatic.      Nose: No congestion or rhinorrhea.      Mouth/Throat:      Mouth: Mucous membranes are moist.   Eyes:      Conjunctiva/sclera: Conjunctivae normal.      Pupils: Pupils are equal, round, and reactive to light.   Cardiovascular:      Rate and Rhythm: Normal rate and regular rhythm.      Heart sounds: No murmur heard.  Pulmonary:      Effort: Pulmonary effort is normal.      Breath sounds: Normal breath sounds. No wheezing.   Chest:      Comments:  Pectus excavatum  Abdominal:      General: Bowel sounds are normal. There is no distension.      Palpations: Abdomen is soft.      Tenderness: There is no abdominal tenderness.   Musculoskeletal:         General: No swelling. Normal range of motion.      Cervical back: Normal range of motion and neck supple.      Right lower leg: No edema.      Left lower leg: No edema.   Skin:     General: Skin is warm and dry.      Findings: Bruising (left flank) present. No rash.   Neurological:      General: No focal deficit present.      Mental Status: He is alert and oriented to person, place, and time.   Psychiatric:         Mood and  Affect: Mood normal.         Behavior: Behavior normal.       Significant Labs: All pertinent labs within the past 24 hours have been reviewed.    Significant Imaging: I have reviewed all pertinent imaging results/findings within the past 24 hours.      Assessment/Plan:      Dementia with behavioral disturbance  -patient was on hospice secondary to advancing dementia         Closed fracture of left iliac wing  -non-operative  -PT/OT  -pain control  -Patient was evaluated by Dr. Harrison on 02/14 who recommended weight-bearing to the left lower extremity and as tolerated to the right lower extremity, okay to continue physical therapy and plan on repeat x-rays later in the week; will re-assess with Dr. Harrison on Monday      Generalized weakness  Continue -PT/OT        VTE Risk Mitigation (From admission, onward)         Ordered     enoxaparin injection 40 mg  Daily         02/09/23 1202     Place NISHA hose  Until discontinued         02/09/23 1202     IP VTE HIGH RISK PATIENT  Once         02/09/23 1202     Place sequential compression device  Until discontinued         02/09/23 1202                Discharge Planning   MAUREEN: 2/9/2023     Code Status: DNR   Is the patient medically ready for discharge?:     Reason for patient still in hospital (select all that apply): Patient trending condition and PT / OT recommendations  Discharge Plan A: New Nursing Home placement - detention care facility   Discharge Delays: None known at this time              Noe Jung MD  Department of Hospital Medicine   [facility]

## 2023-02-19 NOTE — PLAN OF CARE
Problem: Adult Inpatient Plan of Care  Goal: Patient-Specific Goal (Individualized)  Outcome: Ongoing, Progressing  Flowsheets (Taken 2/19/2023 1226)  Individualized Care Needs: remain fall free by dc     Problem: Infection  Goal: Absence of Infection Signs and Symptoms  Outcome: Ongoing, Progressing  Intervention: Prevent or Manage Infection  Flowsheets (Taken 2/19/2023 1226)  Fever Reduction/Comfort Measures:   lightweight bedding   lightweight clothing  Infection Management: aseptic technique maintained  Isolation Precautions: protective     Problem: Impaired Wound Healing  Goal: Optimal Wound Healing  Outcome: Ongoing, Progressing  Intervention: Promote Wound Healing  Flowsheets (Taken 2/19/2023 1226)  Oral Nutrition Promotion:   rest periods promoted   physical activity promoted  Sleep/Rest Enhancement:   awakenings minimized   natural light exposure provided   relaxation techniques promoted  Activity Management: Rolling - L1  Pain Management Interventions:   care clustered   warm blanket provided   quiet environment facilitated   pillow support provided   position adjusted   relaxation techniques promoted

## 2023-02-19 NOTE — PLAN OF CARE
Problem: Adult Inpatient Plan of Care  Goal: Plan of Care Review  Outcome: Ongoing, Progressing  Flowsheets (Taken 2/18/2023 1952)  Plan of Care Reviewed With: patient     Problem: Infection  Goal: Absence of Infection Signs and Symptoms  Outcome: Ongoing, Progressing  Intervention: Prevent or Manage Infection  Flowsheets (Taken 2/18/2023 1952)  Infection Management: aseptic technique maintained     Problem: Impaired Wound Healing  Goal: Optimal Wound Healing  Outcome: Ongoing, Progressing  Intervention: Promote Wound Healing  Flowsheets (Taken 2/18/2023 1952)  Sleep/Rest Enhancement: awakenings minimized     Problem: Fall Injury Risk  Goal: Absence of Fall and Fall-Related Injury  Outcome: Ongoing, Progressing  Intervention: Identify and Manage Contributors  Flowsheets (Taken 2/18/2023 1952)  Self-Care Promotion: independence encouraged  Medication Review/Management: medications reviewed     Problem: Skin Injury Risk Increased  Goal: Skin Health and Integrity  Outcome: Ongoing, Progressing  Intervention: Optimize Skin Protection  Flowsheets (Taken 2/18/2023 1952)  Pressure Reduction Techniques: frequent weight shift encouraged     Problem: Pain Acute  Goal: Acceptable Pain Control and Functional Ability  Outcome: Ongoing, Progressing  Intervention: Prevent or Manage Pain  Flowsheets (Taken 2/18/2023 1952)  Sleep/Rest Enhancement: awakenings minimized  Medication Review/Management: medications reviewed

## 2023-02-20 LAB
ANION GAP SERPL CALC-SCNC: 7 MEQ/L
BASOPHILS # BLD AUTO: 0.05 X10(3)/MCL (ref 0–0.2)
BASOPHILS NFR BLD AUTO: 0.9 %
BUN SERPL-MCNC: 10.5 MG/DL (ref 8.4–25.7)
CALCIUM SERPL-MCNC: 8.2 MG/DL (ref 8.8–10)
CHLORIDE SERPL-SCNC: 104 MMOL/L (ref 98–107)
CO2 SERPL-SCNC: 29 MMOL/L (ref 23–31)
CREAT SERPL-MCNC: 0.65 MG/DL (ref 0.73–1.18)
CREAT/UREA NIT SERPL: 16
EOSINOPHIL # BLD AUTO: 0.07 X10(3)/MCL (ref 0–0.9)
EOSINOPHIL NFR BLD AUTO: 1.3 %
ERYTHROCYTE [DISTWIDTH] IN BLOOD BY AUTOMATED COUNT: 13.7 % (ref 11.5–17)
GFR SERPLBLD CREATININE-BSD FMLA CKD-EPI: >60 MLS/MIN/1.73/M2
GLUCOSE SERPL-MCNC: 82 MG/DL (ref 82–115)
HCT VFR BLD AUTO: 35.3 % (ref 42–52)
HGB BLD-MCNC: 11.6 G/DL (ref 14–18)
IMM GRANULOCYTES # BLD AUTO: 0.01 X10(3)/MCL (ref 0–0.04)
IMM GRANULOCYTES NFR BLD AUTO: 0.2 %
LYMPHOCYTES # BLD AUTO: 1.45 X10(3)/MCL (ref 0.6–4.6)
LYMPHOCYTES NFR BLD AUTO: 26.2 %
MCH RBC QN AUTO: 31.2 PG
MCHC RBC AUTO-ENTMCNC: 32.9 G/DL (ref 33–36)
MCV RBC AUTO: 94.9 FL (ref 80–94)
MONOCYTES # BLD AUTO: 0.63 X10(3)/MCL (ref 0.1–1.3)
MONOCYTES NFR BLD AUTO: 11.4 %
NEUTROPHILS # BLD AUTO: 3.32 X10(3)/MCL (ref 2.1–9.2)
NEUTROPHILS NFR BLD AUTO: 60 %
PLATELET # BLD AUTO: 265 X10(3)/MCL (ref 130–400)
PMV BLD AUTO: 10.1 FL (ref 7.4–10.4)
POCT GLUCOSE: 110 MG/DL (ref 70–110)
POCT GLUCOSE: 149 MG/DL (ref 70–110)
POCT GLUCOSE: 77 MG/DL (ref 70–110)
POCT GLUCOSE: 91 MG/DL (ref 70–110)
POTASSIUM SERPL-SCNC: 3.9 MMOL/L (ref 3.5–5.1)
RBC # BLD AUTO: 3.72 X10(6)/MCL (ref 4.7–6.1)
SODIUM SERPL-SCNC: 140 MMOL/L (ref 136–145)
WBC # SPEC AUTO: 5.5 X10(3)/MCL (ref 4.5–11.5)

## 2023-02-20 PROCEDURE — 63600175 PHARM REV CODE 636 W HCPCS: Performed by: STUDENT IN AN ORGANIZED HEALTH CARE EDUCATION/TRAINING PROGRAM

## 2023-02-20 PROCEDURE — 80048 BASIC METABOLIC PNL TOTAL CA: CPT | Performed by: FAMILY MEDICINE

## 2023-02-20 PROCEDURE — 85025 COMPLETE CBC W/AUTO DIFF WBC: CPT | Performed by: FAMILY MEDICINE

## 2023-02-20 PROCEDURE — 25000003 PHARM REV CODE 250: Performed by: STUDENT IN AN ORGANIZED HEALTH CARE EDUCATION/TRAINING PROGRAM

## 2023-02-20 PROCEDURE — 11000004 HC SNF PRIVATE

## 2023-02-20 PROCEDURE — 94760 N-INVAS EAR/PLS OXIMETRY 1: CPT

## 2023-02-20 RX ADMIN — ESCITALOPRAM 5 MG: 5 TABLET, FILM COATED ORAL at 09:02

## 2023-02-20 RX ADMIN — ENOXAPARIN SODIUM 40 MG: 40 INJECTION SUBCUTANEOUS at 04:02

## 2023-02-20 NOTE — PROGRESS NOTES
Follow up assessment performed.   Back ulceration has decreased in size with current dressing change regimen.  Scab to abrasion on R posterior thigh/trochanter region removed with cleansing.  Pink/white base noted beneath.  Applied silicone foam dressing.  Recommend changing every 3 days and prn soilage.  Removed roll belt, assessed skin beneath.  No Skin issues noted to area.  Incontinence remains.  Pt utilized urinal , however, fecal incontinence remains.  Pt repeated refusing to turn for assessment and for routine skin care to removal soilage.  Discussed importance of keeping skin clean in periarea to prevent breakdown, importance of turning to prevent skin breakdown.  Recommend continued encouragement to reposition and checks for moisturemanagement.     02/20/23 1101   WOCN Assessment   WOCN Total Time (mins) 20   Visit Date 02/20/23   Visit Time 1101   Consult Type Follow Up   WOCN Speciality Wound   Number of Wounds 2   Intervention assessed   Teaching on-going   Skin Interventions   Pressure Reduction Devices positioning supports utilized   Pressure Reduction Techniques frequent weight shift encouraged;heels elevated off bed;positioned off wounds;pressure points protected;weight shift assistance provided   Skin Protection adhesive use limited;incontinence pads utilized;silicone foam dressing in place        Altered Skin Integrity 02/07/23 1011 Right upper Thoracic spine #1 Abrasion(s) Partial thickness tissue loss. Shallow open ulcer with a red or pink wound bed, without slough. Intact or Open/Ruptured Serum-filled blister.   Date First Assessed/Time First Assessed: 02/07/23 1011   Altered Skin Integrity Present on Admission: yes  Side: Right  Orientation: upper  Location: Thoracic spine  Wound Number: #1  Is this injury device related?: No  Primary Wound Type: Abrasion(s)...   Wound Image    Dressing Appearance Intact;Dry   Drainage Amount Small   Drainage Characteristics/Odor Serous   Appearance Tan;Maroon    Periwound Area Redness;Scar tissue;Dry   Wound Edges Defined   Wound Length (cm) 1.7 cm   Wound Width (cm) 1 cm   Wound Surface Area (cm^2) 1.7 cm^2   Care Cleansed with:;Sterile normal saline   Dressing Applied;Hydrofiber;Silicone;Foam   Dressing Change Due 02/22/23        Altered Skin Integrity 02/20/23 1101 Right posterior Greater trochanter Abrasion(s)   Date First Assessed/Time First Assessed: 02/20/23 1101   Altered Skin Integrity Present on Admission: yes  Side: Right  Orientation: posterior  Location: Greater trochanter  Primary Wound Type: Abrasion(s)   Dressing Appearance Intact;Dried drainage   Drainage Amount Scant   Drainage Characteristics/Odor Sanguineous   Appearance Red;White   Tissue loss description Partial thickness   Periwound Area Intact   Wound Edges Defined   Wound Length (cm) 0.5 cm   Wound Width (cm) 1 cm   Wound Depth (cm) 0.1 cm   Wound Volume (cm^3) 0.05 cm^3   Wound Surface Area (cm^2) 0.5 cm^2   Care Cleansed with:;Sterile normal saline   Dressing Applied;Foam   Dressing Change Due 02/22/23

## 2023-02-20 NOTE — PT/OT/SLP PROGRESS
Name: Anthony Ibarra    : 1934 (88 y.o.)  MRN: [id number]           Patient Unavailable      Patient unable to be seen at this time secondary to: pt refused treatment this AM

## 2023-02-20 NOTE — NURSING
Patient got up out of chair and roll belt to go to the bathroom again without  notifying or trying to intervene. I alerted administration but got no response.  reported they were calling another nurse about another patient.

## 2023-02-20 NOTE — PLAN OF CARE
Problem: Adult Inpatient Plan of Care  Goal: Plan of Care Review  Outcome: Ongoing, Progressing  Flowsheets (Taken 2/19/2023 1956)  Plan of Care Reviewed With: patient  Goal: Patient-Specific Goal (Individualized)  Outcome: Ongoing, Progressing  Flowsheets (Taken 2/19/2023 1956)  Individualized Care Needs: pt will remain fall free by discharge     Problem: Infection  Goal: Absence of Infection Signs and Symptoms  Outcome: Ongoing, Progressing  Intervention: Prevent or Manage Infection  Flowsheets (Taken 2/19/2023 1956)  Infection Management: aseptic technique maintained     Problem: Impaired Wound Healing  Goal: Optimal Wound Healing  Outcome: Ongoing, Progressing  Intervention: Promote Wound Healing  Flowsheets (Taken 2/19/2023 1956)  Sleep/Rest Enhancement: awakenings minimized     Problem: Fall Injury Risk  Goal: Absence of Fall and Fall-Related Injury  Outcome: Ongoing, Progressing  Intervention: Identify and Manage Contributors  Flowsheets (Taken 2/19/2023 1956)  Self-Care Promotion: independence encouraged  Medication Review/Management: medications reviewed     Problem: Skin Injury Risk Increased  Goal: Skin Health and Integrity  Outcome: Ongoing, Progressing  Intervention: Optimize Skin Protection  Flowsheets (Taken 2/19/2023 1956)  Pressure Reduction Techniques: frequent weight shift encouraged     Problem: Pain Acute  Goal: Acceptable Pain Control and Functional Ability  Outcome: Ongoing, Progressing  Intervention: Prevent or Manage Pain  Flowsheets (Taken 2/19/2023 1956)  Sleep/Rest Enhancement: awakenings minimized  Medication Review/Management: medications reviewed

## 2023-02-20 NOTE — PROGRESS NOTES
Name: Anthony Ibarra    : 1934 (88 y.o.)  MRN: [id number]           Patient Unavailable      Patient unable to be seen at this time secondary to: Pt initially agreed to get up and then became agitated with OT when uncovering blankets and then refused despite maximal encouragement.

## 2023-02-20 NOTE — PLAN OF CARE
[facility]  Discharge Reassessment    Primary Care Provider: Jossue Martinez MD    Expected Discharge Date: 2/9/2023    Reassessment (most recent)       Discharge Reassessment - 02/20/23 0745          Discharge Reassessment    Assessment Type Discharge Planning Reassessment     Did the patient's condition or plan change since previous assessment? No     Discharge Plan discussed with: Adult children     Communicated MAUREEN with patient/caregiver Date not available/Unable to determine     Discharge Plan A New Nursing Home placement - long term care facility     DME Needed Upon Discharge  none     Discharge Barriers Identified None        Post-Acute Status    Post-Acute Authorization Placement     Post-Acute Placement Status Referrals Sent     Hospital Resources/Appts/Education Provided Provided patient/caregiver with written discharge plan information     Discharge Delays None known at this time

## 2023-02-20 NOTE — NURSING
Patient was found ambulating to the restroom. He was in his chair with the self-releasing roll belt. He managed to escape the roll belt, put his legs down, get up, and walk to the bathroom (~15ft) before nursing was able to intervene. The only indicator that the patient was up was the chair alarm that was low.  was supposed to be watching, but nobody redirected the patient, sounded the alarm, nor call the nurse/CNA. Patient was put back in chair with gait belt safely. Patient was in no distress after returning to chair.  was called and questioned what happened and why they didn't notify anyone. This was the third time  was oblivious to the fact that the patient was getting out of chair/bed in the last 4 days.

## 2023-02-20 NOTE — PROGRESS NOTES
"Inpatient Nutrition Evaluation    Admit Date: 2/9/2023   Total duration of encounter: 11 days    Nutrition Recommendation/Prescription     Continue regular diet. 2. Continue arginaid daily    Nutrition Assessment     Chart Review    Reason Seen: continuous nutrition monitoring, length of stay, and follow-up    Malnutrition Screening Tool Results   Have you recently lost weight without trying?: No  Have you been eating poorly because of a decreased appetite?: No   MST Score: 0     Diagnosis:  Generalized weakness 11/23/2022      Closed fracture of left iliac wing 2/8/2023      Dementia with behavioral disturbance        Relevant Medical History: 3 items  2/8/2023  Rhabdomyolysis  Date Unknown  Cancer   Date Unknown  Compression fracture of body of thoracic vertebra    Nutrition-Related Medications:     Nutrition-Related Labs:   Latest Reference Range & Units 02/20/23 04:10   Sodium 136 - 145 mmol/L 140   Potassium 3.5 - 5.1 mmol/L 3.9   Chloride 98 - 107 mmol/L 104   CO2 23 - 31 mmol/L 29   Anion Gap mEq/L 7.0   BUN 8.4 - 25.7 mg/dL 10.5   Creatinine 0.73 - 1.18 mg/dL 0.65 (L)   BUN/CREAT RATIO  16   eGFR mls/min/1.73/m2 >60   Glucose 82 - 115 mg/dL 82   Calcium 8.8 - 10.0 mg/dL 8.2 (L)   (L): Data is abnormally low    Diet Order: Diet Adult Regular  Oral Supplement Order:  Arginaid  Appetite/Oral Intake: good/% of meals  Factors Affecting Nutritional Intake: decreased appetite  Food/[employer]/Cultural Preferences: none reported  Food Allergies: none reported    Skin Integrity: bruised (ecchymotic)  Wound(s):      Altered Skin Integrity 02/20/23 1101 Right posterior Greater trochanter Abrasion(s)-Tissue loss description: Partial thickness Wound care nurse noted decrease in back ulceration.     Comments    Pt reports intake is good. Marked menus. Will monitor.     Anthropometrics    Height: 5' 11" (180.3 cm) Height Method: Stated  Last Weight: 69 kg (152 lb 1.9 oz) (02/20/23 0304) Weight Method: Bed Scale  BMI " (Calculated): 21.2  BMI Classification: normal (BMI 18.5-24.9)        Ideal Body Weight (IBW), Male: 172 lb     % Ideal Body Weight, Male (lb): 83.95 %                          Usual Weight Provided By: unable to obtain usual weight    Wt Readings from Last 3 Encounters:   02/20/23 0304 69 kg (152 lb 1.9 oz)   02/19/23 0500 68 kg (149 lb 14.6 oz)   02/17/23 0610 67.1 kg (147 lb 14.9 oz)   02/16/23 0521 69.2 kg (152 lb 8.9 oz)   02/15/23 0500 67.5 kg (148 lb 13 oz)   02/14/23 0638 68.8 kg (151 lb 10.8 oz)   02/14/23 0353 67.7 kg (149 lb 4 oz)   02/12/23 0500 66.7 kg (147 lb 0.8 oz)   02/11/23 0500 68.4 kg (150 lb 12.7 oz)   02/09/23 1234 65.5 kg (144 lb 6.4 oz)   02/06/23 1715 65.5 kg (144 lb 6.4 oz)   02/06/23 1050 72.6 kg (160 lb)   12/27/22 1411 72 kg (158 lb 12.8 oz)      Weight Change(s) Since Admission:  Admit Weight: 65.5 kg (144 lb 6.4 oz) (02/09/23 1234)  +3.5 kg wt gained.     Patient Education    Not applicable.    Monitoring & Evaluation     Dietitian will monitor food and beverage intake, weight, weight change, electrolyte/renal panel, and glucose/endocrine profile.  Nutrition Risk/Follow-Up: low (follow-up in 5-7 days)  Patients assigned 'low nutrition risk' status do not qualify for a full nutritional assessment but will be monitored and re-evaluated in a 5-7 day time period. Please consult if re-evaluation needed sooner.

## 2023-02-20 NOTE — PROGRESS NOTES
[facility] Medicine  Progress Note    Patient Name: Anthony Ibarra  MRN: [id number]  Patient Class: IP- Swing   Admission Date: 2/9/2023  Length of Stay: 11 days  Attending Physician: Thairy G Reyes, DO  Primary Care Provider: Jossue Martinez MD        Subjective:     Principal Problem:<principal problem not specified>        HPI:  88 y.o. male presented to ED with c/o of fall and being found down at home by friend. C/O pelvic and low back pain found to have acute left iliac wing fx non displaced and nonoperative by othropedic surgery Dr. Harrison. T11-12 and lumbar 1-5 compression fractures. CPK > 3,000.          Overview/Hospital Course:  Admitted for rehabilitation after a fall resulting in L iliac fracture. Pt has been refusing therapy. Prior to this admission patient was previously on hospice, reason is unclear to me. He did have osseous lumbar lesions found on November 2022 that were concerning for metastasis. Subsequent biopsy was unrevealing for malignancy and he did have kyphoplasty of that region. He is undergoing outpatient screening for multiple myeloma with Dr. Elizabeth LeJeune. Patient has poor insight, he can not recall having had a kyphoplasty and does not know why he was on hospice. Discussed with pt's daughter 2/9 she revealed pt was on hospice secondary to dementia and was able to provide a living will that requests comfort measures, DNR and DNI.  Patient was admitted to swing bed on 02/09/2023 for continued rehabilitation however refusal has been a problem. Daughter requesting antidepressant to aid with refusal however antidepressants take several weeks for peak effect but will try.   02/06/2023 non ew c/o  Walked 20 ft today min assist  Doing better   02/19/2023 no c/o, awaiting PT/OT   02/20/2023 weightbearing status questionable  Not too fond of PT  Possible NH placement friday      Interval History:  Patient is ambulating 5 ft with rolling walker  in mid/mod assistance. H is min to mod assistance for ADLs.  Cognition is the patient's most limiting.    Review of Systems   Musculoskeletal:  Positive for back pain.   Objective:     Vital Signs (Most Recent):  Temp: 98.2 °F (36.8 °C) (02/20/23 1123)  Pulse: 70 (02/20/23 1123)  Resp: 18 (02/20/23 0304)  BP: 115/74 (02/20/23 1123)  SpO2: (!) 93 % (02/20/23 1123)   Vital Signs (24h Range):  Temp:  [97.5 °F (36.4 °C)-98.2 °F (36.8 °C)] 98.2 °F (36.8 °C)  Pulse:  [67-88] 70  Resp:  [18] 18  SpO2:  [93 %-94 %] 93 %  BP: (115-151)/(73-84) 115/74     Weight: 69 kg (152 lb 1.9 oz)  Body mass index is 21.22 kg/m².    Intake/Output Summary (Last 24 hours) at 2/20/2023 1418  Last data filed at 2/20/2023 1224  Gross per 24 hour   Intake 600 ml   Output 250 ml   Net 350 ml        Physical Exam  Constitutional:       General: He is not in acute distress.     Appearance: Normal appearance. He is cachectic.   HENT:      Head: Normocephalic and atraumatic.      Nose: No congestion or rhinorrhea.      Mouth/Throat:      Mouth: Mucous membranes are moist.   Eyes:      Conjunctiva/sclera: Conjunctivae normal.      Pupils: Pupils are equal, round, and reactive to light.   Cardiovascular:      Rate and Rhythm: Normal rate and regular rhythm.      Heart sounds: No murmur heard.  Pulmonary:      Effort: Pulmonary effort is normal.      Breath sounds: Normal breath sounds. No wheezing.   Chest:      Comments:  Pectus excavatum  Abdominal:      General: Bowel sounds are normal. There is no distension.      Palpations: Abdomen is soft.      Tenderness: There is no abdominal tenderness.   Musculoskeletal:         General: No swelling. Normal range of motion.      Cervical back: Normal range of motion and neck supple.      Right lower leg: No edema.      Left lower leg: No edema.   Skin:     General: Skin is warm and dry.      Findings: Bruising (left flank) present. No rash.   Neurological:      General: No focal deficit present.      Mental  Status: He is alert and oriented to person, place, and time.   Psychiatric:         Mood and Affect: Mood normal.         Behavior: Behavior normal.       Significant Labs: All pertinent labs within the past 24 hours have been reviewed.    Significant Imaging: I have reviewed all pertinent imaging results/findings within the past 24 hours.      Assessment/Plan:      Dementia with behavioral disturbance  -patient was on hospice secondary to advancing dementia         Closed fracture of left iliac wing  -non-operative  -PT/OT  -pain control  -Patient was evaluated by Dr. Harrison on 02/14 who recommended weight-bearing to the left lower extremity and as tolerated to the right lower extremity, okay to continue physical therapy and plan on repeat x-rays later in the week; will re-assess with Dr. Harrison on Monday      Generalized weakness  Continue -PT/OT        VTE Risk Mitigation (From admission, onward)         Ordered     enoxaparin injection 40 mg  Daily         02/09/23 1202     Place NISHA hose  Until discontinued         02/09/23 1202     IP VTE HIGH RISK PATIENT  Once         02/09/23 1202     Place sequential compression device  Until discontinued         02/09/23 1202                Discharge Planning   MAUREEN: 2/9/2023     Code Status: DNR   Is the patient medically ready for discharge?:     Reason for patient still in hospital (select all that apply): Patient trending condition, Laboratory test, Treatment and PT / OT recommendations  Discharge Plan A: New Nursing Home placement - retirement care facility   Discharge Delays: None known at this time              Noe Jung MD  Department of Hospital Medicine   [facility]

## 2023-02-20 NOTE — PLAN OF CARE
Received call from Lacie at [facility]. She stated that the Pt's daughter, Sarah, responded to her email requesting financial documents and told her she will get them to her as soon as possible.   Will f/u

## 2023-02-20 NOTE — PLAN OF CARE
Problem: Adult Inpatient Plan of Care  Goal: Patient-Specific Goal (Individualized)  Outcome: Ongoing, Progressing  Flowsheets (Taken 2/20/2023 0951)  Individualized Care Needs: Remain fall free discharge     Problem: Infection  Goal: Absence of Infection Signs and Symptoms  Outcome: Ongoing, Progressing  Intervention: Prevent or Manage Infection  Flowsheets (Taken 2/20/2023 0951)  Fever Reduction/Comfort Measures:   lightweight bedding   lightweight clothing  Isolation Precautions: protective     Problem: Impaired Wound Healing  Goal: Optimal Wound Healing  Outcome: Ongoing, Progressing  Intervention: Promote Wound Healing  Flowsheets (Taken 2/20/2023 0951)  Oral Nutrition Promotion:   physical activity promoted   calorie-dense foods provided   calorie-dense liquids provided   rest periods promoted  Sleep/Rest Enhancement:   awakenings minimized   relaxation techniques promoted   room darkened   noise level reduced  Activity Management: Rolling - L1  Pain Management Interventions:   care clustered   relaxation techniques promoted   position adjusted   warm blanket provided   quiet environment facilitated   pillow support provided     Problem: Fall Injury Risk  Goal: Absence of Fall and Fall-Related Injury  Outcome: Ongoing, Progressing  Intervention: Identify and Manage Contributors  Flowsheets (Taken 2/20/2023 0951)  Self-Care Promotion:   independence encouraged   safe use of adaptive equipment encouraged  Medication Review/Management:   medications reviewed   high-risk medications identified  Intervention: Promote Injury-Free Environment  Flowsheets (Taken 2/20/2023 0951)  Safety Promotion/Fall Prevention:   assistive device/personal item within reach   bed alarm set   Fall Risk reviewed with patient/family   nonskid shoes/socks when out of bed   instructed to call staff for mobility   /camera at bedside   room near unit station

## 2023-02-20 NOTE — SUBJECTIVE & OBJECTIVE
Interval History:  Patient is ambulating 5 ft with rolling walker in mid/mod assistance. H is min to mod assistance for ADLs.  Cognition is the patient's most limiting.    Review of Systems   Musculoskeletal:  Positive for back pain.   Objective:     Vital Signs (Most Recent):  Temp: 98.2 °F (36.8 °C) (02/20/23 1123)  Pulse: 70 (02/20/23 1123)  Resp: 18 (02/20/23 0304)  BP: 115/74 (02/20/23 1123)  SpO2: (!) 93 % (02/20/23 1123)   Vital Signs (24h Range):  Temp:  [97.5 °F (36.4 °C)-98.2 °F (36.8 °C)] 98.2 °F (36.8 °C)  Pulse:  [67-88] 70  Resp:  [18] 18  SpO2:  [93 %-94 %] 93 %  BP: (115-151)/(73-84) 115/74     Weight: 69 kg (152 lb 1.9 oz)  Body mass index is 21.22 kg/m².    Intake/Output Summary (Last 24 hours) at 2/20/2023 1418  Last data filed at 2/20/2023 1224  Gross per 24 hour   Intake 600 ml   Output 250 ml   Net 350 ml        Physical Exam  Constitutional:       General: He is not in acute distress.     Appearance: Normal appearance. He is cachectic.   HENT:      Head: Normocephalic and atraumatic.      Nose: No congestion or rhinorrhea.      Mouth/Throat:      Mouth: Mucous membranes are moist.   Eyes:      Conjunctiva/sclera: Conjunctivae normal.      Pupils: Pupils are equal, round, and reactive to light.   Cardiovascular:      Rate and Rhythm: Normal rate and regular rhythm.      Heart sounds: No murmur heard.  Pulmonary:      Effort: Pulmonary effort is normal.      Breath sounds: Normal breath sounds. No wheezing.   Chest:      Comments:  Pectus excavatum  Abdominal:      General: Bowel sounds are normal. There is no distension.      Palpations: Abdomen is soft.      Tenderness: There is no abdominal tenderness.   Musculoskeletal:         General: No swelling. Normal range of motion.      Cervical back: Normal range of motion and neck supple.      Right lower leg: No edema.      Left lower leg: No edema.   Skin:     General: Skin is warm and dry.      Findings: Bruising (left flank) present. No rash.    Neurological:      General: No focal deficit present.      Mental Status: He is alert and oriented to person, place, and time.   Psychiatric:         Mood and Affect: Mood normal.         Behavior: Behavior normal.       Significant Labs: All pertinent labs within the past 24 hours have been reviewed.    Significant Imaging: I have reviewed all pertinent imaging results/findings within the past 24 hours.

## 2023-02-20 NOTE — PLAN OF CARE
Note from Phone call last week with patient daughter Sarah on 2- [facility] PT discuss at great length that patient should not return home alone. Jaci and I were both on the call with the daughter.Jaci explain to the daughter that due to his age and the type of FX it will take sometime to get him back on his feet again but with that being said therapy will still recommend patient needing 24 hour care, patient has not made much improvement since admit. Daughter stated patient would have to go to the nursing home [facility] of choice on file for [facility] referral sent to [facility] admit person on 2-16-23. Patient on a daily bases refuses PT & OT

## 2023-02-20 NOTE — PROGRESS NOTES
Follow up assessment performed.   Back ulceration has decreased in size with current dressing change regimen.  Scab to abrasion on R posterior thigh/trochanter region removed with cleansing.  Pink/white base noted beneath.  Applied silicone foam dressing.  Recommend changing every 3 days and prn soilage.  Removed roll belt, assessed skin beneath.  No Skin issues noted to area.  Incontinence remains.  Pt utilized urinal , however, fecal incontinence remains.  Pt repeated refusing to turn for assessment and for routine skin care to removal soilage.  Discussed importance of keeping skin clean in periarea to prevent breakdown, importance of turning to prevent skin breakdown.  Recommend continued encouragement to reposition and checks for moisturemanagement.       02/20/23 1101   WOCN Assessment   WOCN Total Time (mins) 20   Visit Date 02/20/23   Visit Time 1101   Consult Type Follow Up   WOCN Speciality Wound   Number of Wounds 2   Intervention assessed   Teaching on-going   Skin Interventions   Pressure Reduction Devices positioning supports utilized   Pressure Reduction Techniques frequent weight shift encouraged;heels elevated off bed;positioned off wounds;pressure points protected;weight shift assistance provided   Skin Protection adhesive use limited;incontinence pads utilized;silicone foam dressing in place        Altered Skin Integrity 02/07/23 1011 Right upper Thoracic spine #1 Abrasion(s) Partial thickness tissue loss. Shallow open ulcer with a red or pink wound bed, without slough. Intact or Open/Ruptured Serum-filled blister.   Date First Assessed/Time First Assessed: 02/07/23 1011   Altered Skin Integrity Present on Admission: yes  Side: Right  Orientation: upper  Location: Thoracic spine  Wound Number: #1  Is this injury device related?: No  Primary Wound Type: Abrasion(s)...   Wound Image    Dressing Appearance Intact;Dry   Drainage Amount Small   Drainage Characteristics/Odor Serous   Appearance  Man;Maroon   Periwound Area Redness;Scar tissue;Dry   Wound Edges Defined   Wound Length (cm) 1.7 cm   Wound Width (cm) 1 cm   Wound Surface Area (cm^2) 1.7 cm^2   Care Cleansed with:;Sterile normal saline   Dressing Applied;Hydrofiber;Silicone;Foam   Dressing Change Due 02/22/23        Altered Skin Integrity 02/20/23 1101 Right posterior Greater trochanter Abrasion(s)   Date First Assessed/Time First Assessed: 02/20/23 1101   Altered Skin Integrity Present on Admission: yes  Side: Right  Orientation: posterior  Location: Greater trochanter  Primary Wound Type: Abrasion(s)   Wound Image    Dressing Appearance Intact;Dried drainage   Drainage Amount Scant   Drainage Characteristics/Odor Sanguineous   Appearance Red;White   Tissue loss description Partial thickness   Periwound Area Intact   Wound Edges Defined   Wound Length (cm) 0.5 cm   Wound Width (cm) 1 cm   Wound Depth (cm) 0.1 cm   Wound Volume (cm^3) 0.05 cm^3   Wound Surface Area (cm^2) 0.5 cm^2   Care Cleansed with:;Sterile normal saline   Dressing Applied;Foam   Dressing Change Due 02/22/23

## 2023-02-21 LAB
POCT GLUCOSE: 118 MG/DL (ref 70–110)
POCT GLUCOSE: 82 MG/DL (ref 70–110)
POCT GLUCOSE: 84 MG/DL (ref 70–110)

## 2023-02-21 PROCEDURE — 11000004 HC SNF PRIVATE

## 2023-02-21 PROCEDURE — 94760 N-INVAS EAR/PLS OXIMETRY 1: CPT

## 2023-02-21 PROCEDURE — 63600175 PHARM REV CODE 636 W HCPCS: Performed by: STUDENT IN AN ORGANIZED HEALTH CARE EDUCATION/TRAINING PROGRAM

## 2023-02-21 PROCEDURE — 25000003 PHARM REV CODE 250: Performed by: STUDENT IN AN ORGANIZED HEALTH CARE EDUCATION/TRAINING PROGRAM

## 2023-02-21 RX ADMIN — ESCITALOPRAM 5 MG: 5 TABLET, FILM COATED ORAL at 10:02

## 2023-02-21 RX ADMIN — BISACODYL 10 MG: 10 SUPPOSITORY RECTAL at 06:02

## 2023-02-21 NOTE — PROGRESS NOTES
Name: Anthony Ibarra    : 1934 (88 y.o.)  MRN: [id number]           Patient Unavailable      Patient unable to be seen at this time secondary to: Pt refused x2 attempts despite maximal encouragement.

## 2023-02-21 NOTE — ASSESSMENT & PLAN NOTE
-non-operative  -PT/OT  -pain control  -Patient was evaluated by Dr. Harrison on 02/14 who recommended weight-bearing to the left lower extremity and as tolerated to the right lower extremity, okay to continue physical therapy and plan on repeat x-rays later in the week; will re-assess with Dr. Harrison

## 2023-02-21 NOTE — PLAN OF CARE
Problem: Adult Inpatient Plan of Care  Goal: Plan of Care Review  Outcome: Ongoing, Progressing  Flowsheets (Taken 2/21/2023 1750)  Plan of Care Reviewed With: patient  Goal: Patient-Specific Goal (Individualized)  Outcome: Ongoing, Progressing  Flowsheets (Taken 2/21/2023 1750)  Anxieties, Fears or Concerns: N/A  Individualized Care Needs: remain fall free with enhanced safety measures  Goal: Absence of Hospital-Acquired Illness or Injury  Outcome: Ongoing, Progressing  Intervention: Identify and Manage Fall Risk  Flowsheets (Taken 2/21/2023 1750)  Safety Promotion/Fall Prevention:   assistive device/personal item within reach   bed alarm set   Fall Risk reviewed with patient/family   side rails raised x 3   instructed to call staff for mobility   nonskid shoes/socks when out of bed  Intervention: Prevent Skin Injury  Flowsheets (Taken 2/21/2023 1750)  Skin Protection:   adhesive use limited   incontinence pads utilized   transparent dressing maintained  Intervention: Prevent and Manage VTE (Venous Thromboembolism) Risk  Flowsheets (Taken 2/21/2023 1750)  Activity Management: Rolling - L1  VTE Prevention/Management:   fluids promoted   bleeding precations maintained  Range of Motion: active ROM (range of motion) encouraged  Intervention: Prevent Infection  Flowsheets (Taken 2/21/2023 1750)  Infection Prevention:   cohorting utilized   environmental surveillance performed   equipment surfaces disinfected   hand hygiene promoted   single patient room provided   rest/sleep promoted  Goal: Optimal Comfort and Wellbeing  Outcome: Ongoing, Progressing  Intervention: Monitor Pain and Promote Comfort  Flowsheets (Taken 2/21/2023 1750)  Pain Management Interventions:   position adjusted   pillow support provided   care clustered  Intervention: Provide Person-Centered Care  Flowsheets (Taken 2/21/2023 1750)  Trust Relationship/Rapport:   care explained   questions answered   questions encouraged  Goal: Readiness for  Transition of Care  Outcome: Ongoing, Progressing     Problem: Infection  Goal: Absence of Infection Signs and Symptoms  Outcome: Ongoing, Progressing  Intervention: Prevent or Manage Infection  Flowsheets (Taken 2/21/2023 1750)  Infection Management: aseptic technique maintained     Problem: Impaired Wound Healing  Goal: Optimal Wound Healing  Outcome: Ongoing, Progressing  Intervention: Promote Wound Healing  Flowsheets (Taken 2/21/2023 1750)  Oral Nutrition Promotion:   calorie-dense foods provided   calorie-dense liquids provided   rest periods promoted   physical activity promoted  Activity Management: Rolling - L1  Pain Management Interventions:   position adjusted   pillow support provided   care clustered     Problem: Fall Injury Risk  Goal: Absence of Fall and Fall-Related Injury  Outcome: Ongoing, Progressing  Intervention: Identify and Manage Contributors  Flowsheets (Taken 2/21/2023 1750)  Self-Care Promotion:   independence encouraged   BADL personal objects within reach   meal set-up provided   safe use of adaptive equipment encouraged  Intervention: Promote Injury-Free Environment  Flowsheets (Taken 2/21/2023 1750)  Safety Promotion/Fall Prevention:   assistive device/personal item within reach   bed alarm set   Fall Risk reviewed with patient/family   side rails raised x 3   instructed to call staff for mobility   nonskid shoes/socks when out of bed     Problem: Skin Injury Risk Increased  Goal: Skin Health and Integrity  Outcome: Ongoing, Progressing  Intervention: Optimize Skin Protection  Flowsheets (Taken 2/21/2023 1750)  Pressure Reduction Techniques: frequent weight shift encouraged  Skin Protection:   adhesive use limited   incontinence pads utilized   transparent dressing maintained  Head of Bed (HOB) Positioning:   HOB at 20-30 degrees   HOB at 30-45 degrees  Intervention: Promote and Optimize Oral Intake  Flowsheets (Taken 2/21/2023 1750)  Oral Nutrition Promotion:   calorie-dense foods  provided   calorie-dense liquids provided   rest periods promoted   physical activity promoted     Problem: Pain Acute  Goal: Acceptable Pain Control and Functional Ability  Outcome: Ongoing, Progressing  Intervention: Develop Pain Management Plan  Flowsheets (Taken 2/21/2023 1750)  Pain Management Interventions:   position adjusted   pillow support provided   care clustered  Intervention: Prevent or Manage Pain  Flowsheets (Taken 2/21/2023 1750)  Bowel Elimination Promotion: adequate fluid intake promoted

## 2023-02-21 NOTE — SUBJECTIVE & OBJECTIVE
Interval History:  Physical therapy reports patient continues to refuse therapy on occasion    Review of Systems   Musculoskeletal:  Positive for back pain.   Objective:     Vital Signs (Most Recent):  Temp: 97.8 °F (36.6 °C) (02/21/23 0722)  Pulse: 68 (02/21/23 0722)  Resp: 18 (02/21/23 0315)  BP: 108/69 (02/21/23 0722)  SpO2: 95 % (02/21/23 0722)   Vital Signs (24h Range):  Temp:  [97.4 °F (36.3 °C)-98.2 °F (36.8 °C)] 97.8 °F (36.6 °C)  Pulse:  [65-90] 68  Resp:  [18] 18  SpO2:  [93 %-96 %] 95 %  BP: (108-130)/(69-80) 108/69     Weight: 68 kg (149 lb 14.6 oz)  Body mass index is 20.91 kg/m².    Intake/Output Summary (Last 24 hours) at 2/21/2023 0952  Last data filed at 2/20/2023 1224  Gross per 24 hour   Intake 360 ml   Output --   Net 360 ml        Physical Exam  Constitutional:       General: He is not in acute distress.     Appearance: Normal appearance. He is cachectic.   HENT:      Head: Normocephalic and atraumatic.      Nose: No congestion or rhinorrhea.      Mouth/Throat:      Mouth: Mucous membranes are moist.   Eyes:      Conjunctiva/sclera: Conjunctivae normal.      Pupils: Pupils are equal, round, and reactive to light.   Cardiovascular:      Rate and Rhythm: Normal rate and regular rhythm.      Heart sounds: No murmur heard.  Pulmonary:      Effort: Pulmonary effort is normal.      Breath sounds: Normal breath sounds. No wheezing.   Chest:      Comments:  Pectus excavatum  Abdominal:      General: Bowel sounds are normal. There is no distension.      Palpations: Abdomen is soft.      Tenderness: There is no abdominal tenderness.   Musculoskeletal:         General: No swelling. Normal range of motion.      Cervical back: Normal range of motion and neck supple.      Right lower leg: No edema.      Left lower leg: No edema.   Skin:     General: Skin is warm and dry.      Findings: Bruising (left flank) present. No rash.   Neurological:      General: No focal deficit present.      Mental Status: He is  alert and oriented to person, place, and time.   Psychiatric:         Mood and Affect: Mood normal.         Behavior: Behavior normal.       Significant Labs: All pertinent labs within the past 24 hours have been reviewed.    Significant Imaging: I have reviewed all pertinent imaging results/findings within the past 24 hours.

## 2023-02-21 NOTE — PROGRESS NOTES
[facility]  [facility] Medicine  Progress Note    Patient Name: Anthony Ibarra  MRN: [id number]  Patient Class: IP- Swing   Admission Date: 2/9/2023  Length of Stay: 12 days  Attending Physician: Thairy G Reyes, DO  Primary Care Provider: Jossue Martinez MD        Subjective:     Principal Problem:<principal problem not specified>        HPI:  88 y.o. male presented to ED with c/o of fall and being found down at home by friend. C/O pelvic and low back pain found to have acute left iliac wing fx non displaced and nonoperative by othropedic surgery Dr. Harrison. T11-12 and lumbar 1-5 compression fractures. CPK > 3,000.          Overview/Hospital Course:  Admitted for rehabilitation after a fall resulting in L iliac fracture. Pt has been refusing therapy. Prior to this admission patient was previously on hospice, reason is unclear to me. He did have osseous lumbar lesions found on November 2022 that were concerning for metastasis. Subsequent biopsy was unrevealing for malignancy and he did have kyphoplasty of that region. He is undergoing outpatient screening for multiple myeloma with Dr. Elizabeth LeJeune. Patient has poor insight, he can not recall having had a kyphoplasty and does not know why he was on hospice. Discussed with pt's daughter 2/9 she revealed pt was on hospice secondary to dementia and was able to provide a living will that requests comfort measures, DNR and DNI.  Patient was admitted to swing bed on 02/09/2023 for continued rehabilitation however refusal has been a problem. Daughter requesting antidepressant to aid with refusal however antidepressants take several weeks for peak effect but will try.       Interval History:  Physical therapy reports patient continues to refuse therapy on occasion    Review of Systems   Musculoskeletal:  Positive for back pain.   Objective:     Vital Signs (Most Recent):  Temp: 97.8 °F (36.6 °C) (02/21/23 0722)  Pulse: 68 (02/21/23  0722)  Resp: 18 (02/21/23 0315)  BP: 108/69 (02/21/23 0722)  SpO2: 95 % (02/21/23 0722)   Vital Signs (24h Range):  Temp:  [97.4 °F (36.3 °C)-98.2 °F (36.8 °C)] 97.8 °F (36.6 °C)  Pulse:  [65-90] 68  Resp:  [18] 18  SpO2:  [93 %-96 %] 95 %  BP: (108-130)/(69-80) 108/69     Weight: 68 kg (149 lb 14.6 oz)  Body mass index is 20.91 kg/m².    Intake/Output Summary (Last 24 hours) at 2/21/2023 0952  Last data filed at 2/20/2023 1224  Gross per 24 hour   Intake 360 ml   Output --   Net 360 ml        Physical Exam  Constitutional:       General: He is not in acute distress.     Appearance: Normal appearance. He is cachectic.   HENT:      Head: Normocephalic and atraumatic.      Nose: No congestion or rhinorrhea.      Mouth/Throat:      Mouth: Mucous membranes are moist.   Eyes:      Conjunctiva/sclera: Conjunctivae normal.      Pupils: Pupils are equal, round, and reactive to light.   Cardiovascular:      Rate and Rhythm: Normal rate and regular rhythm.      Heart sounds: No murmur heard.  Pulmonary:      Effort: Pulmonary effort is normal.      Breath sounds: Normal breath sounds. No wheezing.   Chest:      Comments:  Pectus excavatum  Abdominal:      General: Bowel sounds are normal. There is no distension.      Palpations: Abdomen is soft.      Tenderness: There is no abdominal tenderness.   Musculoskeletal:         General: No swelling. Normal range of motion.      Cervical back: Normal range of motion and neck supple.      Right lower leg: No edema.      Left lower leg: No edema.   Skin:     General: Skin is warm and dry.      Findings: Bruising (left flank) present. No rash.   Neurological:      General: No focal deficit present.      Mental Status: He is alert and oriented to person, place, and time.   Psychiatric:         Mood and Affect: Mood normal.         Behavior: Behavior normal.       Significant Labs: All pertinent labs within the past 24 hours have been reviewed.    Significant Imaging: I have reviewed all  pertinent imaging results/findings within the past 24 hours.      Assessment/Plan:      Closed fracture of left iliac wing  -non-operative  -PT/OT  -pain control  -Patient was evaluated by Dr. Harrison on 02/14 who recommended weight-bearing to the left lower extremity and as tolerated to the right lower extremity, okay to continue physical therapy and plan on repeat x-rays later in the week; will re-assess with Dr. Harrison    Generalized weakness  -Continue PT/OT      Dementia with behavioral disturbance  -patient was on hospice secondary to advancing dementia           VTE Risk Mitigation (From admission, onward)         Ordered     enoxaparin injection 40 mg  Daily         02/09/23 1202     Place NISHA hose  Until discontinued         02/09/23 1202     IP VTE HIGH RISK PATIENT  Once         02/09/23 1202     Place sequential compression device  Until discontinued         02/09/23 1202                Discharge Planning   MAUREEN: 2/9/2023     Code Status: DNR   Is the patient medically ready for discharge?:     Reason for patient still in hospital (select all that apply): PT / OT recommendations and Pending disposition  Discharge Plan A: New Nursing Home placement - [facility] care facility   Discharge Delays: None known at this time              Thairy G Reyes, DO  Department of Hospital Medicine   [facility]

## 2023-02-21 NOTE — PLAN OF CARE
Problem: Adult Inpatient Plan of Care  Goal: Plan of Care Review  Outcome: Ongoing, Progressing  Flowsheets (Taken 2/20/2023 2234)  Plan of Care Reviewed With: patient  Goal: Patient-Specific Goal (Individualized)  Outcome: Ongoing, Progressing  Flowsheets (Taken 2/20/2023 2234)  Anxieties, Fears or Concerns: na  Individualized Care Needs: assistance with enhanced safety measures to remain fall free by dc  Goal: Absence of Hospital-Acquired Illness or Injury  Outcome: Ongoing, Progressing  Intervention: Identify and Manage Fall Risk  Flowsheets (Taken 2/20/2023 2234)  Safety Promotion/Fall Prevention:   assistive device/personal item within reach   bed alarm set   Fall Risk reviewed with patient/family   side rails raised x 3  Intervention: Prevent Skin Injury  Flowsheets (Taken 2/20/2023 2234)  Body Position: position changed independently  Skin Protection: adhesive use limited  Intervention: Prevent and Manage VTE (Venous Thromboembolism) Risk  Flowsheets (Taken 2/20/2023 2234)  Activity Management: Up in chair - L3  VTE Prevention/Management:   bleeding risk assessed   bleeding precations maintained  Range of Motion: active ROM (range of motion) encouraged  Intervention: Prevent Infection  Flowsheets (Taken 2/20/2023 2234)  Infection Prevention:   environmental surveillance performed   personal protective equipment utilized   rest/sleep promoted   single patient room provided   equipment surfaces disinfected   hand hygiene promoted

## 2023-02-22 LAB
POCT GLUCOSE: 101 MG/DL (ref 70–110)
POCT GLUCOSE: 105 MG/DL (ref 70–110)
POCT GLUCOSE: 133 MG/DL (ref 70–110)
POCT GLUCOSE: 85 MG/DL (ref 70–110)
POCT GLUCOSE: 89 MG/DL (ref 70–110)

## 2023-02-22 PROCEDURE — 97530 THERAPEUTIC ACTIVITIES: CPT | Mod: CQ

## 2023-02-22 PROCEDURE — 97110 THERAPEUTIC EXERCISES: CPT | Mod: CQ

## 2023-02-22 PROCEDURE — 11000004 HC SNF PRIVATE

## 2023-02-22 PROCEDURE — 97535 SELF CARE MNGMENT TRAINING: CPT

## 2023-02-22 PROCEDURE — 97530 THERAPEUTIC ACTIVITIES: CPT

## 2023-02-22 PROCEDURE — 97110 THERAPEUTIC EXERCISES: CPT

## 2023-02-22 PROCEDURE — 94760 N-INVAS EAR/PLS OXIMETRY 1: CPT

## 2023-02-22 PROCEDURE — 25000003 PHARM REV CODE 250: Performed by: STUDENT IN AN ORGANIZED HEALTH CARE EDUCATION/TRAINING PROGRAM

## 2023-02-22 RX ADMIN — ESCITALOPRAM 5 MG: 5 TABLET, FILM COATED ORAL at 10:02

## 2023-02-22 NOTE — PLAN OF CARE
Problem: Adult Inpatient Plan of Care  Goal: Plan of Care Review  Outcome: Ongoing, Progressing  Flowsheets (Taken 2/22/2023 1648)  Plan of Care Reviewed With: patient  Goal: Patient-Specific Goal (Individualized)  Outcome: Ongoing, Progressing  Flowsheets (Taken 2/22/2023 1648)  Anxieties, Fears or Concerns: n/a  Individualized Care Needs: remain fall free  Goal: Absence of Hospital-Acquired Illness or Injury  Outcome: Ongoing, Progressing  Intervention: Identify and Manage Fall Risk  Flowsheets (Taken 2/22/2023 1648)  Safety Promotion/Fall Prevention:   assistive device/personal item within reach   bed alarm set   chair alarm set   Fall Risk reviewed with patient/family   Fall Risk signage in place   in recliner, wheels locked   lighting adjusted   muscle strengthening facilitated   nonskid shoes/socks when out of bed   /camera at bedside   room near unit station   Roll belt self-releasing   side rails raised x 3   instructed to call staff for mobility  Intervention: Prevent Skin Injury  Flowsheets (Taken 2/22/2023 1648)  Body Position:   sitting up in bed   supine   weight shifting  Skin Protection:   adhesive use limited   incontinence pads utilized  Intervention: Prevent and Manage VTE (Venous Thromboembolism) Risk  Flowsheets (Taken 2/22/2023 1648)  Activity Management:   Rolling - L1   Up in chair - L3  VTE Prevention/Management:   ambulation promoted   fluids promoted   bleeding precations maintained  Range of Motion: active ROM (range of motion) encouraged  Intervention: Prevent Infection  Flowsheets (Taken 2/22/2023 1648)  Infection Prevention:   cohorting utilized   environmental surveillance performed   equipment surfaces disinfected   hand hygiene promoted   single patient room provided   rest/sleep promoted  Goal: Optimal Comfort and Wellbeing  Outcome: Ongoing, Progressing  Intervention: Monitor Pain and Promote Comfort  Flowsheets (Taken 2/22/2023 1648)  Pain Management  Interventions:   care clustered   pillow support provided   position adjusted  Intervention: Provide Person-Centered Care  Flowsheets (Taken 2/22/2023 1648)  Trust Relationship/Rapport:   questions encouraged   choices provided   questions answered  Goal: Readiness for Transition of Care  Outcome: Ongoing, Progressing     Problem: Infection  Goal: Absence of Infection Signs and Symptoms  Outcome: Ongoing, Progressing  Intervention: Prevent or Manage Infection  Flowsheets (Taken 2/22/2023 1648)  Infection Management: aseptic technique maintained     Problem: Impaired Wound Healing  Goal: Optimal Wound Healing  Outcome: Ongoing, Progressing  Intervention: Promote Wound Healing  Flowsheets (Taken 2/22/2023 1648)  Oral Nutrition Promotion:   calorie-dense foods provided   calorie-dense liquids provided   rest periods promoted   safe use of adaptive equipment encouraged  Activity Management:   Rolling - L1   Up in chair - L3  Pain Management Interventions:   care clustered   pillow support provided   position adjusted     Problem: Fall Injury Risk  Goal: Absence of Fall and Fall-Related Injury  Outcome: Ongoing, Progressing  Intervention: Identify and Manage Contributors  Flowsheets (Taken 2/22/2023 1648)  Self-Care Promotion:   independence encouraged   BADL personal objects within reach   meal set-up provided  Intervention: Promote Injury-Free Environment  Flowsheets (Taken 2/22/2023 1648)  Safety Promotion/Fall Prevention:   assistive device/personal item within reach   bed alarm set   chair alarm set   Fall Risk reviewed with patient/family   Fall Risk signage in place   in recliner, wheels locked   lighting adjusted   muscle strengthening facilitated   nonskid shoes/socks when out of bed   /camera at bedside   room near unit station   Roll belt self-releasing   side rails raised x 3   instructed to call staff for mobility     Problem: Skin Injury Risk Increased  Goal: Skin Health and  Integrity  Outcome: Ongoing, Progressing  Intervention: Optimize Skin Protection  Flowsheets (Taken 2/22/2023 1648)  Pressure Reduction Techniques:   frequent weight shift encouraged   weight shift assistance provided  Skin Protection:   adhesive use limited   incontinence pads utilized  Head of Bed (HOB) Positioning:   HOB lowered   HOB at 20-30 degrees   HOB at 30-45 degrees  Intervention: Promote and Optimize Oral Intake  Flowsheets (Taken 2/22/2023 1648)  Oral Nutrition Promotion:   calorie-dense foods provided   calorie-dense liquids provided   rest periods promoted   safe use of adaptive equipment encouraged     Problem: Pain Acute  Goal: Acceptable Pain Control and Functional Ability  Outcome: Ongoing, Progressing  Intervention: Develop Pain Management Plan  Flowsheets (Taken 2/22/2023 1648)  Pain Management Interventions:   care clustered   pillow support provided   position adjusted  Intervention: Prevent or Manage Pain  Flowsheets (Taken 2/22/2023 1648)  Bowel Elimination Promotion: adequate fluid intake promoted

## 2023-02-22 NOTE — PT/OT/SLP PROGRESS
Name: Anthony Ibarra    : 1934 (88 y.o.)  MRN: [id number]            Interdisciplinary Team Conference     Case conference held with patient/family and care team to discuss progress, plan of care, barriers to be addressed for safe return home, equipment recommendations, and discharge planning. Communicated therapy progress with MD, RN, therapy clinicians and case management. All questions/concerns answered.

## 2023-02-22 NOTE — PROGRESS NOTES
All 10 fingernails trimmed without difficulty.  Pt tolerated well.  [place] board provided for smoothing edges

## 2023-02-22 NOTE — PLAN OF CARE
Spoke with Dahlia at [facility]. She stated that the Pt's daughter, Sarah, will be going Friday to sign admission paperwork. All requested clinical sent and Chest Xray order put in to send results once available. We discussed Pt can transport by [facility]. Dahlia informed me she will be out of the office tomorrow and gave her cell to call if needed.

## 2023-02-22 NOTE — PROGRESS NOTES
Name: Anthony Ibarra    : 1934 (88 y.o.)  MRN: [id number]           Patient Unavailable      Patient unable to be seen at this time secondary to: Pt refused PM session d/t fatigue despite maximal encouragement.

## 2023-02-22 NOTE — PROGRESS NOTES
Name: Anthony Ibarra    : 1934 (88 y.o.)  MRN: [id number]            Interdisciplinary Team Conference     Case conference held with patient/family and care team to discuss progress, plan of care, barriers to be addressed for safe return home, equipment recommendations, and discharge planning. Communicated therapy progress with MD, RN, therapy clinicians and case management. All questions/concerns answered.

## 2023-02-22 NOTE — SUBJECTIVE & OBJECTIVE
Interval History:  Physical therapy reports patient continues to refuse therapy on occasion    Doing well today awaiting PT/OT    Review of Systems   Musculoskeletal:  Positive for back pain.   Objective:     Vital Signs (Most Recent):  Temp: 97.9 °F (36.6 °C) (02/22/23 1047)  Pulse: (!) 57 (02/22/23 1047)  Resp: 19 (02/22/23 0336)  BP: 116/74 (02/22/23 1047)  SpO2: (!) 94 % (02/22/23 1047)   Vital Signs (24h Range):  Temp:  [97.7 °F (36.5 °C)-98 °F (36.7 °C)] 97.9 °F (36.6 °C)  Pulse:  [57-79] 57  Resp:  [18-19] 19  SpO2:  [94 %-96 %] 94 %  BP: (101-127)/(68-79) 116/74     Weight: 68.2 kg (150 lb 5.7 oz)  Body mass index is 20.97 kg/m².    Intake/Output Summary (Last 24 hours) at 2/22/2023 1111  Last data filed at 2/21/2023 1814  Gross per 24 hour   Intake 240 ml   Output 1 ml   Net 239 ml        Physical Exam  Constitutional:       General: He is not in acute distress.     Appearance: Normal appearance. He is cachectic.   HENT:      Head: Normocephalic and atraumatic.      Nose: No congestion or rhinorrhea.      Mouth/Throat:      Mouth: Mucous membranes are moist.   Eyes:      Conjunctiva/sclera: Conjunctivae normal.      Pupils: Pupils are equal, round, and reactive to light.   Cardiovascular:      Rate and Rhythm: Normal rate and regular rhythm.      Heart sounds: No murmur heard.  Pulmonary:      Effort: Pulmonary effort is normal.      Breath sounds: Normal breath sounds. No wheezing.   Chest:      Comments:  Pectus excavatum  Abdominal:      General: Bowel sounds are normal. There is no distension.      Palpations: Abdomen is soft.      Tenderness: There is no abdominal tenderness.   Musculoskeletal:         General: No swelling. Normal range of motion.      Cervical back: Normal range of motion and neck supple.      Right lower leg: No edema.      Left lower leg: No edema.   Skin:     General: Skin is warm and dry.      Findings: Bruising (left flank) present. No rash.   Neurological:      General: No focal  deficit present.      Mental Status: He is alert and oriented to person, place, and time.   Psychiatric:         Mood and Affect: Mood normal.         Behavior: Behavior normal.       Significant Labs: All pertinent labs within the past 24 hours have been reviewed.    Significant Imaging: I have reviewed all pertinent imaging results/findings within the past 24 hours.

## 2023-02-22 NOTE — PROGRESS NOTES
[facility] Medicine  Progress Note    Patient Name: Anthony Ibarra  MRN: [id number]  Patient Class: IP- Swing   Admission Date: 2/9/2023  Length of Stay: 13 days  Attending Physician: Thairy G Reyes, DO  Primary Care Provider: Jossue Martinez MD        Subjective:     Principal Problem:<principal problem not specified>        HPI:  88 y.o. male presented to ED with c/o of fall and being found down at home by friend. C/O pelvic and low back pain found to have acute left iliac wing fx non displaced and nonoperative by othropedic surgery Dr. Harrison. T11-12 and lumbar 1-5 compression fractures. CPK > 3,000.          Overview/Hospital Course:  Admitted for rehabilitation after a fall resulting in L iliac fracture. Pt has been refusing therapy. Prior to this admission patient was previously on hospice, reason is unclear to me. He did have osseous lumbar lesions found on November 2022 that were concerning for metastasis. Subsequent biopsy was unrevealing for malignancy and he did have kyphoplasty of that region. He is undergoing outpatient screening for multiple myeloma with Dr. Elizabeth LeJeune. Patient has poor insight, he can not recall having had a kyphoplasty and does not know why he was on hospice. Discussed with pt's daughter 2/9 she revealed pt was on hospice secondary to dementia and was able to provide a living will that requests comfort measures, DNR and DNI.  Patient was admitted to swing bed on 02/09/2023 for continued rehabilitation however refusal has been a problem. Daughter requesting antidepressant to aid with refusal however antidepressants take several weeks for peak effect but will try.   02/22/2023 working on NH placement       Interval History:  Physical therapy reports patient continues to refuse therapy on occasion    Doing well today awaiting PT/OT    Review of Systems   Musculoskeletal:  Positive for back pain.   Objective:     Vital Signs (Most  Recent):  Temp: 97.9 °F (36.6 °C) (02/22/23 1047)  Pulse: (!) 57 (02/22/23 1047)  Resp: 19 (02/22/23 0336)  BP: 116/74 (02/22/23 1047)  SpO2: (!) 94 % (02/22/23 1047)   Vital Signs (24h Range):  Temp:  [97.7 °F (36.5 °C)-98 °F (36.7 °C)] 97.9 °F (36.6 °C)  Pulse:  [57-79] 57  Resp:  [18-19] 19  SpO2:  [94 %-96 %] 94 %  BP: (101-127)/(68-79) 116/74     Weight: 68.2 kg (150 lb 5.7 oz)  Body mass index is 20.97 kg/m².    Intake/Output Summary (Last 24 hours) at 2/22/2023 1111  Last data filed at 2/21/2023 1814  Gross per 24 hour   Intake 240 ml   Output 1 ml   Net 239 ml        Physical Exam  Constitutional:       General: He is not in acute distress.     Appearance: Normal appearance. He is cachectic.   HENT:      Head: Normocephalic and atraumatic.      Nose: No congestion or rhinorrhea.      Mouth/Throat:      Mouth: Mucous membranes are moist.   Eyes:      Conjunctiva/sclera: Conjunctivae normal.      Pupils: Pupils are equal, round, and reactive to light.   Cardiovascular:      Rate and Rhythm: Normal rate and regular rhythm.      Heart sounds: No murmur heard.  Pulmonary:      Effort: Pulmonary effort is normal.      Breath sounds: Normal breath sounds. No wheezing.   Chest:      Comments:  Pectus excavatum  Abdominal:      General: Bowel sounds are normal. There is no distension.      Palpations: Abdomen is soft.      Tenderness: There is no abdominal tenderness.   Musculoskeletal:         General: No swelling. Normal range of motion.      Cervical back: Normal range of motion and neck supple.      Right lower leg: No edema.      Left lower leg: No edema.   Skin:     General: Skin is warm and dry.      Findings: Bruising (left flank) present. No rash.   Neurological:      General: No focal deficit present.      Mental Status: He is alert and oriented to person, place, and time.   Psychiatric:         Mood and Affect: Mood normal.         Behavior: Behavior normal.       Significant Labs: All pertinent labs  within the past 24 hours have been reviewed.    Significant Imaging: I have reviewed all pertinent imaging results/findings within the past 24 hours.      Assessment/Plan:      Dementia with behavioral disturbance  -patient was on hospice secondary to advancing dementia         Closed fracture of left iliac wing  -non-operative  -PT/OT  -pain control  -Patient was evaluated by Dr. Harrison on 02/14 who recommended weight-bearing to the left lower extremity and as tolerated to the right lower extremity, okay to continue physical therapy and plan on repeat x-rays later in the week; will re-assess with Dr. Harrison    Generalized weakness  -Continue PT/OT        VTE Risk Mitigation (From admission, onward)         Ordered     enoxaparin injection 40 mg  Daily         02/09/23 1202     Place NISHA hose  Until discontinued         02/09/23 1202     IP VTE HIGH RISK PATIENT  Once         02/09/23 1202     Place sequential compression device  Until discontinued         02/09/23 1202                Discharge Planning   MAUREEN: 2/9/2023     Code Status: DNR   Is the patient medically ready for discharge?:     Reason for patient still in hospital (select all that apply): Patient trending condition, Laboratory test, Treatment and PT / OT recommendations  Discharge Plan A: New Nursing Home placement - senior care care facility   Discharge Delays: None known at this time              Noe Jung MD  Department of Hospital Medicine   [facility]

## 2023-02-22 NOTE — PLAN OF CARE
Received call from Chau at [facility] informing us that they cannot accept the Pt with her BIPAP requirements.

## 2023-02-22 NOTE — PT/OT/SLP PROGRESS
Physical Therapy Treatment Note           Name: Anthony Ibarra    : 1934 (88 y.o.)  MRN: [id number]           TREATMENT SUMMARY AND RECOMMENDATIONS:    PT Received On: 23  PT Start Time: 1000     PT Stop Time: 1025  PT Total Time (min): 25 min     Subjective Assessment:  x No complaints  Lethargic    Awake, alert, cooperative  Uncooperative    Agitated  c/o pain    Appropriate  c/o fatigue    Confused  Treated at bedside     Emotionally labile x Treated in gym/dept.    Impulsive  Other:    Flat affect       Therapy Precautions:    Cognitive deficits  Spinal precautions    Collar - hard  Sternal precautions    Collar - soft   TLSO    Fall risk  LSO    Hip precautions - posterior  Knee immobilizer    Hip precautions - anterior  WBAT    Impaired communication  Partial weightbearing    Oxygen x TTWB- L LE    PEG tube  NWB    Visual deficits  Other:    Hearing deficits          Treatment Objectives:     Mobility Training:   Assist level Comments    Bed mobility [place] Supine-sit   Transfer [place] Bed-recliner   Gait     Sit to stand transitions [place] Sit-stand with B UE use   Sitting balance     Standing balance      Wheelchair mobility     Car transfer     Other:          Therapeutic Exercise:   Exercise Sets Reps Comments   B LE exer in long and short sitting  20 reps  In all planes to promote muscle strength and ROM                          Additional Comments:  No gait due to not able to follow WB precaution    Assessment: Patient tolerated session well.    PT Plan: continue  Revisions made to plan of care: No    GOALS:   Multidisciplinary Problems       Physical Therapy Goals          Problem: Physical Therapy    Goal Priority Disciplines Outcome Goal Variances Interventions   Physical Therapy Goal     PT, PT/OT Ongoing, Progressing     Description: Goals to be met by: Discharge    Patient will increase functional independence with mobility by performin. Supine to sit with Modified  [place]  2. Sit to supine with Modified [place]  3. Sit to stand transfer with Modified [place]  4. Bed to chair transfer with Modified [place] using Rolling Walker  5. Gait  x 100 feet with Supervision using Rolling Walker.                        Skilled PT Minutes Provided: 25   Communication with Treatment Team:     Equipment recommendations:       At end of treatment, patient remained:   Up in chair     Up in wheelchair in room    In bed    With alarm activated    Bed rails up    Call bell in reach     Family/friends present    Restraints secured properly    In bathroom with CNA/RN notified    Nurse aware   x In gym with therapist/tech    Other:

## 2023-02-22 NOTE — PT/OT/SLP PROGRESS
Occupational Therapy  Treatment    Name: Anthony Ibarra    : 1934 (88 y.o.)  MRN: [id number]           TREATMENT SUMMARY AND RECOMMENDATIONS:      OT Date of Treatment: 23  OT Start Time: 1000  OT Stop Time: 1025  OT Total Time (min): 25 min      Subjective Assessment:   No complaints  Lethargic   x Awake, alert, cooperative  Impulsive    Uncooperative   Flat affect    Agitated x c/o pain    Appropriate  c/o fatigue    Confused  Treated at bedside     Emotionally labile x Treated in gym/dept.      Other:        Therapy Precautions:    Cognitive deficits  Spinal precautions    Collar - hard  Sternal precautions    Collar - soft   TLSO   x Fall risk  LSO    Hip precautions - posterior  Knee immobilizer    Hip precautions - anterior  WBAT    Impaired communication  Partial weightbearing    Oxygen x TTWB    PEG tube  NWB    Visual deficits      Hearing deficits   Other:        Treatment Objectives:     Mobility Training:    Mobility task Assist level Comments    Bed mobility     Transfer     Sit to stands transitions CGA From BSChair   Functional mobility CGA X15 feet with RW    Sitting balance     Standing balance      Other:        ADL Training:    ADL Assist level Comments    Feeding     Grooming/hygiene SBA Pt brushed teeth while seated in BSChair   Bathing     Upper body dressing     Lower body dressing     Toileting     Toilet transfer     Adaptive equipment training     Other:           Therapeutic Exercise:   Exercise Sets Reps Comments   2# dowel 2 10 Shoulder press, chest press, straight arm raises, bicep curls                         Additional Comments:  Pt participates to his desire and once he has had enough, unable to get any more participation. Recommend DC to NH.    Assessment: Patient tolerated session fair.    OT Plan: Continue POC  Revisions made to plan of care: No    GOALS:   Multidisciplinary Problems       Occupational Therapy Goals          Problem: Occupational Therapy    Goal  Priority Disciplines Outcome Interventions   Occupational Therapy Goal     OT, PT/OT Ongoing, Progressing    Description: Goals to be met by: 02/23/23     Patient will increase functional independence with ADLs by performing:    LE Dressing with Supervision.  Grooming while standing at sink with Stand-by Assistance.  Toileting from bedside commode with Contact Guard Assistance for hygiene and clothing management.   Bathing from  shower chair/bench with Minimal Assistance.  Toilet transfer to toilet with Stand-by Assistance.                         Skilled OT Minutes Provided: 25  Communication with Treatment Team:     Equipment recommendations:       At end of treatment, patient remained:  x Up in chair     Up in wheelchair in room    In bed   x With alarm activated    Bed rails up   x Call bell in reach     Family/friends present   x Restraints secured properly    In bathroom with CNA/RN notified    In gym with PT/PTA/tech    Nurse aware    Other:

## 2023-02-23 LAB
POCT GLUCOSE: 100 MG/DL (ref 70–110)
POCT GLUCOSE: 78 MG/DL (ref 70–110)
POCT GLUCOSE: 99 MG/DL (ref 70–110)

## 2023-02-23 PROCEDURE — 97535 SELF CARE MNGMENT TRAINING: CPT

## 2023-02-23 PROCEDURE — 97110 THERAPEUTIC EXERCISES: CPT

## 2023-02-23 PROCEDURE — 97530 THERAPEUTIC ACTIVITIES: CPT

## 2023-02-23 PROCEDURE — 25000003 PHARM REV CODE 250: Performed by: STUDENT IN AN ORGANIZED HEALTH CARE EDUCATION/TRAINING PROGRAM

## 2023-02-23 PROCEDURE — 11000004 HC SNF PRIVATE

## 2023-02-23 PROCEDURE — 94760 N-INVAS EAR/PLS OXIMETRY 1: CPT

## 2023-02-23 PROCEDURE — 63600175 PHARM REV CODE 636 W HCPCS: Performed by: STUDENT IN AN ORGANIZED HEALTH CARE EDUCATION/TRAINING PROGRAM

## 2023-02-23 RX ADMIN — HYDROCODONE BITARTRATE AND ACETAMINOPHEN 1 TABLET: 5; 325 TABLET ORAL at 02:02

## 2023-02-23 RX ADMIN — ESCITALOPRAM 5 MG: 5 TABLET, FILM COATED ORAL at 08:02

## 2023-02-23 RX ADMIN — ENOXAPARIN SODIUM 40 MG: 40 INJECTION SUBCUTANEOUS at 05:02

## 2023-02-23 NOTE — PT/OT/SLP PROGRESS
Physical Therapy Treatment Note           Name: Anthony Ibarra    : 1934 (88 y.o.)  MRN: [id number]           TREATMENT SUMMARY AND RECOMMENDATIONS:    PT Received On: 23  PT Start Time: 925     PT Stop Time: 948  PT Total Time (min): 23 min     Subjective Assessment:  x No complaints  Lethargic    Awake, alert, cooperative  Uncooperative    Agitated  c/o pain    Appropriate  c/o fatigue    Confused  Treated at bedside     Emotionally labile x Treated in gym/dept.    Impulsive  Other:    Flat affect       Therapy Precautions:    Cognitive deficits  Spinal precautions    Collar - hard  Sternal precautions    Collar - soft   TLSO    Fall risk  LSO    Hip precautions - posterior  Knee immobilizer    Hip precautions - anterior  WBAT    Impaired communication  Partial weightbearing    Oxygen x TTWB- L LE    PEG tube  NWB    Visual deficits  Other:    Hearing deficits          Treatment Objectives:     Mobility Training:   Assist level Comments    Bed mobility CGA Supine-sit   Transfer Min A Bed-recliner   Gait CGA X 30 feet using RW and WC following   Sit to stand transitions Min/CGA Sit-stand with B UE use x 5   Sitting balance     Standing balance      Wheelchair mobility     Car transfer     Other:          Therapeutic Exercise:   Exercise Sets Reps Comments   B LE exer in long and short sitting  20 reps  In all planes to promote muscle strength and ROM                          Additional Comments:  Pt still having difficulty with TTWB, pt PT progressing as able, Improved sit to stand and bed mobility function today.     . Assessment: Patient tolerated session well.    PT Plan: continue  Revisions made to plan of care: No    GOALS:   Multidisciplinary Problems       Physical Therapy Goals          Problem: Physical Therapy    Goal Priority Disciplines Outcome Goal Variances Interventions   Physical Therapy Goal     PT, PT/OT Ongoing, Progressing     Description: Goals to be met by:  Discharge    Patient will increase functional independence with mobility by performin. Supine to sit with Modified [place]  2. Sit to supine with Modified [place]  3. Sit to stand transfer with Modified [place]  4. Bed to chair transfer with Modified [place] using Rolling Walker  5. Gait  x 100 feet with Supervision using Rolling Walker.                        Skilled PT Minutes Provided: 23   Communication with Treatment Team:     Equipment recommendations:       At end of treatment, patient remained:   Up in chair     Up in wheelchair in room   x In bed   x With alarm activated   x Bed rails up   x Call bell in reach     Family/friends present   x Restraints secured properly    In bathroom with CNA/RN notified   x Nurse aware/in room    In gym with therapist/tech    Other:

## 2023-02-23 NOTE — PT/OT/SLP PROGRESS
Occupational Therapy  Treatment    Name: Anthony Ibarra    : 1934 (88 y.o.)  MRN: [id number]           TREATMENT SUMMARY AND RECOMMENDATIONS:      OT Date of Treatment: 23  OT Start Time: 900  OT Stop Time: 925  OT Total Time (min): 25 min      Subjective Assessment:   No complaints  Lethargic   x Awake, alert, cooperative  Impulsive    Uncooperative   Flat affect    Agitated x c/o pain    Appropriate  c/o fatigue    Confused x Treated at bedside     Emotionally labile x Treated in gym/dept.      Other:        Therapy Precautions:    Cognitive deficits  Spinal precautions    Collar - hard  Sternal precautions    Collar - soft   TLSO   x Fall risk  LSO    Hip precautions - posterior  Knee immobilizer    Hip precautions - anterior  WBAT    Impaired communication  Partial weightbearing    Oxygen x TTWB    PEG tube  NWB    Visual deficits      Hearing deficits   Other:        Treatment Objectives:     Mobility Training:    Mobility task Assist level Comments    Bed mobility SBA Supine>EOB   Transfer CGA Stand/pivot t/f with RW    Sit to stands transitions CGA    Functional mobility CGA X15 feet with RW   Sitting balance     Standing balance  CGA Pt stood with RW anterior and performed reach to low stool to grasp bean bags and then toss into bucket anterior. Pt able to complete with RUE f/b LUE before needing seated rest break ~3 min   Other:        ADL Training:    ADL Assist level Comments    Feeding     Grooming/hygiene     Bathing     Upper body dressing     Lower body dressing CGA Pt able to jemal B socks and briefs sitting EOB and required CGA in standing to manage over hips    Toileting CGA Pt able to clean self up in standing following void in diaper.    Toilet transfer     Adaptive equipment training     Other:           Therapeutic Exercise:   Exercise Sets Reps Comments   2# dowel 2 10 Shoulder press, chest press, straight arm raises, bicep curls, forward rows, backwards rows                          Additional Comments:      Assessment: Patient tolerated session well.    OT Plan: Continue POC  Revisions made to plan of care: Yes    GOALS:   Multidisciplinary Problems       Occupational Therapy Goals          Problem: Occupational Therapy    Goal Priority Disciplines Outcome Interventions   Occupational Therapy Goal     OT, PT/OT Ongoing, Progressing    Description: Goals to be met by: 03/2/23     Patient will increase functional independence with ADLs by performing:    LE Dressing with Supervision.  Grooming while standing at sink with Stand-by Assistance.  Toileting from bedside commode with Contact Guard Assistance for hygiene and clothing management. - met 2/23  Bathing from shower chair/bench with Minimal Assistance.  Toilet transfer to toilet with Stand-by Assistance.                         Skilled OT Minutes Provided: 25  Communication with Treatment Team:     Equipment recommendations:       At end of treatment, patient remained:   Up in chair     Up in wheelchair in room    In bed    With alarm activated    Bed rails up    Call bell in reach     Family/friends present    Restraints secured properly    In bathroom with CNA/RN notified   x In gym with PT/PTA/tech    Nurse aware    Other:

## 2023-02-23 NOTE — PLAN OF CARE
Problem: Adult Inpatient Plan of Care  Goal: Plan of Care Review  Outcome: Ongoing, Progressing  Flowsheets (Taken 2/22/2023 2136)  Plan of Care Reviewed With: daughter  Goal: Patient-Specific Goal (Individualized)  Outcome: Ongoing, Progressing  Flowsheets (Taken 2/22/2023 2136)  Anxieties, Fears or Concerns: na  Individualized Care Needs: remain fall free  Goal: Absence of Hospital-Acquired Illness or Injury  Outcome: Ongoing, Progressing  Intervention: Identify and Manage Fall Risk  Flowsheets (Taken 2/22/2023 2136)  Safety Promotion/Fall Prevention:   assistive device/personal item within reach   bed alarm set   Fall Risk reviewed with patient/family   side rails raised x 3  Intervention: Prevent Skin Injury  Flowsheets (Taken 2/22/2023 2136)  Body Position: position changed independently  Skin Protection:   adhesive use limited   incontinence pads utilized  Intervention: Prevent and Manage VTE (Venous Thromboembolism) Risk  Flowsheets (Taken 2/22/2023 2136)  Activity Management: Rolling - L1  VTE Prevention/Management: ambulation promoted  Range of Motion: active ROM (range of motion) encouraged  Intervention: Prevent Infection  Flowsheets (Taken 2/22/2023 2136)  Infection Prevention:   environmental surveillance performed   personal protective equipment utilized   rest/sleep promoted   single patient room provided   equipment surfaces disinfected   hand hygiene promoted

## 2023-02-23 NOTE — PLAN OF CARE
[facility]  Discharge Reassessment    Primary Care Provider: Jossue Martinez MD    Expected Discharge Date: 2/9/2023    Reassessment (most recent)       Discharge Reassessment - 02/23/23 1537          Discharge Reassessment    Assessment Type Discharge Planning Reassessment     Did the patient's condition or plan change since previous assessment? No     Discharge Plan discussed with: Adult children     Communicated MAUREEN with patient/caregiver Date not available/Unable to determine     Discharge Plan A Long-term acute care facility (LTAC)     DME Needed Upon Discharge  none     Discharge Barriers Identified None     Why the patient remains in the hospital Requires continued medical care        Post-Acute Status    Post-Acute Authorization Placement     Post-Acute Placement Status Set-up Complete/Auth obtained     Hospice Status Set-up Complete/Auth obtained     Hospital Resources/Appts/Education Provided Provided patient/caregiver with written discharge plan information     Patient choice form signed by patient/caregiver List with quality metrics by geographic area provided     Discharge Delays None known at this time

## 2023-02-23 NOTE — PLAN OF CARE
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    Compression fracture of body of thoracic vertebra    Memory impairment    Generalized weakness    Closed fracture of left iliac wing    Dementia with behavioral disturbance          Team Members Present:       Nursing Present     Yes [x]    No []    Physical Therapy Present    Yes [x]    No []    Occupational Therapy Present     Yes [x]    No []    Speech Therapy Present    Yes []    No []    NA [x]    Dietary Present    Yes [x]    No []        Physician Present   [] Vishal Durand    [] Thairy Reyes    [] CALLIE Chang    [] MEGHANN Jo     [] LIBIA Jung at bedside    Family Present    [x] Adult Children on phone    [] Spouse    [] POA    [] Friend/ Caregiver    [] Other       Interdisciplinary Meeting Notes:      Staffing today doing better with PT OT   Therapy and MD stated stated patient will need 24 hour care Daughter stated will need placement referral sent to nursing homes possible DC Friday           Please see Documented PT/OT/ST, Dietary, Nursing, and Physician notes for detailed treatment information.

## 2023-02-23 NOTE — PLAN OF CARE
Problem: Occupational Therapy  Goal: Occupational Therapy Goal  Description: Goals to be met by: 03/2/23     Patient will increase functional independence with ADLs by performing:    LE Dressing with Supervision.  Grooming while standing at sink with Stand-by Assistance.  Toileting from bedside commode with Contact Guard Assistance for hygiene and clothing management. - met 2/23  Bathing from shower chair/bench with Minimal Assistance.  Toilet transfer to toilet with Stand-by Assistance.    Outcome: Ongoing, Progressing

## 2023-02-24 VITALS
SYSTOLIC BLOOD PRESSURE: 103 MMHG | HEART RATE: 64 BPM | TEMPERATURE: 97 F | RESPIRATION RATE: 18 BRPM | BODY MASS INDEX: 20.87 KG/M2 | HEIGHT: 71 IN | DIASTOLIC BLOOD PRESSURE: 67 MMHG | WEIGHT: 149.06 LBS | OXYGEN SATURATION: 97 %

## 2023-02-24 LAB
ABS NEUT CALC (OHS): 2.86 X10(3)/MCL (ref 2.1–9.2)
ANION GAP SERPL CALC-SCNC: 7 MEQ/L
BUN SERPL-MCNC: 12.9 MG/DL (ref 8.4–25.7)
CALCIUM SERPL-MCNC: 8.5 MG/DL (ref 8.8–10)
CHLORIDE SERPL-SCNC: 102 MMOL/L (ref 98–107)
CO2 SERPL-SCNC: 31 MMOL/L (ref 23–31)
CREAT SERPL-MCNC: 0.74 MG/DL (ref 0.73–1.18)
CREAT/UREA NIT SERPL: 17
ERYTHROCYTE [DISTWIDTH] IN BLOOD BY AUTOMATED COUNT: 13.9 % (ref 11.5–17)
GFR SERPLBLD CREATININE-BSD FMLA CKD-EPI: >60 MLS/MIN/1.73/M2
GLUCOSE SERPL-MCNC: 91 MG/DL (ref 82–115)
HCT VFR BLD AUTO: 37.6 % (ref 42–52)
HGB BLD-MCNC: 11.8 G/DL (ref 14–18)
IMM GRANULOCYTES # BLD AUTO: 0.01 X10(3)/MCL (ref 0–0.04)
IMM GRANULOCYTES NFR BLD AUTO: 0.2 %
LYMPHOCYTES NFR BLD MANUAL: 1.64 X10(3)/MCL
LYMPHOCYTES NFR BLD MANUAL: 31 % (ref 13–40)
MCH RBC QN AUTO: 29.9 PG
MCHC RBC AUTO-ENTMCNC: 31.4 G/DL (ref 33–36)
MCV RBC AUTO: 95.2 FL (ref 80–94)
MONOCYTES NFR BLD MANUAL: 0.8 X10(3)/MCL (ref 0.1–1.3)
MONOCYTES NFR BLD MANUAL: 15 % (ref 2–11)
NEUTROPHILS NFR BLD MANUAL: 53 % (ref 47–80)
NEUTS BAND NFR BLD MANUAL: 1 % (ref 0–11)
PLATELET # BLD AUTO: 222 X10(3)/MCL (ref 130–400)
PLATELET # BLD EST: NORMAL 10*3/UL
PMV BLD AUTO: 10.2 FL (ref 7.4–10.4)
POCT GLUCOSE: 85 MG/DL (ref 70–110)
POTASSIUM SERPL-SCNC: 3.7 MMOL/L (ref 3.5–5.1)
RBC # BLD AUTO: 3.95 X10(6)/MCL (ref 4.7–6.1)
RBC MORPH BLD: NORMAL
SODIUM SERPL-SCNC: 140 MMOL/L (ref 136–145)
WBC # SPEC AUTO: 5.3 X10(3)/MCL (ref 4.5–11.5)

## 2023-02-24 PROCEDURE — 80048 BASIC METABOLIC PNL TOTAL CA: CPT | Performed by: STUDENT IN AN ORGANIZED HEALTH CARE EDUCATION/TRAINING PROGRAM

## 2023-02-24 PROCEDURE — 94760 N-INVAS EAR/PLS OXIMETRY 1: CPT

## 2023-02-24 PROCEDURE — 85027 COMPLETE CBC AUTOMATED: CPT | Performed by: STUDENT IN AN ORGANIZED HEALTH CARE EDUCATION/TRAINING PROGRAM

## 2023-02-24 PROCEDURE — 25000003 PHARM REV CODE 250: Performed by: STUDENT IN AN ORGANIZED HEALTH CARE EDUCATION/TRAINING PROGRAM

## 2023-02-24 RX ORDER — ALPRAZOLAM 0.25 MG/1
0.25 TABLET ORAL ONCE AS NEEDED
Status: COMPLETED | OUTPATIENT
Start: 2023-02-24 | End: 2023-02-24

## 2023-02-24 RX ADMIN — ALPRAZOLAM 0.25 MG: 0.25 TABLET ORAL at 01:02

## 2023-02-24 NOTE — DISCHARGE SUMMARY
[facility]  Hospital Medicine  Discharge Summary      Patient Name: Anthony Ibarra  MRN: [id number]  TONE: [id number]  Patient Class: IP- Swing  Admission Date: 2/9/2023  Hospital Length of Stay: 15 days  Discharge Date and Time:  02/24/2023 11:25 AM  Attending Physician: Thairy G Reyes, DO   Discharging Provider: Thairy G Reyes, DO  Primary Care Provider: Jossue Martinez MD    Primary Care Team: Networked reference to record PCT     HPI:   88 y.o. male presented to ED with c/o of fall and being found down at home by friend. C/O pelvic and low back pain found to have acute left iliac wing fx non displaced and nonoperative by othropedic surgery Dr. Harrison. T11-12 and lumbar 1-5 compression fractures. CPK > 3,000.          * No surgery found *      Hospital Course:   Admitted for rehabilitation after a fall resulting in L iliac fracture. Pt has been refusing therapy. Prior to this admission patient was previously on hospice, reason is unclear to me. He did have osseous lumbar lesions found on November 2022 that were concerning for metastasis. Subsequent biopsy was unrevealing for malignancy and he did have kyphoplasty of that region. He is undergoing outpatient screening for multiple myeloma with Dr. Elizabeth LeJeune. Patient has poor insight, he can not recall having had a kyphoplasty and does not know why he was on hospice. Discussed with pt's daughter 2/9 she revealed pt was on hospice secondary to dementia and was able to provide a living will that requests comfort measures, DNR and DNI.  Patient was admitted to swing bed on 02/09/2023 for continued rehabilitation however refusal has been a problem. Had an extensive conversation with pt's daughter today regarding his cognition and how that is contributing the most to his ability to safely live alone. Pt has poor insight and safety awareness in addition to his physical deficits. She agrees with placement inpatient is being  discharged to nursing home today.       Goals of Care Treatment Preferences:  Code Status: DNR      Consults:   Consults (From admission, onward)        Status Ordering Provider     Inpatient consult to Orthopedic Surgery  Once        Provider:  Ian Harrison MD    Acknowledged REYES, THAIRY G     IP consult to case management  Once        Provider:  (Not yet assigned)    Acknowledged REYES, THAIRY G          Neuro  Dementia with behavioral disturbance  -patient was on hospice secondary to advancing dementia         Orthopedic  Closed fracture of left iliac wing  -non-operative  -PT/OT  -pain control  -Patient was evaluated by Dr. Harrison on 02/14 who recommended weight-bearing to the left lower extremity and as tolerated to the right lower extremity, okay to continue physical therapy. Repeat imaging obtained on 2/22 showed comminuted displaced fracture involves the left iliac wing with no other definite fractures or dislocations identified     Rhabdomyolysis-resolved as of 2/9/2023        Other  Generalized weakness  -Continue PT/OT        Final Active Diagnoses:    Diagnosis Date Noted POA    Closed fracture of left iliac wing [S32.302A] 02/08/2023 Yes    Generalized weakness [R53.1] 11/23/2022 Yes    Dementia with behavioral disturbance [F03.918] 02/10/2023 Yes      Problems Resolved During this Admission:    Diagnosis Date Noted Date Resolved POA    Rhabdomyolysis [M62.82] 02/08/2023 02/09/2023 Yes       Discharged Condition: stable    Disposition: Skilled Nursing Facility    Follow Up:   Follow-up Information     [facility] Follow up.    Specialty: Skilled Nursing Facility  Contact information:  [address] [place] [id number] [phone]                     Patient Instructions:   No discharge procedures on file.    Significant Diagnostic Studies: Labs:   BMP:   Recent Labs   Lab 02/24/23  0248      K 3.7   CO2 31   BUN 12.9   CREATININE  0.74   CALCIUM 8.5*       Pending Diagnostic Studies:     None         Medications:  Reconciled Home Medications:      Medication List      You have not been prescribed any medications.         Indwelling Lines/Drains at time of discharge:   Lines/Drains/Airways     None                 Time spent on the discharge of patient: 35 minutes         Thairy G Reyes, DO  Department of Hospital Medicine  [facility]

## 2023-02-24 NOTE — NURSING
Patient and family given discharge instructions and verbalized understanding. Patients family received all personal belongings. Patient left via Van with nursing home.

## 2023-02-24 NOTE — ASSESSMENT & PLAN NOTE
-non-operative  -PT/OT  -pain control  -Patient was evaluated by Dr. Harrison on 02/14 who recommended weight-bearing to the left lower extremity and as tolerated to the right lower extremity, okay to continue physical therapy. Repeat imaging obtained on 2/22 showed comminuted displaced fracture involves the left iliac wing with no other definite fractures or dislocations identified

## 2023-02-24 NOTE — PLAN OF CARE
Problem: Adult Inpatient Plan of Care  Goal: Plan of Care Review  Outcome: Ongoing, Progressing  Goal: Patient-Specific Goal (Individualized)  Outcome: Ongoing, Progressing  Flowsheets (Taken 2/23/2023 2000)  Individualized Care Needs: Assist with ambulation, safety measures maintain  Goal: Absence of Hospital-Acquired Illness or Injury  Outcome: Ongoing, Progressing  Intervention: Prevent Skin Injury  Flowsheets (Taken 2/23/2023 2000)  Skin Protection:   adhesive use limited   incontinence pads utilized  Intervention: Prevent and Manage VTE (Venous Thromboembolism) Risk  Flowsheets (Taken 2/23/2023 2000)  VTE Prevention/Management:   bleeding risk assessed   bleeding precations maintained  Range of Motion: ROM (range of motion) performed  Intervention: Prevent Infection  Flowsheets (Taken 2/23/2023 2000)  Infection Prevention: cohorting utilized     Problem: Fall Injury Risk  Goal: Absence of Fall and Fall-Related Injury  Outcome: Ongoing, Progressing  Intervention: Identify and Manage Contributors  Flowsheets (Taken 2/23/2023 2000)  Self-Care Promotion: BADL personal objects within reach     Problem: Pain Acute  Goal: Acceptable Pain Control and Functional Ability  Outcome: Ongoing, Progressing

## 2023-02-24 NOTE — PROGRESS NOTES
[facility]  [facility] Medicine  Progress Note    Patient Name: Anthony Ibarra  MRN: [id number]  Patient Class: IP- Swing   Admission Date: 2/9/2023  Length of Stay: 14 days  Attending Physician: Thairy G Reyes, DO  Primary Care Provider: Jossue Martinez MD        Subjective:     Principal Problem:<principal problem not specified>        HPI:  88 y.o. male presented to ED with c/o of fall and being found down at home by friend. C/O pelvic and low back pain found to have acute left iliac wing fx non displaced and nonoperative by othropedic surgery Dr. Harrison. T11-12 and lumbar 1-5 compression fractures. CPK > 3,000.          Overview/Hospital Course:  Admitted for rehabilitation after a fall resulting in L iliac fracture. Pt has been refusing therapy. Prior to this admission patient was previously on hospice, reason is unclear to me. He did have osseous lumbar lesions found on November 2022 that were concerning for metastasis. Subsequent biopsy was unrevealing for malignancy and he did have kyphoplasty of that region. He is undergoing outpatient screening for multiple myeloma with Dr. Elizabeth LeJeune. Patient has poor insight, he can not recall having had a kyphoplasty and does not know why he was on hospice. Discussed with pt's daughter 2/9 she revealed pt was on hospice secondary to dementia and was able to provide a living will that requests comfort measures, DNR and DNI.  Patient was admitted to swing bed on 02/09/2023 for continued rehabilitation however refusal has been a problem. Had an extensive conversation with pt's daughter today regarding his cognition and how that is contributing the most to his ability to safely live alone. Pt has poor insight and safety awareness in addition to his physical deficits. She agrees with placement.       Interval History:  Physical therapy reports patient continues to refuse therapy on occasion    Review of Systems   Musculoskeletal:   Positive for back pain.   Objective:     Vital Signs (Most Recent):  Temp: 97.9 °F (36.6 °C) (02/23/23 1929)  Pulse: 73 (02/23/23 1929)  Resp: 17 (02/23/23 1929)  BP: 108/65 (02/23/23 1929)  SpO2: 96 % (02/23/23 1929)   Vital Signs (24h Range):  Temp:  [97.4 °F (36.3 °C)-98.3 °F (36.8 °C)] 97.9 °F (36.6 °C)  Pulse:  [68-78] 73  Resp:  [16-20] 17  SpO2:  [94 %-96 %] 96 %  BP: (101-126)/(54-75) 108/65     Weight: 68.8 kg (151 lb 10.8 oz)  Body mass index is 21.15 kg/m².    Intake/Output Summary (Last 24 hours) at 2/23/2023 1938  Last data filed at 2/23/2023 1818  Gross per 24 hour   Intake 840 ml   Output 550 ml   Net 290 ml        Physical Exam  Constitutional:       General: He is not in acute distress.     Appearance: Normal appearance. He is cachectic.   HENT:      Head: Normocephalic and atraumatic.      Nose: No congestion or rhinorrhea.      Mouth/Throat:      Mouth: Mucous membranes are moist.   Eyes:      Conjunctiva/sclera: Conjunctivae normal.      Pupils: Pupils are equal, round, and reactive to light.   Cardiovascular:      Rate and Rhythm: Normal rate and regular rhythm.      Heart sounds: No murmur heard.  Pulmonary:      Effort: Pulmonary effort is normal.      Breath sounds: Normal breath sounds. No wheezing.   Chest:      Comments:  Pectus excavatum  Abdominal:      General: Bowel sounds are normal. There is no distension.      Palpations: Abdomen is soft.      Tenderness: There is no abdominal tenderness.   Musculoskeletal:         General: No swelling. Normal range of motion.      Cervical back: Normal range of motion and neck supple.      Right lower leg: No edema.      Left lower leg: No edema.   Skin:     General: Skin is warm and dry.      Findings: Bruising (left flank) present. No rash.   Neurological:      General: No focal deficit present.      Mental Status: He is alert and oriented to person, place, and time.   Psychiatric:         Mood and Affect: Mood normal.         Behavior: Behavior  normal.       Significant Labs: All pertinent labs within the past 24 hours have been reviewed.    Significant Imaging: I have reviewed all pertinent imaging results/findings within the past 24 hours.      Assessment/Plan:      Closed fracture of left iliac wing  -non-operative  -PT/OT  -pain control  -Patient was evaluated by Dr. Harrison on 02/14 who recommended weight-bearing to the left lower extremity and as tolerated to the right lower extremity, okay to continue physical therapy. Repeat imaging obtained on 2/22 showed comminuted displaced fracture involves the left iliac wing with no other definite fractures or dislocations identified     Generalized weakness  -Continue PT/OT      Dementia with behavioral disturbance  -patient was on hospice secondary to advancing dementia           VTE Risk Mitigation (From admission, onward)         Ordered     enoxaparin injection 40 mg  Daily         02/09/23 1202     Place NISHA hose  Until discontinued         02/09/23 1202     IP VTE HIGH RISK PATIENT  Once         02/09/23 1202     Place sequential compression device  Until discontinued         02/09/23 1202                Discharge Planning   MAUREEN: 2/9/2023     Code Status: DNR   Is the patient medically ready for discharge?:     Reason for patient still in hospital (select all that apply): Pending disposition  Discharge Plan A: Long-term acute care facility (LTAC)   Discharge Delays: None known at this time              Thairy G Reyes, DO  Department of Hospital Medicine   [facility]

## 2023-02-24 NOTE — SUBJECTIVE & OBJECTIVE
Interval History:  Physical therapy reports patient continues to refuse therapy on occasion    Review of Systems   Musculoskeletal:  Positive for back pain.   Objective:     Vital Signs (Most Recent):  Temp: 97.9 °F (36.6 °C) (02/23/23 1929)  Pulse: 73 (02/23/23 1929)  Resp: 17 (02/23/23 1929)  BP: 108/65 (02/23/23 1929)  SpO2: 96 % (02/23/23 1929)   Vital Signs (24h Range):  Temp:  [97.4 °F (36.3 °C)-98.3 °F (36.8 °C)] 97.9 °F (36.6 °C)  Pulse:  [68-78] 73  Resp:  [16-20] 17  SpO2:  [94 %-96 %] 96 %  BP: (101-126)/(54-75) 108/65     Weight: 68.8 kg (151 lb 10.8 oz)  Body mass index is 21.15 kg/m².    Intake/Output Summary (Last 24 hours) at 2/23/2023 1938  Last data filed at 2/23/2023 1818  Gross per 24 hour   Intake 840 ml   Output 550 ml   Net 290 ml        Physical Exam  Constitutional:       General: He is not in acute distress.     Appearance: Normal appearance. He is cachectic.   HENT:      Head: Normocephalic and atraumatic.      Nose: No congestion or rhinorrhea.      Mouth/Throat:      Mouth: Mucous membranes are moist.   Eyes:      Conjunctiva/sclera: Conjunctivae normal.      Pupils: Pupils are equal, round, and reactive to light.   Cardiovascular:      Rate and Rhythm: Normal rate and regular rhythm.      Heart sounds: No murmur heard.  Pulmonary:      Effort: Pulmonary effort is normal.      Breath sounds: Normal breath sounds. No wheezing.   Chest:      Comments:  Pectus excavatum  Abdominal:      General: Bowel sounds are normal. There is no distension.      Palpations: Abdomen is soft.      Tenderness: There is no abdominal tenderness.   Musculoskeletal:         General: No swelling. Normal range of motion.      Cervical back: Normal range of motion and neck supple.      Right lower leg: No edema.      Left lower leg: No edema.   Skin:     General: Skin is warm and dry.      Findings: Bruising (left flank) present. No rash.   Neurological:      General: No focal deficit present.      Mental Status:  He is alert and oriented to person, place, and time.   Psychiatric:         Mood and Affect: Mood normal.         Behavior: Behavior normal.       Significant Labs: All pertinent labs within the past 24 hours have been reviewed.    Significant Imaging: I have reviewed all pertinent imaging results/findings within the past 24 hours.

## 2023-02-24 NOTE — PLAN OF CARE
Problem: Adult Inpatient Plan of Care  Goal: Plan of Care Review  Outcome: Ongoing, Progressing  Goal: Patient-Specific Goal (Individualized)  Outcome: Ongoing, Progressing  Flowsheets (Taken 2/23/2023 1823)  Anxieties, Fears or Concerns: none expressed  Individualized Care Needs: remain fall free  Goal: Absence of Hospital-Acquired Illness or Injury  Outcome: Ongoing, Progressing  Intervention: Identify and Manage Fall Risk  Flowsheets (Taken 2/23/2023 1823)  Safety Promotion/Fall Prevention:   assistive device/personal item within reach   bed alarm set   nonskid shoes/socks when out of bed   side rails raised x 2   /camera at bedside   room near unit station   Roll belt self-releasing  Intervention: Prevent Skin Injury  Flowsheets (Taken 2/23/2023 1823)  Body Position: supine  Skin Protection:   adhesive use limited   incontinence pads utilized   tubing/devices free from skin contact  Intervention: Prevent and Manage VTE (Venous Thromboembolism) Risk  Flowsheets (Taken 2/23/2023 1823)  Activity Management: Rolling - L1  VTE Prevention/Management: fluids promoted  Goal: Optimal Comfort and Wellbeing  Outcome: Ongoing, Progressing  Goal: Readiness for Transition of Care  Outcome: Ongoing, Progressing     Problem: Infection  Goal: Absence of Infection Signs and Symptoms  Outcome: Ongoing, Progressing     Problem: Impaired Wound Healing  Goal: Optimal Wound Healing  Outcome: Ongoing, Progressing     Problem: Fall Injury Risk  Goal: Absence of Fall and Fall-Related Injury  Outcome: Ongoing, Progressing  Intervention: Identify and Manage Contributors  Flowsheets (Taken 2/23/2023 1823)  Self-Care Promotion:   independence encouraged   safe use of adaptive equipment encouraged   BADL personal objects within reach   BADL personal routines maintained   meal set-up provided  Medication Review/Management: medications reviewed     Problem: Skin Injury Risk Increased  Goal: Skin Health and Integrity  Outcome:  Ongoing, Progressing  Intervention: Optimize Skin Protection  Flowsheets (Taken 2/23/2023 1823)  Pressure Reduction Techniques: frequent weight shift encouraged  Skin Protection:   adhesive use limited   incontinence pads utilized   tubing/devices free from skin contact  Head of Bed (HOB) Positioning: HOB at 30-45 degrees     Problem: Pain Acute  Goal: Acceptable Pain Control and Functional Ability  Outcome: Ongoing, Progressing  Intervention: Develop Pain Management Plan  Flowsheets (Taken 2/23/2023 1823)  Pain Management Interventions: medication offered but refused

## 2023-02-26 NOTE — PLAN OF CARE
[facility]  Discharge Final Note    Primary Care Provider: Jossue Martinez MD    Expected Discharge Date: 2/24/2023    Final Discharge Note (most recent)       Final Note - 02/26/23 1520          Final Note    Assessment Type Final Discharge Note     Anticipated Discharge Disposition Long Term Care with Planned Readmission     What phone number can be called within the next 1-3 days to see how you are doing after discharge? [phone]     Hospital Resources/Appts/Education Provided Provided patient/caregiver with written discharge plan information        Post-Acute Status    Post-Acute Authorization Placement     Post-Acute Placement Status Set-up Complete/Auth obtained     Patient choice form signed by patient/caregiver List with quality metrics by geographic area provided     Discharge Delays None known at this time                     Important Message from Medicare             Contact Info       [facility]   Specialty: Skilled Nursing Facility    [address] [place] [id number]   Phone: [phone]       Next Steps: Follow up

## 2023-02-27 ENCOUNTER — LAB REQUISITION (OUTPATIENT)
Dept: LAB | Facility: HOSPITAL | Age: 88
End: 2023-02-27
Payer: MEDICARE

## 2023-02-27 DIAGNOSIS — C61 MALIGNANT NEOPLASM OF PROSTATE: ICD-10-CM

## 2023-02-27 LAB
ALBUMIN SERPL-MCNC: 2.9 G/DL (ref 3.4–4.8)
ALBUMIN/GLOB SERPL: 1 RATIO (ref 1.1–2)
ALP SERPL-CCNC: 234 UNIT/L (ref 40–150)
ALT SERPL-CCNC: 12 UNIT/L (ref 0–55)
AST SERPL-CCNC: 15 UNIT/L (ref 5–34)
BASOPHILS # BLD AUTO: 0.04 X10(3)/MCL (ref 0–0.2)
BASOPHILS NFR BLD AUTO: 0.7 %
BILIRUBIN DIRECT+TOT PNL SERPL-MCNC: 0.6 MG/DL
BUN SERPL-MCNC: 14.7 MG/DL (ref 8.4–25.7)
CALCIUM SERPL-MCNC: 8.8 MG/DL (ref 8.8–10)
CHLORIDE SERPL-SCNC: 105 MMOL/L (ref 98–107)
CHOLEST SERPL-MCNC: 134 MG/DL
CHOLEST/HDLC SERPL: 3 {RATIO} (ref 0–5)
CO2 SERPL-SCNC: 28 MMOL/L (ref 23–31)
CREAT SERPL-MCNC: 0.77 MG/DL (ref 0.73–1.18)
EOSINOPHIL # BLD AUTO: 0.05 X10(3)/MCL (ref 0–0.9)
EOSINOPHIL NFR BLD AUTO: 0.9 %
ERYTHROCYTE [DISTWIDTH] IN BLOOD BY AUTOMATED COUNT: 14.3 % (ref 11.5–17)
GFR SERPLBLD CREATININE-BSD FMLA CKD-EPI: >60 MLS/MIN/1.73/M2
GLOBULIN SER-MCNC: 3 GM/DL (ref 2.4–3.5)
GLUCOSE SERPL-MCNC: 83 MG/DL (ref 82–115)
HCT VFR BLD AUTO: 38.1 % (ref 42–52)
HDLC SERPL-MCNC: 42 MG/DL (ref 35–60)
HGB BLD-MCNC: 12.2 G/DL (ref 14–18)
IMM GRANULOCYTES # BLD AUTO: 0.02 X10(3)/MCL (ref 0–0.04)
IMM GRANULOCYTES NFR BLD AUTO: 0.4 %
LDLC SERPL CALC-MCNC: 82 MG/DL (ref 50–140)
LYMPHOCYTES # BLD AUTO: 1.55 X10(3)/MCL (ref 0.6–4.6)
LYMPHOCYTES NFR BLD AUTO: 27.6 %
MCH RBC QN AUTO: 31.4 PG
MCHC RBC AUTO-ENTMCNC: 32 G/DL (ref 33–36)
MCV RBC AUTO: 98.2 FL (ref 80–94)
MONOCYTES # BLD AUTO: 0.81 X10(3)/MCL (ref 0.1–1.3)
MONOCYTES NFR BLD AUTO: 14.4 %
NEUTROPHILS # BLD AUTO: 3.14 X10(3)/MCL (ref 2.1–9.2)
NEUTROPHILS NFR BLD AUTO: 56 %
NRBC BLD AUTO-RTO: 0 %
PLATELET # BLD AUTO: 192 X10(3)/MCL (ref 130–400)
PMV BLD AUTO: 11.3 FL (ref 7.4–10.4)
POTASSIUM SERPL-SCNC: 4.6 MMOL/L (ref 3.5–5.1)
PROT SERPL-MCNC: 5.9 GM/DL (ref 5.8–7.6)
RBC # BLD AUTO: 3.88 X10(6)/MCL (ref 4.7–6.1)
SODIUM SERPL-SCNC: 144 MMOL/L (ref 136–145)
TRIGL SERPL-MCNC: 52 MG/DL (ref 34–140)
TSH SERPL-ACNC: 0.87 UIU/ML (ref 0.35–4.94)
VLDLC SERPL CALC-MCNC: 10 MG/DL
WBC # SPEC AUTO: 5.6 X10(3)/MCL (ref 4.5–11.5)

## 2023-02-27 PROCEDURE — 80061 LIPID PANEL: CPT | Performed by: THORACIC SURGERY (CARDIOTHORACIC VASCULAR SURGERY)

## 2023-02-27 PROCEDURE — 80053 COMPREHEN METABOLIC PANEL: CPT | Performed by: THORACIC SURGERY (CARDIOTHORACIC VASCULAR SURGERY)

## 2023-02-27 PROCEDURE — 84443 ASSAY THYROID STIM HORMONE: CPT | Performed by: THORACIC SURGERY (CARDIOTHORACIC VASCULAR SURGERY)

## 2023-02-27 PROCEDURE — 85025 COMPLETE CBC W/AUTO DIFF WBC: CPT | Performed by: THORACIC SURGERY (CARDIOTHORACIC VASCULAR SURGERY)

## 2023-03-01 ENCOUNTER — LAB REQUISITION (OUTPATIENT)
Dept: LAB | Facility: HOSPITAL | Age: 88
End: 2023-03-01
Payer: MEDICARE

## 2023-03-01 DIAGNOSIS — C61 MALIGNANT NEOPLASM OF PROSTATE: ICD-10-CM

## 2023-03-01 LAB — PSA SERPL-MCNC: <0.1 NG/ML

## 2023-03-01 PROCEDURE — 84153 ASSAY OF PSA TOTAL: CPT | Performed by: THORACIC SURGERY (CARDIOTHORACIC VASCULAR SURGERY)

## 2023-03-02 NOTE — PT/OT/SLP DISCHARGE
Physical Therapy Discharge Summary    Name: Anthony Ibarra  MRN: [id number]   Principal Problem: <principal problem not specified>     Patient Discharged from acute Physical Therapy on 2023.  Please refer to prior PT noted date on 2023 for functional status.     Assessment:     Patient appropriate for care in another setting. Patient has not met goals.    Objective:     GOALS:   Multidisciplinary Problems       Physical Therapy Goals          Problem: Physical Therapy    Goal Priority Disciplines Outcome Goal Variances Interventions   Physical Therapy Goal     PT, PT/OT Unable to Meet, Plan Revised     Description: Goals to be met by: Discharge    Patient will increase functional independence with mobility by performin. Supine to sit with Modified [place]  2. Sit to supine with Modified [place]  3. Sit to stand transfer with Modified [place]  4. Bed to chair transfer with Modified [place] using Rolling Walker  5. Gait  x 100 feet with Supervision using Rolling Walker.                        Reasons for Discontinuation of Therapy Services  Transfer to alternate level of care.      Plan:     Patient Discharged to: Skilled Nursing Facility.      3/2/2023

## 2023-03-02 NOTE — PLAN OF CARE
Problem: Physical Therapy  Goal: Physical Therapy Goal  Description: Goals to be met by: Discharge    Patient will increase functional independence with mobility by performin. Supine to sit with Modified [place]  2. Sit to supine with Modified [place]  3. Sit to stand transfer with Modified [place]  4. Bed to chair transfer with Modified [place] using Rolling Walker  5. Gait  x 100 feet with Supervision using Rolling Walker.   Outcome: Unable to Meet, Plan Revised

## 2023-03-08 ENCOUNTER — LAB REQUISITION (OUTPATIENT)
Dept: LAB | Facility: HOSPITAL | Age: 88
End: 2023-03-08
Payer: MEDICARE

## 2023-03-08 DIAGNOSIS — I10 ESSENTIAL (PRIMARY) HYPERTENSION: ICD-10-CM

## 2023-03-08 LAB
ALBUMIN SERPL-MCNC: 2.6 G/DL (ref 3.4–4.8)
ALBUMIN/GLOB SERPL: 0.9 RATIO (ref 1.1–2)
ALP SERPL-CCNC: 186 UNIT/L (ref 40–150)
ALT SERPL-CCNC: 5 UNIT/L (ref 0–55)
AST SERPL-CCNC: 12 UNIT/L (ref 5–34)
BILIRUBIN DIRECT+TOT PNL SERPL-MCNC: 0.5 MG/DL
BUN SERPL-MCNC: 9 MG/DL (ref 8.4–25.7)
CALCIUM SERPL-MCNC: 8.4 MG/DL (ref 8.8–10)
CHLORIDE SERPL-SCNC: 104 MMOL/L (ref 98–107)
CO2 SERPL-SCNC: 30 MMOL/L (ref 23–31)
CREAT SERPL-MCNC: 0.66 MG/DL (ref 0.73–1.18)
GFR SERPLBLD CREATININE-BSD FMLA CKD-EPI: >60 MLS/MIN/1.73/M2
GLOBULIN SER-MCNC: 2.9 GM/DL (ref 2.4–3.5)
GLUCOSE SERPL-MCNC: 67 MG/DL (ref 82–115)
POTASSIUM SERPL-SCNC: 3.9 MMOL/L (ref 3.5–5.1)
PROT SERPL-MCNC: 5.5 GM/DL (ref 5.8–7.6)
SODIUM SERPL-SCNC: 142 MMOL/L (ref 136–145)

## 2023-03-08 PROCEDURE — 80053 COMPREHEN METABOLIC PANEL: CPT | Performed by: FAMILY MEDICINE

## 2023-03-15 ENCOUNTER — LAB REQUISITION (OUTPATIENT)
Dept: LAB | Facility: HOSPITAL | Age: 88
End: 2023-03-15
Payer: MEDICARE

## 2023-03-15 DIAGNOSIS — I10 ESSENTIAL (PRIMARY) HYPERTENSION: ICD-10-CM

## 2023-03-15 LAB
APPEARANCE UR: CLEAR
BILIRUB UR QL STRIP.AUTO: NEGATIVE MG/DL
COLOR UR AUTO: ABNORMAL
GLUCOSE UR QL STRIP.AUTO: NEGATIVE MG/DL
KETONES UR QL STRIP.AUTO: ABNORMAL MG/DL
LEUKOCYTE ESTERASE UR QL STRIP.AUTO: NEGATIVE UNIT/L
NITRITE UR QL STRIP.AUTO: NEGATIVE
PH UR STRIP.AUTO: 5.5 [PH]
PROT UR QL STRIP.AUTO: NEGATIVE MG/DL
RBC UR QL AUTO: NEGATIVE UNIT/L
SP GR UR STRIP.AUTO: >=1.03
UROBILINOGEN UR STRIP-ACNC: 2 MG/DL

## 2023-03-15 PROCEDURE — 81003 URINALYSIS AUTO W/O SCOPE: CPT | Performed by: FAMILY MEDICINE

## 2023-03-24 ENCOUNTER — LAB REQUISITION (OUTPATIENT)
Dept: LAB | Facility: HOSPITAL | Age: 88
End: 2023-03-24
Payer: MEDICARE

## 2023-03-24 DIAGNOSIS — I10 ESSENTIAL (PRIMARY) HYPERTENSION: ICD-10-CM

## 2023-03-24 LAB
ALBUMIN SERPL-MCNC: 2.8 G/DL (ref 3.4–4.8)
ALBUMIN/GLOB SERPL: 1 RATIO (ref 1.1–2)
ALP SERPL-CCNC: 145 UNIT/L (ref 40–150)
ALT SERPL-CCNC: 7 UNIT/L (ref 0–55)
AST SERPL-CCNC: 10 UNIT/L (ref 5–34)
BASOPHILS # BLD AUTO: 0.03 X10(3)/MCL (ref 0–0.2)
BASOPHILS NFR BLD AUTO: 0.5 %
BILIRUBIN DIRECT+TOT PNL SERPL-MCNC: 0.3 MG/DL
BUN SERPL-MCNC: 11.6 MG/DL (ref 8.4–25.7)
CALCIUM SERPL-MCNC: 8.9 MG/DL (ref 8.8–10)
CHLORIDE SERPL-SCNC: 109 MMOL/L (ref 98–107)
CO2 SERPL-SCNC: 31 MMOL/L (ref 23–31)
CREAT SERPL-MCNC: 0.69 MG/DL (ref 0.73–1.18)
EOSINOPHIL # BLD AUTO: 0.04 X10(3)/MCL (ref 0–0.9)
EOSINOPHIL NFR BLD AUTO: 0.7 %
ERYTHROCYTE [DISTWIDTH] IN BLOOD BY AUTOMATED COUNT: 13.6 % (ref 11.5–17)
GFR SERPLBLD CREATININE-BSD FMLA CKD-EPI: >60 MLS/MIN/1.73/M2
GLOBULIN SER-MCNC: 2.9 GM/DL (ref 2.4–3.5)
GLUCOSE SERPL-MCNC: 70 MG/DL (ref 82–115)
HCT VFR BLD AUTO: 38.6 % (ref 42–52)
HGB BLD-MCNC: 12.4 G/DL (ref 14–18)
IMM GRANULOCYTES # BLD AUTO: 0.02 X10(3)/MCL (ref 0–0.04)
IMM GRANULOCYTES NFR BLD AUTO: 0.4 %
LYMPHOCYTES # BLD AUTO: 1.78 X10(3)/MCL (ref 0.6–4.6)
LYMPHOCYTES NFR BLD AUTO: 31.4 %
MCH RBC QN AUTO: 30.8 PG (ref 27–31)
MCHC RBC AUTO-ENTMCNC: 32.1 G/DL (ref 33–36)
MCV RBC AUTO: 96 FL (ref 80–94)
MONOCYTES # BLD AUTO: 0.82 X10(3)/MCL (ref 0.1–1.3)
MONOCYTES NFR BLD AUTO: 14.5 %
NEUTROPHILS # BLD AUTO: 2.98 X10(3)/MCL (ref 2.1–9.2)
NEUTROPHILS NFR BLD AUTO: 52.5 %
NRBC BLD AUTO-RTO: 0 %
PLATELET # BLD AUTO: 208 X10(3)/MCL (ref 130–400)
PMV BLD AUTO: 11.1 FL (ref 7.4–10.4)
POTASSIUM SERPL-SCNC: 3.8 MMOL/L (ref 3.5–5.1)
PROT SERPL-MCNC: 5.7 GM/DL (ref 5.8–7.6)
RBC # BLD AUTO: 4.02 X10(6)/MCL (ref 4.7–6.1)
SODIUM SERPL-SCNC: 146 MMOL/L (ref 136–145)
WBC # SPEC AUTO: 5.7 X10(3)/MCL (ref 4.5–11.5)

## 2023-03-24 PROCEDURE — 80053 COMPREHEN METABOLIC PANEL: CPT | Performed by: FAMILY MEDICINE

## 2023-03-24 PROCEDURE — 85025 COMPLETE CBC W/AUTO DIFF WBC: CPT | Performed by: FAMILY MEDICINE

## 2023-03-30 ENCOUNTER — LAB REQUISITION (OUTPATIENT)
Dept: LAB | Facility: HOSPITAL | Age: 88
End: 2023-03-30
Payer: MEDICARE

## 2023-03-30 DIAGNOSIS — N39.0 URINARY TRACT INFECTION, SITE NOT SPECIFIED: ICD-10-CM

## 2023-03-30 LAB
APPEARANCE UR: CLEAR
BILIRUB UR QL STRIP.AUTO: NEGATIVE MG/DL
COLOR UR AUTO: YELLOW
GLUCOSE UR QL STRIP.AUTO: NEGATIVE MG/DL
KETONES UR QL STRIP.AUTO: ABNORMAL MG/DL
LEUKOCYTE ESTERASE UR QL STRIP.AUTO: NEGATIVE UNIT/L
NITRITE UR QL STRIP.AUTO: NEGATIVE
PH UR STRIP.AUTO: 6 [PH]
PROT UR QL STRIP.AUTO: NEGATIVE MG/DL
RBC UR QL AUTO: NEGATIVE UNIT/L
SP GR UR STRIP.AUTO: 1.02
UROBILINOGEN UR STRIP-ACNC: 2 MG/DL

## 2023-03-30 PROCEDURE — 81003 URINALYSIS AUTO W/O SCOPE: CPT | Performed by: FAMILY MEDICINE

## 2024-03-25 ENCOUNTER — LAB REQUISITION (OUTPATIENT)
Dept: LAB | Facility: HOSPITAL | Age: 89
End: 2024-03-25
Payer: MEDICARE

## 2024-03-25 DIAGNOSIS — F03.918 UNSPECIFIED DEMENTIA, UNSPECIFIED SEVERITY, WITH OTHER BEHAVIORAL DISTURBANCE: ICD-10-CM

## 2024-03-25 DIAGNOSIS — C90.01 MULTIPLE MYELOMA IN REMISSION: ICD-10-CM

## 2024-03-25 LAB
CHOLEST SERPL-MCNC: 118 MG/DL
CHOLEST/HDLC SERPL: 3 {RATIO} (ref 0–5)
HDLC SERPL-MCNC: 42 MG/DL (ref 35–60)
LDLC SERPL CALC-MCNC: 68 MG/DL (ref 50–140)
TRIGL SERPL-MCNC: 39 MG/DL (ref 34–140)
TSH SERPL-ACNC: 0.81 UIU/ML (ref 0.35–4.94)
VLDLC SERPL CALC-MCNC: 8 MG/DL

## 2024-03-25 PROCEDURE — 80061 LIPID PANEL: CPT | Performed by: FAMILY MEDICINE

## 2024-03-25 PROCEDURE — 84443 ASSAY THYROID STIM HORMONE: CPT | Performed by: FAMILY MEDICINE

## 2024-04-18 ENCOUNTER — HOSPITAL ENCOUNTER (INPATIENT)
Facility: HOSPITAL | Age: 89
LOS: 4 days | Discharge: HOME | DRG: 493 | End: 2024-04-22
Attending: STUDENT IN AN ORGANIZED HEALTH CARE EDUCATION/TRAINING PROGRAM | Admitting: SURGERY
Payer: MEDICARE

## 2024-04-18 ENCOUNTER — ANESTHESIA EVENT (OUTPATIENT)
Dept: SURGERY | Facility: HOSPITAL | Age: 89
DRG: 493 | End: 2024-04-18
Payer: MEDICARE

## 2024-04-18 ENCOUNTER — ANESTHESIA (OUTPATIENT)
Dept: SURGERY | Facility: HOSPITAL | Age: 89
DRG: 493 | End: 2024-04-18
Payer: MEDICARE

## 2024-04-18 DIAGNOSIS — S62.102A WRIST FRACTURE, CLOSED, LEFT, INITIAL ENCOUNTER: Primary | ICD-10-CM

## 2024-04-18 DIAGNOSIS — M25.512 ACUTE PAIN OF LEFT SHOULDER: ICD-10-CM

## 2024-04-18 DIAGNOSIS — S42.202A CLOSED FRACTURE OF PROXIMAL END OF LEFT HUMERUS, UNSPECIFIED FRACTURE MORPHOLOGY, INITIAL ENCOUNTER: ICD-10-CM

## 2024-04-18 DIAGNOSIS — I49.9 IRREGULAR HEART RATE: ICD-10-CM

## 2024-04-18 DIAGNOSIS — S62.102A LEFT WRIST FRACTURE, CLOSED, INITIAL ENCOUNTER: ICD-10-CM
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
## 2024-04-18 DIAGNOSIS — Z01.818 PRE-OP EVALUATION: ICD-10-CM

## 2024-04-18 LAB
ALBUMIN SERPL-MCNC: 3 G/DL (ref 3.4–4.8)
ALBUMIN/GLOB SERPL: 0.7 RATIO (ref 1.1–2)
ALP SERPL-CCNC: 113 UNIT/L (ref 40–150)
ALT SERPL-CCNC: 14 UNIT/L (ref 0–55)
APTT PPP: 31.8 SECONDS (ref 23.2–33.7)
AST SERPL-CCNC: 26 UNIT/L (ref 5–34)
BASOPHILS # BLD AUTO: 0.03 X10(3)/MCL
BASOPHILS NFR BLD AUTO: 0.3 %
BILIRUB SERPL-MCNC: 0.5 MG/DL
BUN SERPL-MCNC: 16.3 MG/DL (ref 8.4–25.7)
CALCIUM SERPL-MCNC: 9.2 MG/DL (ref 8.8–10)
CHLORIDE SERPL-SCNC: 106 MMOL/L (ref 98–107)
CO2 SERPL-SCNC: 23 MMOL/L (ref 23–31)
CREAT SERPL-MCNC: 0.83 MG/DL (ref 0.73–1.18)
EOSINOPHIL # BLD AUTO: 0 X10(3)/MCL (ref 0–0.9)
EOSINOPHIL NFR BLD AUTO: 0 %
ERYTHROCYTE [DISTWIDTH] IN BLOOD BY AUTOMATED COUNT: 13.3 % (ref 11.5–17)
GFR SERPLBLD CREATININE-BSD FMLA CKD-EPI: >60 MLS/MIN/1.73/M2
GLOBULIN SER-MCNC: 4.6 GM/DL (ref 2.4–3.5)
GLUCOSE SERPL-MCNC: 123 MG/DL (ref 82–115)
HCT VFR BLD AUTO: 40.2 % (ref 42–52)
HGB BLD-MCNC: 13.4 G/DL (ref 14–18)
IMM GRANULOCYTES # BLD AUTO: 0.08 X10(3)/MCL (ref 0–0.04)
IMM GRANULOCYTES NFR BLD AUTO: 0.8 %
INR PPP: 1.3
LACTATE SERPL-SCNC: 1.3 MMOL/L (ref 0.5–2.2)
LYMPHOCYTES # BLD AUTO: 0.81 X10(3)/MCL (ref 0.6–4.6)
LYMPHOCYTES NFR BLD AUTO: 7.7 %
MCH RBC QN AUTO: 31.8 PG (ref 27–31)
MCHC RBC AUTO-ENTMCNC: 33.3 G/DL (ref 33–36)
MCV RBC AUTO: 95.5 FL (ref 80–94)
MONOCYTES # BLD AUTO: 0.9 X10(3)/MCL (ref 0.1–1.3)
MONOCYTES NFR BLD AUTO: 8.6 %
NEUTROPHILS # BLD AUTO: 8.68 X10(3)/MCL (ref 2.1–9.2)
NEUTROPHILS NFR BLD AUTO: 82.6 %
NRBC BLD AUTO-RTO: 0 %
OHS QRS DURATION: 98 MS
OHS QTC CALCULATION: 472 MS
PLATELET # BLD AUTO: 158 X10(3)/MCL (ref 130–400)
PMV BLD AUTO: 11.3 FL (ref 7.4–10.4)
POTASSIUM SERPL-SCNC: 3.9 MMOL/L (ref 3.5–5.1)
PROT SERPL-MCNC: 7.6 GM/DL (ref 5.8–7.6)
PROTHROMBIN TIME: 15.7 SECONDS (ref 12.5–14.5)
RBC # BLD AUTO: 4.21 X10(6)/MCL (ref 4.7–6.1)
SODIUM SERPL-SCNC: 140 MMOL/L (ref 136–145)
WBC # SPEC AUTO: 10.5 X10(3)/MCL (ref 4.5–11.5)

## 2024-04-18 PROCEDURE — 36000711: Performed by: ORTHOPAEDIC SURGERY

## 2024-04-18 PROCEDURE — D9220A PRA ANESTHESIA: Mod: ANES,,, | Performed by: ANESTHESIOLOGY

## 2024-04-18 PROCEDURE — 25000003 PHARM REV CODE 250

## 2024-04-18 PROCEDURE — 25000003 PHARM REV CODE 250: Performed by: NURSE PRACTITIONER

## 2024-04-18 PROCEDURE — 99223 1ST HOSP IP/OBS HIGH 75: CPT | Mod: AI,GC,, | Performed by: SURGERY

## 2024-04-18 PROCEDURE — 85025 COMPLETE CBC W/AUTO DIFF WBC: CPT | Performed by: STUDENT IN AN ORGANIZED HEALTH CARE EDUCATION/TRAINING PROGRAM

## 2024-04-18 PROCEDURE — 25609 OPTX DST RD XART FX/EP SEP3+: CPT | Mod: AS,51,LT, | Performed by: PHYSICIAN ASSISTANT

## 2024-04-18 PROCEDURE — 71000033 HC RECOVERY, INTIAL HOUR: Performed by: ORTHOPAEDIC SURGERY

## 2024-04-18 PROCEDURE — 36000710: Performed by: ORTHOPAEDIC SURGERY

## 2024-04-18 PROCEDURE — 63600175 PHARM REV CODE 636 W HCPCS: Performed by: NURSE PRACTITIONER

## 2024-04-18 PROCEDURE — 85730 THROMBOPLASTIN TIME PARTIAL: CPT | Performed by: STUDENT IN AN ORGANIZED HEALTH CARE EDUCATION/TRAINING PROGRAM

## 2024-04-18 PROCEDURE — 63600175 PHARM REV CODE 636 W HCPCS: Performed by: PHYSICIAN ASSISTANT

## 2024-04-18 PROCEDURE — 25609 OPTX DST RD XART FX/EP SEP3+: CPT | Mod: 51,LT,, | Performed by: ORTHOPAEDIC SURGERY

## 2024-04-18 PROCEDURE — 80053 COMPREHEN METABOLIC PANEL: CPT | Performed by: STUDENT IN AN ORGANIZED HEALTH CARE EDUCATION/TRAINING PROGRAM

## 2024-04-18 PROCEDURE — 99152 MOD SED SAME PHYS/QHP 5/>YRS: CPT

## 2024-04-18 PROCEDURE — 85610 PROTHROMBIN TIME: CPT | Performed by: STUDENT IN AN ORGANIZED HEALTH CARE EDUCATION/TRAINING PROGRAM

## 2024-04-18 PROCEDURE — 21400001 HC TELEMETRY ROOM

## 2024-04-18 PROCEDURE — 63600175 PHARM REV CODE 636 W HCPCS

## 2024-04-18 PROCEDURE — 63600175 PHARM REV CODE 636 W HCPCS: Performed by: ORTHOPAEDIC SURGERY

## 2024-04-18 PROCEDURE — 63600175 PHARM REV CODE 636 W HCPCS: Performed by: ANESTHESIOLOGY

## 2024-04-18 PROCEDURE — 99223 1ST HOSP IP/OBS HIGH 75: CPT | Mod: 57,,, | Performed by: ORTHOPAEDIC SURGERY

## 2024-04-18 PROCEDURE — 11000001 HC ACUTE MED/SURG PRIVATE ROOM

## 2024-04-18 PROCEDURE — 37000008 HC ANESTHESIA 1ST 15 MINUTES: Performed by: ORTHOPAEDIC SURGERY

## 2024-04-18 PROCEDURE — C1713 ANCHOR/SCREW BN/BN,TIS/BN: HCPCS | Performed by: ORTHOPAEDIC SURGERY

## 2024-04-18 PROCEDURE — 0PSJ04Z REPOSITION LEFT RADIUS WITH INTERNAL FIXATION DEVICE, OPEN APPROACH: ICD-10-PCS | Performed by: ORTHOPAEDIC SURGERY

## 2024-04-18 PROCEDURE — 37000009 HC ANESTHESIA EA ADD 15 MINS: Performed by: ORTHOPAEDIC SURGERY

## 2024-04-18 PROCEDURE — 83605 ASSAY OF LACTIC ACID: CPT | Performed by: NURSE PRACTITIONER

## 2024-04-18 PROCEDURE — 23615 OPTX PROX HUMRL FX W/INT FIX: CPT | Mod: LT,,, | Performed by: ORTHOPAEDIC SURGERY

## 2024-04-18 PROCEDURE — 27201423 OPTIME MED/SURG SUP & DEVICES STERILE SUPPLY: Performed by: ORTHOPAEDIC SURGERY

## 2024-04-18 PROCEDURE — 64415 NJX AA&/STRD BRCH PLXS IMG: CPT | Performed by: ANESTHESIOLOGY

## 2024-04-18 PROCEDURE — 93005 ELECTROCARDIOGRAM TRACING: CPT

## 2024-04-18 PROCEDURE — P9047 ALBUMIN (HUMAN), 25%, 50ML: HCPCS | Mod: JZ,JG

## 2024-04-18 PROCEDURE — 93010 ELECTROCARDIOGRAM REPORT: CPT | Mod: ,,, | Performed by: INTERNAL MEDICINE

## 2024-04-18 PROCEDURE — 23615 OPTX PROX HUMRL FX W/INT FIX: CPT | Mod: AS,LT,, | Performed by: PHYSICIAN ASSISTANT

## 2024-04-18 PROCEDURE — 99285 EMERGENCY DEPT VISIT HI MDM: CPT | Mod: 25

## 2024-04-18 PROCEDURE — 25605 CLTX DST RDL FX/EPHYS SEP W/: CPT | Mod: LT

## 2024-04-18 PROCEDURE — 0PHG06Z INSERTION OF INTRAMEDULLARY INTERNAL FIXATION DEVICE INTO LEFT HUMERAL SHAFT, OPEN APPROACH: ICD-10-PCS | Performed by: ORTHOPAEDIC SURGERY

## 2024-04-18 PROCEDURE — D9220A PRA ANESTHESIA: Mod: CRNA,,,

## 2024-04-18 DEVICE — IMPLANTABLE DEVICE: Type: IMPLANTABLE DEVICE | Site: HUMERUS | Status: FUNCTIONAL

## 2024-04-18 DEVICE — SCREW BONE TI LOCK 12X3MM: Type: IMPLANTABLE DEVICE | Site: RADIUS | Status: FUNCTIONAL

## 2024-04-18 DEVICE — PLATE GEMINUS DORSAL SPANNING: Type: IMPLANTABLE DEVICE | Site: RADIUS | Status: FUNCTIONAL

## 2024-04-18 RX ORDER — PROPOFOL 10 MG/ML
VIAL (ML) INTRAVENOUS
Status: DISCONTINUED | OUTPATIENT
Start: 2024-04-18 | End: 2024-04-18

## 2024-04-18 RX ORDER — SODIUM CHLORIDE, SODIUM LACTATE, POTASSIUM CHLORIDE, CALCIUM CHLORIDE 600; 310; 30; 20 MG/100ML; MG/100ML; MG/100ML; MG/100ML
INJECTION, SOLUTION INTRAVENOUS CONTINUOUS
Status: DISCONTINUED | OUTPATIENT
Start: 2024-04-18 | End: 2024-04-19

## 2024-04-18 RX ORDER — ENOXAPARIN SODIUM 100 MG/ML
40 INJECTION SUBCUTANEOUS EVERY 12 HOURS
Status: DISCONTINUED | OUTPATIENT
Start: 2024-04-18 | End: 2024-04-21

## 2024-04-18 RX ORDER — GABAPENTIN 300 MG/1
300 CAPSULE ORAL 3 TIMES DAILY
Status: DISCONTINUED | OUTPATIENT
Start: 2024-04-18 | End: 2024-04-22 | Stop reason: HOSPADM

## 2024-04-18 RX ORDER — FENTANYL CITRATE 50 UG/ML
INJECTION, SOLUTION INTRAMUSCULAR; INTRAVENOUS
Status: DISCONTINUED | OUTPATIENT
Start: 2024-04-18 | End: 2024-04-18

## 2024-04-18 RX ORDER — OXYCODONE HYDROCHLORIDE 10 MG/1
10 TABLET ORAL EVERY 4 HOURS PRN
Status: DISCONTINUED | OUTPATIENT
Start: 2024-04-18 | End: 2024-04-22 | Stop reason: HOSPADM

## 2024-04-18 RX ORDER — ONDANSETRON HYDROCHLORIDE 2 MG/ML
4 INJECTION, SOLUTION INTRAVENOUS EVERY 6 HOURS PRN
Status: DISCONTINUED | OUTPATIENT
Start: 2024-04-18 | End: 2024-04-22 | Stop reason: HOSPADM

## 2024-04-18 RX ORDER — METHOCARBAMOL 100 MG/ML
500 INJECTION, SOLUTION INTRAMUSCULAR; INTRAVENOUS ONCE
Status: COMPLETED | OUTPATIENT
Start: 2024-04-18 | End: 2024-04-19

## 2024-04-18 RX ORDER — ROCURONIUM BROMIDE 10 MG/ML
INJECTION, SOLUTION INTRAVENOUS
Status: DISCONTINUED | OUTPATIENT
Start: 2024-04-18 | End: 2024-04-18

## 2024-04-18 RX ORDER — VANCOMYCIN HYDROCHLORIDE 1 G/20ML
INJECTION, POWDER, LYOPHILIZED, FOR SOLUTION INTRAVENOUS
Status: DISCONTINUED | OUTPATIENT
Start: 2024-04-18 | End: 2024-04-18 | Stop reason: HOSPADM

## 2024-04-18 RX ORDER — PHENYLEPHRINE HCL IN 0.9% NACL 1 MG/10 ML
SYRINGE (ML) INTRAVENOUS
Status: DISCONTINUED | OUTPATIENT
Start: 2024-04-18 | End: 2024-04-18

## 2024-04-18 RX ORDER — LIDOCAINE HYDROCHLORIDE 10 MG/ML
5 INJECTION INFILTRATION; PERINEURAL ONCE
Status: DISCONTINUED | OUTPATIENT
Start: 2024-04-18 | End: 2024-04-18 | Stop reason: HOSPADM

## 2024-04-18 RX ORDER — PROPOFOL 10 MG/ML
INJECTION, EMULSION INTRAVENOUS
Status: COMPLETED
Start: 2024-04-18 | End: 2024-04-18

## 2024-04-18 RX ORDER — METHOCARBAMOL 500 MG/1
500 TABLET, FILM COATED ORAL EVERY 8 HOURS
Status: DISCONTINUED | OUTPATIENT
Start: 2024-04-18 | End: 2024-04-22 | Stop reason: HOSPADM

## 2024-04-18 RX ORDER — ROPIVACAINE HYDROCHLORIDE 5 MG/ML
30 INJECTION, SOLUTION EPIDURAL; INFILTRATION; PERINEURAL ONCE
Status: DISCONTINUED | OUTPATIENT
Start: 2024-04-18 | End: 2024-04-18 | Stop reason: HOSPADM

## 2024-04-18 RX ORDER — DIPHENHYDRAMINE HYDROCHLORIDE 50 MG/ML
25 INJECTION INTRAMUSCULAR; INTRAVENOUS EVERY 6 HOURS PRN
Status: DISCONTINUED | OUTPATIENT
Start: 2024-04-18 | End: 2024-04-18 | Stop reason: HOSPADM

## 2024-04-18 RX ORDER — OXYCODONE HYDROCHLORIDE 5 MG/1
5 TABLET ORAL EVERY 4 HOURS PRN
Status: DISCONTINUED | OUTPATIENT
Start: 2024-04-18 | End: 2024-04-22 | Stop reason: HOSPADM

## 2024-04-18 RX ORDER — ROPIVACAINE HYDROCHLORIDE 5 MG/ML
INJECTION, SOLUTION EPIDURAL; INFILTRATION; PERINEURAL
Status: COMPLETED | OUTPATIENT
Start: 2024-04-18 | End: 2024-04-18

## 2024-04-18 RX ORDER — ADHESIVE BANDAGE
30 BANDAGE TOPICAL DAILY PRN
Status: DISCONTINUED | OUTPATIENT
Start: 2024-04-18 | End: 2024-04-22 | Stop reason: HOSPADM

## 2024-04-18 RX ORDER — HYDROMORPHONE HYDROCHLORIDE 2 MG/ML
0.2 INJECTION, SOLUTION INTRAMUSCULAR; INTRAVENOUS; SUBCUTANEOUS EVERY 5 MIN PRN
Status: DISCONTINUED | OUTPATIENT
Start: 2024-04-18 | End: 2024-04-18 | Stop reason: HOSPADM

## 2024-04-18 RX ORDER — LIDOCAINE HYDROCHLORIDE 20 MG/ML
INJECTION INTRAVENOUS
Status: DISCONTINUED | OUTPATIENT
Start: 2024-04-18 | End: 2024-04-18

## 2024-04-18 RX ORDER — TALC
6 POWDER (GRAM) TOPICAL NIGHTLY PRN
Status: DISCONTINUED | OUTPATIENT
Start: 2024-04-18 | End: 2024-04-22 | Stop reason: HOSPADM

## 2024-04-18 RX ORDER — TRANEXAMIC ACID 100 MG/ML
INJECTION, SOLUTION INTRAVENOUS
Status: DISCONTINUED | OUTPATIENT
Start: 2024-04-18 | End: 2024-04-18

## 2024-04-18 RX ORDER — CEFAZOLIN SODIUM 2 G/50ML
2 SOLUTION INTRAVENOUS
Status: COMPLETED | OUTPATIENT
Start: 2024-04-18 | End: 2024-04-19

## 2024-04-18 RX ORDER — ACETAMINOPHEN 325 MG/1
650 TABLET ORAL EVERY 4 HOURS
Status: DISCONTINUED | OUTPATIENT
Start: 2024-04-18 | End: 2024-04-22 | Stop reason: HOSPADM

## 2024-04-18 RX ORDER — MORPHINE SULFATE 10 MG/ML
4 INJECTION INTRAMUSCULAR; INTRAVENOUS; SUBCUTANEOUS EVERY 6 HOURS PRN
Status: DISCONTINUED | OUTPATIENT
Start: 2024-04-18 | End: 2024-04-22 | Stop reason: HOSPADM

## 2024-04-18 RX ORDER — DOCUSATE SODIUM 100 MG/1
100 CAPSULE, LIQUID FILLED ORAL 2 TIMES DAILY
Status: DISCONTINUED | OUTPATIENT
Start: 2024-04-18 | End: 2024-04-22 | Stop reason: HOSPADM

## 2024-04-18 RX ORDER — ALBUMIN HUMAN 250 G/1000ML
SOLUTION INTRAVENOUS
Status: DISCONTINUED | OUTPATIENT
Start: 2024-04-18 | End: 2024-04-18

## 2024-04-18 RX ORDER — ONDANSETRON HYDROCHLORIDE 2 MG/ML
INJECTION, SOLUTION INTRAVENOUS
Status: DISCONTINUED | OUTPATIENT
Start: 2024-04-18 | End: 2024-04-18

## 2024-04-18 RX ORDER — POLYETHYLENE GLYCOL 3350 17 G/17G
17 POWDER, FOR SOLUTION ORAL 2 TIMES DAILY
Status: DISCONTINUED | OUTPATIENT
Start: 2024-04-18 | End: 2024-04-22 | Stop reason: HOSPADM

## 2024-04-18 RX ORDER — HYDROMORPHONE HYDROCHLORIDE 2 MG/ML
0.5 INJECTION, SOLUTION INTRAMUSCULAR; INTRAVENOUS; SUBCUTANEOUS EVERY 5 MIN PRN
Status: DISCONTINUED | OUTPATIENT
Start: 2024-04-18 | End: 2024-04-18 | Stop reason: HOSPADM

## 2024-04-18 RX ORDER — MEPERIDINE HYDROCHLORIDE 25 MG/ML
12.5 INJECTION INTRAMUSCULAR; INTRAVENOUS; SUBCUTANEOUS ONCE
Status: DISCONTINUED | OUTPATIENT
Start: 2024-04-18 | End: 2024-04-18 | Stop reason: HOSPADM

## 2024-04-18 RX ORDER — ONDANSETRON HYDROCHLORIDE 2 MG/ML
4 INJECTION, SOLUTION INTRAVENOUS ONCE
Status: DISCONTINUED | OUTPATIENT
Start: 2024-04-18 | End: 2024-04-18 | Stop reason: HOSPADM

## 2024-04-18 RX ORDER — LIDOCAINE HYDROCHLORIDE 10 MG/ML
INJECTION INFILTRATION; PERINEURAL
Status: DISPENSED
Start: 2024-04-18 | End: 2024-04-19

## 2024-04-18 RX ADMIN — ONDANSETRON 4 MG: 2 INJECTION INTRAMUSCULAR; INTRAVENOUS at 04:04

## 2024-04-18 RX ADMIN — ROPIVACAINE HYDROCHLORIDE 30 ML: 5 INJECTION, SOLUTION EPIDURAL; INFILTRATION; PERINEURAL at 02:04

## 2024-04-18 RX ADMIN — Medication 200 MCG: at 04:04

## 2024-04-18 RX ADMIN — ROCURONIUM BROMIDE 70 MG: 10 SOLUTION INTRAVENOUS at 03:04

## 2024-04-18 RX ADMIN — SUGAMMADEX 200 MG: 100 INJECTION, SOLUTION INTRAVENOUS at 04:04

## 2024-04-18 RX ADMIN — LIDOCAINE HYDROCHLORIDE 80 MG: 20 INJECTION INTRAVENOUS at 03:04

## 2024-04-18 RX ADMIN — ENOXAPARIN SODIUM 40 MG: 40 INJECTION SUBCUTANEOUS at 08:04

## 2024-04-18 RX ADMIN — SODIUM CHLORIDE, SODIUM GLUCONATE, SODIUM ACETATE, POTASSIUM CHLORIDE AND MAGNESIUM CHLORIDE: 526; 502; 368; 37; 30 INJECTION, SOLUTION INTRAVENOUS at 03:04

## 2024-04-18 RX ADMIN — SODIUM CHLORIDE, SODIUM GLUCONATE, SODIUM ACETATE, POTASSIUM CHLORIDE AND MAGNESIUM CHLORIDE: 526; 502; 368; 37; 30 INJECTION, SOLUTION INTRAVENOUS at 04:04

## 2024-04-18 RX ADMIN — SODIUM CHLORIDE, POTASSIUM CHLORIDE, SODIUM LACTATE AND CALCIUM CHLORIDE: 600; 310; 30; 20 INJECTION, SOLUTION INTRAVENOUS at 08:04

## 2024-04-18 RX ADMIN — ENOXAPARIN SODIUM 40 MG: 40 INJECTION SUBCUTANEOUS at 09:04

## 2024-04-18 RX ADMIN — MORPHINE SULFATE 4 MG: 10 INJECTION INTRAVENOUS at 08:04

## 2024-04-18 RX ADMIN — CEFAZOLIN SODIUM 2 G: 2 SOLUTION INTRAVENOUS at 03:04

## 2024-04-18 RX ADMIN — Medication 200 MCG: at 03:04

## 2024-04-18 RX ADMIN — PROPOFOL 50 MG: 10 INJECTION, EMULSION INTRAVENOUS at 03:04

## 2024-04-18 RX ADMIN — PROPOFOL 20 MG: 10 INJECTION, EMULSION INTRAVENOUS at 04:04

## 2024-04-18 RX ADMIN — ALBUMIN (HUMAN) 100 ML: 12.5 SOLUTION INTRAVENOUS at 04:04

## 2024-04-18 RX ADMIN — TRANEXAMIC ACID 1000 MG: 100 INJECTION, SOLUTION INTRAVENOUS at 03:04

## 2024-04-18 RX ADMIN — FENTANYL CITRATE 25 MCG: 50 INJECTION, SOLUTION INTRAMUSCULAR; INTRAVENOUS at 04:04

## 2024-04-18 RX ADMIN — PROPOFOL 40 MG: 10 INJECTION, EMULSION INTRAVENOUS at 05:04

## 2024-04-18 NOTE — TRANSFER OF CARE
"Anesthesia Transfer of Care Note    Patient: Kaleb Ibarra    Procedure(s) Performed: Procedure(s) (LRB):  ORIF, FRACTURE, RADIUS, DISTAL (Left)  ORIF, FRACTURE, HUMERUS, PROXIMAL    Patient location: PACU    Anesthesia Type: general    Transport from OR: Transported from OR on room air with adequate spontaneous ventilation    Post pain: adequate analgesia    Post assessment: no apparent anesthetic complications and tolerated procedure well    Post vital signs: stable    Level of consciousness: awake and alert    Nausea/Vomiting: no nausea/vomiting    Complications: none    Transfer of care protocol was followed      Last vitals: Visit Vitals  /70 (BP Location: Right arm, Patient Position: Lying)   Pulse 83   Temp 36.9 °C (98.4 °F) (Oral)   Resp 15   Ht 5' 11" (1.803 m)   Wt 63.5 kg (140 lb)   SpO2 96%   BMI 19.53 kg/m²     "

## 2024-04-18 NOTE — SUBJECTIVE & OBJECTIVE
Current Facility-Administered Medications   Medication Dose Route Frequency Provider Last Rate Last Admin    acetaminophen tablet 650 mg  650 mg Oral Q4H Santhosh Busby, FNP        docusate sodium capsule 100 mg  100 mg Oral BID Santhosh Busby, FNP        enoxaparin injection 40 mg  40 mg Subcutaneous Q12H Santhosh Busby, FNP        gabapentin capsule 300 mg  300 mg Oral TID Santhosh Busby, FNP        lactated ringers infusion   Intravenous Continuous Santhosh Busby, FNP        magnesium hydroxide 400 mg/5 ml suspension 2,400 mg  30 mL Oral Daily PRN Santhosh Busby, FNP        melatonin tablet 6 mg  6 mg Oral Nightly PRN Santhosh Busby, FNP        methocarbamoL tablet 500 mg  500 mg Oral Q8H Santhosh Busby, FNP        oxyCODONE immediate release tablet 5 mg  5 mg Oral Q4H PRN Santhosh Busby, FNP        oxyCODONE immediate release tablet Tab 10 mg  10 mg Oral Q4H PRN Santhosh Busby, FNP        polyethylene glycol packet 17 g  17 g Oral BID Santhosh Busby, FNP         No current outpatient medications on file.       Review of patient's allergies indicates:  No Known Allergies    Past Medical History:   Diagnosis Date    Cancer     prostate    Compression fracture of body of thoracic vertebra     Rhabdomyolysis 2/8/2023     Past Surgical History:   Procedure Laterality Date    PROSTATE SURGERY N/A      Family History       Problem Relation (Age of Onset)    Diabetes Brother          Tobacco Use    Smoking status: Never     Passive exposure: Never    Smokeless tobacco: Never   Substance and Sexual Activity    Alcohol use: Yes     Alcohol/week: 2.0 standard drinks of alcohol     Types: 2 Cans of beer per week     Comment: socially    Drug use: Not Currently    Sexual activity: Not Currently     Review of Systems   Constitutional:  Negative for chills and fever.   HENT:  Negative for ear pain and trouble swallowing.    Eyes:  Negative for pain and redness.    Respiratory:  Negative for cough and chest tightness.    Cardiovascular:  Negative for chest pain, palpitations and leg swelling.   Gastrointestinal:  Negative for abdominal distention, abdominal pain, nausea and vomiting.   Genitourinary:  Negative for difficulty urinating.   Musculoskeletal:  Positive for arthralgias, joint swelling and myalgias. Negative for back pain and neck pain.   Skin:  Negative for color change, pallor and wound.   Neurological:  Negative for dizziness, syncope, speech difficulty, weakness, light-headedness, numbness and headaches.   Psychiatric/Behavioral:  Negative for agitation and suicidal ideas.    All other systems reviewed and are negative.    Objective:     Vital Signs (Most Recent):  Temp: 98.3 °F (36.8 °C) (04/18/24 0250)  Pulse: 79 (04/18/24 0616)  Resp: 14 (04/18/24 0610)  BP: 116/79 (04/18/24 0616)  SpO2: 100 % (04/18/24 0616) Vital Signs (24h Range):  Temp:  [98.3 °F (36.8 °C)] 98.3 °F (36.8 °C)  Pulse:  [66-90] 79  Resp:  [13-21] 14  SpO2:  [91 %-100 %] 100 %  BP: (101-126)/(66-89) 116/79     Weight: 63.5 kg (140 lb)  Body mass index is 19.53 kg/m².     Physical Exam  Constitutional:       Appearance: Normal appearance.   HENT:      Head: Normocephalic and atraumatic.      Nose: Nose normal.   Eyes:      Pupils: Pupils are equal, round, and reactive to light.   Cardiovascular:      Rate and Rhythm: Normal rate.      Pulses: Normal pulses.      Comments: Normal peripheral pulses  Pulmonary:      Effort: Pulmonary effort is normal. No respiratory distress.   Chest:      Chest wall: No tenderness.   Abdominal:      General: Abdomen is flat. Bowel sounds are normal. There is no distension.      Palpations: Abdomen is soft.      Tenderness: There is no abdominal tenderness.   Musculoskeletal:         General: Swelling, tenderness, deformity and signs of injury present.      Cervical back: Normal range of motion and neck supple. No tenderness.   Skin:     General: Skin is warm  and dry.      Capillary Refill: Capillary refill takes less than 2 seconds.      Findings: No lesion.   Neurological:      General: No focal deficit present.      Mental Status: He is alert and oriented to person, place, and time. Mental status is at baseline.   Psychiatric:         Mood and Affect: Mood normal.         Behavior: Behavior normal.         Thought Content: Thought content normal.         Judgment: Judgment normal.            I have reviewed all pertinent lab results within the past 24 hours.    Significant Diagnostics:  I have reviewed all pertinent imaging results/findings within the past 24 hours.

## 2024-04-18 NOTE — CONSULTS
[facility] - Emergency Dept  Orthopedic Trauma  Consult Note    Patient Name: Kaleb Ibarra  MRN: [id number]  Admission Date: 4/18/2024  Hospital Length of Stay: 0 days  Attending Provider: Rick Pelaez MD  Primary Care Provider: Jossue Martinez MD        Inpatient consult to Orthopedic Surgery  Consult performed by: Kraig Malcolm DO  Consult ordered by: Erick Conde MD        Subjective:         Chief Complaint:   Chief Complaint   Patient presents with    Fall     Left wrist deformity and forehead hematoma after a glf - from [facility]. Hx dementia. (-) loc, (-) bt        HPI: Patient is an 89-year-old male ground level fall to the left side.  Patient has a left proximal humerus fracture appears to be at the anatomic neck and anteriorly nondisplaced and a left severely comminuted distal radius distal ulnar shaft fracture.  Has severe osteoporosis and poor bone quality.  Dull achy pain worse with range of motion better rest.  History of dementia    Past Medical History:   Diagnosis Date    Cancer     prostate    Compression fracture of body of thoracic vertebra     Rhabdomyolysis 2/8/2023       Past Surgical History:   Procedure Laterality Date    PROSTATE SURGERY N/A        Review of patient's allergies indicates:  No Known Allergies    Current Facility-Administered Medications   Medication Dose Route Frequency Provider Last Rate Last Admin    acetaminophen tablet 650 mg  650 mg Oral Q4H Santhosh Busby, CAITYP        docusate sodium capsule 100 mg  100 mg Oral BID Santhosh Busby, FNP        enoxaparin injection 40 mg  40 mg Subcutaneous Q12H Santhosh Busby FNP   40 mg at 04/18/24 0849    gabapentin capsule 300 mg  300 mg Oral TID Santhosh Busby, FNP        lactated ringers infusion   Intravenous Continuous Santhosh Busby, CAITYP 100 mL/hr at 04/18/24 0804 New Bag at 04/18/24 0804    magnesium hydroxide 400 mg/5 ml suspension 2,400 mg  30 mL Oral Daily  "PRN Santhosh Busby, FNP        melatonin tablet 6 mg  6 mg Oral Nightly PRN Santhosh Busby, FNP        methocarbamoL tablet 500 mg  500 mg Oral Q8H Santhosh Busby, FNP        oxyCODONE immediate release tablet 5 mg  5 mg Oral Q4H PRN Santhosh Busby, FNP        oxyCODONE immediate release tablet Tab 10 mg  10 mg Oral Q4H PRN Santhosh Busby, FNP        polyethylene glycol packet 17 g  17 g Oral BID Santhosh Busby, CAITYP         No current outpatient medications on file.     Family History       Problem Relation (Age of Onset)    Diabetes Brother          Tobacco Use    Smoking status: Never     Passive exposure: Never    Smokeless tobacco: Never   Substance and Sexual Activity    Alcohol use: Yes     Alcohol/week: 2.0 standard drinks of alcohol     Types: 2 Cans of beer per week     Comment: socially    Drug use: Not Currently    Sexual activity: Not Currently       ROS:  Constitutional: Denies fever chills  Eyes: No change in vision  ENT: No ringing or current infections  CV: No chest pain  Resp: No labored breathing  MSK: Pain evident at site of injury located in HPI,   Integ: No signs of abrasions or lacerations  Neuro: No numbness or tingling  Lymphatic: No swelling outside the area of injury   Objective:     Vital Signs (Most Recent):  Temp: 98.4 °F (36.9 °C) (04/18/24 0730)  Pulse: 79 (04/18/24 0730)  Resp: 20 (04/18/24 0730)  BP: 122/80 (04/18/24 0730)  SpO2: 99 % (04/18/24 0730) Vital Signs (24h Range):  Temp:  [98.3 °F (36.8 °C)-98.4 °F (36.9 °C)] 98.4 °F (36.9 °C)  Pulse:  [66-90] 79  Resp:  [13-21] 20  SpO2:  [91 %-100 %] 99 %  BP: (101-126)/(66-89) 122/80     Weight: 63.5 kg (140 lb)  Height: 5' 11" (180.3 cm)  Body mass index is 19.53 kg/m².    No intake or output data in the 24 hours ending 04/18/24 1031    Ortho/SPM Exam  General the patient is alert no acute distress nontoxic-appearing appropriate affect.    Constitutional: Vital signs are examined and stable.  Resp: No " signs of labored breathing    LUE: --Skin:  No open injury.  Tenderness about the wrist and left shoulder especially with range of motion.         -MSK: STR 5/5 AIN/PIN/Median/Radial/Ulnar motor           -Neuro:  Sensation intact to light touch C5-T1 dermatomes           -Lymphatic: No signs of lymphadenopathy, No signs of swelling,           -CV:Capillary refill is less than 2 seconds. Radial and ulnar pulses 2/4. Compartments Soft and compressible                Significant Labs:  I have reviewed all labs in relation to Orthopedics  Recent Lab Results         04/18/24  0703   04/18/24  0401   04/18/24  0356        Albumin/Globulin Ratio     0.7       Albumin     3.0       ALP     113       ALT     14       PTT   31.8  Comment: For Minimal Heparin Infusion, the goal aPTT 64-85 seconds corresponds to an anti-Xa of 0.3-0.5.    For Low Intensity and High Intensity Heparin, the goal aPTT  seconds corresponds to an anti-Xa of 0.3-0.7         AST     26       Baso #     0.03       Basophil %     0.3       BILIRUBIN TOTAL     0.5       BUN     16.3       Calcium     9.2       Chloride     106       CO2     23       Creatinine     0.83       eGFR     >60       Eos #     0.00       Eos %     0.0       Globulin, Total     4.6       Glucose     123       Hematocrit     40.2       Hemoglobin     13.4       Immature Grans (Abs)     0.08       Immature Granulocytes     0.8       INR   1.3         Lactic Acid Level 1.3           Lymph #     0.81       LYMPH %     7.7       MCH     31.8       MCHC     33.3       MCV     95.5       Mono #     0.90       Mono %     8.6       MPV     11.3       Neut #     8.68       Neut %     82.6       nRBC     0.0       Platelet Count     158       Potassium     3.9       PROTEIN TOTAL     7.6       PT   15.7         RBC     4.21       RDW     13.3       Sodium     140       WBC     10.50               Significant Imaging: I have reviewed all pertinent imaging results/findings.  CT Head  Without Contrast    Result Date: 4/18/2024  EXAMINATION: CT HEAD WITHOUT CONTRAST CLINICAL HISTORY: Head trauma, minor (Age >= 65y); TECHNIQUE: CT imaging of the head performed from the skull base to the vertex without intravenous contrast. DLP 1066 mGycm. Automatic exposure control, adjustment of mA/kV or iterative reconstruction technique was used to reduce radiation. COMPARISON: 6 February 2023 FINDINGS: There is no acute cortical infarct, hemorrhage or mass lesion.  No new parenchymal attenuation abnormality.  Moderate global atrophy.  Ventricular size is stable. Visualized paranasal sinuses and mastoid air cells are clear. There is some left frontal scalp soft tissue swelling.     No acute intracranial findings. No significant discrepancy with the preliminary report. Electronically signed by: Loc Hernandez Date:    04/18/2024 Time:    09:52    CT Cervical Spine Without Contrast    Result Date: 4/18/2024  EXAMINATION: CT CERVICAL SPINE WITHOUT CONTRAST CLINICAL HISTORY: Neck trauma (Age >= 65y); TECHNIQUE: Low dose axial images, sagittal and coronal reformations were performed though the cervical spine.  Contrast was not administered.  .  Automated exposure control used. COMPARISON: None FINDINGS: Slight superior endplate compression deformity of T2 within the field of view, indeterminate age.  No other fracture seen..  Odontoid intact.  Exaggerated lordotic curvature.  Kyphotic curvature upper thoracic region. C2-C3 demonstrates mild disc space narrowing with no osseous spinal canal or foraminal narrowing. C3-C4 mild moderate disc space narrowing without spinal canal or foraminal narrowing. C4-C5 near normal disc space height without osseous spinal canal or foraminal narrowing. C5-C6 moderate disc space narrowing, no significant osseous spinal canal narrowing, mild osseous foraminal narrowing on the right. C6-C7 and C7-T1 no significant osseous spinal canal or foraminal narrowing. No soft tissue mass,  fluid collection, hematoma, lymphadenopathy.     Slight concave compression deformity of the T2 vertebral body superior endplate within the field of view, indeterminate age.  No other fracture seen.  Spondylosis as above. Agree with whitney Electronically signed by: Evelio Mace MD Date:    04/18/2024 Time:    09:00    X-Ray Chest AP Portable    Result Date: 4/18/2024  EXAMINATION: XR CHEST AP PORTABLE CLINICAL HISTORY: Fall; TECHNIQUE: Single view of the chest COMPARISON: 02/22/2023 FINDINGS: Interval development of right upper lobe opacification. The cardiomediastinal silhouette is within normal limits. No acute osseous abnormality.     Interval development of right upper lobe opacification.  Developing infectious process is not excluded.  Recommend continued follow-up Electronically signed by: Hector Zuleta Date:    04/18/2024 Time:    07:29    X-Ray Wrist Complete Left    Result Date: 4/18/2024  EXAMINATION: XR WRIST COMPLETE 3 VIEWS LEFT CLINICAL HISTORY: Wrist fracture ORIF;  Fracture of unspecified carpal bone, left wrist, initial encounter for closed fracture COMPARISON: Earlier today FINDINGS: Two views left wrist.  There is improved position and alignment of the fractures of the distal radius and ulna.     As above. Electronically signed by: Loc Hernandez Date:    04/18/2024 Time:    07:27       Assessment/Plan:     Active Diagnoses:    Diagnosis Date Noted POA    PRINCIPAL PROBLEM:  Left wrist fracture, closed, initial encounter [S62.102A] 04/18/2024 Yes      Problems Resolved During this Admission:       Independent Radiology ordered by other provider:  CT left wrist showing intra-articular distal radius fracture with involvement of the distal ulna as well with significant comminution and loss of height.  Patient has poor bone quality.    CT of the left shoulder x-ray of the left shoulder showing a left proximal humerus fracture nondisplaced anatomic neck fracture.  This extends anteriorly through  the lesser tuberosity    Pt has acute injury with risk of severe bodily function with their injury to the left arm.        Patient is an 89-year-old gentleman with dementia.  I have reached out to family.  Patient has a severely comminuted left distal radius fracture large volar fragment which will likely lead to carpus translation volarly which could lead to neurovascular compromise.  I am recommending bridge plate fixation due to his severe poor bone quality and the low functional status of the patient.  This bridge plate can stay in for 3 months and then removed.  Patient also has a left nondisplaced proximal humerus fracture I am recommending we place hardware across this he is able to ambulate with a walker assist.    I explained that surgery and the nature of their condition are not without risks. These include, but are not limited to, bleeding, infection, neurovascular compromise, malunion, nonunion, hardware complications, wound complications, scarring, cosmetic defects, need for later and/or repeated surgeries, avascular necrosis, bone death due to initial trauma, pain, loss of ROM, loss of function, PTOA, deformity, stance/gait and/or functional abnormalities, thromboembolic complications, compartment syndrome, loss of limb, loss of life, anesthetic complications, and other imponderables. I explained that these can occur despite the adequacy of treatments rendered, and that their risks are heightened given the nature of their condition.  I have also discussed the importance not using nicotine products due to the increased risk of infection surgical wound healing complications and nonunion of the fracture.  They verbalized understanding.  No guarantees were made.  They would like to continue with surgery at this time. If appropriate family was involved with surgical discussion.       This note/OR report was created with the assistance of  voice recognition software or phone  dictation.  There may be  transcription errors as a result of using this technology however minimal. Effort has been made to assure accuracy of transcription but any obvious errors or omissions should be clarified with the author of the document.       Kraig Malcolm,    Orthopedic Trauma Surgery  [facility] - Emergency Dept

## 2024-04-18 NOTE — ED PROVIDER NOTES
Encounter Date: 4/18/2024    SCRIBE #1 NOTE: I, Nohemi Watts, am scribing for, and in the presence of,  Erick Conde MD. I have scribed the following portions of the note - Other sections scribed: HPI, ROS, PE.       History     Chief Complaint   Patient presents with    Fall     Left wrist deformity and forehead hematoma after a glf - from [facility]. Hx dementia. (-) loc, (-) bt     89 year old male with a history of dementia, prostate CA, rhabdomyolysis, and compression fracture of thoracic vertebra presents to ED via EMS from [facility] [place] secondary to a ground level fall. Denies LOC and blood thinners. Pt states he does not remember the event. He endorses left wrist pain and swelling, reports increased pain with movement of digits. C-collar placed en route.    The history is provided by the patient. No  was used.     Review of patient's allergies indicates:  No Known Allergies  Past Medical History:   Diagnosis Date    Cancer     prostate    Compression fracture of body of thoracic vertebra     Rhabdomyolysis 2/8/2023     Past Surgical History:   Procedure Laterality Date    PROSTATE SURGERY N/A      Family History   Problem Relation Name Age of Onset    Diabetes Brother       Social History     Tobacco Use    Smoking status: Never     Passive exposure: Never    Smokeless tobacco: Never   Substance Use Topics    Alcohol use: Yes     Alcohol/week: 2.0 standard drinks of alcohol     Types: 2 Cans of beer per week     Comment: socially    Drug use: Not Currently     Review of Systems   Musculoskeletal:  Positive for arthralgias (L wrist) and joint swelling (L wrist).       Physical Exam     Initial Vitals [04/18/24 0250]   BP Pulse Resp Temp SpO2   126/81 90 16 98.3 °F (36.8 °C) 98 %      MAP       --         Physical Exam    Constitutional: He appears well-developed and well-nourished. He is not diaphoretic. No distress.   HENT:   Head: Normocephalic.   Right Ear:  External ear normal.   Left Ear: External ear normal.   Nose: Nose normal.   1 cm abrasion superior to left orbit   Eyes: EOM are normal. Pupils are equal, round, and reactive to light. Right eye exhibits no discharge. Left eye exhibits no discharge.   Cardiovascular:  Normal rate, regular rhythm and normal heart sounds.     Exam reveals no gallop and no friction rub.       No murmur heard.  Pulmonary/Chest: Effort normal and breath sounds normal. No respiratory distress. He has no wheezes. He has no rhonchi. He has no rales. He exhibits no tenderness.   Abdominal: Abdomen is soft. Bowel sounds are normal. He exhibits no distension and no mass. There is no abdominal tenderness. There is no rebound and no guarding.   Musculoskeletal:      Comments: Left wrist deformity, motor sensation intact to left hand, 2+ radial pulses bilaterally     Neurological: He is alert and oriented to person, place, and time. No cranial nerve deficit or sensory deficit.   Skin: Skin is warm and dry. Capillary refill takes less than 2 seconds.         ED Course   Orthopedic Injury    Date/Time: 2024 6:12 AM    Performed by: Erick Conde MD  Authorized by: Erick Conde MD    Location procedure was performed:  [facility] EMERGENCY DEPARTMENT  Consent Done?:  Yes  Universal Protocol:     Verbal consent obtained?: Yes      Risks and benefits: Risks, benefits and alternatives were discussed      Consent given by:  Patient    Patient identity confirmed:   and verbally with patient  Injury:     Injury location:  Wrist    Location details:  Left wrist    Injury type:  Fracture    Fracture type: distal radius and ulnar styloid      Fracture type: distal radius and ulnar styloid        Pre-procedure assessment:     Neurovascular status: Neurovascularly intact      Distal perfusion: normal      Neurological function: normal      Range of motion: reduced        Selections made in this section will also lock the Injury type section  above.:     Manipulation performed?: Yes      Skeletal traction used?: Yes      Reduction successful?: Yes      Confirmation: Reduction confirmed by x-ray      Immobilization:  Splint    Splint type:  Sugar tong    Supplies used:  Cotton padding, elastic bandage and Ortho-Glass    Complications: No    Post-procedure assessment:     Neurovascular status: Neurovascularly intact      Distal perfusion: normal      Neurological function: normal      Range of motion: splinted    Procedural Sedation        Date/Time: 2024 6:12 AM    Performed by: Erick Conde MD  Authorized by: Erick Conde MD  Consent Done: Yes  Consent: Verbal consent obtained.  Risks and benefits: risks, benefits and alternatives were discussed  Consent given by: patient  Patient identity confirmed:  and verbally with patient  ASA Class: Class 2 - Mild Illness without functional impairment.  Mallampati Score: Class 2 - Visualization of the soft palate, fauces, and uvula.     Equipment: on cardiac monitor., on BP monitor., on CO2 monitor., suction available., airway equipment available. and on supplemental oxygen.     Sedation type: moderate (conscious) sedation    Sedatives: propofol  Sedation start date/time: 2024 5:55 AM  Sedation end date/time: 2024 6:05 AM  Vitals: Vital signs were monitored during sedation.  Complications: No complications.   Comments: Patient tolerated procedure well  Patient/Family history of anesthesia or sedation complications: No      Labs Reviewed   COMPREHENSIVE METABOLIC PANEL - Abnormal; Notable for the following components:       Result Value    Glucose Level 123 (*)     Albumin Level 3.0 (*)     Globulin 4.6 (*)     Albumin/Globulin Ratio 0.7 (*)     All other components within normal limits   PROTIME-INR - Abnormal; Notable for the following components:    PT 15.7 (*)     All other components within normal limits   CBC WITH DIFFERENTIAL - Abnormal; Notable for the following components:     RBC 4.21 (*)     Hgb 13.4 (*)     Hct 40.2 (*)     MCV 95.5 (*)     MCH 31.8 (*)     MPV 11.3 (*)     IG# 0.08 (*)     All other components within normal limits   APTT - Normal   LACTIC ACID, PLASMA - Normal   CBC W/ AUTO DIFFERENTIAL    Narrative:     The following orders were created for panel order CBC auto differential.  Procedure                               Abnormality         Status                     ---------                               -----------         ------                     CBC with Differential[[id number]]        Abnormal            Final result                 Please view results for these tests on the individual orders.          Imaging Results              CT Wrist Without Contrast Left (In process)                      CT Shoulder Without Contrast Left (In process)                      X-Ray Wrist Complete Left (Final result)  Result time 04/18/24 07:27:00   Procedure changed from X-Ray Wrist Complete Right     Final result by Loc Hernandez MD (04/18/24 07:27:00)                   Impression:      As above.      Electronically signed by: Loc Hernandez  Date:    04/18/2024  Time:    07:27               Narrative:    EXAMINATION:  XR WRIST COMPLETE 3 VIEWS LEFT    CLINICAL HISTORY:  Wrist fracture ORIF;  Fracture of unspecified carpal bone, left wrist, initial encounter for closed fracture    COMPARISON:  Earlier today    FINDINGS:  Two views left wrist.  There is improved position and alignment of the fractures of the distal radius and ulna.                                       X-Ray Shoulder 2 or More Views Left (In process)                      CT Head Without Contrast (Final result)  Result time 04/18/24 09:52:52      Final result by Loc Hernandez MD (04/18/24 09:52:52)                   Impression:      No acute intracranial findings.    No significant discrepancy with the preliminary report.      Electronically signed by: Loc Hernandez  Date:    04/18/2024  Time:    09:52                Narrative:    EXAMINATION:  CT HEAD WITHOUT CONTRAST    CLINICAL HISTORY:  Head trauma, minor (Age >= 65y);    TECHNIQUE:  CT imaging of the head performed from the skull base to the vertex without intravenous contrast. DLP 1066 mGycm. Automatic exposure control, adjustment of mA/kV or iterative reconstruction technique was used to reduce radiation.    COMPARISON:  6 February 2023    FINDINGS:  There is no acute cortical infarct, hemorrhage or mass lesion.  No new parenchymal attenuation abnormality.  Moderate global atrophy.  Ventricular size is stable.    Visualized paranasal sinuses and mastoid air cells are clear. There is some left frontal scalp soft tissue swelling.                        Preliminary result by Solitario Barbosa Jr., MD (04/18/24 03:58:21)                   Impression:    1. No acute intracranial process identified. Details and other findings as noted above.               Narrative:    START OF REPORT:  Technique: CT of the head was performed without intravenous contrast with axial as well as coronal and sagittal images.    Comparison: None.    Dosage Information: Automated exposure control was utilized.    Clinical history: Fall, head injury.    Findings:  Hemorrhage: No acute intracranial hemorrhage is seen.  CSF spaces: The ventricles, sulci and basal cisterns all appear moderately prominent global cerebral atrophy.  Brain parenchyma: Moderate microvascular change is seen in portions of the periventricular and deep white matter tracts.  Vascular territory infarcts:  Lacunar infarcts: There is a stable lacunar infarct in the midbrain.  Cerebellum: Unremarkable.  Sella and skull base: The sella appears to be within normal limits for age.  Intracranial calcifications: Incidental note is made of bilateral choroid plexus calcification. Incidental note is made of some pineal region calcification. Incidental note is made of some calcification of the falx.  Calvarium: No acute linear or  depressed skull fracture is seen.  Scalp: Mild scalp soft tissue swelling is seen along the left frontal bone.    Maxillofacial Structures:  Paranasal sinuses: The visualized paranasal sinuses appear clear with no mucoperiosteal thickening or air fluid levels identified.  Orbits: The orbits appear unremarkable.  Zygomatic arches: The zygomatic arches are intact and unremarkable.  Temporal bones and mastoids: The temporal bones and mastoids appear unremarkable.  TMJ: The mandibular condyles appear normally placed with respect to the mandibular fossa.                                         CT Cervical Spine Without Contrast (Final result)  Result time 04/18/24 09:00:06      Final result by Shashi Mace MD (04/18/24 09:00:06)                   Impression:      Slight concave compression deformity of the T2 vertebral body superior endplate within the field of view, indeterminate age.  No other fracture seen.  Spondylosis as above.    Agree with nighthawk      Electronically signed by: Evelio Mace MD  Date:    04/18/2024  Time:    09:00               Narrative:    EXAMINATION:  CT CERVICAL SPINE WITHOUT CONTRAST    CLINICAL HISTORY:  Neck trauma (Age >= 65y);    TECHNIQUE:  Low dose axial images, sagittal and coronal reformations were performed though the cervical spine.  Contrast was not administered.  .  Automated exposure control used.    COMPARISON:  None    FINDINGS:  Slight superior endplate compression deformity of T2 within the field of view, indeterminate age.  No other fracture seen..  Odontoid intact.  Exaggerated lordotic curvature.  Kyphotic curvature upper thoracic region.    C2-C3 demonstrates mild disc space narrowing with no osseous spinal canal or foraminal narrowing.    C3-C4 mild moderate disc space narrowing without spinal canal or foraminal narrowing.    C4-C5 near normal disc space height without osseous spinal canal or foraminal narrowing.    C5-C6 moderate disc space narrowing,  no significant osseous spinal canal narrowing, mild osseous foraminal narrowing on the right.    C6-C7 and C7-T1 no significant osseous spinal canal or foraminal narrowing.    No soft tissue mass, fluid collection, hematoma, lymphadenopathy.                        Preliminary result by Solitario Barbosa Jr., MD (04/18/24 03:48:14)                   Impression:    1. No acute cervical spine fracture dislocation or subluxation is seen. Mild compression fracture of the superior endplate of T2 vertebral body, of indeterminate age. Correlate clinically as regards further evaluation and followup.  2. Details and other findings as noted above.               Narrative:    START OF REPORT:  Technique: CT of the cervical spine was performed without intravenous contrast with axial as well as sagittal and coronal images.    Comparison: None.    Dosage Information: Automated exposure control was utilized.    Clinical history: Fall, head injury.    Findings:  Lung apices: The visualized lung apices appear unremarkable.  Spine:  Spinal canal: The spinal canal appears unremarkable.  Spinal cord: The spinal cord appears unremarkable.  Mineralization: Mild osteopenia is seen in the visualized bony structures.  Scoliosis: No significant scoliosis is seen.  Vertebral Fusion: No vertebral fusion is identified.  Listhesis: No significant listhesis is identified.  Lordosis: The cervical lordosis is maintained.  Intervertebral disc spaces: Mildly decreased disc height is seen at C3-C4 and C5-C6.  Osteophytes: Small inferior endplate osteophytes seen at C5 and C6.  Endplate Sclerosis: No significant endplate sclerosis is seen.  Uncovertebral degenerative changes: Mild uncovertebral joint degenerative changes are seen at C3-C4.  Facet degenerative changes: No significant facet degenerative changes are seen.  Fractures: No acute cervical spine fracture dislocation or subluxation is seen. Mild compression fracture of the superior endplate  of T2 vertebral body, of indeterminate age. Correlate clinically as regards further evaluation and followup.                                         X-Ray Wrist Complete Left (In process)                      X-Ray Chest AP Portable (Final result)  Result time 04/18/24 07:29:20      Final result by Hector Zuleta MD (04/18/24 07:29:20)                   Impression:      Interval development of right upper lobe opacification.  Developing infectious process is not excluded.  Recommend continued follow-up      Electronically signed by: Hector Zuleta  Date:    04/18/2024  Time:    07:29               Narrative:    EXAMINATION:  XR CHEST AP PORTABLE    CLINICAL HISTORY:  Fall;    TECHNIQUE:  Single view of the chest    COMPARISON:  02/22/2023    FINDINGS:  Interval development of right upper lobe opacification.    The cardiomediastinal silhouette is within normal limits.    No acute osseous abnormality.                                       Medications   lactated ringers infusion ( Intravenous New Bag 4/18/24 0804)   enoxaparin injection 40 mg (40 mg Subcutaneous Given 4/18/24 0849)   acetaminophen tablet 650 mg (650 mg Oral Not Given 4/18/24 1000)   oxyCODONE immediate release tablet 5 mg (has no administration in time range)   oxyCODONE immediate release tablet Tab 10 mg (has no administration in time range)   methocarbamoL tablet 500 mg (500 mg Oral Not Given 4/18/24 0849)   gabapentin capsule 300 mg (300 mg Oral Not Given 4/18/24 0849)   melatonin tablet 6 mg (has no administration in time range)   polyethylene glycol packet 17 g (17 g Oral Not Given 4/18/24 0900)   docusate sodium capsule 100 mg (100 mg Oral Not Given 4/18/24 0849)   magnesium hydroxide 400 mg/5 ml suspension 2,400 mg (has no administration in time range)   propofoL (DIPRIVAN) 10 mg/mL infusion (0 mcg/kg/min  Stopped 4/18/24 0600)     Medical Decision Making  The differential diagnosis includes, but is not limited to fracture laceration  abrasion contusion head bleed shoulder fracture, dislocation, vascular injury, nerve injury    Patient awake alert well-appearing.  NAD.  No respiratory distress.  Does not remember all of the events from earlier tonight but does apparently has a history of dementia per chart review.  Obvious deformity to left wrist.  Closed fracture.  Neurovascularly intact.  Plain films reveal distal radius ulna fracture.  Splinted and reduced the best my ability here in the emergency department.  Unsatisfactory reduction on the lateral view.  After reduction he complains of left shoulder pain.  Plain films do not reveal any obvious fracture we will obtain CT scan.  I discussion with orthopedic surgery we will admit for evaluation surgical management.  Will also obtain CT of wrist for preop planning.  Discussed with Santhosh on-call for trauma.  Will admit to their service for further evaluation management.  Care transferred.    Amount and/or Complexity of Data Reviewed  External Data Reviewed: notes.     Details: See ED course  Labs: ordered. Decision-making details documented in ED Course.  Radiology: ordered and independent interpretation performed. Decision-making details documented in ED Course.    Risk  OTC drugs.  Prescription drug management.  Decision regarding hospitalization.            Scribe Attestation:   Scribe #1: I performed the above scribed service and the documentation accurately describes the services I performed. I attest to the accuracy of the note.    Attending Attestation:           Physician Attestation for Scribe:  Physician Attestation Statement for Scribe #1: I, Erick Conde MD, reviewed documentation, as scribed by Nohemi Watts in my presence, and it is both accurate and complete.             ED Course as of 04/18/24 1043   Thu Apr 18, 2024   0357 CT Head Without Contrast [MM]   0357 X-Ray Chest AP Portable  Negative for acute [MM]   0357 X-Ray Wrist Complete Left  Distal radius ulna fracture [MM]    0359 CT Head Without Contrast  Negative for acute [MM]   0622 Chart review of paperwork from nursing home reveals history of muscle wasting, lack coordination, aphasia, weakness, malignant neoplasm of prostate. [MM]   0636 Discussed with tahira Garcia.  Recommends admission for surgery today or tomorrow [MM]   0641 X-Ray Wrist Complete Left  Improved but not complete reduction of fracture [MM]   0648 CBC auto differential(!)  Mild anemia no leukocytosis [MM]   0648 Comprehensive metabolic panel(!)  No significant electrolyte abnormalities or renal dysfunction. [MM]   0650 Discussed with Trauma surgery, Santhosh, will admit. [MM]   0753 EKG from 743 shows sinus rhythm rate of 95 .  There is left axis deviation.  There was some artifact that appear similar to flutter in V1 only.  No obvious ST elevation or depression. [MM]      ED Course User Index  [MM] Erick Conde MD                             Clinical Impression:  Final diagnoses:  [W19.XXXA] Fall  [S62.102A] Wrist fracture, closed, left, initial encounter (Primary)  [M25.512] Acute pain of left shoulder          ED Disposition Condition    Admit                 Erick Conde MD  04/18/24 0656       Erick Conde MD  04/18/24 1043

## 2024-04-18 NOTE — OP NOTE
OPERATIVE REPORT    Patient: Kaleb Ibarra   : 1934    MRN: [id number]  Date: 2024      Surgeon:Kraig Malcolm DO  Assistant: Olaf Bennett was essential, part of the procedure including deep hardware placement and deep closure.  No senior assistant was availible  Preoperative Diagnosis:Left intra articular distal radius fracture , left proximal humerus for  Postoperative Diagnosis: Same  Procedure:   1) Open reduction and internal fixation of 3+ part intra-articular left distal radius fracture  2) open reduction internal fixation left proximal humerus fracture with an intramedullary implant  Anesthesiologist: No responsible provider has been recorded for the case.  OR Staff: Circulator: Bro Hernandez RN; Marilyn Orosco RN  Physician Assistant: Bridget Bennett PA-C  Scrub Person: Sherrell Forrester ST  Implants:  Skeletal Dynamics/Synthes  Implant Name Type Inv. Item Serial No.  Lot No. LRB No. Used Action   9.5mm TI MULTILOC PROX HUMERUS NAIL / 160MM    SYNTHES [id number]  1 Implanted   34MM 4.5MM TITANIUM MULTILOC SCREW    [employer]   1 Implanted   44MM 4.5MM TITANIUM MULTILOC SCREW    [employer]   2 Implanted   32MM 4.0MM TI LOCKING SCREW WITH T25 STARDRIVE    [employer]   1 Implanted   2.5MM GUIDE HANNA WITH STOP    [employer]   1 Implanted   PLATE GEMINUS DORSAL SPANNING - [id number]  PLATE GEMINUS DORSAL SPANNING  [employer]   1 Implanted   3.0MM X 10MM, TI LOCKING SCREW    [employer]   2 Implanted and Explanted   SCREW BONE TI LOCK 12X3MM - [id number]  SCREW BONE TI LOCK 12X3MM  [employer]   2 Implanted   3.0MM X 14MM TI LOCKING SCREW    [employer]   2 Implanted   3.0MM X 16MM TI LOCKING SCREW    [employer]   1 Implanted   SCREW BONE COMPR 10X3MM - [id number]  SCREW BONE COMPR 10X3MM  [employer]   1 Implanted and Explanted   SCREW BONE COMPR 14X3MM - [id number]  SCREW BONE COMPR 14X3MM  [employer]   1 Implanted and Explanted   3.0MM X 16MM NON-LOCKING SCREW    [employer]   1 Implanted     EBL: 100cc  Complications: None  Disposition: To PACU, stable    Indications: Kaleb Ibarra is a 89 y.o. male presenting with the aforementioned injuries. The procedure is indicated to obtain and maintain stability of the wrist and allow early ROM.  The patient is suffering from dementia. After thorough discussion of the risks, benefits, expected outcomes, and alternatives to surgical intervention, the patient and Daughter DES agreed to proceed with surgical treatment.  Specific risks discussed included, but were not limited to: superficial or deep infection, wound healing complications, DVT/PE, significant bleeding requiring transfusion, damage to named anatomic structures in the immediate area including named neurovascular structures, carpal tunnel syndrome, implant failure, and general risks of anesthesia.  Patient will have hardware removed at 3-4 months to prevent hardware failure.  The patient voiced understanding and written as well as verbal consent was obtained by myself prior to the procedure.  \  Patient has a severely comminuted distal radius fracture.  It was at risk for volar translation.  He also has a left proximal humerus fracture nondisplaced.  Due to the multiple fractures on this single extremity I am concerned he will not be able to use his arm for a walker.  We will plan for fixation today.  I have discussed the risks and benefits with the family no guarantees were made.    Procedure Note:  The patient was brought back to the OR and placed supine on the OR table. After successful induction of anesthesia by anesthesia staff, the patient was positioned in the supine position and all bony prominences were padded appropriately.  The surgical field was then provisionally cleansed and then prepped and draped in the usual sterile fashion.    At this time a time-out was performed,  with the correct patient, site, and procedure identified.  The universal time out as well as sign your site protocols were followed.  Preoperative antibiotics were verified as administered.  We are going to the procedure with my attention turned to the left proximal humerus made a standard anterior lateral approach to the proximal humerus dissected down to the rotator cuff.  No previous where appreciated.  I then incised the cuff tagged for later repair.  We then placed a guidewire in the proximal humerus reamed appropriately and placed a Synthes nail.  I then placed screws proximally and distally using the jig.  Satisfied length alignment rotation we then took final intraoperative fluoroscopy films.  I moved our attention to the left wrist.          A standard dorsal approach was used for the distal radius.  I marked out using the bridge plate as a guide on the   Second metacarpal using XR to guide my reduction with traction.      I then used a 15 blade to make the incision over the area marked out over the dorsal bridge plate.  The bony dissected down to the metacarpal and to the radius.  I then slid the correct size dorsal bridge plate in the place beneath the tendon structures.  All neurovascular structures protected.  We then completed the reduction and I locked the proximal aspect of the plate using a nonlocking screw.  Following that I then pulled traction and completed the reduction to the correct metacarpal as stated above and place a combination of locking and nonlocking screws.          Final C-arm images were obtained and saved to the [employer] system.  The 3-part intra-articular fracture was reduced and held nicely.  No additional implants were needed for stabilization of the distal radius.    The incisions were then irrigated using copious sterile saline and then closed in layered fashion.  The surgical sites were infiltrated using local anesthetic, and the arm was sterilely cleansed and dressed.    The  patient was then subsequently extubated and transferred to to PACU in a stable condition.    All sponge and needle counts were correct at the end of the case.  I was present and participated in all aspects of the procedure.    Prognosis:  We will keep the patient NWB/ROMAT LUE. PT MAY USE LUE FOR WALKER.  Distal radius plate we will need to be removed at 3 months to avoid nicanor implant fracture. .  The patient can do ROM of the fingers and elbow as tolerated.        This note/OR report was created with the assistance of  voice recognition software or phone  dictation.  There may be transcription errors as a result of using this technology however minimal. Effort has been made to assure accuracy of transcription but any obvious errors or omissions should be clarified with the author of the document.       Kraig Malcolm, DO  Orthopedic Trauma Surgery

## 2024-04-18 NOTE — H&P
[facility] - Emergency Dept  Trauma  History & Physical    Patient Name: Kaleb Ibarra  MRN: [id number]  Admission Date: 4/18/2024  Attending Physician: Rick Pelaez MD   Primary Care Provider: Jossue Martinez MD    Patient information was obtained from patient, past medical records, and ER records.     Subjective:     Chief Complaint/Reason for Admission: FFS    History of Present Illness: 89-year-old male trip and fall at home 2 days ago.  States lives alone.  Denies any past medical history or any current medications.  A thorough chart review reveals patient has a history of dementia although I do not believe he was information he was incorrect and report to his history.  It does appear that he has had orthopedic injuries requiring intervention in the past that was some concern for malignancy although does not appear he has been anything.  He was worked up for multiple myeloma.  He was done cleared workup complete.  Based on the notes it appears he may still do at home.  He was no family available currently.  Previous notes state that he has a living will that states he was DNR, DNI, comfort care only.  He will be a full code until we have family to provide those documents.  Complains of left wrist pain.  He initially denied left shoulder pain to me although he would complained of left shoulder pain to the emergency department.  He does have some mild tenderness there.  He was GCS 15 and hemodynamically stable.  He was left arrest has been reduced and splinted and Orthopedics as aware and plans for surgical intervention today.  He was a CT of his wrist and shoulder pending.  He was CT of his head and neck were negative.  He was cervical collar clear.  His labs have been reviewed.    Current Facility-Administered Medications   Medication Dose Route Frequency Provider Last Rate Last Admin    acetaminophen tablet 650 mg  650 mg Oral Q4H Santhosh Busby, LEANDRA        docusate sodium  capsule 100 mg  100 mg Oral BID Santhosh Busby, FNP        enoxaparin injection 40 mg  40 mg Subcutaneous Q12H Santhosh Busby, FNP        gabapentin capsule 300 mg  300 mg Oral TID Santhosh Busby, FNP        lactated ringers infusion   Intravenous Continuous Santhosh Busby, FNP        magnesium hydroxide 400 mg/5 ml suspension 2,400 mg  30 mL Oral Daily PRN Santhosh Busby, FNP        melatonin tablet 6 mg  6 mg Oral Nightly PRN Santhosh Busby, FNP        methocarbamoL tablet 500 mg  500 mg Oral Q8H Santhosh Busby, FNP        oxyCODONE immediate release tablet 5 mg  5 mg Oral Q4H PRN Santhosh Busby, FNP        oxyCODONE immediate release tablet Tab 10 mg  10 mg Oral Q4H PRN Santhosh Busby, FNP        polyethylene glycol packet 17 g  17 g Oral BID Santhosh Busby, FNP         No current outpatient medications on file.       Review of patient's allergies indicates:  No Known Allergies    Past Medical History:   Diagnosis Date    Cancer     prostate    Compression fracture of body of thoracic vertebra     Rhabdomyolysis 2/8/2023     Past Surgical History:   Procedure Laterality Date    PROSTATE SURGERY N/A      Family History       Problem Relation (Age of Onset)    Diabetes Brother          Tobacco Use    Smoking status: Never     Passive exposure: Never    Smokeless tobacco: Never   Substance and Sexual Activity    Alcohol use: Yes     Alcohol/week: 2.0 standard drinks of alcohol     Types: 2 Cans of beer per week     Comment: socially    Drug use: Not Currently    Sexual activity: Not Currently     Review of Systems   Constitutional:  Negative for chills and fever.   HENT:  Negative for ear pain and trouble swallowing.    Eyes:  Negative for pain and redness.   Respiratory:  Negative for cough and chest tightness.    Cardiovascular:  Negative for chest pain, palpitations and leg swelling.   Gastrointestinal:  Negative for abdominal distention, abdominal pain, nausea  and vomiting.   Genitourinary:  Negative for difficulty urinating.   Musculoskeletal:  Positive for arthralgias, joint swelling and myalgias. Negative for back pain and neck pain.   Skin:  Negative for color change, pallor and wound.   Neurological:  Negative for dizziness, syncope, speech difficulty, weakness, light-headedness, numbness and headaches.   Psychiatric/Behavioral:  Negative for agitation and suicidal ideas.    All other systems reviewed and are negative.    Objective:     Vital Signs (Most Recent):  Temp: 98.3 °F (36.8 °C) (04/18/24 0250)  Pulse: 79 (04/18/24 0616)  Resp: 14 (04/18/24 0610)  BP: 116/79 (04/18/24 0616)  SpO2: 100 % (04/18/24 0616) Vital Signs (24h Range):  Temp:  [98.3 °F (36.8 °C)] 98.3 °F (36.8 °C)  Pulse:  [66-90] 79  Resp:  [13-21] 14  SpO2:  [91 %-100 %] 100 %  BP: (101-126)/(66-89) 116/79     Weight: 63.5 kg (140 lb)  Body mass index is 19.53 kg/m².     Physical Exam  Constitutional:       Appearance: Normal appearance.   HENT:      Head: Normocephalic and atraumatic.      Nose: Nose normal.   Eyes:      Pupils: Pupils are equal, round, and reactive to light.   Cardiovascular:      Rate and Rhythm: Normal rate.      Pulses: Normal pulses.      Comments: Normal peripheral pulses  Pulmonary:      Effort: Pulmonary effort is normal. No respiratory distress.   Chest:      Chest wall: No tenderness.   Abdominal:      General: Abdomen is flat. Bowel sounds are normal. There is no distension.      Palpations: Abdomen is soft.      Tenderness: There is no abdominal tenderness.   Musculoskeletal:         General: Swelling, tenderness, deformity and signs of injury present.      Cervical back: Normal range of motion and neck supple. No tenderness.   Skin:     General: Skin is warm and dry.      Capillary Refill: Capillary refill takes less than 2 seconds.      Findings: No lesion.   Neurological:      General: No focal deficit present.      Mental Status: He is alert and oriented to person,  place, and time. Mental status is at baseline.   Psychiatric:         Mood and Affect: Mood normal.         Behavior: Behavior normal.         Thought Content: Thought content normal.         Judgment: Judgment normal.            I have reviewed all pertinent lab results within the past 24 hours.    Significant Diagnostics:  I have reviewed all pertinent imaging results/findings within the past 24 hours.    Assessment/Plan:     * Left wrist fracture, closed, initial encounter  Admitted to floor  NPO  [facility]  We will need physical therapy and occupational therapy  Nonweightbearing left upper extremity   Q.4 neurovascular checks left upper extremity   Follow up CT of shoulder and wrist   Follow up orthopedic recommendations   IV fluid  Obtain more thorough medical history from daughter or family  Obtain accurate code status from family      VTE Risk Mitigation (From admission, onward)           Ordered     enoxaparin injection 40 mg  Every 12 hours         04/18/24 0655     IP VTE HIGH RISK PATIENT  Once         04/18/24 0655     Place sequential compression device  Until discontinued         04/18/24 0655                    LEANDRA Lares  Trauma  [facility] - Emergency Dept

## 2024-04-18 NOTE — HPI
89-year-old male trip and fall at home 2 days ago.  States lives alone.  Denies any past medical history or any current medications.  A thorough chart review reveals patient has a history of dementia although I do not believe he was information he was incorrect and report to his history.  It does appear that he has had orthopedic injuries requiring intervention in the past that was some concern for malignancy although does not appear he has been anything.  He was worked up for multiple myeloma.  He was done cleared workup complete.  Based on the notes it appears he may still do at home.  He was no family available currently.  Previous notes state that he has a living will that states he was DNR, DNI, comfort care only.  He will be a full code until we have family to provide those documents.  Complains of left wrist pain.  He initially denied left shoulder pain to me although he would complained of left shoulder pain to the emergency department.  He does have some mild tenderness there.  He was GCS 15 and hemodynamically stable.  He was left arrest has been reduced and splinted and Orthopedics as aware and plans for surgical intervention today.  He was a CT of his wrist and shoulder pending.  He was CT of his head and neck were negative.  He was cervical collar clear.  His labs have been reviewed.

## 2024-04-18 NOTE — ANESTHESIA PROCEDURE NOTES
Intubation    Date/Time: 4/18/2024 3:25 PM    Performed by: Joni Sheikh CRNA  Authorized by: Zay Pleitez MD    Intubation:     Induction:  Intravenous    Intubated:  Postinduction    Mask Ventilation:  Easy mask    Attempts:  1    Attempted By:  CRNA    Method of Intubation:  Direct    Blade:  Ponce 2    Laryngeal View Grade: Grade I - full view of cords      Difficult Airway Encountered?: No      Complications:  None    Airway Device:  Oral endotracheal tube    Airway Device Size:  7.5    Style/Cuff Inflation:  Cuffed (inflated to minimal occlusive pressure) (25 cm H2O)    Inflation Amount (mL):  8    Tube secured:  23    Secured at:  The lips (w/ tape)    Placement Verified By:  Capnometry    Complicating Factors:  None    Findings Post-Intubation:  BS equal bilateral and atraumatic/condition of teeth unchanged

## 2024-04-18 NOTE — ASSESSMENT & PLAN NOTE
Admitted to floor  NPO  Lovenox b.i.d.  We will need physical therapy and occupational therapy  Nonweightbearing left upper extremity   Q.4 neurovascular checks left upper extremity   Follow up CT of shoulder and wrist   Follow up orthopedic recommendations   IV fluid  Obtain more thorough medical history from daughter or family  Obtain accurate code status from family

## 2024-04-18 NOTE — ANESTHESIA PREPROCEDURE EVALUATION
04/18/2024  Kaleb Ibarra is a 89 y.o., male.  history of dementia, prostate CA, rhabdomyolysis, and compression fracture of thoracic vertebra presents to ED via EMS from [facility] secondary to a ground level fall. Denies LOC and blood thinners. Pt states he does not remember the event. He endorses left wrist pain and swelling, reports increased pain with movement of digits. C-collar placed en route.       Pre-op Assessment    I have reviewed the Patient Summary Reports.     I have reviewed the Nursing Notes. I have reviewed the NPO Status.   I have reviewed the Medications.     Review of Systems         Anesthesia Plan  Type of Anesthesia, risks & benefits discussed:    Anesthesia Type: Gen ETT  Intra-op Monitoring Plan: Standard ASA Monitors  Post Op Pain Control Plan: multimodal analgesia and peripheral nerve block  Induction:  IV  Airway Plan: Direct, Post-Induction  Informed Consent: Informed consent signed with the Patient and all parties understand the risks and agree with anesthesia plan.  All questions answered. Patient consented to blood products? Yes  ASA Score: 3  Day of Surgery Review of History & Physical: H&P Update referred to the surgeon/provider.  Anesthesia Plan Notes: No blood thinners to preclude supraclavicular block.    Ready For Surgery From Anesthesia Perspective.     .  Consider precedex if preop sedation required for block

## 2024-04-18 NOTE — PT/OT/SLP PROGRESS
SLP attempting clinical swallowing evaluation, however pt NPO for possible sx. SLP to continue to follow and treat as appropriate.

## 2024-04-18 NOTE — ANESTHESIA PROCEDURE NOTES
Peripheral Block    Patient location during procedure: pre-op   Block not for primary anesthetic.  Reason for block: at surgeon's request and post-op pain management   Post-op Pain Location: Left arm   Start time: 4/18/2024 2:47 PM  Timeout: 4/18/2024 2:47 PM   End time: 4/18/2024 2:48 PM    Staffing  Authorizing Provider: Rhoda Ewing MD  Performing Provider: Rhoda Ewing MD    Staffing  Performed by: Rhoda Ewing MD  Authorized by: Rhoda Ewing MD    Preanesthetic Checklist  Completed: patient identified, IV checked, site marked, risks and benefits discussed, surgical consent, monitors and equipment checked, pre-op evaluation and timeout performed  Peripheral Block  Patient position: supine  Prep: ChloraPrep  Patient monitoring: heart rate, cardiac monitor, continuous pulse ox, continuous capnometry and frequent blood pressure checks  Block type: supraclavicular  Laterality: left  Injection technique: single shot  Needle  Needle type: Stimuplex   Needle gauge: 22 G  Needle length: 2 in  Needle localization: anatomical landmarks and ultrasound guidance   -ultrasound image captured on disc.  Assessment  Injection assessment: negative aspiration, negative parasthesia and local visualized surrounding nerve  Paresthesia pain: none  Heart rate change: no  Slow fractionated injection: yes    Medications:    Medications: ropivacaine (NAROPIN) injection 0.5% - Perineural   30 mL - 4/18/2024 2:48:00 PM    Additional Notes  VSS.  [facility] RN monitoring vitals throughout procedure.  Patient tolerated procedure well.

## 2024-04-19 LAB
ALBUMIN SERPL-MCNC: 3 G/DL (ref 3.4–4.8)
ALBUMIN/GLOB SERPL: 0.8 RATIO (ref 1.1–2)
ALP SERPL-CCNC: 85 UNIT/L (ref 40–150)
ALT SERPL-CCNC: 13 UNIT/L (ref 0–55)
AST SERPL-CCNC: 35 UNIT/L (ref 5–34)
BASOPHILS # BLD AUTO: 0.02 X10(3)/MCL
BASOPHILS NFR BLD AUTO: 0.2 %
BILIRUB SERPL-MCNC: 0.6 MG/DL
BUN SERPL-MCNC: 13.2 MG/DL (ref 8.4–25.7)
CALCIUM SERPL-MCNC: 8.7 MG/DL (ref 8.8–10)
CHLORIDE SERPL-SCNC: 104 MMOL/L (ref 98–107)
CO2 SERPL-SCNC: 23 MMOL/L (ref 23–31)
CREAT SERPL-MCNC: 0.75 MG/DL (ref 0.73–1.18)
EOSINOPHIL # BLD AUTO: 0.18 X10(3)/MCL (ref 0–0.9)
EOSINOPHIL NFR BLD AUTO: 2.2 %
ERYTHROCYTE [DISTWIDTH] IN BLOOD BY AUTOMATED COUNT: 13.5 % (ref 11.5–17)
GFR SERPLBLD CREATININE-BSD FMLA CKD-EPI: >60 MLS/MIN/1.73/M2
GLOBULIN SER-MCNC: 4 GM/DL (ref 2.4–3.5)
GLUCOSE SERPL-MCNC: 108 MG/DL (ref 82–115)
HCT VFR BLD AUTO: 34.5 % (ref 42–52)
HGB BLD-MCNC: 11.5 G/DL (ref 14–18)
IMM GRANULOCYTES # BLD AUTO: 0.03 X10(3)/MCL (ref 0–0.04)
IMM GRANULOCYTES NFR BLD AUTO: 0.4 %
LYMPHOCYTES # BLD AUTO: 1.22 X10(3)/MCL (ref 0.6–4.6)
LYMPHOCYTES NFR BLD AUTO: 14.8 %
MAGNESIUM SERPL-MCNC: 1.8 MG/DL (ref 1.6–2.6)
MCH RBC QN AUTO: 31.8 PG (ref 27–31)
MCHC RBC AUTO-ENTMCNC: 33.3 G/DL (ref 33–36)
MCV RBC AUTO: 95.3 FL (ref 80–94)
MONOCYTES # BLD AUTO: 1 X10(3)/MCL (ref 0.1–1.3)
MONOCYTES NFR BLD AUTO: 12.1 %
NEUTROPHILS # BLD AUTO: 5.79 X10(3)/MCL (ref 2.1–9.2)
NEUTROPHILS NFR BLD AUTO: 70.3 %
NRBC BLD AUTO-RTO: 0 %
PHOSPHATE SERPL-MCNC: 2.7 MG/DL (ref 2.3–4.7)
PLATELET # BLD AUTO: 104 X10(3)/MCL (ref 130–400)
PLATELETS.RETICULATED NFR BLD AUTO: 5.6 % (ref 0.9–11.2)
PMV BLD AUTO: 11.5 FL (ref 7.4–10.4)
POCT GLUCOSE: 102 MG/DL (ref 70–110)
POTASSIUM SERPL-SCNC: 4.7 MMOL/L (ref 3.5–5.1)
PROT SERPL-MCNC: 7 GM/DL (ref 5.8–7.6)
RBC # BLD AUTO: 3.62 X10(6)/MCL (ref 4.7–6.1)
SODIUM SERPL-SCNC: 138 MMOL/L (ref 136–145)
WBC # SPEC AUTO: 8.24 X10(3)/MCL (ref 4.5–11.5)

## 2024-04-19 PROCEDURE — 25500020 PHARM REV CODE 255: Performed by: SURGERY

## 2024-04-19 PROCEDURE — 80053 COMPREHEN METABOLIC PANEL: CPT | Performed by: NURSE PRACTITIONER

## 2024-04-19 PROCEDURE — 63600175 PHARM REV CODE 636 W HCPCS: Performed by: PHYSICIAN ASSISTANT

## 2024-04-19 PROCEDURE — 92610 EVALUATE SWALLOWING FUNCTION: CPT

## 2024-04-19 PROCEDURE — 63600175 PHARM REV CODE 636 W HCPCS: Performed by: NURSE PRACTITIONER

## 2024-04-19 PROCEDURE — 84100 ASSAY OF PHOSPHORUS: CPT | Performed by: NURSE PRACTITIONER

## 2024-04-19 PROCEDURE — 97162 PT EVAL MOD COMPLEX 30 MIN: CPT

## 2024-04-19 PROCEDURE — 63600175 PHARM REV CODE 636 W HCPCS

## 2024-04-19 PROCEDURE — A9698 NON-RAD CONTRAST MATERIALNOC: HCPCS | Performed by: SURGERY

## 2024-04-19 PROCEDURE — 85025 COMPLETE CBC W/AUTO DIFF WBC: CPT | Performed by: NURSE PRACTITIONER

## 2024-04-19 PROCEDURE — 11000001 HC ACUTE MED/SURG PRIVATE ROOM

## 2024-04-19 PROCEDURE — 97530 THERAPEUTIC ACTIVITIES: CPT

## 2024-04-19 PROCEDURE — 97166 OT EVAL MOD COMPLEX 45 MIN: CPT

## 2024-04-19 PROCEDURE — 51798 US URINE CAPACITY MEASURE: CPT

## 2024-04-19 PROCEDURE — 25000003 PHARM REV CODE 250: Performed by: NURSE PRACTITIONER

## 2024-04-19 PROCEDURE — 83735 ASSAY OF MAGNESIUM: CPT | Performed by: NURSE PRACTITIONER

## 2024-04-19 PROCEDURE — 92611 MOTION FLUOROSCOPY/SWALLOW: CPT

## 2024-04-19 PROCEDURE — 21400001 HC TELEMETRY ROOM

## 2024-04-19 RX ORDER — SODIUM CHLORIDE, SODIUM LACTATE, POTASSIUM CHLORIDE, CALCIUM CHLORIDE 600; 310; 30; 20 MG/100ML; MG/100ML; MG/100ML; MG/100ML
INJECTION, SOLUTION INTRAVENOUS CONTINUOUS
Status: DISCONTINUED | OUTPATIENT
Start: 2024-04-19 | End: 2024-04-22 | Stop reason: HOSPADM

## 2024-04-19 RX ORDER — TAMSULOSIN HYDROCHLORIDE 0.4 MG/1
0.4 CAPSULE ORAL DAILY
Status: DISCONTINUED | OUTPATIENT
Start: 2024-04-19 | End: 2024-04-22 | Stop reason: HOSPADM

## 2024-04-19 RX ADMIN — MORPHINE SULFATE 4 MG: 10 INJECTION INTRAVENOUS at 06:04

## 2024-04-19 RX ADMIN — CEFAZOLIN SODIUM 2 G: 2 SOLUTION INTRAVENOUS at 01:04

## 2024-04-19 RX ADMIN — SODIUM CHLORIDE, POTASSIUM CHLORIDE, SODIUM LACTATE AND CALCIUM CHLORIDE: 600; 310; 30; 20 INJECTION, SOLUTION INTRAVENOUS at 06:04

## 2024-04-19 RX ADMIN — OXYCODONE HYDROCHLORIDE 10 MG: 10 TABLET ORAL at 10:04

## 2024-04-19 RX ADMIN — BARIUM SULFATE 10 ML: 0.81 POWDER, FOR SUSPENSION ORAL at 09:04

## 2024-04-19 RX ADMIN — DOCUSATE SODIUM 100 MG: 100 CAPSULE, LIQUID FILLED ORAL at 09:04

## 2024-04-19 RX ADMIN — SODIUM CHLORIDE, POTASSIUM CHLORIDE, SODIUM LACTATE AND CALCIUM CHLORIDE: 600; 310; 30; 20 INJECTION, SOLUTION INTRAVENOUS at 05:04

## 2024-04-19 RX ADMIN — ENOXAPARIN SODIUM 40 MG: 40 INJECTION SUBCUTANEOUS at 09:04

## 2024-04-19 RX ADMIN — METHOCARBAMOL 500 MG: 500 TABLET ORAL at 09:04

## 2024-04-19 RX ADMIN — GABAPENTIN 300 MG: 300 CAPSULE ORAL at 08:04

## 2024-04-19 RX ADMIN — CEFAZOLIN SODIUM 2 G: 2 SOLUTION INTRAVENOUS at 08:04

## 2024-04-19 RX ADMIN — DOCUSATE SODIUM 100 MG: 100 CAPSULE, LIQUID FILLED ORAL at 08:04

## 2024-04-19 RX ADMIN — ENOXAPARIN SODIUM 40 MG: 40 INJECTION SUBCUTANEOUS at 08:04

## 2024-04-19 RX ADMIN — ACETAMINOPHEN 650 MG: 325 TABLET, FILM COATED ORAL at 08:04

## 2024-04-19 RX ADMIN — ACETAMINOPHEN 650 MG: 325 TABLET, FILM COATED ORAL at 09:04

## 2024-04-19 RX ADMIN — GABAPENTIN 300 MG: 300 CAPSULE ORAL at 09:04

## 2024-04-19 RX ADMIN — POLYETHYLENE GLYCOL 3350 17 G: 17 POWDER, FOR SOLUTION ORAL at 09:04

## 2024-04-19 RX ADMIN — METHOCARBAMOL 500 MG: 100 INJECTION, SOLUTION INTRAMUSCULAR; INTRAVENOUS at 01:04

## 2024-04-19 NOTE — PLAN OF CARE
Problem: Adult Inpatient Plan of Care  Goal: Plan of Care Review  Outcome: Ongoing, Progressing  Flowsheets (Taken 4/18/2024 2135)  Plan of Care Reviewed With: daughter  Goal: Patient-Specific Goal (Individualized)  Outcome: Ongoing, Progressing  Flowsheets (Taken 4/18/2024 2135)  Individualized Care Needs: mental status  Goal: Absence of Hospital-Acquired Illness or Injury  Outcome: Ongoing, Progressing  Intervention: Identify and Manage Fall Risk  Flowsheets (Taken 4/18/2024 2135)  Safety Promotion/Fall Prevention:   assistive device/personal item within reach   bed alarm set   lighting adjusted   medications reviewed   high risk medications identified   side rails raised x 3  Intervention: Prevent Skin Injury  Flowsheets (Taken 4/18/2024 2135)  Body Position:   weight shifting   lower extremity elevated  Skin Protection:   incontinence pads utilized   tubing/devices free from skin contact  Intervention: Prevent and Manage VTE (Venous Thromboembolism) Risk  Flowsheets (Taken 4/18/2024 2135)  VTE Prevention/Management:   dorsiflexion/plantar flexion performed   intravenous hydration  Goal: Optimal Comfort and Wellbeing  Outcome: Ongoing, Progressing  Intervention: Monitor Pain and Promote Comfort  Flowsheets (Taken 4/18/2024 2135)  Pain Management Interventions:   around-the-clock dosing utilized   pillow support provided   relaxation techniques promoted   quiet environment facilitated   position adjusted  Intervention: Provide Person-Centered Care  Flowsheets (Taken 4/18/2024 2135)  Trust Relationship/Rapport: care explained  Goal: Readiness for Transition of Care  Outcome: Ongoing, Progressing     Problem: Fall Injury Risk  Goal: Absence of Fall and Fall-Related Injury  Outcome: Ongoing, Progressing  Intervention: Identify and Manage Contributors  Flowsheets (Taken 4/18/2024 2135)  Medication Review/Management:   medications reviewed   high-risk medications identified  Intervention: Promote Injury-Free  Environment  Flowsheets (Taken 4/18/2024 2135)  Safety Promotion/Fall Prevention:   assistive device/personal item within reach   bed alarm set   lighting adjusted   medications reviewed   high risk medications identified   side rails raised x 3     Problem: Skin Injury Risk Increased  Goal: Skin Health and Integrity  Intervention: Optimize Skin Protection  Flowsheets (Taken 4/18/2024 2135)  Pressure Reduction Techniques:   heels elevated off bed   weight shift assistance provided  Pressure Reduction Devices:   positioning supports utilized   heel offloading device utilized  Skin Protection:   incontinence pads utilized   tubing/devices free from skin contact  Head of Bed (HOB) Positioning: HOB at 30-45 degrees

## 2024-04-19 NOTE — PLAN OF CARE
Problem: Physical Therapy  Goal: Physical Therapy Goal  Description: Goals to be met by: 24     Patient will increase functional independence with mobility by performin. Supine to sit with Stand-by Assistance  2. Sit to supine with Stand-by Assistance  3. Sit to stand transfer with Stand-by Assistance  4. Bed to chair transfer with Stand-by Assistance using Rolling Walker vs LRAD  5. Gait  x 25 feet with Stand-by Assistance using Rolling Walker vs LRAD.     Outcome: Ongoing, Progressing

## 2024-04-19 NOTE — PLAN OF CARE
04/19/24 1039   Discharge Assessment   Assessment Type Discharge Planning Assessment   Source of Information facility verbal report   Communicated MAUREEN with patient/caregiver Date not available/Unable to determine   Reason For Admission Fall   People in Home facility resident   Facility Arrived From: [facility]   Do you expect to return to your current living situation? Yes   Do you have help at home or someone to help you manage your care at home? Yes   Who are your caregiver(s) and their phone number(s)? NH staff   Prior to hospitilization cognitive status: Unable to Assess   Current cognitive status: Alert/Oriented   Walking or Climbing Stairs Difficulty yes   Walking or Climbing Stairs ambulation difficulty, requires equipment   Mobility Management RW   Dressing/Bathing Difficulty yes   Dressing/Bathing bathing difficulty, assistance 1 person   Dressing/Bathing Management Staff assist   Home Accessibility wheelchair accessible   Home Layout Able to live on 1st floor   Equipment Currently Used at Home walker, rolling   Patient currently being followed by outpatient case management? No   Do you currently have service(s) that help you manage your care at home? No   Do you take prescription medications? Yes   Do you have prescription coverage? Yes   Coverage Medicare   Who is going to help you get home at discharge? NH van   How do you get to doctors appointments? other (see comments)  (NH transport van)   Are you on dialysis? No   Discharge Plan A Return to nursing home   Discharge Plan B Return to Nursing Home   DME Needed Upon Discharge  none   Discharge Plan discussed with: Caregiver   Name(s) and Number(s) [phone]   Transition of Care Barriers None   OTHER   Name(s) of People in Home Facility resident     Received report from nurse who cares for patient at [facility]. She informs  patient ambulates with a RW and does not have oxygen needs at facility. Also informs  staff assist with bathing  to ensure task is completed, as patient does not like to shower. Patient takes meds whole at facility, medical director is Ralph Olson. Nurse will fax med rec to nurse's station.     Attempted to contact daughter, number is her work phone and she is not at work today. Was provided with an email address: [email or web address]. Sent email to inform her of admission.

## 2024-04-19 NOTE — PT/OT/SLP EVAL
"Occupational Therapy  Evaluation    Name: Kaleb Ibarra  MRN: [id number]  Admitting Diagnosis: GLF: L distal radius fx s/p ORIF, L humerus fx s/p ORIF  Hx:dementia, severe osteoporosis  Recent Surgery: Procedure(s) (LRB):  ORIF, FRACTURE, RADIUS, DISTAL (Left)  ORIF, FRACTURE, HUMERUS, PROXIMAL 1 Day Post-Op    Recommendations:     Discharge therapy intensity: Moderate Intensity Therapy   Discharge Equipment Recommendations:  to be determined by next level of care  Barriers to discharge:  Other (Comment) (Severity of deficits)    Assessment:     Kaleb Ibarra is a 89 y.o. male with a medical diagnosis of GLF: L distal radius fx s/p ORIF, L humerus fx s/p ORIF Hx:dementia, severe osteoporosis. Pt not oriented to place or situation during evaluation and did not appear to be a reliable historian. Per chart, pt admitted from [facility]; however, pt reported he was living at home [facility] prior to admit. He presents with the following performance deficits affecting function: weakness, impaired endurance, impaired cognition, orthopedic precautions, impaired self care skills, decreased upper extremity function, impaired functional mobility, gait instability, decreased safety awareness, impaired balance, pain. Assessment of LUE limited d/t pt guarding and refusing therapist to touch UE stating "leave it alone". Pt required Total A for UB/LB dressing and Mod A to stand. Pt unable to take steps d/t reported pain in L hip- RN notified. Recommend moderate intensity therapy at d/c.     Rehab Prognosis: Fair; patient would benefit from acute skilled OT services to address these deficits and reach maximum level of function.       Plan:     Patient to be seen 5 x/week to address the above listed problems via self-care/home management, therapeutic activities, therapeutic exercises  Plan of Care Expires: 05/17/24  Plan of Care Reviewed with: patient    Subjective     Chief Complaint: Pain in LUE/LLE   Patient/Family " "Comments/goals: To go home     Occupational Profile:  Pt c hx of dementia and did not appear to be a reliable historian. Per chart, pt admitted from [facility]; however, pt reported he was living at home [facility] prior to admit.   Equipment Used at Home:  Alvino walker noted in room; however, pt denied use and stated he uses "something like that".     Pain/Comfort:  Pain Rating 1: 8/10  Location 1:  (L hip/LUE)  Pain Addressed 1: Reposition, Distraction, Nurse notified    Patients cultural, spiritual, Episcopal conflicts given the current situation: no    Objective:     OT communicated with RN prior to session.      Patient was found sitting edge of bed with PT and peripheral IV upon OT entry to room.    General Precautions: Standard, fall  Orthopedic Precautions: LUE non weight bearing (OK to use RW, ROMAT elbow and digits)  Braces: UE Sling      Bed Mobility:    Patient completed Scooting/Bridging with maximal assistance  Patient completed Sit to Supine with maximal assistance    Functional Mobility/Transfers:  Patient completed Sit <> Stand Transfer with moderate assistance  with  hemiwalker   Functional Mobility: Pt unable to take step 2/2 pain in L hip.    Activities of Daily Living:  Upper Body Dressing: total assistance doff/don sling   Lower Body Dressing: total assistance    Toileting: total assistance        Functional Cognition:  Orientation: oriented to Person and Time. Pt not oriented to place, city, or situation.     Upper Extremity Function:  Right Upper Extremity:   WFL    Left Upper Extremity:  Assessment of LUE limited d/t pt guarding and refusing therapist to touch UE stating "leave it alone".   Pt tolerated full passive extension/flexion of digits. Unable to assess elbow d/t pt refusing.     Therapeutic Positioning  Risk for acquired pressure injuries is increased due to impaired mobility.    OT interventions performed during the course of today's session:   Therapeutic positioning was provided " at the conclusion of session to offload all bony prominences for the prevention and/or reduction of pressure injuries    Skin assessment: all bony prominences were assessed    Findings: no redness or breakdown noted    OT recommendations for therapeutic positioning throughout hospitalization:   Follow [facility] Pressure Injury Prevention Protocol    Patient Education:  Patient provided with verbal education education regarding OT role/goals/POC and post op precautions.  Understanding was verbalized.     Patient left HOB elevated with all lines intact, call button in reach, and pressure relief boots.    GOALS:   Multidisciplinary Problems       Occupational Therapy Goals          Problem: Occupational Therapy    Goal Priority Disciplines Outcome Interventions   Occupational Therapy Goal     OT, PT/OT Ongoing, Progressing    Description: Goals to be met by 5/17/24:    Pt will complete grooming standing at sink with LRAD with CGA.  Pt will complete UB dressing with Min A.  Pt will complete LB dressing with Min A using LRAD.  Pt will complete toileting with Min A using LRAD.  Pt will complete functional mobility to/from toilet and toilet transfer with CGA using LRAD.                        History:     Past Medical History:   Diagnosis Date    Cancer     prostate    Compression fracture of body of thoracic vertebra     Rhabdomyolysis 2/8/2023         Past Surgical History:   Procedure Laterality Date    OPEN REDUCTION AND INTERNAL FIXATION (ORIF) OF FRACTURE OF DISTAL RADIUS Left 4/18/2024    Procedure: ORIF, FRACTURE, RADIUS, DISTAL;  Surgeon: Kraig Malcolm DO;  Location: [facility];  Service: Orthopedics;  Laterality: Left;  supine hand table c arm skeletal dynamics.    OPEN REDUCTION AND INTERNAL FIXATION (ORIF) OF FRACTURE OF PROXIMAL HUMERUS  4/18/2024    Procedure: ORIF, FRACTURE, HUMERUS, PROXIMAL;  Surgeon: Kraig Malcolm DO;  Location: [facility];  Service: Orthopedics;;    PROSTATE SURGERY N/A        Time Tracking:      OT Date of Treatment: 04/19/24  OT Start Time: 1010  OT Stop Time: 1029  OT Total Time (min): 19 min    Billable Minutes:Evaluation Moderate complexity     4/19/2024

## 2024-04-19 NOTE — PLAN OF CARE
Problem: Occupational Therapy  Goal: Occupational Therapy Goal  Description: Goals to be met by 5/17/24:    Pt will complete grooming standing at sink with LRAD with CGA.  Pt will complete UB dressing with Min A.  Pt will complete LB dressing with Min A using LRAD.  Pt will complete toileting with Min A using LRAD.  Pt will complete functional mobility to/from toilet and toilet transfer with CGA using LRAD.   Outcome: Ongoing, Progressing

## 2024-04-19 NOTE — PT/OT/SLP EVAL
Physical Therapy Evaluation    Patient Name:  Kaleb Ibarra   MRN:  [id number]    Recommendations:     Discharge therapy intensity: Moderate Intensity Therapy   Discharge Equipment Recommendations: to be determined by next level of care   Barriers to discharge: Impaired mobility    Assessment:     Kaleb Ibarra is a 89 y.o. male admitted with a medical diagnosis of GLF: L distal radius fx s/p ORIF, L humerus fx s/p ORIF Hx:dementia, severe osteoporosis.  He presents with the following impairments/functional limitations: weakness, gait instability, impaired balance, impaired endurance, decreased safety awareness, impaired functional mobility. The pt tolerated session fair. The pt is a poor historian, and states that he lives at home independently. The pt's chart states that he lives at [facility] confirms this. The pt's mobility is limited 2/2 significant pain in R hip and R arm. The pt ws able to stand EOB with assistance, and would ultimately benefit from SNF placement upon dc.    Rehab Prognosis: Good; patient would benefit from acute skilled PT services to address these deficits and reach maximum level of function.    Recent Surgery: Procedure(s) (LRB):  ORIF, FRACTURE, RADIUS, DISTAL (Left)  ORIF, FRACTURE, HUMERUS, PROXIMAL 1 Day Post-Op    Plan:     During this hospitalization, patient to be seen 6 x/week to address the identified rehab impairments via gait training, therapeutic activities, therapeutic exercises and progress toward the following goals:    Plan of Care Expires:  05/17/24    Subjective     Chief Complaint: pain  Patient/Family Comments/goals: return to PLOF  Pain/Comfort:  Pain Rating 1: 8/10  Location 1: arm  Pain Addressed 1: Reposition, Nurse notified  Location - Side 2: Left  Location 2: hip  Pain Addressed 2: Reposition, Nurse notified    Patients cultural, spiritual, Latter-day conflicts given the current situation: no    Living Environment:   [place], NH staff  assistance.   Prior to admission, patients level of function was independent.  Equipment used at home: walker, donya.  DME owned (not currently used):  hemiwalker .  Upon discharge, patient will have assistance from NH staff.    Objective:     Communicated with NSG prior to session.  Patient found supine with peripheral IV, knight catheter  upon PT entry to room.    General Precautions: Standard, fall  Orthopedic Precautions:LUE non weight bearing (ok to use LUE for walker, ROMAT digits and elbow)   Braces: UE Sling  Respiratory Status: Room air  Blood Pressure: NA      Exams:  RLE ROM: WFL  RLE Strength: WFL  LLE ROM: limited AROM 2/2 pain  LLE Strength: NT- increased pain  Skin integrity: Visible skin intact      Functional Mobility:  Bed Mobility:     Supine to Sit: minimum assistance- increased time to perform tasks, pt does not want assistance.  Sit to Supine: maximal assistance  Transfers:     Sit to Stand:  moderate assistance with hemiwalker  Balance: pt sits EOB with SBA.    Patient provided with verbal education education regarding PT role/goals/POC, safety awareness, and discharge/DME recommendations.  Understanding was verbalized.     Patient left supine with all lines intact, call button in reach, and NSG notified.    GOALS:   Multidisciplinary Problems       Physical Therapy Goals          Problem: Physical Therapy    Goal Priority Disciplines Outcome Goal Variances Interventions   Physical Therapy Goal     PT, PT/OT Ongoing, Progressing     Description: Goals to be met by: 24     Patient will increase functional independence with mobility by performin. Supine to sit with Stand-by Assistance  2. Sit to supine with Stand-by Assistance  3. Sit to stand transfer with Stand-by Assistance  4. Bed to chair transfer with Stand-by Assistance using Rolling Walker vs LRAD  5. Gait  x 25 feet with Stand-by Assistance using Rolling Walker vs LRAD.                          History:     Past Medical  History:   Diagnosis Date    Cancer     prostate    Compression fracture of body of thoracic vertebra     Rhabdomyolysis 2/8/2023       Past Surgical History:   Procedure Laterality Date    OPEN REDUCTION AND INTERNAL FIXATION (ORIF) OF FRACTURE OF DISTAL RADIUS Left 4/18/2024    Procedure: ORIF, FRACTURE, RADIUS, DISTAL;  Surgeon: Kraig Malcolm DO;  Location: [facility];  Service: Orthopedics;  Laterality: Left;  supine hand table c arm skeletal dynamics.    OPEN REDUCTION AND INTERNAL FIXATION (ORIF) OF FRACTURE OF PROXIMAL HUMERUS  4/18/2024    Procedure: ORIF, FRACTURE, HUMERUS, PROXIMAL;  Surgeon: Kraig Malcolm DO;  Location: [facility];  Service: Orthopedics;;    PROSTATE SURGERY N/A        Time Tracking:     PT Received On: 04/19/24  PT Start Time: 0950     PT Stop Time: 1029  PT Total Time (min): 39 min     Billable Minutes: Evaluation 15, Activity 24      04/19/2024

## 2024-04-19 NOTE — ANESTHESIA POSTPROCEDURE EVALUATION
Anesthesia Post Evaluation    Patient: Kaleb Ibarra    Procedure(s) Performed: Procedure(s) (LRB):  ORIF, FRACTURE, RADIUS, DISTAL (Left)  ORIF, FRACTURE, HUMERUS, PROXIMAL    Final Anesthesia Type: general      Patient location during evaluation: PACU  Patient participation: Yes- Able to Participate  Level of consciousness: awake and alert and oriented  Post-procedure vital signs: reviewed and stable  Pain management: adequate  Airway patency: patent  BRIAN mitigation strategies: Use of major conduction anesthesia (spinal/epidural) or peripheral nerve block and Multimodal analgesia  PONV status at discharge: No PONV  Anesthetic complications: no      Cardiovascular status: hemodynamically stable  Respiratory status: unassisted  Hydration status: euvolemic  Follow-up not needed.  Comments: Stable peripheral blockade               Vitals Value Taken Time   /66 04/18/24 1842   Temp 36.8 04/18/24 1941   Pulse 88 04/18/24 1905   Resp 17 04/18/24 1733   SpO2 89 % 04/18/24 1905   Vitals shown include unfiled device data.      Event Time   Out of Recovery 17:30:00         Pain/Nini Score: Pain Rating Prior to Med Admin: 5 (4/18/2024  8:48 AM)  Nini Score: 10 (4/18/2024  6:17 AM)

## 2024-04-19 NOTE — PROGRESS NOTES
[facility]  Progress Note    Patient Name: Kaleb Ibarra  MRN: [id number]  Admission Date: 4/18/2024  Hospital Length of Stay: 1 days  Attending Provider: Rick Pelaez MD  Primary Care Provider: Jossue Martinez MD  Follow-up For: Procedure(s) (LRB):  ORIF, FRACTURE, RADIUS, DISTAL (Left)  ORIF, FRACTURE, HUMERUS, PROXIMAL    Post-Operative Day: 1 Day Post-Op  Subjective:     Principal Problem:Left wrist fracture, closed, initial encounter    Principal Orthopedic Problem: 1 Day Post-Op   S/P bridge plating left distad radius and IMN left Humerus.     Interval History: Seen this am, calm and participatory. Confused, tells me "this is my house" Endorsing pain to LUE. NVI.     Review of patient's allergies indicates:  No Known Allergies    Current Facility-Administered Medications   Medication Dose Route Frequency Provider Last Rate Last Admin    acetaminophen tablet 650 mg  650 mg Oral Q4H Santhosh Busby, FNP   650 mg at 04/19/24 0840    docusate sodium capsule 100 mg  100 mg Oral BID Satnhosh Busby, FNP   100 mg at 04/19/24 0840    enoxaparin injection 40 mg  40 mg Subcutaneous Q12H Santhosh Busby, FNP   40 mg at 04/19/24 0840    gabapentin capsule 300 mg  300 mg Oral TID Santhosh Busby, FNP   300 mg at 04/19/24 0840    lactated ringers infusion   Intravenous Continuous NateCitlalli  mL/hr at 04/19/24 0840 Rate Change at 04/19/24 0840    magnesium hydroxide 400 mg/5 ml suspension 2,400 mg  30 mL Oral Daily PRN Santhosh Busby, FNP        melatonin tablet 6 mg  6 mg Oral Nightly PRN Santhosh Busby, FNP        methocarbamoL tablet 500 mg  500 mg Oral Q8H Santhosh Busby, FNP        morphine injection 4 mg  4 mg Intravenous Q6H PRN Bridget Bennett PA-MATTHEW   4 mg at 04/19/24 0634    ondansetron injection 4 mg  4 mg Intravenous Q6H PRN Bridget Bennett PA-MATTHEW        oxyCODONE immediate release tablet 5 mg  5 mg Oral Q4H " "PRN Santhosh Busyb, LEANDRA        oxyCODONE immediate release tablet Tab 10 mg  10 mg Oral Q4H PRN Santhosh Busby FNP   10 mg at 04/19/24 1004    polyethylene glycol packet 17 g  17 g Oral BID Santhosh Busby FNP         Objective:     Vital Signs (Most Recent):  Temp: 98.4 °F (36.9 °C) (04/19/24 0715)  Pulse: 92 (04/19/24 0715)  Resp: 18 (04/19/24 1004)  BP: 108/65 (04/19/24 0715)  SpO2: 95 % (04/19/24 0715) Vital Signs (24h Range):  Temp:  [98.2 °F (36.8 °C)-98.5 °F (36.9 °C)] 98.4 °F (36.9 °C)  Pulse:  [] 92  Resp:  [15-23] 18  SpO2:  [75 %-100 %] 95 %  BP: (104-140)/() 108/65     Weight: 63.5 kg (140 lb)  Height: 5' 11" (180.3 cm)  Body mass index is 19.53 kg/m².      Intake/Output Summary (Last 24 hours) at 4/19/2024 1036  Last data filed at 4/18/2024 1900  Gross per 24 hour   Intake 1300 ml   Output 250 ml   Net 1050 ml       Physical Exam:   General the patient is alert no acute distress elderly ans frail.     LUE: --Skin: Dressings CDI sling.            -MSK: STR 5/5 AIN/PIN/Median/Radial/Ulnar motor           -Neuro:  Sensation intact to light touch C5-T1 dermatomes           -Lymphatic: No signs of lymphadenopathy, No signs of swelling,           -CV:Capillary refill is less than 2 seconds. Radial and ulnar pulses 2/4. Compartments Soft and compressible        Diagnostic Findings:   Significant Labs:   Recent Lab Results  (Last 5 results in the past 72 hours)        04/19/24  0836   04/19/24  0105   04/18/24  0743   04/18/24  0703   04/18/24  0401        Immature Platelet Fraction 5.6               PTT         31.8  Comment: For Minimal Heparin Infusion, the goal aPTT 64-85 seconds corresponds to an anti-Xa of 0.3-0.5.    For Low Intensity and High Intensity Heparin, the goal aPTT  seconds corresponds to an anti-Xa of 0.3-0.7       Baso # 0.02               Basophil % 0.2               Eos # 0.18               Eos % 2.2               Hematocrit 34.5               Hemoglobin " 11.5               Immature Grans (Abs) 0.03               Immature Granulocytes 0.4               INR         1.3       Lactic Acid Level       1.3         Lymph # 1.22               LYMPH % 14.8               MCH 31.8               MCHC 33.3               MCV 95.3               Mono # 1.00               Mono % 12.1               MPV 11.5               Neut # 5.79               Neut % 70.3               nRBC 0.0               QRS Duration     98           OHS QTC Calculation     472           Platelet Count 104               POCT Glucose   102             PT         15.7       RBC 3.62               RDW 13.5               WBC 8.24                                       Significant Imaging: I have reviewed all pertinent imaging results/findings.     Assessment/Plan:     Active Diagnoses:    Diagnosis Date Noted POA    PRINCIPAL PROBLEM:  Left wrist fracture, closed, initial encounter [S62.102A] 04/18/2024 Yes    Closed fracture of left proximal humerus [S42.202A] 04/18/2024 Yes      Problems Resolved During this Admission:   88 Yo M here following a fall  POD #1 S/P bridge plating left distad radius and IMN left Humerus    Diet: as tolerated  Pain: multimodal Prn  Periop ancef  PT/OT: eval and treat  DVTppx:Lovenox; none neede from ortho standpoint on DC  Activity: NWB LUE except platform walker ok for mobility  Sling for comfort out of bed  Dressing changes tomorrow      The above findings, diagnostics, and treatment plan were discussed with Dr Malcolm who is in agreement with the plan of care except as stated in additional documentation.      Yumiko Denton, LEANDRA   Orthopedic Trauma Surgery  [facility]

## 2024-04-19 NOTE — PROCEDURES
[facility]  Speech Language Pathology Department  Modified Barium Swallow (MBS) Study    Patient Name:  Kaleb Ibarra   MRN:  [id number]    Recommendations     General recommendations:  SLP follow up x1  Repeat MBS study: not warranted at this time  Diet texture/consistency recommendations:  Minced & Moist solids (IDDSI 5) and thin liquids (IDDSI 0)  Medications: crushed in puree  Swallow strategies/precautions: small bites/sips and slow rate  General precautions: Standard,      History     Kaleb Ibarra is a/n 89 y.o. male admitted following ground level fall. Nursing reporting difficult with medication management.     Past Medical History:   Diagnosis Date    Cancer     prostate    Compression fracture of body of thoracic vertebra     Rhabdomyolysis 2/8/2023     Past Surgical History:   Procedure Laterality Date    OPEN REDUCTION AND INTERNAL FIXATION (ORIF) OF FRACTURE OF DISTAL RADIUS Left 4/18/2024    Procedure: ORIF, FRACTURE, RADIUS, DISTAL;  Surgeon: Kraig Malcolm DO;  Location: [facility];  Service: Orthopedics;  Laterality: Left;  supine hand table c arm skeletal dynamics.    OPEN REDUCTION AND INTERNAL FIXATION (ORIF) OF FRACTURE OF PROXIMAL HUMERUS  4/18/2024    Procedure: ORIF, FRACTURE, HUMERUS, PROXIMAL;  Surgeon: Kraig Malcolm DO;  Location: [facility] OR;  Service: Orthopedics;;    PROSTATE SURGERY N/A      A MBS Study was completed to assess the efficiency of his swallow function, rule out aspiration and make recommendations regarding safe dietary consistencies, effective compensatory strategies, and safe eating environment.     Home diet texture/consistency: Regular and thin liquids  Current Method of Nutrition: NPO      Imaging   Results for orders placed during the hospital encounter of 02/09/23    X-Ray Chest 1 View    Narrative  EXAMINATION:  XR CHEST 1 VIEW    CPT 95362    CLINICAL HISTORY:  screen;    FINDINGS:  Examination reveals mediastinal silhouette to be  within normal limits cardiac silhouette is not enlarged there is minimal blunting of the left costophrenic angle which may indicate the presence of left-sided pleural effusion and or chronic pleural disease.    There is some increase left retrocardiac density with partial silhouetting of the left hemidiaphragm which might represent an infiltrate/atelectasis linear densities at the left base represent subsegmental atelectatic changes    Impression  Minimal blunting of the left costophrenic angle indicating the presence of a left-sided pleural effusion and or chronic pleural disease.    Increased left retrocardiac density with partial silhouetting of the left hemidiaphragm which might be related to an infiltrate/atelectasis.    Atelectatic changes at the left base      Electronically signed by: Gokul Turner  Date:    02/22/2023  Time:    15:08    Results for orders placed in visit on 11/22/22    CT Chest With Contrast    Narrative  EXAMINATION:  CT CHEST WITH CONTRAST    CLINICAL HISTORY:  Metastatic disease evaluation;MRI showing metastatic disease to lumbar spine;Secondary malignant neoplasm of bone    TECHNIQUE:  Thin slice helical CT imaging was performed from above the lung apices through the diaphragm following the administration of 125 mL Omnipaque 350 low osmolar intravenous contrast and with coronal and sagittal reformatted images generated from the axial data set per routine protocol.  Automatic dose modulation and/or weight based mA/kv utilized to achieve as low as reasonable radiation dose.    COMPARISON:  None    FINDINGS:  There is thin linear subsegmental atelectasis at the left greater than right lower lobes and lateral segment of the middle lobe.  There is no pulmonary nodule or mass.  No pleural effusion or nodularity.  The large central airways are widely patent without filling defect or bronchiectasis.  There is no abnormality at the thoracic inlet.  There is no intrathoracic or axillary  adenopathy or mass.  No significant chest wall abnormality.  The heart and great vessels are normal in size without pericardial effusion.  There is moderate left coronary artery atherosclerotic calcification.  No pathology is identified along the esophagus.  Please see the separately dictated dedicated abdomen/pelvis CT report for abdominal findings.  No significant osseous lesion is identified.  There are numerous mid and lower thoracic and upper lumbar age indeterminate compression fracture deformities.  No pathologic fracture is identified.    Impression  No primary malignancy or soft tissue metastatic disease identified at the chest.  Indeterminate mild to moderate sclerosis diffusely throughout the T6 vertebral body where there is a compression fracture.  This could be related to healing response or marrow replacement process.    Electronically signed by: Willsi Yu  Date:    11/22/2022  Time:    16:57  Subjective     Patient awake and alert.  Spiritual/Cultural/Amish Beliefs/Practices that affect care:  no  Pain/Comfort:  0/10    Fluoroscopic Findings     Oral Musculature  Dentition: own teeth  Secretion Management: adequate  Mucosal Quality: good  Volitional Cough: Weak    Setup  Upright in bed  Unable to self feed due to cognition   Adequate head control    Visualization  Lateral view    Oral Phase:   Adequate lip closure  Prolonged/disorganized bolus formation  Reduced bolus cohesion  Reduced bolus control with liquids    Pharyngeal Phase:   Swallow delay with spill to the valleculae  Reduced base of tongue retraction  Reduced hyolaryngeal excursion  Adequate airway protection  Consistency Fed by Laryngeal Penetration Aspiration Residue   Mildly thick liquid by straw SLP None None Mild  Valleculae  Cleared with additional swallow   Thin liquid by straw SLP None None Mild  Valleculae  Cleared with additional swallow   Puree SLP None None Mild  Valleculae  Cleared with additional swallow   Chewable  solid SLP None None Mild  Valleculae  Cleared with additional swallow   Moderately thick liquid by straw SLP None None Mild  Valleculae  Cleared with additional swallow       Cervical Esophageal Phase:   UES appeared to accommodate all bolus types without stasis or retrograde movement visualized    Assessment     Pt exhibited mild oropharyngeal dysphagia characterized by the findings noted above.  Despite mild delay, pt with adequate airway protection. No laryngeal penetration/aspiration was visualized during this study.  Both swallow safety and swallow efficiency are preserved,    Patient appears to be at low risk for aspiration related pneumonia when considering poor oral health/hygiene and complicated medical status.  Prognosis for behavioral swallow rehabilitation is poor due to cognition .      Education     Patient provided with verbal education regarding POC.  Understanding was verbalized.    Plan     Plan of Care reviewed with:  patient     Time Tracking     SLP Treatment Date:   04/19/24  Speech Start Time:  1000  Speech Stop Time:  1020     Speech Total Time (min):  20 min    Billable minutes:   Motion Fluoroscopic Evaluation, Video Recording, 20 minutes     04/19/2024

## 2024-04-19 NOTE — PROGRESS NOTES
"   Trauma Surgery   Progress Note  Admit Date: 4/18/2024  HD#1  POD#1 Day Post-Op    Subjective:   Interval history:  AF, VSS  OR yesterday with ortho for LUE ORIF  Reports moderate pain this morning  Per nursing, patient confused and combative overnight and did not sleep  Voiding, UOP 300ml over last 12 hours  No BM     Home Meds: No current outpatient medications   Scheduled Meds:  Current Facility-Administered Medications   Medication Dose Route Frequency    acetaminophen  650 mg Oral Q4H    ceFAZolin (Ancef) IV (PEDS and ADULTS)  2 g Intravenous Q8H    docusate sodium  100 mg Oral BID    enoxparin  40 mg Subcutaneous Q12H    gabapentin  300 mg Oral TID    methocarbamoL  500 mg Oral Q8H    polyethylene glycol  17 g Oral BID     Continuous Infusions:  Current Facility-Administered Medications   Medication Dose Route Frequency Last Rate Last Admin    lactated ringers   Intravenous Continuous 100 mL/hr at 04/19/24 0633 New Bag at 04/19/24 0633     PRN Meds:  Current Facility-Administered Medications:     magnesium hydroxide 400 mg/5 ml, 30 mL, Oral, Daily PRN    melatonin, 6 mg, Oral, Nightly PRN    morphine, 4 mg, Intravenous, Q6H PRN    ondansetron, 4 mg, Intravenous, Q6H PRN    oxyCODONE, 5 mg, Oral, Q4H PRN    oxyCODONE, 10 mg, Oral, Q4H PRN     Objective:     VITAL SIGNS: 24 HR MIN & MAX LAST   Temp  Min: 98.2 °F (36.8 °C)  Max: 98.5 °F (36.9 °C)  98.4 °F (36.9 °C)   BP  Min: 104/68  Max: 140/88  108/65    Pulse  Min: 60  Max: 107  92    Resp  Min: 13  Max: 23  18    SpO2  Min: 75 %  Max: 100 %  97 %      HT: 5' 11" (180.3 cm)  WT: 63.5 kg (140 lb)  BMI: 19.5     Intake/output:  Intake/Output - Last 3 Shifts         04/17 0700 04/18 0659 04/18 0700 04/19 0659 04/19 0700 04/20 0659    I.V. (mL/kg)  100 (1.6)     IV Piggyback  1200     Total Intake(mL/kg)  1300 (20.5)     Urine (mL/kg/hr)  150 (0.1)     Blood  100     Total Output  250     Net  +1050                    Intake/Output Summary (Last 24 hours) at " 4/19/2024 0731  Last data filed at 4/18/2024 1900  Gross per 24 hour   Intake 1300 ml   Output 250 ml   Net 1050 ml         Lines/drains/airway:       Peripheral IV - Single Lumen 04/18/24 2050 18 G Anterior;Right;Lateral Upper Arm (Active)   Site Assessment Clean;Dry;Intact 04/18/24 2051   Extremity Assessment Distal to IV No abnormal discoloration 04/18/24 2051   Line Status Infusing 04/18/24 2051   Dressing Status Clean;Dry;Intact 04/18/24 2051   Dressing Intervention Integrity maintained 04/18/24 2051   Number of days: 0       Physical examination:  Gen: NAD, alert, confused  CV: RR  Resp: NWOB  Abd: S/NT/ND  Msk: LUE in sling, surgical dressing c/d/i  Neuro: CN II-XII grossly intact  Skin/wounds: warm, dry    Labs:  Renal:  Recent Labs     04/18/24  0356   BUN 16.3   CREATININE 0.83     Recent Labs     04/18/24  0703   LACTIC 1.3     FEN/GI:  Recent Labs     04/18/24  0356      K 3.9   CO2 23   CALCIUM 9.2   ALBUMIN 3.0*   BILITOT 0.5   AST 26   ALKPHOS 113   ALT 14     Heme:  Recent Labs     04/18/24  0356 04/18/24  0401   HGB 13.4*  --    HCT 40.2*  --      --    PTT  --  31.8   INR  --  1.3     ID:  Recent Labs     04/18/24  0356   WBC 10.50     CBG:  Recent Labs     04/18/24  0356   GLUCOSE 123*      Recent Labs     04/19/24  0105   POCTGLUCOSE 102        Imaging:  X-Ray Wrist Complete Left   Final Result      Status post surgical hardware placement in the distal radius through to the 2nd metacarpal for stabilization of the comminuted wrist fracture         Electronically signed by: Shankar Lama   Date:    04/18/2024   Time:    18:02      X-Ray Humerus 2 View Left   Final Result      Satisfactory alignment following internal fixation of the proximal humerus.         Electronically signed by: Lashay Henry   Date:    04/18/2024   Time:    17:54      SURG FL Surgery Fluoro Usage   Final Result      CT 3D RECON WITH INDEPENDENT WS   Final Result      3D reconstruction performed from source  data for surgical planning.         Electronically signed by: Hector Zuleta   Date:    04/18/2024   Time:    12:00      X-Ray Hip 2 or 3 views Left (with Pelvis when performed)   Final Result      Degenerative changes.      No definite fractures or dislocations identified.         Electronically signed by: Gokul Turner   Date:    04/18/2024   Time:    11:49      CT 3D RECON WITH INDEPENDENT WS   Final Result      3D reconstructions for surgical planning.         Electronically signed by: Hector Zuleta   Date:    04/18/2024   Time:    11:31      CT Wrist Without Contrast Left   Final Result      Fractures of distal radius and ulna as outlined above         Electronically signed by: Shankar Lama   Date:    04/18/2024   Time:    15:23      CT Shoulder Without Contrast Left   Final Result      Nondisplaced humeral neck fracture         Electronically signed by: Shankar Lama   Date:    04/18/2024   Time:    15:16      X-Ray Wrist Complete Left   Final Result      As above.         Electronically signed by: Loc Hernandez   Date:    04/18/2024   Time:    07:27      X-Ray Shoulder 2 or More Views Left   Final Result      Early degenerative changes in the AC joint         Electronically signed by: Sid Zarate MD   Date:    04/18/2024   Time:    11:54      CT Head Without Contrast   Final Result      No acute intracranial findings.      No significant discrepancy with the preliminary report.         Electronically signed by: Loc Hernandez   Date:    04/18/2024   Time:    09:52      CT Cervical Spine Without Contrast   Final Result      Slight concave compression deformity of the T2 vertebral body superior endplate within the field of view, indeterminate age.  No other fracture seen.  Spondylosis as above.      Agree with whitney         Electronically signed by: Evelio Mace MD   Date:    04/18/2024   Time:    09:00      X-Ray Wrist Complete Left   Final Result      Fractures of the distal radius and ulna.          Electronically signed by: Sid Zarate MD   Date:    04/18/2024   Time:    12:59      X-Ray Chest AP Portable   Final Result      Interval development of right upper lobe opacification.  Developing infectious process is not excluded.  Recommend continued follow-up         Electronically signed by: Hector Zuleta   Date:    04/18/2024   Time:    07:29         I have reviewed all pertinent imaging results/findings within the past 24 hours.      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    - MMPC  - mIVF discontinued  - CXR shows evidence of right upper lobe opacification, unclear if contusion vs aspiration vs developing infection but patient breathing comfortably on room air. Recommend repeat CXR Monday  - SLP eval, placed patient on minced and moist diet  - Reporting hip pain, will obtain MRI pelvis per ortho  - Bowel regimen  - Daily labs  - Ancef x24 hours per orthopedic surgery  - NWB to CHARLENE  - PT/OT consulted  - CM consulted  - Lovenox BID    Citlalli Sullivan MD  [employer] General Surgery PGY-1

## 2024-04-19 NOTE — PT/OT/SLP EVAL
[facility]  Speech Language Pathology Department  Clinical Swallow Evaluation    Patient Name:  Kaleb Ibarra   MRN:  [id number]    Recommendations     General recommendations:  Modified Barium Swallow Study  Diet texture/consistency recommendations:  NPO  Medications: NPO  Precautions: Standard,     History     Kaleb Ibarra is a/n 89 y.o. male admitted following ground level fall. Nursing reporting difficult with medication management.     Past Medical History:   Diagnosis Date    Cancer     prostate    Compression fracture of body of thoracic vertebra     Rhabdomyolysis 2/8/2023     Past Surgical History:   Procedure Laterality Date    OPEN REDUCTION AND INTERNAL FIXATION (ORIF) OF FRACTURE OF DISTAL RADIUS Left 4/18/2024    Procedure: ORIF, FRACTURE, RADIUS, DISTAL;  Surgeon: Kraig Malcolm DO;  Location: [facility] OR;  Service: Orthopedics;  Laterality: Left;  supine hand table c arm skeletal dynamics.    OPEN REDUCTION AND INTERNAL FIXATION (ORIF) OF FRACTURE OF PROXIMAL HUMERUS  4/18/2024    Procedure: ORIF, FRACTURE, HUMERUS, PROXIMAL;  Surgeon: Kraig Malcolm DO;  Location: [facility] OR;  Service: Orthopedics;;    PROSTATE SURGERY N/A      Home diet texture/consistency: Regular and thin liquids    Imaging   Results for orders placed during the hospital encounter of 02/09/23    X-Ray Chest 1 View    Narrative  EXAMINATION:  XR CHEST 1 VIEW    CPT 95694    CLINICAL HISTORY:  screen;    FINDINGS:  Examination reveals mediastinal silhouette to be within normal limits cardiac silhouette is not enlarged there is minimal blunting of the left costophrenic angle which may indicate the presence of left-sided pleural effusion and or chronic pleural disease.    There is some increase left retrocardiac density with partial silhouetting of the left hemidiaphragm which might represent an infiltrate/atelectasis linear densities at the left base represent subsegmental atelectatic  changes    Impression  Minimal blunting of the left costophrenic angle indicating the presence of a left-sided pleural effusion and or chronic pleural disease.    Increased left retrocardiac density with partial silhouetting of the left hemidiaphragm which might be related to an infiltrate/atelectasis.    Atelectatic changes at the left base    Electronically signed by: Gokul Turner  Date:    02/22/2023  Time:    15:08    Results for orders placed in visit on 11/22/22    CT Chest With Contrast    Narrative  EXAMINATION:  CT CHEST WITH CONTRAST    CLINICAL HISTORY:  Metastatic disease evaluation;MRI showing metastatic disease to lumbar spine;Secondary malignant neoplasm of bone    TECHNIQUE:  Thin slice helical CT imaging was performed from above the lung apices through the diaphragm following the administration of 125 mL Omnipaque 350 low osmolar intravenous contrast and with coronal and sagittal reformatted images generated from the axial data set per routine protocol.  Automatic dose modulation and/or weight based mA/kv utilized to achieve as low as reasonable radiation dose.    COMPARISON:  None    FINDINGS:  There is thin linear subsegmental atelectasis at the left greater than right lower lobes and lateral segment of the middle lobe.  There is no pulmonary nodule or mass.  No pleural effusion or nodularity.  The large central airways are widely patent without filling defect or bronchiectasis.  There is no abnormality at the thoracic inlet.  There is no intrathoracic or axillary adenopathy or mass.  No significant chest wall abnormality.  The heart and great vessels are normal in size without pericardial effusion.  There is moderate left coronary artery atherosclerotic calcification.  No pathology is identified along the esophagus.  Please see the separately dictated dedicated abdomen/pelvis CT report for abdominal findings.  No significant osseous lesion is identified.  There are numerous mid and lower  thoracic and upper lumbar age indeterminate compression fracture deformities.  No pathologic fracture is identified.    Impression  No primary malignancy or soft tissue metastatic disease identified at the chest.  Indeterminate mild to moderate sclerosis diffusely throughout the T6 vertebral body where there is a compression fracture.  This could be related to healing response or marrow replacement process.    Electronically signed by: Willis Yu  Date:    11/22/2022  Time:    16:57    Subjective     Patient awake and alert.  Spiritual/Cultural/Holiness Beliefs/Practices that affect care:  no  Pain/Comfort:  0/10    Objective     ORAL MUSCULATURE  Dentition: own teeth  Secretion Management: adequate  Mucosal Quality: good  Facial Movement: WFL  Buccal Strength & Mobility: WFL  Volitional Cough: Able to clear secretions    Consistency Fed By Oral Symptoms Pharyngeal Symptoms   Thin liquid by cup SLP None Multiple swallows   Puree SLP None None   Soft & Bite-sized solid SLP None None     Assessment     Signs/sx of aspiration. MBS to further evaluate swallow function.     Education     Patient provided with verbal education regarding POC.  Understanding was verbalized.    Plan     Plan of Care reviewed with:  patient     Time Tracking     SLP Treatment Date:   04/19/24  Speech Start Time:  0845  Speech Stop Time:  0900     Speech Total Time (min):  15 min    Billable minutes:  Swallow and Oral Function Evaluation, 15 minutes     04/19/2024

## 2024-04-19 NOTE — NURSING
Nurses Note -- 4 Eyes      4/18/2024   7:26 PM      Skin assessed during: Admit      [x] No Altered Skin Integrity Present    [x]Prevention Measures Documented      [] Yes- Altered Skin Integrity Present or Discovered   [] LDA Added if Not in Epic (Describe Wound)   [] New Altered Skin Integrity was Present on Admit and Documented in LDA   [] Wound Image Taken    Wound Care Consulted? No    Attending Nurse:  Micaela Cee RN/Staff Member:  gildardo goldsmith

## 2024-04-20 LAB
ALBUMIN SERPL-MCNC: 2.6 G/DL (ref 3.4–4.8)
ALBUMIN/GLOB SERPL: 0.8 RATIO (ref 1.1–2)
ALP SERPL-CCNC: 74 UNIT/L (ref 40–150)
ALT SERPL-CCNC: 8 UNIT/L (ref 0–55)
AST SERPL-CCNC: 24 UNIT/L (ref 5–34)
BASOPHILS # BLD AUTO: 0.03 X10(3)/MCL
BASOPHILS NFR BLD AUTO: 0.5 %
BILIRUB SERPL-MCNC: 0.4 MG/DL
BUN SERPL-MCNC: 12.9 MG/DL (ref 8.4–25.7)
CALCIUM SERPL-MCNC: 8.5 MG/DL (ref 8.8–10)
CHLORIDE SERPL-SCNC: 104 MMOL/L (ref 98–107)
CO2 SERPL-SCNC: 26 MMOL/L (ref 23–31)
CREAT SERPL-MCNC: 0.7 MG/DL (ref 0.73–1.18)
EOSINOPHIL # BLD AUTO: 0.05 X10(3)/MCL (ref 0–0.9)
EOSINOPHIL NFR BLD AUTO: 0.8 %
ERYTHROCYTE [DISTWIDTH] IN BLOOD BY AUTOMATED COUNT: 13.2 % (ref 11.5–17)
GFR SERPLBLD CREATININE-BSD FMLA CKD-EPI: >60 MLS/MIN/1.73/M2
GLOBULIN SER-MCNC: 3.4 GM/DL (ref 2.4–3.5)
GLUCOSE SERPL-MCNC: 91 MG/DL (ref 82–115)
HCT VFR BLD AUTO: 32 % (ref 42–52)
HGB BLD-MCNC: 10.4 G/DL (ref 14–18)
IMM GRANULOCYTES # BLD AUTO: 0.03 X10(3)/MCL (ref 0–0.04)
IMM GRANULOCYTES NFR BLD AUTO: 0.5 %
LYMPHOCYTES # BLD AUTO: 1.38 X10(3)/MCL (ref 0.6–4.6)
LYMPHOCYTES NFR BLD AUTO: 22.5 %
MCH RBC QN AUTO: 31.3 PG (ref 27–31)
MCHC RBC AUTO-ENTMCNC: 32.5 G/DL (ref 33–36)
MCV RBC AUTO: 96.4 FL (ref 80–94)
MONOCYTES # BLD AUTO: 0.82 X10(3)/MCL (ref 0.1–1.3)
MONOCYTES NFR BLD AUTO: 13.4 %
NEUTROPHILS # BLD AUTO: 3.82 X10(3)/MCL (ref 2.1–9.2)
NEUTROPHILS NFR BLD AUTO: 62.3 %
NRBC BLD AUTO-RTO: 0 %
PLATELET # BLD AUTO: 102 X10(3)/MCL (ref 130–400)
PLATELETS.RETICULATED NFR BLD AUTO: 5.8 % (ref 0.9–11.2)
PMV BLD AUTO: 11.2 FL (ref 7.4–10.4)
POTASSIUM SERPL-SCNC: 4.1 MMOL/L (ref 3.5–5.1)
PROT SERPL-MCNC: 6 GM/DL (ref 5.8–7.6)
RBC # BLD AUTO: 3.32 X10(6)/MCL (ref 4.7–6.1)
SODIUM SERPL-SCNC: 139 MMOL/L (ref 136–145)
WBC # SPEC AUTO: 6.13 X10(3)/MCL (ref 4.5–11.5)

## 2024-04-20 PROCEDURE — 25000003 PHARM REV CODE 250

## 2024-04-20 PROCEDURE — 93005 ELECTROCARDIOGRAM TRACING: CPT

## 2024-04-20 PROCEDURE — 80053 COMPREHEN METABOLIC PANEL: CPT | Performed by: NURSE PRACTITIONER

## 2024-04-20 PROCEDURE — 11000001 HC ACUTE MED/SURG PRIVATE ROOM

## 2024-04-20 PROCEDURE — 85025 COMPLETE CBC W/AUTO DIFF WBC: CPT | Performed by: NURSE PRACTITIONER

## 2024-04-20 PROCEDURE — 25000003 PHARM REV CODE 250: Performed by: NURSE PRACTITIONER

## 2024-04-20 PROCEDURE — 21400001 HC TELEMETRY ROOM

## 2024-04-20 PROCEDURE — 63600175 PHARM REV CODE 636 W HCPCS: Performed by: NURSE PRACTITIONER

## 2024-04-20 PROCEDURE — 93010 ELECTROCARDIOGRAM REPORT: CPT | Mod: ,,, | Performed by: INTERNAL MEDICINE

## 2024-04-20 RX ADMIN — METHOCARBAMOL 500 MG: 500 TABLET ORAL at 08:04

## 2024-04-20 RX ADMIN — POLYETHYLENE GLYCOL 3350 17 G: 17 POWDER, FOR SOLUTION ORAL at 08:04

## 2024-04-20 RX ADMIN — ENOXAPARIN SODIUM 40 MG: 40 INJECTION SUBCUTANEOUS at 08:04

## 2024-04-20 RX ADMIN — ACETAMINOPHEN 650 MG: 325 TABLET, FILM COATED ORAL at 02:04

## 2024-04-20 RX ADMIN — GABAPENTIN 300 MG: 300 CAPSULE ORAL at 08:04

## 2024-04-20 RX ADMIN — DOCUSATE SODIUM 100 MG: 100 CAPSULE, LIQUID FILLED ORAL at 08:04

## 2024-04-20 RX ADMIN — GABAPENTIN 300 MG: 300 CAPSULE ORAL at 02:04

## 2024-04-20 RX ADMIN — ACETAMINOPHEN 650 MG: 325 TABLET, FILM COATED ORAL at 11:04

## 2024-04-20 RX ADMIN — METHOCARBAMOL 500 MG: 500 TABLET ORAL at 02:04

## 2024-04-20 RX ADMIN — ACETAMINOPHEN 650 MG: 325 TABLET, FILM COATED ORAL at 08:04

## 2024-04-20 RX ADMIN — METHOCARBAMOL 500 MG: 500 TABLET ORAL at 10:04

## 2024-04-20 RX ADMIN — OXYCODONE HYDROCHLORIDE 10 MG: 10 TABLET ORAL at 08:04

## 2024-04-20 RX ADMIN — TAMSULOSIN HYDROCHLORIDE 0.4 MG: 0.4 CAPSULE ORAL at 08:04

## 2024-04-20 NOTE — PT/OT/SLP PROGRESS
[facility]  Speech Language Pathology Department  Diet Tolerance Follow-up/Discharge    Patient Name:  Kaleb Ibarra   MRN:  [id number]    Recommendations     General recommendations:  SLP intervention not indicated  Diet texture/consistency recommendations:  Minced & Moist solids (IDDSI 5) and thin liquids (IDDSI 0)  Medications: crushed in puree  Swallow strategies/precautions: small bites/sips and slow rate  Precautions: Standard,      Diet Tolerance     Nursing reports no difficulty regarding diet tolerance. SLP to sign off at this time, please re-consult as warranted.     04/20/2024

## 2024-04-20 NOTE — PROGRESS NOTES
"[facility]  [facility] Note    Patient Name: Kaleb Ibarra  MRN: [id number]  Admission Date: 4/18/2024  Hospital Length of Stay: 2 days  Attending Provider: Rick Pelaez MD  Primary Care Provider: Jossue Martinez MD  Follow-up For: Procedure(s) (LRB):  ORIF, FRACTURE, RADIUS, DISTAL (Left)  ORIF, FRACTURE, HUMERUS, PROXIMAL    Post-Operative Day: 2 Day Post-Op  Subjective:     Principal Problem:Left wrist fracture, closed, initial encounter    Principal Orthopedic Problem: 2 Days Post-Op   S/P bridge plating left distad radius and IMN left Humerus.     Interval History:  Awake and alert.  Patient complaining of hip pain" Endorsing pain to LUE. NVI.  MRI of the pelvis ordered to rule out occult hip fracture the patient was to agitated yesterday to undergo MRI.  Plan is to try again today with some premedication prior to transfer    Review of patient's allergies indicates:  No Known Allergies    Current Facility-Administered Medications   Medication Dose Route Frequency Provider Last Rate Last Admin    acetaminophen tablet 650 mg  650 mg Oral Q4H Santhosh Busby, FNP   650 mg at 04/19/24 2110    docusate sodium capsule 100 mg  100 mg Oral BID Santhosh Busby, FNP   100 mg at 04/19/24 2110    enoxaparin injection 40 mg  40 mg Subcutaneous Q12H Santhosh Busby, FNP   40 mg at 04/19/24 2110    gabapentin capsule 300 mg  300 mg Oral TID Santhosh Busby, FNP   300 mg at 04/19/24 2110    lactated ringers infusion   Intravenous Continuous NateCitlalli damon  mL/hr at 04/19/24 1718 New Bag at 04/19/24 1718    magnesium hydroxide 400 mg/5 ml suspension 2,400 mg  30 mL Oral Daily PRN Santhosh Bubsy, LEANDRA        melatonin tablet 6 mg  6 mg Oral Nightly PRN Santhosh Busby, CAITYP        methocarbamoL tablet 500 mg  500 mg Oral Q8H Santhosh Busby, FNP   500 mg at 04/19/24 2110    morphine injection 4 mg  4 mg Intravenous Q6H PRN Bridget Bennett " "FLO TYSON   4 mg at 04/19/24 0634    ondansetron injection 4 mg  4 mg Intravenous Q6H PRN Bridget Bennett PA-C        oxyCODONE immediate release tablet 5 mg  5 mg Oral Q4H PRN QiSanthosh, FNP        oxyCODONE immediate release tablet Tab 10 mg  10 mg Oral Q4H PRN QiSanthosh, FNP   10 mg at 04/19/24 1004    polyethylene glycol packet 17 g  17 g Oral BID Qi Santhosh TALISHA, FNP   17 g at 04/19/24 2111    tamsulosin 24 hr capsule 0.4 mg  0.4 mg Oral Daily Citlalli Sullivan MD         Objective:     Vital Signs (Most Recent):  Temp: 97.5 °F (36.4 °C) (04/20/24 0703)  Pulse: 93 (04/20/24 0703)  Resp: 20 (04/20/24 0440)  BP: 119/65 (04/20/24 0703)  SpO2: 98 % (04/20/24 0440) Vital Signs (24h Range):  Temp:  [97.5 °F (36.4 °C)-100.1 °F (37.8 °C)] 97.5 °F (36.4 °C)  Pulse:  [82-96] 93  Resp:  [16-20] 20  SpO2:  [90 %-98 %] 98 %  BP: ()/(59-87) 119/65     Weight: 63.5 kg (140 lb)  Height: 5' 11" (180.3 cm)  Body mass index is 19.53 kg/m².      Intake/Output Summary (Last 24 hours) at 4/20/2024 0756  Last data filed at 4/20/2024 0628  Gross per 24 hour   Intake --   Output 1400 ml   Net -1400 ml       Physical Exam:   General the patient is alert no acute distress elderly ans frail.     LUE: --Skin: Dressings CDI sling.            -MSK: STR 5/5 AIN/PIN/Median/Radial/Ulnar motor           -Neuro:  Sensation intact to light touch C5-T1 dermatomes           -Lymphatic: No signs of lymphadenopathy, No signs of swelling,           -CV:Capillary refill is less than 2 seconds. Radial and ulnar pulses 2/4. Compartments Soft and compressible        Diagnostic Findings:   Significant Labs:   Recent Lab Results  (Last 5 results in the past 72 hours)        04/20/24  0442   04/19/24  1041   04/19/24  0836   04/19/24  0105   04/18/24  0743        Immature Platelet Fraction 5.8     5.6           Albumin/Globulin Ratio 0.8   0.8             Albumin 2.6   3.0             ALP 74   85             ALT 8   13         "     AST 24   35             Baso # 0.03     0.02           Basophil % 0.5     0.2           BILIRUBIN TOTAL 0.4   0.6             BUN 12.9   13.2             Calcium 8.5   8.7             Chloride 104   104             CO2 26   23             Creatinine 0.70   0.75             eGFR >60   >60             Eos # 0.05     0.18           Eos % 0.8     2.2           Globulin, Total 3.4   4.0             Glucose 91   108             Hematocrit 32.0     34.5           Hemoglobin 10.4     11.5           Immature Grans (Abs) 0.03     0.03           Immature Granulocytes 0.5     0.4           Lymph # 1.38     1.22           LYMPH % 22.5     14.8           Magnesium    1.80             MCH 31.3     31.8           MCHC 32.5     33.3           MCV 96.4     95.3           Mono # 0.82     1.00           Mono % 13.4     12.1           MPV 11.2     11.5           Neut # 3.82     5.79           Neut % 62.3     70.3           nRBC 0.0     0.0           QRS Duration         98       [facility] QTC Calculation         472       Phosphorus Level   2.7             Platelet Count 102     104           POCT Glucose       102         Potassium 4.1   4.7             PROTEIN TOTAL 6.0   7.0             RBC 3.32     3.62           RDW 13.2     13.5           Sodium 139   138             WBC 6.13     8.24                                   Significant Imaging: I have reviewed all pertinent imaging results/findings.     Assessment/Plan:     Active Diagnoses:    Diagnosis Date Noted POA    PRINCIPAL PROBLEM:  Left wrist fracture, closed, initial encounter [S62.102A] 04/18/2024 Yes    Closed fracture of left proximal humerus [S42.202A] 04/18/2024 Yes      Problems Resolved During this Admission:   88 Yo M here following a fall  POD #2 S/P bridge plating left distad radius and IMN left Humerus    Diet: as tolerated  Pain: multimodal Prn  Periop ancef  PT/OT: eval and treat  DVTppx:Lovenox; none neede from ortho standpoint on DC  Activity: NWB LUE except  platform walker ok for mobility  Sling for comfort out of bed  Dressing changes daily  MRI pending of the pelvis, we will follow    Addendum:   MRI reviewed showing a subtle irregularity of the anterior left superior pubic rami indicating subacute versus chronic fracture.  No acute fracture seen.      The above findings, diagnostics, and treatment plan were discussed with Dr Valdes who is in agreement with the plan of care except as stated in additional documentation.      LIN Griffin   Orthopedic Trauma Surgery  [facility]

## 2024-04-20 NOTE — TERTIARY TRAUMA SURVEY NOTE
TERTIARY TRAUMA SURVEY (TTS)    List Injuries Identified to Date:   1. Left distal radius and ulna fracture  2. Left humeral neck fracture    []Problems list reviewed  List Operations and Procedures:   1. ORIF distal radius and ulna, ORIF proximal humerus    Past Surgical History:   Procedure Laterality Date    OPEN REDUCTION AND INTERNAL FIXATION (ORIF) OF FRACTURE OF DISTAL RADIUS Left 4/18/2024    Procedure: ORIF, FRACTURE, RADIUS, DISTAL;  Surgeon: Kraig Malcolm DO;  Location: [facility] OR;  Service: Orthopedics;  Laterality: Left;  supine hand table c arm skeletal dynamics.    OPEN REDUCTION AND INTERNAL FIXATION (ORIF) OF FRACTURE OF PROXIMAL HUMERUS  4/18/2024    Procedure: ORIF, FRACTURE, HUMERUS, PROXIMAL;  Surgeon: Kraig Malcolm DO;  Location: [facility] OR;  Service: Orthopedics;;    PROSTATE SURGERY N/A        Incidental findings:   1. T2 compression deformity    Past Medical History:   1. Dementia  2. Prostate cancer  3. T2 compression fracture  4. 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 11/23/2022    Compression fracture of body of thoracic vertebra 11/23/2022    Memory impairment 11/23/2022    Generalized weakness 11/23/2022    Closed fracture of left iliac wing 02/08/2023    Dementia with behavioral disturbance 02/10/2023     Resolved Ambulatory Problems     Diagnosis Date Noted    Rhabdomyolysis 02/08/2023     Past Medical History:   Diagnosis Date    Cancer      Past Medical History:   Diagnosis Date    Cancer     prostate    Compression fracture of body of thoracic vertebra     Rhabdomyolysis 2/8/2023       Tertiary Physical Exam:     Physical Exam  Constitutional:       General: He is not in acute distress.  HENT:      Head: Normocephalic and atraumatic.   Eyes:      Extraocular Movements: Extraocular movements intact.      Pupils: Pupils are equal, round, and reactive to light.   Cardiovascular:       Rate and Rhythm: Normal rate and regular rhythm.      Pulses: Normal pulses.   Pulmonary:      Effort: Pulmonary effort is normal. No respiratory distress.      Breath sounds: No wheezing.   Chest:      Chest wall: No tenderness.   Abdominal:      General: Abdomen is flat. There is no distension.      Palpations: Abdomen is soft.      Tenderness: There is no abdominal tenderness.   Musculoskeletal:         General: Swelling (LUE) and signs of injury present.      Cervical back: Normal range of motion. No tenderness.      Right lower leg: No edema.      Left lower leg: No edema.      Comments: LUE surgical dressings c/d/i, LUE in sling   Neurological:      General: No focal deficit present.      Mental Status: He is alert. Mental status is at baseline.      Motor: Weakness present.      Gait: Gait abnormal (secondary to left hip pain).   Psychiatric:         Mood and Affect: Mood normal.         Imaging Review:     Imaging Results              CT 3D RECON WITH INDEPENDENT WS (Final result)  Result time 04/18/24 12:00:52      Final result by Hector Zuleta MD (04/18/24 12:00:52)                   Impression:      3D reconstruction performed from source data for surgical planning.      Electronically signed by: Hector Zuleta  Date:    04/18/2024  Time:    12:00               Narrative:    CLINICAL HISTORY:  Surgical planning    TECHNIQUE:  3D reconstruction performed from source data on an independent workstation for surgical planning.    COMPARISON:  No prior imaging available for comparison.    FINDINGS:  3D reconstruction performed from source data for surgical planning.                                       X-Ray Hip 2 or 3 views Left (with Pelvis when performed) (Final result)  Result time 04/18/24 11:49:53      Final result by Gokul Turner MD (04/18/24 11:49:53)                   Impression:      Degenerative changes.    No definite fractures or dislocations identified.      Electronically signed  by: Gokul Turner  Date:    04/18/2024  Time:    11:49               Narrative:    EXAMINATION:  XR HIP WITH PELVIS WHEN PERFORMED, 2 OR 3 VIEWS LEFT    CLINICAL HISTORY:  pain;    COMPARISON:  None.    FINDINGS:  There is demineralization of the visualized osseous structures commensurate with patient's age    No acute displaced fractures or dislocations.    There is narrowing of the inferior medial aspect of both hip joints with degenerative changes of the sacroiliac joints and lumbosacral spine articular spaces are otherwise preserved with smooth articular surfaces    No blastic or lytic lesions.    Soft tissues within normal limits.    Radiation seeds identified in the prostate                                       CT 3D RECON WITH INDEPENDENT WS (Final result)  Result time 04/18/24 11:31:56   Procedure changed from CT 3D RECON WITHOUT INDEPENDENT WS     Final result by Hector Zuleta MD (04/18/24 11:31:56)                   Impression:      3D reconstructions for surgical planning.      Electronically signed by: Hector Zuleta  Date:    04/18/2024  Time:    11:31               Narrative:    CLINICAL HISTORY:  Surgical planning    TECHNIQUE:  3D reconstructions performed from source data for surgical planning on an independent workstation.    COMPARISON:  No prior imaging available for comparison.    FINDINGS:  3D reconstructions for surgical planning.                                       CT Wrist Without Contrast Left (Final result)  Result time 04/18/24 15:23:29      Final result by Jenny Lama MD (04/18/24 15:23:29)                   Impression:      Fractures of distal radius and ulna as outlined above      Electronically signed by: Shankar Lama  Date:    04/18/2024  Time:    15:23               Narrative:    EXAMINATION:  CT WRIST WITHOUT CONTRAST LEFT    CLINICAL HISTORY:  Wrist trauma, subluxation on xray;    TECHNIQUE:  Multiple axial images were obtained of the left wrist and  sagittal and coronal reconstructions were performed.    Automatic exposure control (AEC) is utilized to reduce patient radiation exposure.    COMPARISON:  Plain film dated 04/18/2024    FINDINGS:  There is a comminuted fracture of the distal radius involving the distal radial metaphysis and as well as the epiphysis extending to the articular surface.  There is displacement and some dorsal angulation.  There is also fracture of the distal ulnar metaphysis with slight dorsal angulation.  There is a fracture of the ulnar styloid.  Bones are diffusely osteoporotic.  No obvious fracture is seen.  There is soft tissue swelling in the wrist diffusely.                                       CT Shoulder Without Contrast Left (Final result)  Result time 04/18/24 15:16:44      Final result by Jenny Lama MD (04/18/24 15:16:44)                   Impression:      Nondisplaced humeral neck fracture      Electronically signed by: Shankar Lama  Date:    04/18/2024  Time:    15:16               Narrative:    EXAMINATION:  CT SHOULDER WITHOUT CONTRAST LEFT    CLINICAL HISTORY:  Shoulder pain, stress fracture suspected, neg xray;    TECHNIQUE:  Multiple axial images were obtained the left shoulder and sagittal and coronal reconstructions were performed.  Automatic exposure control (AEC) is utilized to reduce patient radiation exposure.    COMPARISON:  Plain film dated 04/18/2024    FINDINGS:  The clavicle is intact.  No obvious rib fractures are seen.  The scapula appears to be intact.  There is a fracture of the humerus.  Fracture seen at the humeral neck.  It is nondisplaced.  It is best seen on images number 94 through 101 series 3.  No other fractures are seen.    There is some changes consistent with COPD in the lungs.  There is some atelectasis in the left lower lobe.                                       X-Ray Wrist Complete Left (Final result)  Result time 04/18/24 07:27:00   Procedure changed from X-Ray Wrist  Complete Right     Final result by Loc Hernandez MD (04/18/24 07:27:00)                   Impression:      As above.      Electronically signed by: Loc Hernandez  Date:    04/18/2024  Time:    07:27               Narrative:    EXAMINATION:  XR WRIST COMPLETE 3 VIEWS LEFT    CLINICAL HISTORY:  Wrist fracture ORIF;  Fracture of unspecified carpal bone, left wrist, initial encounter for closed fracture    COMPARISON:  Earlier today    FINDINGS:  Two views left wrist.  There is improved position and alignment of the fractures of the distal radius and ulna.                                       X-Ray Shoulder 2 or More Views Left (Final result)  Result time 04/18/24 11:54:59      Final result by Sid Zarate MD (04/18/24 11:54:59)                   Impression:      Early degenerative changes in the AC joint      Electronically signed by: Sid Zarate MD  Date:    04/18/2024  Time:    11:54               Narrative:    EXAMINATION:  XR SHOULDER COMPLETE 2 OR MORE VIEWS LEFT    CLINICAL HISTORY:  fall;    TECHNIQUE:  Two or three views of the left shoulder were performed.    COMPARISON:  None    FINDINGS:  There are early degenerative changes in the AC joint.  There are no fractures seen.  There is no dislocation.                                       CT Head Without Contrast (Final result)  Result time 04/18/24 09:52:52      Final result by Loc Hernandez MD (04/18/24 09:52:52)                   Impression:      No acute intracranial findings.    No significant discrepancy with the preliminary report.      Electronically signed by: Loc Hernandez  Date:    04/18/2024  Time:    09:52               Narrative:    EXAMINATION:  CT HEAD WITHOUT CONTRAST    CLINICAL HISTORY:  Head trauma, minor (Age >= 65y);    TECHNIQUE:  CT imaging of the head performed from the skull base to the vertex without intravenous contrast. DLP 1066 mGycm. Automatic exposure control, adjustment of mA/kV or iterative reconstruction technique was  used to reduce radiation.    COMPARISON:  6 February 2023    FINDINGS:  There is no acute cortical infarct, hemorrhage or mass lesion.  No new parenchymal attenuation abnormality.  Moderate global atrophy.  Ventricular size is stable.    Visualized paranasal sinuses and mastoid air cells are clear. There is some left frontal scalp soft tissue swelling.                        Preliminary result by Solitario Barbosa Jr., MD (04/18/24 03:58:21)                   Impression:    1. No acute intracranial process identified. Details and other findings as noted above.               Narrative:    START OF REPORT:  Technique: CT of the head was performed without intravenous contrast with axial as well as coronal and sagittal images.    Comparison: None.    Dosage Information: Automated exposure control was utilized.    Clinical history: Fall, head injury.    Findings:  Hemorrhage: No acute intracranial hemorrhage is seen.  CSF spaces: The ventricles, sulci and basal cisterns all appear moderately prominent global cerebral atrophy.  Brain parenchyma: Moderate microvascular change is seen in portions of the periventricular and deep white matter tracts.  Vascular territory infarcts:  Lacunar infarcts: There is a stable lacunar infarct in the midbrain.  Cerebellum: Unremarkable.  Sella and skull base: The sella appears to be within normal limits for age.  Intracranial calcifications: Incidental note is made of bilateral choroid plexus calcification. Incidental note is made of some pineal region calcification. Incidental note is made of some calcification of the falx.  Calvarium: No acute linear or depressed skull fracture is seen.  Scalp: Mild scalp soft tissue swelling is seen along the left frontal bone.    Maxillofacial Structures:  Paranasal sinuses: The visualized paranasal sinuses appear clear with no mucoperiosteal thickening or air fluid levels identified.  Orbits: The orbits appear unremarkable.  Zygomatic arches:  The zygomatic arches are intact and unremarkable.  Temporal bones and mastoids: The temporal bones and mastoids appear unremarkable.  TMJ: The mandibular condyles appear normally placed with respect to the mandibular fossa.                                         CT Cervical Spine Without Contrast (Final result)  Result time 04/18/24 09:00:06      Final result by Shashi Mace MD (04/18/24 09:00:06)                   Impression:      Slight concave compression deformity of the T2 vertebral body superior endplate within the field of view, indeterminate age.  No other fracture seen.  Spondylosis as above.    Agree with nighthawk      Electronically signed by: Evelio Mace MD  Date:    04/18/2024  Time:    09:00               Narrative:    EXAMINATION:  CT CERVICAL SPINE WITHOUT CONTRAST    CLINICAL HISTORY:  Neck trauma (Age >= 65y);    TECHNIQUE:  Low dose axial images, sagittal and coronal reformations were performed though the cervical spine.  Contrast was not administered.  .  Automated exposure control used.    COMPARISON:  None    FINDINGS:  Slight superior endplate compression deformity of T2 within the field of view, indeterminate age.  No other fracture seen..  Odontoid intact.  Exaggerated lordotic curvature.  Kyphotic curvature upper thoracic region.    C2-C3 demonstrates mild disc space narrowing with no osseous spinal canal or foraminal narrowing.    C3-C4 mild moderate disc space narrowing without spinal canal or foraminal narrowing.    C4-C5 near normal disc space height without osseous spinal canal or foraminal narrowing.    C5-C6 moderate disc space narrowing, no significant osseous spinal canal narrowing, mild osseous foraminal narrowing on the right.    C6-C7 and C7-T1 no significant osseous spinal canal or foraminal narrowing.    No soft tissue mass, fluid collection, hematoma, lymphadenopathy.                        Preliminary result by Solitario Barbosa Jr., MD (04/18/24  03:48:14)                   Impression:    1. No acute cervical spine fracture dislocation or subluxation is seen. Mild compression fracture of the superior endplate of T2 vertebral body, of indeterminate age. Correlate clinically as regards further evaluation and followup.  2. Details and other findings as noted above.               Narrative:    START OF REPORT:  Technique: CT of the cervical spine was performed without intravenous contrast with axial as well as sagittal and coronal images.    Comparison: None.    Dosage Information: Automated exposure control was utilized.    Clinical history: Fall, head injury.    Findings:  Lung apices: The visualized lung apices appear unremarkable.  Spine:  Spinal canal: The spinal canal appears unremarkable.  Spinal cord: The spinal cord appears unremarkable.  Mineralization: Mild osteopenia is seen in the visualized bony structures.  Scoliosis: No significant scoliosis is seen.  Vertebral Fusion: No vertebral fusion is identified.  Listhesis: No significant listhesis is identified.  Lordosis: The cervical lordosis is maintained.  Intervertebral disc spaces: Mildly decreased disc height is seen at C3-C4 and C5-C6.  Osteophytes: Small inferior endplate osteophytes seen at C5 and C6.  Endplate Sclerosis: No significant endplate sclerosis is seen.  Uncovertebral degenerative changes: Mild uncovertebral joint degenerative changes are seen at C3-C4.  Facet degenerative changes: No significant facet degenerative changes are seen.  Fractures: No acute cervical spine fracture dislocation or subluxation is seen. Mild compression fracture of the superior endplate of T2 vertebral body, of indeterminate age. Correlate clinically as regards further evaluation and followup.                                         X-Ray Wrist Complete Left (Final result)  Result time 04/18/24 12:59:20      Final result by Sid Zarate MD (04/18/24 12:59:20)                   Impression:      Fractures  of the distal radius and ulna.      Electronically signed by: Sid Zarate MD  Date:    04/18/2024  Time:    12:59               Narrative:    EXAMINATION:  XR WRIST COMPLETE 3 VIEWS LEFT    CLINICAL HISTORY:  Unspecified fall, initial encounter    TECHNIQUE:  PA, lateral, and oblique views of the left wrist were performed.    COMPARISON:  None    FINDINGS:  There are fractures of the distal radius and ulna.  Fractures are displaced dorsally.  The radius fracture questionably extends intra-articular.                                       X-Ray Chest AP Portable (Final result)  Result time 04/18/24 07:29:20      Final result by Hector Zuleta MD (04/18/24 07:29:20)                   Impression:      Interval development of right upper lobe opacification.  Developing infectious process is not excluded.  Recommend continued follow-up      Electronically signed by: Hector Zuleta  Date:    04/18/2024  Time:    07:29               Narrative:    EXAMINATION:  XR CHEST AP PORTABLE    CLINICAL HISTORY:  Fall;    TECHNIQUE:  Single view of the chest    COMPARISON:  02/22/2023    FINDINGS:  Interval development of right upper lobe opacification.    The cardiomediastinal silhouette is within normal limits.    No acute osseous abnormality.                                       Lab Review:   CBC:  Recent Labs   Lab Result Units 04/18/24  0356 04/19/24  0836 04/20/24  0442   WBC x10(3)/mcL 10.50 8.24 6.13   RBC x10(6)/mcL 4.21* 3.62* 3.32*   Hgb g/dL 13.4* 11.5* 10.4*   Hct % 40.2* 34.5* 32.0*   Platelet x10(3)/mcL 158 104* 102*   MCV fL 95.5* 95.3* 96.4*   MCH pg 31.8* 31.8* 31.3*   MCHC g/dL 33.3 33.3 32.5*       CMP:  Recent Labs   Lab Result Units 04/18/24  0356 04/19/24  1041 04/20/24  0442   Calcium Level Total mg/dL 9.2 8.7* 8.5*   Albumin Level g/dL 3.0* 3.0* 2.6*   Sodium Level mmol/L 140 138 139   Potassium Level mmol/L 3.9 4.7 4.1   Carbon Dioxide mmol/L 23 23 26   Blood Urea Nitrogen mg/dL 16.3 13.2 12.9  "  Creatinine mg/dL 0.83 0.75 0.70*   Alkaline Phosphatase unit/L 113 85 74   Alanine Aminotransferase unit/L 14 13 8   Aspartate Aminotransferase unit/L 26 35* 24   Bilirubin Total mg/dL 0.5 0.6 0.4       Troponin:  No results for input(s): "TROPONINI" in the last 2160 hours.    ETOH:  No results for input(s): "ETHANOL" in the last 72 hours.     Urine Drug Screen:  No results for input(s): "COCAINE", "OPIATE", "BARBITURATE", "AMPHETAMINE", "FENTANYL", "CANNABINOIDS", "MDMA" in the last 72 hours.    Invalid input(s): "BENZODIAZEPINE", "PHENCYCLIDINE"     COLLIN Risk Assessment:   COLLIN Risk Factors: Anemia                                               1  Total score: 1  Plan:   89M who presents with left humerus and distal radial/ulnar fracture now s/p ORIF with orthopedic surgery.    - Reporting left hip pain, will obtain MRI pelvis per ortho  - Wean O2 as tolerated  - Continue minced and moist diet  - Bowel regimen  - Daily labs  - NWB to [facility]  - PT/OT consulted  - Lovenox BID  - Dispo pending MRI    Citlalli Sullivan MD  [employer] General Surgery PGY-1      "

## 2024-04-20 NOTE — PT/OT/SLP PROGRESS
Physical Therapy      Patient Name:  Kaleb Ibarra   MRN:  [id number]    Patient attempted 2x this AM 2/2 patient off floor for MRI . Will follow-up as schedule allows.    Lamar West, PTA  4/20/2024

## 2024-04-20 NOTE — NURSING
MRI attempted, patient very upset stating he does not want the test, yelling, advised staff that with the next opportunity meds would be given prior to testing

## 2024-04-21 LAB
ALBUMIN SERPL-MCNC: 2.3 G/DL (ref 3.4–4.8)
ALBUMIN/GLOB SERPL: 0.7 RATIO (ref 1.1–2)
ALP SERPL-CCNC: 70 UNIT/L (ref 40–150)
ALT SERPL-CCNC: 7 UNIT/L (ref 0–55)
AST SERPL-CCNC: 24 UNIT/L (ref 5–34)
BASOPHILS # BLD AUTO: 0.03 X10(3)/MCL
BASOPHILS NFR BLD AUTO: 0.5 %
BILIRUB SERPL-MCNC: 0.3 MG/DL
BUN SERPL-MCNC: 14 MG/DL (ref 8.4–25.7)
CALCIUM SERPL-MCNC: 8.2 MG/DL (ref 8.8–10)
CHLORIDE SERPL-SCNC: 105 MMOL/L (ref 98–107)
CO2 SERPL-SCNC: 25 MMOL/L (ref 23–31)
CREAT SERPL-MCNC: 0.66 MG/DL (ref 0.73–1.18)
EOSINOPHIL # BLD AUTO: 0.14 X10(3)/MCL (ref 0–0.9)
EOSINOPHIL NFR BLD AUTO: 2.5 %
ERYTHROCYTE [DISTWIDTH] IN BLOOD BY AUTOMATED COUNT: 13.2 % (ref 11.5–17)
GFR SERPLBLD CREATININE-BSD FMLA CKD-EPI: >60 MLS/MIN/1.73/M2
GLOBULIN SER-MCNC: 3.2 GM/DL (ref 2.4–3.5)
GLUCOSE SERPL-MCNC: 139 MG/DL (ref 82–115)
HCT VFR BLD AUTO: 32.4 % (ref 42–52)
HGB BLD-MCNC: 11 G/DL (ref 14–18)
IMM GRANULOCYTES # BLD AUTO: 0.02 X10(3)/MCL (ref 0–0.04)
IMM GRANULOCYTES NFR BLD AUTO: 0.4 %
LYMPHOCYTES # BLD AUTO: 1.32 X10(3)/MCL (ref 0.6–4.6)
LYMPHOCYTES NFR BLD AUTO: 23.6 %
MCH RBC QN AUTO: 32.6 PG (ref 27–31)
MCHC RBC AUTO-ENTMCNC: 34 G/DL (ref 33–36)
MCV RBC AUTO: 96.1 FL (ref 80–94)
MONOCYTES # BLD AUTO: 0.79 X10(3)/MCL (ref 0.1–1.3)
MONOCYTES NFR BLD AUTO: 14.1 %
NEUTROPHILS # BLD AUTO: 3.3 X10(3)/MCL (ref 2.1–9.2)
NEUTROPHILS NFR BLD AUTO: 58.9 %
NRBC BLD AUTO-RTO: 0 %
OHS QRS DURATION: 96 MS
OHS QTC CALCULATION: 437 MS
PLATELET # BLD AUTO: 148 X10(3)/MCL (ref 130–400)
PMV BLD AUTO: 10.8 FL (ref 7.4–10.4)
POTASSIUM SERPL-SCNC: 3.9 MMOL/L (ref 3.5–5.1)
PROT SERPL-MCNC: 5.5 GM/DL (ref 5.8–7.6)
RBC # BLD AUTO: 3.37 X10(6)/MCL (ref 4.7–6.1)
SODIUM SERPL-SCNC: 140 MMOL/L (ref 136–145)
WBC # SPEC AUTO: 5.6 X10(3)/MCL (ref 4.5–11.5)

## 2024-04-21 PROCEDURE — 97530 THERAPEUTIC ACTIVITIES: CPT | Mod: CQ

## 2024-04-21 PROCEDURE — 85025 COMPLETE CBC W/AUTO DIFF WBC: CPT | Performed by: NURSE PRACTITIONER

## 2024-04-21 PROCEDURE — 21400001 HC TELEMETRY ROOM

## 2024-04-21 PROCEDURE — 80053 COMPREHEN METABOLIC PANEL: CPT | Performed by: NURSE PRACTITIONER

## 2024-04-21 PROCEDURE — 25000003 PHARM REV CODE 250: Performed by: NURSE PRACTITIONER

## 2024-04-21 PROCEDURE — 63600175 PHARM REV CODE 636 W HCPCS

## 2024-04-21 PROCEDURE — 25000003 PHARM REV CODE 250

## 2024-04-21 PROCEDURE — 63600175 PHARM REV CODE 636 W HCPCS: Performed by: NURSE PRACTITIONER

## 2024-04-21 RX ORDER — ENOXAPARIN SODIUM 100 MG/ML
1 INJECTION SUBCUTANEOUS EVERY 12 HOURS
Status: DISCONTINUED | OUTPATIENT
Start: 2024-04-21 | End: 2024-04-22 | Stop reason: HOSPADM

## 2024-04-21 RX ORDER — CALCIUM GLUCONATE 20 MG/ML
1 INJECTION, SOLUTION INTRAVENOUS ONCE
Status: COMPLETED | OUTPATIENT
Start: 2024-04-21 | End: 2024-04-21

## 2024-04-21 RX ORDER — MAGNESIUM SULFATE 1 G/100ML
1 INJECTION INTRAVENOUS ONCE
Status: COMPLETED | OUTPATIENT
Start: 2024-04-21 | End: 2024-04-21

## 2024-04-21 RX ORDER — METOPROLOL TARTRATE 25 MG/1
25 TABLET, FILM COATED ORAL 2 TIMES DAILY
Status: DISCONTINUED | OUTPATIENT
Start: 2024-04-21 | End: 2024-04-22

## 2024-04-21 RX ADMIN — SODIUM CHLORIDE 1000 ML: 9 INJECTION, SOLUTION INTRAVENOUS at 09:04

## 2024-04-21 RX ADMIN — POLYETHYLENE GLYCOL 3350 17 G: 17 POWDER, FOR SOLUTION ORAL at 09:04

## 2024-04-21 RX ADMIN — ACETAMINOPHEN 650 MG: 325 TABLET, FILM COATED ORAL at 03:04

## 2024-04-21 RX ADMIN — TAMSULOSIN HYDROCHLORIDE 0.4 MG: 0.4 CAPSULE ORAL at 09:04

## 2024-04-21 RX ADMIN — DOCUSATE SODIUM 100 MG: 100 CAPSULE, LIQUID FILLED ORAL at 09:04

## 2024-04-21 RX ADMIN — ENOXAPARIN SODIUM 60 MG: 60 INJECTION SUBCUTANEOUS at 09:04

## 2024-04-21 RX ADMIN — GABAPENTIN 300 MG: 300 CAPSULE ORAL at 03:04

## 2024-04-21 RX ADMIN — CALCIUM GLUCONATE 1 G: 20 INJECTION, SOLUTION INTRAVENOUS at 09:04

## 2024-04-21 RX ADMIN — MAGNESIUM SULFATE IN DEXTROSE 1 G: 10 INJECTION, SOLUTION INTRAVENOUS at 09:04

## 2024-04-21 RX ADMIN — METHOCARBAMOL 500 MG: 500 TABLET ORAL at 06:04

## 2024-04-21 RX ADMIN — ACETAMINOPHEN 650 MG: 325 TABLET, FILM COATED ORAL at 06:04

## 2024-04-21 RX ADMIN — ENOXAPARIN SODIUM 40 MG: 40 INJECTION SUBCUTANEOUS at 09:04

## 2024-04-21 RX ADMIN — METOPROLOL TARTRATE 25 MG: 25 TABLET, FILM COATED ORAL at 09:04

## 2024-04-21 RX ADMIN — ACETAMINOPHEN 650 MG: 325 TABLET, FILM COATED ORAL at 09:04

## 2024-04-21 RX ADMIN — METHOCARBAMOL 500 MG: 500 TABLET ORAL at 09:04

## 2024-04-21 RX ADMIN — GABAPENTIN 300 MG: 300 CAPSULE ORAL at 09:04

## 2024-04-21 RX ADMIN — SODIUM CHLORIDE, POTASSIUM CHLORIDE, SODIUM LACTATE AND CALCIUM CHLORIDE: 600; 310; 30; 20 INJECTION, SOLUTION INTRAVENOUS at 09:04

## 2024-04-21 RX ADMIN — METHOCARBAMOL 500 MG: 500 TABLET ORAL at 03:04

## 2024-04-21 NOTE — PROGRESS NOTES
"   Trauma Surgery   Progress Note  Admit Date: 4/18/2024  HD#3  POD#3 Days Post-Op    Subjective:   Interval history:  NAEON, AF, VSS  Pain controlled  Satting well on room air  Voiding spontaneously  + BM     Home Meds:   Current Outpatient Medications   Medication Instructions    multivitamin with minerals tablet 1 tablet, Oral, Daily      Scheduled Meds:  Current Facility-Administered Medications   Medication Dose Route Frequency    acetaminophen  650 mg Oral Q4H    docusate sodium  100 mg Oral BID    enoxparin  1 mg/kg Subcutaneous Q12H    gabapentin  300 mg Oral TID    methocarbamoL  500 mg Oral Q8H    metoprolol tartrate  25 mg Oral BID    polyethylene glycol  17 g Oral BID    tamsulosin  0.4 mg Oral Daily     Continuous Infusions:  Current Facility-Administered Medications   Medication Dose Route Frequency Last Rate Last Admin    lactated ringers   Intravenous Continuous 100 mL/hr at 04/19/24 1718 New Bag at 04/19/24 1718     PRN Meds:  Current Facility-Administered Medications:     magnesium hydroxide 400 mg/5 ml, 30 mL, Oral, Daily PRN    melatonin, 6 mg, Oral, Nightly PRN    morphine, 4 mg, Intravenous, Q6H PRN    ondansetron, 4 mg, Intravenous, Q6H PRN    oxyCODONE, 5 mg, Oral, Q4H PRN    oxyCODONE, 10 mg, Oral, Q4H PRN     Objective:     VITAL SIGNS: 24 HR MIN & MAX LAST   Temp  Min: 97.4 °F (36.3 °C)  Max: 98.1 °F (36.7 °C)  97.9 °F (36.6 °C)   BP  Min: 90/50  Max: 109/69  109/69    Pulse  Min: 67  Max: 79  79    Resp  Min: 16  Max: 18  18    SpO2  Min: 93 %  Max: 95 %  95 %      HT: 5' 11" (180.3 cm)  WT: 63.5 kg (140 lb)  BMI: 19.5     Intake/output:  Intake/Output - Last 3 Shifts         04/19 0700 04/20 0659 04/20 0700 04/21 0659 04/21 0700 04/22 0659    P.O.  420 840    I.V. (mL/kg)       IV Piggyback       Total Intake(mL/kg)  420 (6.6) 840 (13.2)    Urine (mL/kg/hr) 1400 (0.9) 350 (0.2) 900 (1.6)    Other  0     Blood       Total Output 1400 350 900    Net -1400 +70 -60           Urine " "Occurrence  1 x 1 x            Intake/Output Summary (Last 24 hours) at 4/21/2024 1541  Last data filed at 4/21/2024 1100  Gross per 24 hour   Intake 1140 ml   Output 1250 ml   Net -110 ml         Lines/drains/airway:       Peripheral IV - Single Lumen 04/18/24 2050 18 G Anterior;Right;Lateral Upper Arm (Active)   Site Assessment Clean;Dry;Intact 04/18/24 2051   Extremity Assessment Distal to IV No abnormal discoloration 04/18/24 2051   Line Status Infusing 04/18/24 2051   Dressing Status Clean;Dry;Intact 04/18/24 2051   Dressing Intervention Integrity maintained 04/18/24 2051   Number of days: 0       Physical examination:  Gen: NAD, sleeping  CV: RR  Resp: NWOB  Abd: S/NT/ND  Msk: LUE in sling, surgical dressing c/d/i  Neuro: CN II-XII grossly intact  Skin/wounds: warm, dry    Labs:  Renal:  Recent Labs     04/19/24  1041 04/20/24  0442 04/21/24  1119   BUN 13.2 12.9 14.0   CREATININE 0.75 0.70* 0.66*     No results for input(s): "LACTIC" in the last 72 hours.    FEN/GI:  Recent Labs     04/19/24  1041 04/20/24  0442 04/21/24  1119    139 140   K 4.7 4.1 3.9   CO2 23 26 25   CALCIUM 8.7* 8.5* 8.2*   MG 1.80  --   --    PHOS 2.7  --   --    ALBUMIN 3.0* 2.6* 2.3*   BILITOT 0.6 0.4 0.3   AST 35* 24 24   ALKPHOS 85 74 70   ALT 13 8 7     Heme:  Recent Labs     04/19/24  0836 04/20/24  0442 04/21/24  0546   HGB 11.5* 10.4* 11.0*   HCT 34.5* 32.0* 32.4*   * 102* 148     ID:  Recent Labs     04/19/24  0836 04/20/24  0442 04/21/24  0546   WBC 8.24 6.13 5.60     CBG:  Recent Labs     04/19/24  1041 04/20/24  0442 04/21/24  1119   GLUCOSE 108 91 139*      Recent Labs     04/19/24  0105   POCTGLUCOSE 102        Imaging:  MRI Pelvis Without Contrast   Final Result      Subtle signal variation irregularity of the anterior left superior pubic ramus with minimal edema possibly indicating a subacute fracture or chronic fracture.  Acute fracture less likely.  Possibly associated asymmetric intramuscular, fascial " edema left hip region including the adductor muscle group, pectineus, obturator externus.         Electronically signed by: Evelio Mace MD   Date:    04/20/2024   Time:    10:50      X-Ray Chest 1 View   Final Result      No significant interval change..         Electronically signed by: Evelio Mace MD   Date:    04/20/2024   Time:    08:19      Fl Modified Barium Swallow Speech   Final Result      X-Ray Wrist Complete Left   Final Result      Status post surgical hardware placement in the distal radius through to the 2nd metacarpal for stabilization of the comminuted wrist fracture         Electronically signed by: Shankar Lama   Date:    04/18/2024   Time:    18:02      X-Ray Humerus 2 View Left   Final Result      Satisfactory alignment following internal fixation of the proximal humerus.         Electronically signed by: Lashay Henry   Date:    04/18/2024   Time:    17:54      SURG FL Surgery Fluoro Usage   Final Result      CT 3D RECON WITH INDEPENDENT WS   Final Result      3D reconstruction performed from source data for surgical planning.         Electronically signed by: Hector Zuleta   Date:    04/18/2024   Time:    12:00      X-Ray Hip 2 or 3 views Left (with Pelvis when performed)   Final Result      Degenerative changes.      No definite fractures or dislocations identified.         Electronically signed by: Gokul Turner   Date:    04/18/2024   Time:    11:49      CT 3D RECON WITH INDEPENDENT WS   Final Result      3D reconstructions for surgical planning.         Electronically signed by: Hector Zuleta   Date:    04/18/2024   Time:    11:31      CT Wrist Without Contrast Left   Final Result      Fractures of distal radius and ulna as outlined above         Electronically signed by: Shankar Lama   Date:    04/18/2024   Time:    15:23      CT Shoulder Without Contrast Left   Final Result      Nondisplaced humeral neck fracture         Electronically signed by: Shankar Lama    Date:    04/18/2024   Time:    15:16      X-Ray Wrist Complete Left   Final Result      As above.         Electronically signed by: Loc Hernandez   Date:    04/18/2024   Time:    07:27      X-Ray Shoulder 2 or More Views Left   Final Result      Early degenerative changes in the AC joint         Electronically signed by: Sid Zarate MD   Date:    04/18/2024   Time:    11:54      CT Head Without Contrast   Final Result      No acute intracranial findings.      No significant discrepancy with the preliminary report.         Electronically signed by: Loc Hernandez   Date:    04/18/2024   Time:    09:52      CT Cervical Spine Without Contrast   Final Result      Slight concave compression deformity of the T2 vertebral body superior endplate within the field of view, indeterminate age.  No other fracture seen.  Spondylosis as above.      Agree with [facility]hawk         Electronically signed by: Evelio Mace MD   Date:    04/18/2024   Time:    09:00      X-Ray Wrist Complete Left   Final Result      Fractures of the distal radius and ulna.         Electronically signed by: Sid Zarate MD   Date:    04/18/2024   Time:    12:59      X-Ray Chest AP Portable   Final Result      Interval development of right upper lobe opacification.  Developing infectious process is not excluded.  Recommend continued follow-up         Electronically signed by: Hector Zuleta   Date:    04/18/2024   Time:    07:29         I have reviewed all pertinent imaging results/findings within the past 24 hours.      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    - Per ortho, subacute pelvic fracture non-operative  - Stable for discharge from orthopedic standpoint  - Cards consulted, transitioned to full dose lovenox and continued metoprolol  - Will need Eliquis at discharge  - Needs PT/OT recs for dispo and placement  - [facility]  - Wean O2 as tolerated  - Continue minced and moist diet  - Bowel regimen  - Daily labs  - NWB to LUE  - PT/OT consulted  - CM consulted    Citlalli Sullivan MD  [employer] General Surgery PGY-1

## 2024-04-21 NOTE — CONSULTS
[facility]    Cardiology  Consult Note    Patient Name: Kaleb Ibarra  MRN: [id number]  Admission Date: 4/18/2024  Hospital Length of Stay: 3 days  Code Status: Full Code   Attending Provider: Rick Pelaez MD   Consulting Provider: Teresa Moscoso NP  Primary Care Physician: Jossue Martinez MD  Principal Problem:Left wrist fracture, closed, initial encounter    Patient information was obtained from patient, past medical records, and ER records.     Subjective:     Reason for Consult: Heart failure (Newly diagnosed)    HPI: 89-year-old male that is remotely known to [facility]/Dr. Siddiqui (last seen in 2020) presented to [facility] after a trip and ground level fall at home to his left side. He had a left proximal humerus fracture appears to be at the anatomic neck and anteriorly nondisplaced and a left severely comminuted distal radius distal ulnar shaft fracture. Which was surgically repaired on 4.18.24. After surgery he went into afib with CVR. He denies a previous history of this. [facility] was consulted for newly diagnosed afib.       PMH: Prostate cancer, Rhabdomyolysis,   PSH: Prostate surgery, radius and humerus fracture repair  Family History: Brother: DM  Social History: Denies Tobacco use, ETOH use, no illicit drugs    Previous Cardiac Diagnostics:   None         Review of patient's allergies indicates:  No Known Allergies  Current Facility-Administered Medications   Medication Dose Route Frequency Provider Last Rate Last Admin    acetaminophen tablet 650 mg  650 mg Oral Q4H Santhosh Busby, FNP   650 mg at 04/21/24 0935    docusate sodium capsule 100 mg  100 mg Oral BID Santhosh Busby, FNP   100 mg at 04/21/24 0935    enoxaparin injection 40 mg  40 mg Subcutaneous Q12H Santhosh Busby, FNP   40 mg at 04/21/24 0934    gabapentin capsule 300 mg  300 mg Oral TID Santhosh Busby, FNP   300 mg at 04/21/24 0934    lactated ringers infusion   Intravenous Continuous  Citlalli Sullivan  mL/hr at 04/19/24 1718 New Bag at 04/19/24 1718    magnesium hydroxide 400 mg/5 ml suspension 2,400 mg  30 mL Oral Daily PRN Qi Santhosh WOODALL, LEANDRA        melatonin tablet 6 mg  6 mg Oral Nightly PRN Qi, Santhosh WOODALL, FNP        methocarbamoL tablet 500 mg  500 mg Oral Q8H QiSanthosh, FNP   500 mg at 04/21/24 0626    metoprolol tartrate (LOPRESSOR) tablet 25 mg  25 mg Oral BID QiSanthosh, FNP        morphine injection 4 mg  4 mg Intravenous Q6H PRN Bridget Bennett PA-C   4 mg at 04/19/24 0634    ondansetron injection 4 mg  4 mg Intravenous Q6H PRN Bridget Bennett PA-C        oxyCODONE immediate release tablet 5 mg  5 mg Oral Q4H PRN Qi, Santhosh WOODALL, FNP        oxyCODONE immediate release tablet Tab 10 mg  10 mg Oral Q4H PRN Qi Santhosh TALISHA, FNP   10 mg at 04/20/24 0835    polyethylene glycol packet 17 g  17 g Oral BID Santhosh Busby, FNP   17 g at 04/21/24 0934    tamsulosin 24 hr capsule 0.4 mg  0.4 mg Oral Daily Citlalli Sullivan MD   0.4 mg at 04/21/24 0935     Family History       Problem Relation (Age of Onset)    Diabetes Brother          Tobacco Use    Smoking status: Never     Passive exposure: Never    Smokeless tobacco: Never   Substance and Sexual Activity    Alcohol use: Yes     Alcohol/week: 2.0 standard drinks of alcohol     Types: 2 Cans of beer per week     Comment: socially    Drug use: Not Currently    Sexual activity: Not Currently       Review of Systems   Constitutional:  Negative for chills and fatigue.   Respiratory:  Negative for chest tightness and shortness of breath.    Cardiovascular:  Negative for chest pain and palpitations.   Gastrointestinal:  Negative for abdominal pain, nausea and vomiting.   Genitourinary: Negative.    Musculoskeletal:         + left wrist and L hip pain   Skin: Negative.    Neurological: Negative.    Psychiatric/Behavioral: Negative.       Objective:     Vital Signs (Most Recent):  Temp: 98.1  °F (36.7 °C) (04/21/24 1049)  Pulse: 68 (04/21/24 1049)  Resp: 18 (04/21/24 1049)  BP: 107/67 (04/21/24 1049)  SpO2: 95 % (04/21/24 1049) Vital Signs (24h Range):  Temp:  [97.4 °F (36.3 °C)-98.1 °F (36.7 °C)] 98.1 °F (36.7 °C)  Pulse:  [67-93] 68  Resp:  [16-18] 18  SpO2:  [93 %-95 %] 95 %  BP: ()/(50-72) 107/67   Weight: 63.5 kg (140 lb)  Body mass index is 19.53 kg/m².  SpO2: 95 %       Intake/Output Summary (Last 24 hours) at 4/21/2024 1136  Last data filed at 4/21/2024 0719  Gross per 24 hour   Intake 420 ml   Output 350 ml   Net 70 ml     Lines/Drains/Airways       Peripheral Intravenous Line  Duration                  Peripheral IV - Single Lumen 04/18/24 2050 18 G Anterior;Right;Lateral Upper Arm 2 days                  Significant Labs:  Recent Results (from the past 72 hour(s))   POCT glucose    Collection Time: 04/19/24  1:05 AM   Result Value Ref Range    POCT Glucose 102 70 - 110 mg/dL   CBC with Differential    Collection Time: 04/19/24  8:36 AM   Result Value Ref Range    WBC 8.24 4.50 - 11.50 x10(3)/mcL    RBC 3.62 (L) 4.70 - 6.10 x10(6)/mcL    Hgb 11.5 (L) 14.0 - 18.0 g/dL    Hct 34.5 (L) 42.0 - 52.0 %    MCV 95.3 (H) 80.0 - 94.0 fL    MCH 31.8 (H) 27.0 - 31.0 pg    MCHC 33.3 33.0 - 36.0 g/dL    RDW 13.5 11.5 - 17.0 %    Platelet 104 (L) 130 - 400 x10(3)/mcL    MPV 11.5 (H) 7.4 - 10.4 fL    Neut % 70.3 %    Lymph % 14.8 %    Mono % 12.1 %    Eos % 2.2 %    Basophil % 0.2 %    Lymph # 1.22 0.6 - 4.6 x10(3)/mcL    Neut # 5.79 2.1 - 9.2 x10(3)/mcL    Mono # 1.00 0.1 - 1.3 x10(3)/mcL    Eos # 0.18 0 - 0.9 x10(3)/mcL    Baso # 0.02 <=0.2 x10(3)/mcL    IG# 0.03 0 - 0.04 x10(3)/mcL    IG% 0.4 %    NRBC% 0.0 %    IPF 5.6 0.9 - 11.2 %   Magnesium    Collection Time: 04/19/24 10:41 AM   Result Value Ref Range    Magnesium Level 1.80 1.60 - 2.60 mg/dL   Phosphorus    Collection Time: 04/19/24 10:41 AM   Result Value Ref Range    Phosphorus Level 2.7 2.3 - 4.7 mg/dL   Comprehensive metabolic panel     Collection Time: 04/19/24 10:41 AM   Result Value Ref Range    Sodium Level 138 136 - 145 mmol/L    Potassium Level 4.7 3.5 - 5.1 mmol/L    Chloride 104 98 - 107 mmol/L    Carbon Dioxide 23 23 - 31 mmol/L    Glucose Level 108 82 - 115 mg/dL    Blood Urea Nitrogen 13.2 8.4 - 25.7 mg/dL    Creatinine 0.75 0.73 - 1.18 mg/dL    Calcium Level Total 8.7 (L) 8.8 - 10.0 mg/dL    Protein Total 7.0 5.8 - 7.6 gm/dL    Albumin Level 3.0 (L) 3.4 - 4.8 g/dL    Globulin 4.0 (H) 2.4 - 3.5 gm/dL    Albumin/Globulin Ratio 0.8 (L) 1.1 - 2.0 ratio    Bilirubin Total 0.6 <=1.5 mg/dL    Alkaline Phosphatase 85 40 - 150 unit/L    Alanine Aminotransferase 13 0 - 55 unit/L    Aspartate Aminotransferase 35 (H) 5 - 34 unit/L    eGFR >60 mls/min/1.73/m2   Comprehensive metabolic panel    Collection Time: 04/20/24  4:42 AM   Result Value Ref Range    Sodium Level 139 136 - 145 mmol/L    Potassium Level 4.1 3.5 - 5.1 mmol/L    Chloride 104 98 - 107 mmol/L    Carbon Dioxide 26 23 - 31 mmol/L    Glucose Level 91 82 - 115 mg/dL    Blood Urea Nitrogen 12.9 8.4 - 25.7 mg/dL    Creatinine 0.70 (L) 0.73 - 1.18 mg/dL    Calcium Level Total 8.5 (L) 8.8 - 10.0 mg/dL    Protein Total 6.0 5.8 - 7.6 gm/dL    Albumin Level 2.6 (L) 3.4 - 4.8 g/dL    Globulin 3.4 2.4 - 3.5 gm/dL    Albumin/Globulin Ratio 0.8 (L) 1.1 - 2.0 ratio    Bilirubin Total 0.4 <=1.5 mg/dL    Alkaline Phosphatase 74 40 - 150 unit/L    Alanine Aminotransferase 8 0 - 55 unit/L    Aspartate Aminotransferase 24 5 - 34 unit/L    eGFR >60 mls/min/1.73/m2   CBC with Differential    Collection Time: 04/20/24  4:42 AM   Result Value Ref Range    WBC 6.13 4.50 - 11.50 x10(3)/mcL    RBC 3.32 (L) 4.70 - 6.10 x10(6)/mcL    Hgb 10.4 (L) 14.0 - 18.0 g/dL    Hct 32.0 (L) 42.0 - 52.0 %    MCV 96.4 (H) 80.0 - 94.0 fL    MCH 31.3 (H) 27.0 - 31.0 pg    MCHC 32.5 (L) 33.0 - 36.0 g/dL    RDW 13.2 11.5 - 17.0 %    Platelet 102 (L) 130 - 400 x10(3)/mcL    MPV 11.2 (H) 7.4 - 10.4 fL    Neut % 62.3 %    Lymph %  22.5 %    Mono % 13.4 %    Eos % 0.8 %    Basophil % 0.5 %    Lymph # 1.38 0.6 - 4.6 x10(3)/mcL    Neut # 3.82 2.1 - 9.2 x10(3)/mcL    Mono # 0.82 0.1 - 1.3 x10(3)/mcL    Eos # 0.05 0 - 0.9 x10(3)/mcL    Baso # 0.03 <=0.2 x10(3)/mcL    IG# 0.03 0 - 0.04 x10(3)/mcL    IG% 0.5 %    NRBC% 0.0 %    IPF 5.8 0.9 - 11.2 %   EKG 12-lead    Collection Time: 04/20/24 11:19 AM   Result Value Ref Range    QRS Duration 96 ms    [facility] QTC Calculation 437 ms   CBC with Differential    Collection Time: 04/21/24  5:46 AM   Result Value Ref Range    WBC 5.60 4.50 - 11.50 x10(3)/mcL    RBC 3.37 (L) 4.70 - 6.10 x10(6)/mcL    Hgb 11.0 (L) 14.0 - 18.0 g/dL    Hct 32.4 (L) 42.0 - 52.0 %    MCV 96.1 (H) 80.0 - 94.0 fL    MCH 32.6 (H) 27.0 - 31.0 pg    MCHC 34.0 33.0 - 36.0 g/dL    RDW 13.2 11.5 - 17.0 %    Platelet 148 130 - 400 x10(3)/mcL    MPV 10.8 (H) 7.4 - 10.4 fL    Neut % 58.9 %    Lymph % 23.6 %    Mono % 14.1 %    Eos % 2.5 %    Basophil % 0.5 %    Lymph # 1.32 0.6 - 4.6 x10(3)/mcL    Neut # 3.30 2.1 - 9.2 x10(3)/mcL    Mono # 0.79 0.1 - 1.3 x10(3)/mcL    Eos # 0.14 0 - 0.9 x10(3)/mcL    Baso # 0.03 <=0.2 x10(3)/mcL    IG# 0.02 0 - 0.04 x10(3)/mcL    IG% 0.4 %    NRBC% 0.0 %     Significant Imaging:  Imaging Results              CT 3D RECON WITH INDEPENDENT WS (Final result)  Result time 04/18/24 12:00:52      Final result by Hector Zuleta MD (04/18/24 12:00:52)                   Impression:      3D reconstruction performed from source data for surgical planning.      Electronically signed by: Hector Zuleta  Date:    04/18/2024  Time:    12:00               Narrative:    CLINICAL HISTORY:  Surgical planning    TECHNIQUE:  3D reconstruction performed from source data on an independent workstation for surgical planning.    COMPARISON:  No prior imaging available for comparison.    FINDINGS:  3D reconstruction performed from source data for surgical planning.                                       X-Ray Hip 2 or 3 views Left (with  Pelvis when performed) (Final result)  Result time 04/18/24 11:49:53      Final result by Gokul Turner MD (04/18/24 11:49:53)                   Impression:      Degenerative changes.    No definite fractures or dislocations identified.      Electronically signed by: Gokul Turner  Date:    04/18/2024  Time:    11:49               Narrative:    EXAMINATION:  XR HIP WITH PELVIS WHEN PERFORMED, 2 OR 3 VIEWS LEFT    CLINICAL HISTORY:  pain;    COMPARISON:  None.    FINDINGS:  There is demineralization of the visualized osseous structures commensurate with patient's age    No acute displaced fractures or dislocations.    There is narrowing of the inferior medial aspect of both hip joints with degenerative changes of the sacroiliac joints and lumbosacral spine articular spaces are otherwise preserved with smooth articular surfaces    No blastic or lytic lesions.    Soft tissues within normal limits.    Radiation seeds identified in the prostate                                       CT 3D RECON WITH INDEPENDENT WS (Final result)  Result time 04/18/24 11:31:56   Procedure changed from CT 3D RECON WITHOUT INDEPENDENT WS     Final result by Hector Zuleta MD (04/18/24 11:31:56)                   Impression:      3D reconstructions for surgical planning.      Electronically signed by: Hector Zuleta  Date:    04/18/2024  Time:    11:31               Narrative:    CLINICAL HISTORY:  Surgical planning    TECHNIQUE:  3D reconstructions performed from source data for surgical planning on an independent workstation.    COMPARISON:  No prior imaging available for comparison.    FINDINGS:  3D reconstructions for surgical planning.                                       CT Wrist Without Contrast Left (Final result)  Result time 04/18/24 15:23:29      Final result by Jenny Lama MD (04/18/24 15:23:29)                   Impression:      Fractures of distal radius and ulna as outlined above      Electronically  signed by: Shankar Lama  Date:    04/18/2024  Time:    15:23               Narrative:    EXAMINATION:  CT WRIST WITHOUT CONTRAST LEFT    CLINICAL HISTORY:  Wrist trauma, subluxation on xray;    TECHNIQUE:  Multiple axial images were obtained of the left wrist and sagittal and coronal reconstructions were performed.    Automatic exposure control (AEC) is utilized to reduce patient radiation exposure.    COMPARISON:  Plain film dated 04/18/2024    FINDINGS:  There is a comminuted fracture of the distal radius involving the distal radial metaphysis and as well as the epiphysis extending to the articular surface.  There is displacement and some dorsal angulation.  There is also fracture of the distal ulnar metaphysis with slight dorsal angulation.  There is a fracture of the ulnar styloid.  Bones are diffusely osteoporotic.  No obvious fracture is seen.  There is soft tissue swelling in the wrist diffusely.                                       CT Shoulder Without Contrast Left (Final result)  Result time 04/18/24 15:16:44      Final result by Jenny Lama MD (04/18/24 15:16:44)                   Impression:      Nondisplaced humeral neck fracture      Electronically signed by: Shankar Lama  Date:    04/18/2024  Time:    15:16               Narrative:    EXAMINATION:  CT SHOULDER WITHOUT CONTRAST LEFT    CLINICAL HISTORY:  Shoulder pain, stress fracture suspected, neg xray;    TECHNIQUE:  Multiple axial images were obtained the left shoulder and sagittal and coronal reconstructions were performed.  Automatic exposure control (AEC) is utilized to reduce patient radiation exposure.    COMPARISON:  Plain film dated 04/18/2024    FINDINGS:  The clavicle is intact.  No obvious rib fractures are seen.  The scapula appears to be intact.  There is a fracture of the humerus.  Fracture seen at the humeral neck.  It is nondisplaced.  It is best seen on images number 94 through 101 series 3.  No other fractures are  seen.    There is some changes consistent with COPD in the lungs.  There is some atelectasis in the left lower lobe.                                       X-Ray Wrist Complete Left (Final result)  Result time 04/18/24 07:27:00   Procedure changed from X-Ray Wrist Complete Right     Final result by Loc Hernandez MD (04/18/24 07:27:00)                   Impression:      As above.      Electronically signed by: Loc Hernandez  Date:    04/18/2024  Time:    07:27               Narrative:    EXAMINATION:  XR WRIST COMPLETE 3 VIEWS LEFT    CLINICAL HISTORY:  Wrist fracture ORIF;  Fracture of unspecified carpal bone, left wrist, initial encounter for closed fracture    COMPARISON:  Earlier today    FINDINGS:  Two views left wrist.  There is improved position and alignment of the fractures of the distal radius and ulna.                                       X-Ray Shoulder 2 or More Views Left (Final result)  Result time 04/18/24 11:54:59      Final result by Sid Zarate MD (04/18/24 11:54:59)                   Impression:      Early degenerative changes in the AC joint      Electronically signed by: Sid Zarate MD  Date:    04/18/2024  Time:    11:54               Narrative:    EXAMINATION:  XR SHOULDER COMPLETE 2 OR MORE VIEWS LEFT    CLINICAL HISTORY:  fall;    TECHNIQUE:  Two or three views of the left shoulder were performed.    COMPARISON:  None    FINDINGS:  There are early degenerative changes in the AC joint.  There are no fractures seen.  There is no dislocation.                                       CT Head Without Contrast (Final result)  Result time 04/18/24 09:52:52      Final result by Loc Hernandez MD (04/18/24 09:52:52)                   Impression:      No acute intracranial findings.    No significant discrepancy with the preliminary report.      Electronically signed by: Loc Hernandez  Date:    04/18/2024  Time:    09:52               Narrative:    EXAMINATION:  CT HEAD WITHOUT  CONTRAST    CLINICAL HISTORY:  Head trauma, minor (Age >= 65y);    TECHNIQUE:  CT imaging of the head performed from the skull base to the vertex without intravenous contrast. DLP 1066 mGycm. Automatic exposure control, adjustment of mA/kV or iterative reconstruction technique was used to reduce radiation.    COMPARISON:  6 February 2023    FINDINGS:  There is no acute cortical infarct, hemorrhage or mass lesion.  No new parenchymal attenuation abnormality.  Moderate global atrophy.  Ventricular size is stable.    Visualized paranasal sinuses and mastoid air cells are clear. There is some left frontal scalp soft tissue swelling.                        Preliminary result by Solitario Barbosa Jr., MD (04/18/24 03:58:21)                   Impression:    1. No acute intracranial process identified. Details and other findings as noted above.               Narrative:    START OF REPORT:  Technique: CT of the head was performed without intravenous contrast with axial as well as coronal and sagittal images.    Comparison: None.    Dosage Information: Automated exposure control was utilized.    Clinical history: Fall, head injury.    Findings:  Hemorrhage: No acute intracranial hemorrhage is seen.  CSF spaces: The ventricles, sulci and basal cisterns all appear moderately prominent global cerebral atrophy.  Brain parenchyma: Moderate microvascular change is seen in portions of the periventricular and deep white matter tracts.  Vascular territory infarcts:  Lacunar infarcts: There is a stable lacunar infarct in the midbrain.  Cerebellum: Unremarkable.  Sella and skull base: The sella appears to be within normal limits for age.  Intracranial calcifications: Incidental note is made of bilateral choroid plexus calcification. Incidental note is made of some pineal region calcification. Incidental note is made of some calcification of the falx.  Calvarium: No acute linear or depressed skull fracture is seen.  Scalp: Mild scalp  soft tissue swelling is seen along the left frontal bone.    Maxillofacial Structures:  Paranasal sinuses: The visualized paranasal sinuses appear clear with no mucoperiosteal thickening or air fluid levels identified.  Orbits: The orbits appear unremarkable.  Zygomatic arches: The zygomatic arches are intact and unremarkable.  Temporal bones and mastoids: The temporal bones and mastoids appear unremarkable.  TMJ: The mandibular condyles appear normally placed with respect to the mandibular fossa.                                         CT Cervical Spine Without Contrast (Final result)  Result time 04/18/24 09:00:06      Final result by Shashi Mace MD (04/18/24 09:00:06)                   Impression:      Slight concave compression deformity of the T2 vertebral body superior endplate within the field of view, indeterminate age.  No other fracture seen.  Spondylosis as above.    Agree with nighthawk      Electronically signed by: Evelio Mace MD  Date:    04/18/2024  Time:    09:00               Narrative:    EXAMINATION:  CT CERVICAL SPINE WITHOUT CONTRAST    CLINICAL HISTORY:  Neck trauma (Age >= 65y);    TECHNIQUE:  Low dose axial images, sagittal and coronal reformations were performed though the cervical spine.  Contrast was not administered.  .  Automated exposure control used.    COMPARISON:  None    FINDINGS:  Slight superior endplate compression deformity of T2 within the field of view, indeterminate age.  No other fracture seen..  Odontoid intact.  Exaggerated lordotic curvature.  Kyphotic curvature upper thoracic region.    C2-C3 demonstrates mild disc space narrowing with no osseous spinal canal or foraminal narrowing.    C3-C4 mild moderate disc space narrowing without spinal canal or foraminal narrowing.    C4-C5 near normal disc space height without osseous spinal canal or foraminal narrowing.    C5-C6 moderate disc space narrowing, no significant osseous spinal canal narrowing, mild  osseous foraminal narrowing on the right.    C6-C7 and C7-T1 no significant osseous spinal canal or foraminal narrowing.    No soft tissue mass, fluid collection, hematoma, lymphadenopathy.                        Preliminary result by Solitario Barbosa Jr., MD (04/18/24 03:48:14)                   Impression:    1. No acute cervical spine fracture dislocation or subluxation is seen. Mild compression fracture of the superior endplate of T2 vertebral body, of indeterminate age. Correlate clinically as regards further evaluation and followup.  2. Details and other findings as noted above.               Narrative:    START OF REPORT:  Technique: CT of the cervical spine was performed without intravenous contrast with axial as well as sagittal and coronal images.    Comparison: None.    Dosage Information: Automated exposure control was utilized.    Clinical history: Fall, head injury.    Findings:  Lung apices: The visualized lung apices appear unremarkable.  Spine:  Spinal canal: The spinal canal appears unremarkable.  Spinal cord: The spinal cord appears unremarkable.  Mineralization: Mild osteopenia is seen in the visualized bony structures.  Scoliosis: No significant scoliosis is seen.  Vertebral Fusion: No vertebral fusion is identified.  Listhesis: No significant listhesis is identified.  Lordosis: The cervical lordosis is maintained.  Intervertebral disc spaces: Mildly decreased disc height is seen at C3-C4 and C5-C6.  Osteophytes: Small inferior endplate osteophytes seen at C5 and C6.  Endplate Sclerosis: No significant endplate sclerosis is seen.  Uncovertebral degenerative changes: Mild uncovertebral joint degenerative changes are seen at C3-C4.  Facet degenerative changes: No significant facet degenerative changes are seen.  Fractures: No acute cervical spine fracture dislocation or subluxation is seen. Mild compression fracture of the superior endplate of T2 vertebral body, of indeterminate age.  Correlate clinically as regards further evaluation and followup.                                         X-Ray Wrist Complete Left (Final result)  Result time 04/18/24 12:59:20      Final result by Sid Zarate MD (04/18/24 12:59:20)                   Impression:      Fractures of the distal radius and ulna.      Electronically signed by: Sid Zarate MD  Date:    04/18/2024  Time:    12:59               Narrative:    EXAMINATION:  XR WRIST COMPLETE 3 VIEWS LEFT    CLINICAL HISTORY:  Unspecified fall, initial encounter    TECHNIQUE:  PA, lateral, and oblique views of the left wrist were performed.    COMPARISON:  None    FINDINGS:  There are fractures of the distal radius and ulna.  Fractures are displaced dorsally.  The radius fracture questionably extends intra-articular.                                       X-Ray Chest AP Portable (Final result)  Result time 04/18/24 07:29:20      Final result by Hector Zuleta MD (04/18/24 07:29:20)                   Impression:      Interval development of right upper lobe opacification.  Developing infectious process is not excluded.  Recommend continued follow-up      Electronically signed by: Hector Zuleta  Date:    04/18/2024  Time:    07:29               Narrative:    EXAMINATION:  XR CHEST AP PORTABLE    CLINICAL HISTORY:  Fall;    TECHNIQUE:  Single view of the chest    COMPARISON:  02/22/2023    FINDINGS:  Interval development of right upper lobe opacification.    The cardiomediastinal silhouette is within normal limits.    No acute osseous abnormality.                                    EKG:       Telemetry:  Afib CVR    Physical Exam  HENT:      Head: Normocephalic.      Mouth/Throat:      Mouth: Mucous membranes are moist.   Cardiovascular:      Rate and Rhythm: Normal rate. Rhythm irregular.      Pulses: Normal pulses.      Heart sounds: Normal heart sounds. No murmur heard.  Pulmonary:      Effort: Pulmonary effort is normal. No respiratory distress.    Abdominal:      General: Abdomen is flat.   Skin:     General: Skin is warm.   Neurological:      Mental Status: He is alert.   Psychiatric:         Mood and Affect: Mood normal.       Home Medications:   No current facility-administered medications on file prior to encounter.     Current Outpatient Medications on File Prior to Encounter   Medication Sig Dispense Refill    multivitamin with minerals tablet Take 1 tablet by mouth once daily.       Current Inpatient Medications:    Current Facility-Administered Medications:     acetaminophen tablet 650 mg, 650 mg, Oral, Q4H, Santhosh Busby, LEANDRA, 650 mg at 04/21/24 0935    docusate sodium capsule 100 mg, 100 mg, Oral, BID, Santhosh Busby, FNP, 100 mg at 04/21/24 0935    enoxaparin injection 40 mg, 40 mg, Subcutaneous, Q12H, Santhosh Busby FNP, 40 mg at 04/21/24 0934    gabapentin capsule 300 mg, 300 mg, Oral, TID, Santhosh Busby, FNP, 300 mg at 04/21/24 0934    lactated ringers infusion, , Intravenous, Continuous, Citlalli Sullivan MD, Last Rate: 100 mL/hr at 04/19/24 1718, New Bag at 04/19/24 1718    magnesium hydroxide 400 mg/5 ml suspension 2,400 mg, 30 mL, Oral, Daily PRN, Santhosh Busby FNP    melatonin tablet 6 mg, 6 mg, Oral, Nightly PRN, Santhosh Busby FNP    methocarbamoL tablet 500 mg, 500 mg, Oral, Q8H, Santhosh Busby, CAITYP, 500 mg at 04/21/24 0626    metoprolol tartrate (LOPRESSOR) tablet 25 mg, 25 mg, Oral, BID, Santhosh Busby FNP    morphine injection 4 mg, 4 mg, Intravenous, Q6H PRN, Bridget Bennett PA-C, 4 mg at 04/19/24 0634    ondansetron injection 4 mg, 4 mg, Intravenous, Q6H PRN, Bridget Bennett PA-C    oxyCODONE immediate release tablet 5 mg, 5 mg, Oral, Q4H PRN, Santhosh Busby, LEANDRA    oxyCODONE immediate release tablet Tab 10 mg, 10 mg, Oral, Q4H PRN, Santhosh Busby, CAITYP, 10 mg at 04/20/24 0835    polyethylene glycol packet 17 g, 17 g, Oral, BID, Santhosh Busby, CAITYP, 17 g at  04/21/24 0934    tamsulosin 24 hr capsule 0.4 mg, 0.4 mg, Oral, Daily, Citlalli Sullivan MD, 0.4 mg at 04/21/24 0935  VTE Risk Mitigation (From admission, onward)           Ordered     Place NISHA hose  Until discontinued        Comments: Remove daily to inspect skin    04/18/24 1531     enoxaparin injection 40 mg  Every 12 hours         04/18/24 0655     IP VTE HIGH RISK PATIENT  Once         04/18/24 0655     Place sequential compression device  Until discontinued         04/18/24 0655                  I,Keyon Espinal MD,performed the substantive portion of this visit. I had a face-to-face time with the patient on 4/21/24. I reviewed and agree with the nurse practitioner's history, physical exam.     Medical decision making:        Assessment/Plan:   Afib (Newly Diagnosed) ~ breanna CVR   - [id number]: 2 points   Mechanical ground level fall  Left wrist fracture, closed  & Closed fracture of left proximal humerus     S/P bridge plating left distad radius and IMN left Humerus (4.18.24)  Prostate Cancer      EKG and tele reviewed  Obtain ECHO   Cont. Metoprolol tarte  Will start Eliquis for CVA risk reduction when okay with ortho surgery   Cont. FD Lovenox for CVA risk reduction         Thank you for your consult.     Teresa Moscoso NP  Cardiology  [facility] - Ortho Neuro  04/21/2024

## 2024-04-21 NOTE — PT/OT/SLP PROGRESS
Physical Therapy Treatment    Patient Name:  Kaleb Ibarra   MRN:  [id number]    Recommendations:     Discharge therapy intensity: Moderate Intensity Therapy   Discharge Equipment Recommendations: to be determined by next level of care  Barriers to discharge: Impaired mobility and Ongoing medical needs    Assessment:     Kaleb Ibarra is a 89 y.o. male admitted with a medical diagnosis of GLF: L distal radius fx s/p ORIF, L humerus fx s/p ORIF Hx:dementia, severe osteoporosis.   He presents with the following impairments/functional limitations: weakness, gait instability, impaired balance, impaired endurance, decreased safety awareness, impaired functional mobility.    Gait limited 2/2 pain in left hip and left arm. Patient remained NWB on left upper extremity during gait.     Rehab Prognosis: Good; patient would benefit from acute skilled PT services to address these deficits and reach maximum level of function.    Recent Surgery: Procedure(s) (LRB):  ORIF, FRACTURE, RADIUS, DISTAL (Left)  ORIF, FRACTURE, HUMERUS, PROXIMAL 3 Days Post-Op    Plan:     During this hospitalization, patient to be seen 6 x/week to address the identified rehab impairments via gait training, therapeutic activities, therapeutic exercises and progress toward the following goals:    Plan of Care Expires:  05/17/24    Subjective     Chief Complaint: left arm and hip pain  Patient/Family Comments/goals: none stated  Pain/Comfort:  Pain Rating 1: other (see comments) (pain not rated)  Location - Side 1: Left  Location - Orientation 1: generalized  Location 1: arm  Pain Addressed 1: Reposition, Cessation of Activity, Distraction      Objective:     Communicated with nurse prior to session.  Patient found HOB elevated with peripheral IV upon PT entry to room.     General Precautions: Standard, fall  Orthopedic Precautions: LUE non weight bearing (ok to use RW)  Braces: UE Sling  Respiratory Status: Room air  Blood Pressure: NT  Skin  Integrity: Visible skin intact      Functional Mobility:  Bed Mobility:     Rolling Left:  minimum assistance  Rolling Right: minimum assistance  Supine to Sit: minimum assistance  Sit to Supine: moderate assistance  Transfers:     Sit to Stand:  moderate assistance with rolling walker  Gait: patient took 5 lateral steps with donya walker with modA. Patient required max verbal cues for safety and upright posture.     Education:  Patient provided with verbal education education regarding PT role/goals/POC, post-op precautions, fall prevention, and safety awareness.  Understanding was verbalized.     Patient left HOB elevated with all lines intact, call button in reach, and wedge under L side    GOALS:   Multidisciplinary Problems       Physical Therapy Goals          Problem: Physical Therapy    Goal Priority Disciplines Outcome Goal Variances Interventions   Physical Therapy Goal     PT, PT/OT Ongoing, Progressing     Description: Goals to be met by: 24     Patient will increase functional independence with mobility by performin. Supine to sit with Stand-by Assistance  2. Sit to supine with Stand-by Assistance  3. Sit to stand transfer with Stand-by Assistance  4. Bed to chair transfer with Stand-by Assistance using Rolling Walker vs LRAD  5. Gait  x 25 feet with Stand-by Assistance using Rolling Walker vs LRAD.                          Time Tracking:     PT Received On: 24  PT Start Time: 918     PT Stop Time: 941  PT Total Time (min): 23 min     Billable Minutes: Therapeutic Activity 23    Treatment Type: Treatment  PT/PTA: PTA     Number of PTA visits since last PT visit: 2024

## 2024-04-22 VITALS
BODY MASS INDEX: 19.6 KG/M2 | TEMPERATURE: 98 F | SYSTOLIC BLOOD PRESSURE: 104 MMHG | HEART RATE: 57 BPM | OXYGEN SATURATION: 95 % | WEIGHT: 140 LBS | DIASTOLIC BLOOD PRESSURE: 60 MMHG | HEIGHT: 71 IN | RESPIRATION RATE: 18 BRPM

## 2024-04-22 LAB
ALBUMIN SERPL-MCNC: 2.4 G/DL (ref 3.4–4.8)
ALBUMIN/GLOB SERPL: 0.8 RATIO (ref 1.1–2)
ALP SERPL-CCNC: 73 UNIT/L (ref 40–150)
ALT SERPL-CCNC: 11 UNIT/L (ref 0–55)
AST SERPL-CCNC: 23 UNIT/L (ref 5–34)
AV INDEX (PROSTH): 0.71
AV MEAN GRADIENT: 3 MMHG
AV PEAK GRADIENT: 5 MMHG
AV VALVE AREA BY VELOCITY RATIO: 2.4 CM²
AV VALVE AREA: 2.45 CM²
AV VELOCITY RATIO: 0.69
BASOPHILS # BLD AUTO: 0.03 X10(3)/MCL
BASOPHILS NFR BLD AUTO: 0.7 %
BILIRUB SERPL-MCNC: 0.4 MG/DL
BSA FOR ECHO PROCEDURE: 1.78 M2
BUN SERPL-MCNC: 14.9 MG/DL (ref 8.4–25.7)
CALCIUM SERPL-MCNC: 8.2 MG/DL (ref 8.8–10)
CHLORIDE SERPL-SCNC: 107 MMOL/L (ref 98–107)
CO2 SERPL-SCNC: 29 MMOL/L (ref 23–31)
CREAT SERPL-MCNC: 0.71 MG/DL (ref 0.73–1.18)
CRP SERPL-MCNC: 61.1 MG/L
CV ECHO LV RWT: 0.5 CM
DOP CALC AO PEAK VEL: 1.17 M/S
DOP CALC AO VTI: 18.4 CM
DOP CALC LVOT AREA: 3.5 CM2
DOP CALC LVOT DIAMETER: 2.1 CM
DOP CALC LVOT PEAK VEL: 0.81 M/S
DOP CALC LVOT STROKE VOLUME: 45 CM3
DOP CALC MV VTI: 18 CM
DOP CALCLVOT PEAK VEL VTI: 13 CM
E WAVE DECELERATION TIME: 267 MSEC
E/A RATIO: 0.7
E/E' RATIO: 9.2 M/S
ECHO LV POSTERIOR WALL: 1 CM (ref 0.6–1.1)
EJECTION FRACTION: 55 %
EOSINOPHIL # BLD AUTO: 0.08 X10(3)/MCL (ref 0–0.9)
EOSINOPHIL NFR BLD AUTO: 1.7 %
ERYTHROCYTE [DISTWIDTH] IN BLOOD BY AUTOMATED COUNT: 13.2 % (ref 11.5–17)
FRACTIONAL SHORTENING: 25 % (ref 28–44)
GFR SERPLBLD CREATININE-BSD FMLA CKD-EPI: >60 MLS/MIN/1.73/M2
GLOBULIN SER-MCNC: 3 GM/DL (ref 2.4–3.5)
GLUCOSE SERPL-MCNC: 99 MG/DL (ref 82–115)
HCT VFR BLD AUTO: 29.8 % (ref 42–52)
HGB BLD-MCNC: 9.8 G/DL (ref 14–18)
IMM GRANULOCYTES # BLD AUTO: 0.02 X10(3)/MCL (ref 0–0.04)
IMM GRANULOCYTES NFR BLD AUTO: 0.4 %
INTERVENTRICULAR SEPTUM: 1.3 CM (ref 0.6–1.1)
LEFT ATRIUM SIZE: 4.1 CM
LEFT INTERNAL DIMENSION IN SYSTOLE: 3 CM (ref 2.1–4)
LEFT VENTRICLE DIASTOLIC VOLUME INDEX: 38.67 ML/M2
LEFT VENTRICLE DIASTOLIC VOLUME: 70 ML
LEFT VENTRICLE MASS INDEX: 86 G/M2
LEFT VENTRICLE SYSTOLIC VOLUME INDEX: 19.3 ML/M2
LEFT VENTRICLE SYSTOLIC VOLUME: 35 ML
LEFT VENTRICULAR INTERNAL DIMENSION IN DIASTOLE: 4 CM (ref 3.5–6)
LEFT VENTRICULAR MASS: 155.39 G
LV LATERAL E/E' RATIO: 8.63 M/S
LV SEPTAL E/E' RATIO: 9.86 M/S
LVOT MG: 1 MMHG
LVOT MV: 0.52 CM/S
LYMPHOCYTES # BLD AUTO: 1.52 X10(3)/MCL (ref 0.6–4.6)
LYMPHOCYTES NFR BLD AUTO: 33 %
MCH RBC QN AUTO: 31.1 PG (ref 27–31)
MCHC RBC AUTO-ENTMCNC: 32.9 G/DL (ref 33–36)
MCV RBC AUTO: 94.6 FL (ref 80–94)
MONOCYTES # BLD AUTO: 0.53 X10(3)/MCL (ref 0.1–1.3)
MONOCYTES NFR BLD AUTO: 11.5 %
MV MEAN GRADIENT: 3 MMHG
MV PEAK A VEL: 0.98 M/S
MV PEAK E VEL: 0.69 M/S
MV PEAK GRADIENT: 5 MMHG
MV STENOSIS PRESSURE HALF TIME: 52 MS
MV VALVE AREA BY CONTINUITY EQUATION: 2.5 CM2
MV VALVE AREA P 1/2 METHOD: 4.23 CM2
NEUTROPHILS # BLD AUTO: 2.43 X10(3)/MCL (ref 2.1–9.2)
NEUTROPHILS NFR BLD AUTO: 52.7 %
NRBC BLD AUTO-RTO: 0 %
OHS CV RV/LV RATIO: 0.65 CM
OHS QRS DURATION: 96 MS
OHS QTC CALCULATION: 425 MS
PISA TR MAX VEL: 1.17 M/S
PLATELET # BLD AUTO: 123 X10(3)/MCL (ref 130–400)
PLATELETS.RETICULATED NFR BLD AUTO: 5.8 % (ref 0.9–11.2)
PMV BLD AUTO: 11 FL (ref 7.4–10.4)
POTASSIUM SERPL-SCNC: 4.3 MMOL/L (ref 3.5–5.1)
PREALB SERPL-MCNC: 5.5 MG/DL (ref 16–42)
PROT SERPL-MCNC: 5.4 GM/DL (ref 5.8–7.6)
PV PEAK GRADIENT: 4 MMHG
PV PEAK VELOCITY: 1.06 M/S
RA PRESSURE ESTIMATED: 3 MMHG
RBC # BLD AUTO: 3.15 X10(6)/MCL (ref 4.7–6.1)
RIGHT VENTRICULAR END-DIASTOLIC DIMENSION: 2.6 CM
RV TB RVSP: 4 MMHG
SODIUM SERPL-SCNC: 141 MMOL/L (ref 136–145)
TDI LATERAL: 0.08 M/S
TDI SEPTAL: 0.07 M/S
TDI: 0.08 M/S
TR MAX PG: 5 MMHG
TRICUSPID ANNULAR PLANE SYSTOLIC EXCURSION: 1.74 CM
TV REST PULMONARY ARTERY PRESSURE: 8 MMHG
WBC # SPEC AUTO: 4.61 X10(3)/MCL (ref 4.5–11.5)
Z-SCORE OF LEFT VENTRICULAR DIMENSION IN END DIASTOLE: -2.25
Z-SCORE OF LEFT VENTRICULAR DIMENSION IN END SYSTOLE: -0.24

## 2024-04-22 PROCEDURE — 97530 THERAPEUTIC ACTIVITIES: CPT

## 2024-04-22 PROCEDURE — 86140 C-REACTIVE PROTEIN: CPT | Performed by: NURSE PRACTITIONER

## 2024-04-22 PROCEDURE — 80053 COMPREHEN METABOLIC PANEL: CPT | Performed by: NURSE PRACTITIONER

## 2024-04-22 PROCEDURE — 84134 ASSAY OF PREALBUMIN: CPT | Performed by: NURSE PRACTITIONER

## 2024-04-22 PROCEDURE — 93010 ELECTROCARDIOGRAM REPORT: CPT | Mod: ,,, | Performed by: INTERNAL MEDICINE

## 2024-04-22 PROCEDURE — 25000003 PHARM REV CODE 250: Performed by: NURSE PRACTITIONER

## 2024-04-22 PROCEDURE — 85025 COMPLETE CBC W/AUTO DIFF WBC: CPT | Performed by: NURSE PRACTITIONER

## 2024-04-22 PROCEDURE — 99238 HOSP IP/OBS DSCHRG MGMT 30/<: CPT | Mod: GC,,, | Performed by: SURGERY

## 2024-04-22 PROCEDURE — 63600175 PHARM REV CODE 636 W HCPCS: Performed by: NURSE PRACTITIONER

## 2024-04-22 PROCEDURE — 93005 ELECTROCARDIOGRAM TRACING: CPT

## 2024-04-22 PROCEDURE — 25000003 PHARM REV CODE 250

## 2024-04-22 PROCEDURE — 97535 SELF CARE MNGMENT TRAINING: CPT | Mod: CO

## 2024-04-22 RX ORDER — METOPROLOL TARTRATE 25 MG/1
12.5 TABLET ORAL 2 TIMES DAILY
Status: DISCONTINUED | OUTPATIENT
Start: 2024-04-22 | End: 2024-04-22

## 2024-04-22 RX ORDER — ACETAMINOPHEN 325 MG/1
650 TABLET ORAL EVERY 6 HOURS PRN
Qty: 30 TABLET | Refills: 0 | Status: SHIPPED | OUTPATIENT
Start: 2024-04-22 | End: 2024-05-02

## 2024-04-22 RX ORDER — METHOCARBAMOL 500 MG/1
500 TABLET, FILM COATED ORAL EVERY 8 HOURS
Qty: 30 TABLET | Refills: 0 | Status: SHIPPED | OUTPATIENT
Start: 2024-04-22 | End: 2024-05-02

## 2024-04-22 RX ADMIN — ACETAMINOPHEN 650 MG: 325 TABLET, FILM COATED ORAL at 06:04

## 2024-04-22 RX ADMIN — METHOCARBAMOL 500 MG: 500 TABLET ORAL at 06:04

## 2024-04-22 RX ADMIN — POLYETHYLENE GLYCOL 3350 17 G: 17 POWDER, FOR SOLUTION ORAL at 08:04

## 2024-04-22 RX ADMIN — GABAPENTIN 300 MG: 300 CAPSULE ORAL at 08:04

## 2024-04-22 RX ADMIN — TAMSULOSIN HYDROCHLORIDE 0.4 MG: 0.4 CAPSULE ORAL at 08:04

## 2024-04-22 RX ADMIN — ACETAMINOPHEN 650 MG: 325 TABLET, FILM COATED ORAL at 08:04

## 2024-04-22 RX ADMIN — DOCUSATE SODIUM 100 MG: 100 CAPSULE, LIQUID FILLED ORAL at 08:04

## 2024-04-22 RX ADMIN — ENOXAPARIN SODIUM 60 MG: 60 INJECTION SUBCUTANEOUS at 08:04

## 2024-04-22 NOTE — PLAN OF CARE
[facility] uploaded discharge summary orders/AVS and clinicals to [facility]. Dahlia with [facility] was notified and discharge packet was given to  nurse. Patient in will call with [facility] [phone].

## 2024-04-22 NOTE — PLAN OF CARE
04/22/24 1216   Final Note   Assessment Type Final Discharge Note   Anticipated Discharge Disposition [facility] Resources/Appts/Education Provided Post-Acute resouces added to AVS   Post-Acute Status   Post-Acute Authorization Placement   Post-Acute Placement Status Set-up Complete/Auth obtained   Discharge Delays None known at this time     Pt returning to [facility] via [facility]. Transport in will-call at this. Report number and transport info given to nurse.     Marilyn Barrera LCSW    Pt taken out of will-call by bedside RN for pickup.     Pt not eligible for ambulance transport. SW spoke with Dahlia at [facility] who stated they can provide transport with [facility]. Pickup will be around 4pm. Updated RN. Attempted to contact dtr to update, but had to leave .

## 2024-04-22 NOTE — PT/OT/SLP PROGRESS
Occupational Therapy   Treatment    Name: Kaleb Ibarra  MRN: [id number]  Admitting Diagnosis:  Left wrist fracture, closed, initial encounter  4 Days Post-Op    Recommendations:     Recommended therapy intensity at discharge: Moderate Intensity Therapy   Discharge Equipment Recommendations:  to be determined by next level of care  Barriers to discharge:       Assessment:     Kaleb Ibarra is a 89 y.o. male with a medical diagnosis of Left wrist fracture, closed, initial encounter.  He presents with improved balance and increased endurance, cueing required for adherence to NWB of L UE. Recommending Mod intensity therapy. Performance deficits affecting function are weakness, impaired endurance, impaired cognition, orthopedic precautions, impaired self care skills, decreased upper extremity function, impaired functional mobility, gait instability, decreased safety awareness, impaired balance, pain.     Rehab Prognosis:  Good; patient would benefit from acute skilled OT services to address these deficits and reach maximum level of function.       Plan:     Patient to be seen 5 x/week to address the above listed problems via self-care/home management, therapeutic activities, therapeutic exercises  Plan of Care Expires: 05/17/24  Plan of Care Reviewed with: patient    Subjective     Pain/Comfort:       Objective:     Communicated with: RN prior to session.  Patient found HOB elevated with   upon OT entry to room.    General Precautions: Standard, fall    Orthopedic Precautions:LUE non weight bearing (OK to use RW, ROMAT elbow and digits)  Braces: UE Sling  Respiratory Status: Room air       Occupational Performance:   (Bed Mobility- Min A)  (Sitting balance- CGA_SBA)  Pt. Performing grooming task (brushing teeth) with appropriate preparation and setup noted using R UE for excursion to mouth.   (Sit to stand- Mod-Min A) using hemiwalker.  Pt. Laid back down and repositioned in bed appropriately.       Therapeutic  Positioning    OT interventions performed during the course of today's session in an effort to prevent and/or reduce acquired pressure injuries:   Therapeutic positioning was provided at the conclusion of session to offload all bony prominences for the prevention and/or reduction of pressure injuries        [facility] 6 Click ADL:      Patient Education:  Patient provided with verbal education education regarding fall prevention and safety awareness.  Additional teaching is warranted.      Patient left HOB elevated with all lines intact and call button in reach.    GOALS:   Multidisciplinary Problems       Occupational Therapy Goals          Problem: Occupational Therapy    Goal Priority Disciplines Outcome Interventions   Occupational Therapy Goal     OT, PT/OT Ongoing, Progressing    Description: Goals to be met by 5/17/24:    Pt will complete grooming standing at sink with LRAD with CGA.  Pt will complete UB dressing with Min A.  Pt will complete LB dressing with Min A using LRAD.  Pt will complete toileting with Min A using LRAD.  Pt will complete functional mobility to/from toilet and toilet transfer with CGA using LRAD.                        Time Tracking:     OT Date of Treatment: 04/22/24  OT Start Time: 1029  OT Stop Time: 1056  OT Total Time (min): 27 min    Billable Minutes:Self Care/Home Management 2    OT/PENELOPE: PENELOPE     Number of PENELOPE visits since last OT visit: 1    4/22/2024      [facility]

## 2024-04-22 NOTE — PLAN OF CARE
Clinical updates sent to Kezia Naqvi via [employer]. Plan for pt to return to NH once stable for d/c. Notified Dahlia at NH.    Marilyn Barrera LCSW

## 2024-04-22 NOTE — DISCHARGE SUMMARY
[facility]  General Surgery  Discharge Summary      Patient Name: Kaleb Ibarra  MRN: [id number]  Admission Date: 4/18/2024  Hospital Length of Stay: 4 days  Discharge Date and Time:  04/22/2024 11:03 AM  Attending Physician: Rick Pelaez MD   Discharging Provider: Loyd Deras MD  Primary Care Provider: Jossue Martinez MD     HPI: 89-year-old male trip and fall at home 2 days ago. States lives alone. Denies any past medical history or any current medications. A thorough chart review reveals patient has a history of dementia although I do not believe he was information he was incorrect and report to his history. It does appear that he has had orthopedic injuries requiring intervention in the past that was some concern for malignancy although does not appear he has been anything. He was worked up for multiple myeloma. He was done cleared workup complete. Based on the notes it appears he may still do at home. He was no family available currently. Previous notes state that he has a living will that states he was DNR, DNI, comfort care only. He will be a full code until we have family to provide those documents. Complains of left wrist pain. He initially denied left shoulder pain to me although he would complained of left shoulder pain to the emergency department. He does have some mild tenderness there. He was GCS 15 and hemodynamically stable. He was left arrest has been reduced and splinted and Orthopedics as aware and plans for surgical intervention today. He was a CT of his wrist and shoulder pending. He was CT of his head and neck were negative. He was cervical collar clear. His labs have been reviewed.     Procedure(s) (LRB):  ORIF, FRACTURE, RADIUS, DISTAL (Left)  ORIF, FRACTURE, HUMERUS, PROXIMAL     Hospital Course: Patient was admitted for management of LUE fractures after fall. The patient underwent surgical repair of fractures with orthopedics on 4/18. He continued  to work with PT/OT and Speech post operatively. He was evaluated by cardiology who recommended starting eliquis therapy for newly diagnosed AFIB. He was deemed stable for discharge on 4/22 back to nursing home.     Consults:   Consults (From admission, onward)          Status Ordering Provider     Inpatient consult to Cardiology  Once        Provider:  Keyon Espinal MD    Completed [facility]     Inpatient consult to Social Work/Case Management  Once        Provider:  (Not yet assigned)    Acknowledged FERNANDO BENNETT     Inpatient consult to Orthopedic Surgery  Once        Provider:  Kraig Malcolm DO    Completed [facility]TON              Pending Diagnostic Studies:       None          Final Active Diagnoses:    Diagnosis Date Noted POA    PRINCIPAL PROBLEM:  Left wrist fracture, closed, initial encounter [S62.102A] 04/18/2024 Yes    Closed fracture of left proximal humerus [S42.202A] 04/18/2024 Yes      Problems Resolved During this Admission:      Discharged Condition: good    Disposition: Another [facility] Nursing Home    Follow Up:   Follow-up Information       Fernando Bennett, PAMadhuriC. Schedule an appointment as soon as possible for a visit in 3 week(s).    Specialty: Orthopedic Surgery  Why: For suture removal, For wound re-check and xrays post op  Contact information:  [address]  [address]  [facility] [address] [phone]               Jossue Martinez MD Follow up in 2 week(s).    Specialty: Internal Medicine  Contact information:  [address] [facility] [facility] [address] [phone]                           Patient Instructions:      Diet Dysphagia Soft     Change dressing (specify)   Order Comments: Dressing change: daily     Activity as tolerated     Weight bearing restrictions (specify):   Order Comments: Non-weight bearing to Left upper extremity except for platform walker use     Medications:  Reconciled Home Medications:      Medication List        START  taking these medications      acetaminophen 325 MG tablet  Commonly known as: TYLENOL  Take 2 tablets (650 mg total) by mouth every 6 (six) hours as needed for Pain.     apixaban 5 mg Tab  Commonly known as: ELIQUIS  Take 1 tablet (5 mg total) by mouth 2 (two) times daily.     methocarbamoL 500 MG Tab  Commonly known as: ROBAXIN  Take 1 tablet (500 mg total) by mouth every 8 (eight) hours. for 10 days            CONTINUE taking these medications      multivitamin with minerals tablet  Take 1 tablet by mouth once daily.              Loyd Tenorio MD  General Surgery  [facility]

## 2024-04-22 NOTE — PROGRESS NOTES
[facility] Neuro    Cardiology  Progress Note    Patient Name: Kaleb Ibarra  MRN: [id number]  Admission Date: 4/18/2024  Hospital Length of Stay: 4 days  Code Status: Full Code   Attending Physician: Rick Pelaez MD   Primary Care Physician: Jossue Martinez MD  Expected Discharge Date:   Principal Problem:Left wrist fracture, closed, initial encounter    Subjective:     Reason for Consult: Afib (Newly diagnosed)     HPI: 89-year-old male that is remotely known to [facility]/Dr. Siddiqui (last seen in 2020) presented to [facility] after a trip and ground level fall at home to his left side. He had a left proximal humerus fracture appears to be at the anatomic neck and anteriorly nondisplaced and a left severely comminuted distal radius distal ulnar shaft fracture. Which was surgically repaired on 4.18.24. After surgery he went into afib with CVR. He denies a previous history of this. [facility] was consulted for newly diagnosed afib.     Hospital Course:   4.22.24: NAD, VSS. He reports L hip pain and L wrist pain. He denies SOB, CP, or palpitations. Sinus bradycardia noted on tele review.     PMH: Prostate cancer, Rhabdomyolysis,   PSH: Prostate surgery, radius and humerus fracture repair  Family History: Brother: DM  Social History: Denies Tobacco use, ETOH use, no illicit drugs    Previous Diagnostics:  ECHO (4.21.24):  Left Ventricle: The left ventricle is normal in size. Mildly increased wall thickness. There is normal systolic function. Ejection fraction by visual approximation is 55%.  Right Ventricle: Normal right ventricular cavity size. Systolic function is normal.  Left Atrium: Left atrium is mildly dilated.  IVC/SVC: Normal venous pressure at 3 mmHg.  Pericardium: There is no pericardial effusion.    Review of Systems   Constitutional: Negative for chills and fever.   Cardiovascular:  Negative for chest pain, palpitations and syncope.   Respiratory: Negative.  Negative for shortness of  breath.    Skin: Negative.    Musculoskeletal:         + L wrist pain & + L hip pain   Gastrointestinal:  Negative for abdominal pain, nausea and vomiting.   Psychiatric/Behavioral: Negative.         Objective:     Vital Signs (Most Recent):  Temp: 97.8 °F (36.6 °C) (04/22/24 0739)  Pulse: 82 (04/22/24 0739)  Resp: 19 (04/22/24 0739)  BP: (!) 90/59 (04/22/24 0739)  SpO2: (!) 92 % (04/22/24 0739) Vital Signs (24h Range):  Temp:  [97.8 °F (36.6 °C)-98.6 °F (37 °C)] 97.8 °F (36.6 °C)  Pulse:  [47-95] 82  Resp:  [16-19] 19  SpO2:  [92 %-95 %] 92 %  BP: ()/(59-69) 90/59   Weight: 63.5 kg (140 lb)  Body mass index is 19.53 kg/m².  SpO2: (!) 92 %       Intake/Output Summary (Last 24 hours) at 4/22/2024 0953  Last data filed at 4/22/2024 0442  Gross per 24 hour   Intake 240 ml   Output 1150 ml   Net -910 ml     Lines/Drains/Airways       Peripheral Intravenous Line  Duration                  Peripheral IV - Single Lumen 04/18/24 2050 18 G Anterior;Right;Lateral Upper Arm 3 days                  Significant Labs:   Recent Results (from the past 72 hour(s))   Magnesium    Collection Time: 04/19/24 10:41 AM   Result Value Ref Range    Magnesium Level 1.80 1.60 - 2.60 mg/dL   Phosphorus    Collection Time: 04/19/24 10:41 AM   Result Value Ref Range    Phosphorus Level 2.7 2.3 - 4.7 mg/dL   Comprehensive metabolic panel    Collection Time: 04/19/24 10:41 AM   Result Value Ref Range    Sodium Level 138 136 - 145 mmol/L    Potassium Level 4.7 3.5 - 5.1 mmol/L    Chloride 104 98 - 107 mmol/L    Carbon Dioxide 23 23 - 31 mmol/L    Glucose Level 108 82 - 115 mg/dL    Blood Urea Nitrogen 13.2 8.4 - 25.7 mg/dL    Creatinine 0.75 0.73 - 1.18 mg/dL    Calcium Level Total 8.7 (L) 8.8 - 10.0 mg/dL    Protein Total 7.0 5.8 - 7.6 gm/dL    Albumin Level 3.0 (L) 3.4 - 4.8 g/dL    Globulin 4.0 (H) 2.4 - 3.5 gm/dL    Albumin/Globulin Ratio 0.8 (L) 1.1 - 2.0 ratio    Bilirubin Total 0.6 <=1.5 mg/dL    Alkaline Phosphatase 85 40 - 150 unit/L     Alanine Aminotransferase 13 0 - 55 unit/L    Aspartate Aminotransferase 35 (H) 5 - 34 unit/L    eGFR >60 mls/min/1.73/m2   Comprehensive metabolic panel    Collection Time: 04/20/24  4:42 AM   Result Value Ref Range    Sodium Level 139 136 - 145 mmol/L    Potassium Level 4.1 3.5 - 5.1 mmol/L    Chloride 104 98 - 107 mmol/L    Carbon Dioxide 26 23 - 31 mmol/L    Glucose Level 91 82 - 115 mg/dL    Blood Urea Nitrogen 12.9 8.4 - 25.7 mg/dL    Creatinine 0.70 (L) 0.73 - 1.18 mg/dL    Calcium Level Total 8.5 (L) 8.8 - 10.0 mg/dL    Protein Total 6.0 5.8 - 7.6 gm/dL    Albumin Level 2.6 (L) 3.4 - 4.8 g/dL    Globulin 3.4 2.4 - 3.5 gm/dL    Albumin/Globulin Ratio 0.8 (L) 1.1 - 2.0 ratio    Bilirubin Total 0.4 <=1.5 mg/dL    Alkaline Phosphatase 74 40 - 150 unit/L    Alanine Aminotransferase 8 0 - 55 unit/L    Aspartate Aminotransferase 24 5 - 34 unit/L    eGFR >60 mls/min/1.73/m2   CBC with Differential    Collection Time: 04/20/24  4:42 AM   Result Value Ref Range    WBC 6.13 4.50 - 11.50 x10(3)/mcL    RBC 3.32 (L) 4.70 - 6.10 x10(6)/mcL    Hgb 10.4 (L) 14.0 - 18.0 g/dL    Hct 32.0 (L) 42.0 - 52.0 %    MCV 96.4 (H) 80.0 - 94.0 fL    MCH 31.3 (H) 27.0 - 31.0 pg    MCHC 32.5 (L) 33.0 - 36.0 g/dL    RDW 13.2 11.5 - 17.0 %    Platelet 102 (L) 130 - 400 x10(3)/mcL    MPV 11.2 (H) 7.4 - 10.4 fL    Neut % 62.3 %    Lymph % 22.5 %    Mono % 13.4 %    Eos % 0.8 %    Basophil % 0.5 %    Lymph # 1.38 0.6 - 4.6 x10(3)/mcL    Neut # 3.82 2.1 - 9.2 x10(3)/mcL    Mono # 0.82 0.1 - 1.3 x10(3)/mcL    Eos # 0.05 0 - 0.9 x10(3)/mcL    Baso # 0.03 <=0.2 x10(3)/mcL    IG# 0.03 0 - 0.04 x10(3)/mcL    IG% 0.5 %    NRBC% 0.0 %    IPF 5.8 0.9 - 11.2 %   EKG 12-lead    Collection Time: 04/20/24 11:19 AM   Result Value Ref Range    QRS Duration 96 ms    [facility] QTC Calculation 437 ms   CBC with Differential    Collection Time: 04/21/24  5:46 AM   Result Value Ref Range    WBC 5.60 4.50 - 11.50 x10(3)/mcL    RBC 3.37 (L) 4.70 - 6.10 x10(6)/mcL    Hgb  11.0 (L) 14.0 - 18.0 g/dL    Hct 32.4 (L) 42.0 - 52.0 %    MCV 96.1 (H) 80.0 - 94.0 fL    MCH 32.6 (H) 27.0 - 31.0 pg    MCHC 34.0 33.0 - 36.0 g/dL    RDW 13.2 11.5 - 17.0 %    Platelet 148 130 - 400 x10(3)/mcL    MPV 10.8 (H) 7.4 - 10.4 fL    Neut % 58.9 %    Lymph % 23.6 %    Mono % 14.1 %    Eos % 2.5 %    Basophil % 0.5 %    Lymph # 1.32 0.6 - 4.6 x10(3)/mcL    Neut # 3.30 2.1 - 9.2 x10(3)/mcL    Mono # 0.79 0.1 - 1.3 x10(3)/mcL    Eos # 0.14 0 - 0.9 x10(3)/mcL    Baso # 0.03 <=0.2 x10(3)/mcL    IG# 0.02 0 - 0.04 x10(3)/mcL    IG% 0.4 %    NRBC% 0.0 %   Comprehensive metabolic panel    Collection Time: 04/21/24 11:19 AM   Result Value Ref Range    Sodium Level 140 136 - 145 mmol/L    Potassium Level 3.9 3.5 - 5.1 mmol/L    Chloride 105 98 - 107 mmol/L    Carbon Dioxide 25 23 - 31 mmol/L    Glucose Level 139 (H) 82 - 115 mg/dL    Blood Urea Nitrogen 14.0 8.4 - 25.7 mg/dL    Creatinine 0.66 (L) 0.73 - 1.18 mg/dL    Calcium Level Total 8.2 (L) 8.8 - 10.0 mg/dL    Protein Total 5.5 (L) 5.8 - 7.6 gm/dL    Albumin Level 2.3 (L) 3.4 - 4.8 g/dL    Globulin 3.2 2.4 - 3.5 gm/dL    Albumin/Globulin Ratio 0.7 (L) 1.1 - 2.0 ratio    Bilirubin Total 0.3 <=1.5 mg/dL    Alkaline Phosphatase 70 40 - 150 unit/L    Alanine Aminotransferase 7 0 - 55 unit/L    Aspartate Aminotransferase 24 5 - 34 unit/L    eGFR >60 mls/min/1.73/m2   Echo    Collection Time: 04/21/24  5:51 PM   Result Value Ref Range    BSA 1.78 m2    LVOT stroke volume 45.00 cm3    LVIDd 4.00 3.5 - 6.0 cm    LV Systolic Volume 35.00 mL    LV Systolic Volume Index 19.3 mL/m2    LVIDs 3.00 2.1 - 4.0 cm    LV Diastolic Volume 70.00 mL    LV Diastolic Volume Index 38.67 mL/m2    IVS 1.30 (A) 0.6 - 1.1 cm    LVOT diameter 2.10 cm    LVOT area 3.5 cm2    FS 25 (A) 28 - 44 %    Left Ventricle Relative Wall Thickness 0.50 cm    Posterior Wall 1.00 0.6 - 1.1 cm    LV mass 155.39 g    LV Mass Index 86 g/m2    MV Peak E Derek 0.69 m/s    TDI LATERAL 0.08 m/s    TDI SEPTAL 0.07  m/s    E/E' ratio 9.20 m/s    MV Peak A Derek 0.98 m/s    TR Max Derek 1.17 m/s    E/A ratio 0.70     E wave deceleration time 267.00 msec    LV SEPTAL E/E' RATIO 9.86 m/s    LV LATERAL E/E' RATIO 8.63 m/s    LVOT peak derek 0.81 m/s    Left Ventricular Outflow Tract Mean Velocity 0.52 cm/s    Left Ventricular Outflow Tract Mean Gradient 1.00 mmHg    RVDD 2.60 cm    TAPSE 1.74 cm    LA size 4.10 cm    AV mean gradient 3 mmHg    AV peak gradient 5 mmHg    Ao peak derek 1.17 m/s    Ao VTI 18.40 cm    LVOT peak VTI 13.00 cm    AV valve area 2.45 cm²    AV Velocity Ratio 0.69     AV index (prosthetic) 0.71     AMAIRANI by Velocity Ratio 2.40 cm²    MV mean gradient 3 mmHg    MV peak gradient 5 mmHg    MV stenosis pressure 1/2 time 52.00 ms    MV valve area p 1/2 method 4.23 cm2    MV valve area by continuity eq 2.50 cm2    MV VTI 18.0 cm    Triscuspid Valve Regurgitation Peak Gradient 5 mmHg    PV PEAK VELOCITY 1.06 m/s    PV peak gradient 4 mmHg    RV/LV Ratio 0.65 cm    Mean e' 0.08 m/s    ZLVIDS -0.24     ZLVIDD -2.25     EF 55 %    TV resting pulmonary artery pressure 8 mmHg    RV TB RVSP 4 mmHg    Est. RA pres 3 mmHg   Comprehensive metabolic panel    Collection Time: 04/22/24  4:23 AM   Result Value Ref Range    Sodium Level 141 136 - 145 mmol/L    Potassium Level 4.3 3.5 - 5.1 mmol/L    Chloride 107 98 - 107 mmol/L    Carbon Dioxide 29 23 - 31 mmol/L    Glucose Level 99 82 - 115 mg/dL    Blood Urea Nitrogen 14.9 8.4 - 25.7 mg/dL    Creatinine 0.71 (L) 0.73 - 1.18 mg/dL    Calcium Level Total 8.2 (L) 8.8 - 10.0 mg/dL    Protein Total 5.4 (L) 5.8 - 7.6 gm/dL    Albumin Level 2.4 (L) 3.4 - 4.8 g/dL    Globulin 3.0 2.4 - 3.5 gm/dL    Albumin/Globulin Ratio 0.8 (L) 1.1 - 2.0 ratio    Bilirubin Total 0.4 <=1.5 mg/dL    Alkaline Phosphatase 73 40 - 150 unit/L    Alanine Aminotransferase 11 0 - 55 unit/L    Aspartate Aminotransferase 23 5 - 34 unit/L    eGFR >60 mls/min/1.73/m2   C-reactive protein    Collection Time: 04/22/24  4:23  AM   Result Value Ref Range    C-Reactive Protein 61.10 (H) <5.00 mg/L   Prealbumin    Collection Time: 04/22/24  4:23 AM   Result Value Ref Range    Prealbumin 5.5 (L) 16.0 - 42.0 mg/dL   CBC with Differential    Collection Time: 04/22/24  4:23 AM   Result Value Ref Range    WBC 4.61 4.50 - 11.50 x10(3)/mcL    RBC 3.15 (L) 4.70 - 6.10 x10(6)/mcL    Hgb 9.8 (L) 14.0 - 18.0 g/dL    Hct 29.8 (L) 42.0 - 52.0 %    MCV 94.6 (H) 80.0 - 94.0 fL    MCH 31.1 (H) 27.0 - 31.0 pg    MCHC 32.9 (L) 33.0 - 36.0 g/dL    RDW 13.2 11.5 - 17.0 %    Platelet 123 (L) 130 - 400 x10(3)/mcL    MPV 11.0 (H) 7.4 - 10.4 fL    Neut % 52.7 %    Lymph % 33.0 %    Mono % 11.5 %    Eos % 1.7 %    Basophil % 0.7 %    Lymph # 1.52 0.6 - 4.6 x10(3)/mcL    Neut # 2.43 2.1 - 9.2 x10(3)/mcL    Mono # 0.53 0.1 - 1.3 x10(3)/mcL    Eos # 0.08 0 - 0.9 x10(3)/mcL    Baso # 0.03 <=0.2 x10(3)/mcL    IG# 0.02 0 - 0.04 x10(3)/mcL    IG% 0.4 %    NRBC% 0.0 %    IPF 5.8 0.9 - 11.2 %     Telemetry:      Physical Exam  Constitutional:       Appearance: Normal appearance.   HENT:      Head: Normocephalic.      Nose: Nose normal.      Mouth/Throat:      Mouth: Mucous membranes are moist.   Cardiovascular:      Rate and Rhythm: Bradycardia present. Rhythm irregular.      Pulses: Normal pulses.   Abdominal:      General: Abdomen is flat.   Skin:     General: Skin is warm.   Neurological:      Mental Status: He is alert and oriented to person, place, and time.   Psychiatric:         Mood and Affect: Mood normal.         Behavior: Behavior normal.         Judgment: Judgment normal.         Current Inpatient Medications:    Current Facility-Administered Medications:     acetaminophen tablet 650 mg, 650 mg, Oral, Q4H, Santhosh Busby, LEANDRA, 650 mg at 04/22/24 0818    docusate sodium capsule 100 mg, 100 mg, Oral, BID, Santhosh Busby, LEANDRA, 100 mg at 04/22/24 0818    enoxaparin injection 60 mg, 1 mg/kg, Subcutaneous, Q12H, Santhosh Busby, LEANDRA, 60 mg at 04/22/24  0818    gabapentin capsule 300 mg, 300 mg, Oral, TID, Santhosh Busby, FNP, 300 mg at 04/22/24 0818    lactated ringers infusion, , Intravenous, Continuous, Citlalli Sullivan MD, Last Rate: 100 mL/hr at 04/21/24 2130, New Bag at 04/21/24 2130    magnesium hydroxide 400 mg/5 ml suspension 2,400 mg, 30 mL, Oral, Daily PRN, Santhosh Busby, LEANDRA    melatonin tablet 6 mg, 6 mg, Oral, Nightly PRN, Santhosh Busby, FNP    methocarbamoL tablet 500 mg, 500 mg, Oral, Q8H, Santhosh Busby, CAITYP, 500 mg at 04/22/24 0611    metoprolol tartrate (LOPRESSOR) split tablet 12.5 mg, 12.5 mg, Oral, BID, Santhosh Busby, LEANDRA    morphine injection 4 mg, 4 mg, Intravenous, Q6H PRN, Bridget Bennett PA-C, 4 mg at 04/19/24 0634    ondansetron injection 4 mg, 4 mg, Intravenous, Q6H PRN, Bridget Bennett PA-C    oxyCODONE immediate release tablet 5 mg, 5 mg, Oral, Q4H PRN, Santhosh Busby, CAITYP    oxyCODONE immediate release tablet Tab 10 mg, 10 mg, Oral, Q4H PRN, Santhosh Busby, CAITYP, 10 mg at 04/20/24 0835    polyethylene glycol packet 17 g, 17 g, Oral, BID, Santhosh Busby, FNP, 17 g at 04/22/24 0818    tamsulosin 24 hr capsule 0.4 mg, 0.4 mg, Oral, Daily, Citlalli Sullivan MD, 0.4 mg at 04/22/24 0818  VTE Risk Mitigation (From admission, onward)           Ordered     enoxaparin injection 60 mg  Every 12 hours         04/21/24 1303     Place NISHA hose  Until discontinued        Comments: Remove daily to inspect skin    04/18/24 1531     IP VTE HIGH RISK PATIENT  Once         04/18/24 0655     Place sequential compression device  Until discontinued         04/18/24 0655                  Assessment:   Afib (Newly Diagnosed) ~ currenly CVR   - [id number]: 2 points    - ECHO (4.21.24): LVEF 55%  Mechanical ground level fall  Left wrist fracture, closed  & Closed fracture of left proximal humerus     S/P bridge plating left distad radius and IMN left Humerus (4.18.24)  Prostate Cancer      Plan:   EKG and  tele reviewed  ECHO reviewed keyla,al LVEF   hold Metoprolol tarte to 12.5mg due to bradycardiac for now   Will start Eliquis for CVA risk reduction when okay with ortho surgery   Cont. FD Lovenox for CVA risk reduction       Teresa Moscoso, ARLET  Cardiology  [facility] - Ortho Neuro  04/22/2024      Physician addendum:  I have seen and examined this patient as a split-shared visit with the BARB d/t complicated medical management of above problems written in assessment and high acuity requiring physician expertise in medical decision-making. I performed the substantive portion of the history and exam. Above medical decision-making is also formulated by me.    Tele - ?Afib with CVR or AVB    Plan:  Arrhythmia - Afib- will check EKG today to determine the rhythm. Since fall risk, may not be a good candidate for outpatient NOAC. C/w Lovenox in the interim while in bed. C/w ASA for now    Marcelo Powers MD [facility]  Int. Cardiologist

## 2024-04-22 NOTE — PT/OT/SLP PROGRESS
Physical Therapy Treatment    Patient Name:  Kaleb Ibarra   MRN:  [id number]    Recommendations:     Discharge therapy intensity: Moderate Intensity Therapy   Discharge Equipment Recommendations: to be determined by next level of care  Barriers to discharge: Impaired mobility    Assessment:     Kaleb Ibarra is a 89 y.o. male admitted with a medical diagnosis of GLF: L distal radius fx s/p ORIF, L humerus fx s/p ORIF Hx:dementia, severe osteoporosis.  He presents with the following impairments/functional limitations: weakness, gait instability, impaired balance, impaired endurance, decreased safety awareness, impaired functional mobility. The pt tolerated session well. He was motivated for session this morning. He was able to mobilize to EOB with min A and increased time to perform mobility. He was able to stand x 2 trials with mod A and take lateral steps to R. Pt used HW for taking steps and would greatly benefit from use of RW instead of HW.    Rehab Prognosis: Good; patient would benefit from acute skilled PT services to address these deficits and reach maximum level of function.    Recent Surgery: Procedure(s) (LRB):  ORIF, FRACTURE, RADIUS, DISTAL (Left)  ORIF, FRACTURE, HUMERUS, PROXIMAL 4 Days Post-Op    Plan:     During this hospitalization, patient would benefit from acute PT services 6 x/week to address the identified rehab impairments via gait training, therapeutic activities, therapeutic exercises and progress toward the following goals:    Plan of Care Expires:  05/22/24    Subjective     Chief Complaint: none  Patient/Family Comments/goals: return to PLOF  Pain/Comfort:  Location - Side 1: Left  Location 1: shoulder  Pain Addressed 1: Reposition, Distraction      Objective:     Communicated with NSG prior to session.  Patient found supine with peripheral IV upon PT entry to room.     General Precautions: Standard, fall  Orthopedic Precautions: LUE non weight bearing (ok to use LUE for  RW)  Braces: UE Sling  Respiratory Status: Room air  Blood Pressure: NA  Skin Integrity: Visible skin intact      Functional Mobility:  Bed Mobility:     Scooting: minimum assistance- increased time to perform task  Supine to Sit: minimum assistance  Sit to Supine: mod A-assist with trunk and BLE into bed.  Transfers:     Sit to Stand:  moderate assistance with rolling walker  Gait: pregait- pt takes 2 lateral steps to the R with HW, Pt grabbing HW with both hands, PT to recommend use of RW for safety and stability.    Education:  Patient provided with verbal education education regarding PT role/goals/POC, safety awareness, and discharge/DME recommendations.  Understanding was verbalized.     Patient left supine with all lines intact, call button in reach, and NSG notified    GOALS:   Multidisciplinary Problems       Physical Therapy Goals          Problem: Physical Therapy    Goal Priority Disciplines Outcome Goal Variances Interventions   Physical Therapy Goal     PT, PT/OT Ongoing, Progressing     Description: Goals to be met by: 24     Patient will increase functional independence with mobility by performin. Supine to sit with Stand-by Assistance  2. Sit to supine with Stand-by Assistance  3. Sit to stand transfer with Stand-by Assistance  4. Bed to chair transfer with Stand-by Assistance using Rolling Walker vs LRAD  5. Gait  x 25 feet with Stand-by Assistance using Rolling Walker vs LRAD.                          Time Tracking:     PT Received On: 24  PT Start Time: 1028     PT Stop Time: 1058  PT Total Time (min): 30 min     Billable Minutes: Therapeutic Activity 30    Treatment Type: Treatment  PT/PTA: PT     Number of PTA visits since last PT visit: 2     2024

## 2024-04-22 NOTE — PROGRESS NOTES
[facility] - [facility]  Orthopedics  Progress Note    Patient Name: Kaleb Ibarra  MRN: [id number]  Admission Date: 4/18/2024  Hospital Length of Stay: 4 days  Attending Provider: Rick Pelaez MD  Primary Care Provider: Jossue Martinez MD  Follow-up For: Procedure(s) (LRB):  ORIF, FRACTURE, RADIUS, DISTAL (Left)  ORIF, FRACTURE, HUMERUS, PROXIMAL    Subjective:     Principal Problem:Left wrist fracture, closed, initial encounter    Principal Orthopedic Problem: 4 Days Post-Op   S/P bridge plating left distad radius and IMN left Humerus.     Interval History:  Awake and alert. Doing well and eating breakfast. Complains of left shoulder pain this am. Dressings changed    Review of patient's allergies indicates:  No Known Allergies    Current Facility-Administered Medications   Medication Dose Route Frequency Provider Last Rate Last Admin    acetaminophen tablet 650 mg  650 mg Oral Q4H Santhosh Busby, FNP   650 mg at 04/22/24 0611    docusate sodium capsule 100 mg  100 mg Oral BID Santhosh Busby, FNP   100 mg at 04/21/24 2117    enoxaparin injection 60 mg  1 mg/kg Subcutaneous Q12H Santhosh Busby, FNP   60 mg at 04/21/24 2124    gabapentin capsule 300 mg  300 mg Oral TID Santhosh Busby, FNP   300 mg at 04/21/24 2117    lactated ringers infusion   Intravenous Continuous Nate, MD Citlalli 100 mL/hr at 04/21/24 2130 New Bag at 04/21/24 2130    magnesium hydroxide 400 mg/5 ml suspension 2,400 mg  30 mL Oral Daily PRN Santhosh Busby, LEANDRA        melatonin tablet 6 mg  6 mg Oral Nightly PRN Santhosh Busby, CAITYP        methocarbamoL tablet 500 mg  500 mg Oral Q8H Santhosh Busby, FNP   500 mg at 04/22/24 0611    metoprolol tartrate (LOPRESSOR) tablet 25 mg  25 mg Oral BID Santhosh Busby, FNP   25 mg at 04/21/24 2117    morphine injection 4 mg  4 mg Intravenous Q6H PRN Bridget Bennett PA-C   4 mg at 04/19/24 0634    ondansetron injection 4 mg  4 mg  "Intravenous Q6H PRN Bridget Bennett PA-C        oxyCODONE immediate release tablet 5 mg  5 mg Oral Q4H PRN QiSanthosh, FNP        oxyCODONE immediate release tablet Tab 10 mg  10 mg Oral Q4H PRN QiSanthosh, FNP   10 mg at 04/20/24 0835    polyethylene glycol packet 17 g  17 g Oral BID Santhosh Busby, FNP   17 g at 04/21/24 2124    tamsulosin 24 hr capsule 0.4 mg  0.4 mg Oral Daily Citlalli Sullivan MD   0.4 mg at 04/21/24 0935     Objective:     Vital Signs (Most Recent):  Temp: 97.8 °F (36.6 °C) (04/22/24 0739)  Pulse: 82 (04/22/24 0739)  Resp: 19 (04/22/24 0739)  BP: (!) 90/59 (04/22/24 0739)  SpO2: (!) 92 % (04/22/24 0739) Vital Signs (24h Range):  Temp:  [97.8 °F (36.6 °C)-98.6 °F (37 °C)] 97.8 °F (36.6 °C)  Pulse:  [47-95] 82  Resp:  [16-19] 19  SpO2:  [92 %-95 %] 92 %  BP: ()/(59-69) 90/59     Weight: 63.5 kg (140 lb)  Height: 5' 11" (180.3 cm)  Body mass index is 19.53 kg/m².      Intake/Output Summary (Last 24 hours) at 4/22/2024 0816  Last data filed at 4/22/2024 0442  Gross per 24 hour   Intake 240 ml   Output 1150 ml   Net -910 ml       Physical Exam:   General the patient is alert no acute distress elderly ans frail.     LUE: --Skin: Dressings changed. Incisions well approximated. Sutures/staples in tact           -MSK: gross motor in tact, bride plate           -Neuro:  Sensation intact to light touch C5-T1 dermatomes                  -CV:Capillary refill is less than 2 seconds. Radial and ulnar pulses 2/4. Compartments Soft and compressible        Diagnostic Findings:   Significant Labs:   Recent Lab Results  (Last 5 results in the past 72 hours)        04/22/24  0423   04/21/24  1751   04/21/24  1119   04/21/24  0546   04/20/24  1119        Immature Platelet Fraction 5.8               Albumin/Globulin Ratio 0.8     0.7           Albumin 2.4     2.3           ALP 73     70           ALT 11     7           Ao peak juani   1.17             Ao VTI   18.40             AST " 23     24           AV valve area   2.45             AMAIRANI by Velocity Ratio   2.40             AV mean gradient   3             AV index (prosthetic)   0.71             AV peak gradient   5             AV Velocity Ratio   0.69             Baso # 0.03       0.03         Basophil % 0.7       0.5         BILIRUBIN TOTAL 0.4     0.3           BSA   1.78             BUN 14.9     14.0           Calcium 8.2     8.2           Chloride 107     105           CO2 29     25           Creatinine 0.71     0.66           CRP 61.10               Left Ventricle Relative Wall Thickness   0.50             E/A ratio   0.70             E/E' ratio   9.20             eGFR >60     >60           EF   55             Eos # 0.08       0.14         Eos % 1.7       2.5         E wave deceleration time   267.00             FS   25             Globulin, Total 3.0     3.2           Glucose 99     139           Hematocrit 29.8       32.4         Hemoglobin 9.8       11.0         Immature Grans (Abs) 0.02       0.02         Immature Granulocytes 0.4       0.4         IVSd   1.30             LA size   4.10             LVOT area   3.5             LV LATERAL E/E' RATIO   8.63             LV SEPTAL E/E' RATIO   9.86             LV EDV BP   70.00             LV Diastolic Volume Index   38.67             LVIDd   4.00             LVIDs   3.00             LV mass   155.39             LV Mass Index   86             Left Ventricular Outflow Tract Mean Gradient   1.00             Left Ventricular Outflow Tract Mean Velocity   0.52             LVOT diameter   2.10             LVOT peak juani   0.81             LVOT stroke volume   45.00             LVOT peak VTI   13.00             LV ESV BP   35.00             LV Systolic Volume Index   19.3             Lymph # 1.52       1.32         LYMPH % 33.0       23.6         MCH 31.1       32.6         MCHC 32.9       34.0         MCV 94.6       96.1         Mean e'   0.08             Mono # 0.53       0.79         Mono %  11.5       14.1         MPV 11.0       10.8         MV valve area p 1/2 method   4.23             MV valve area by continuity eq   2.50             MV mean gradient   3             MV peak gradient   5             MV Peak A Derek   0.98             MV Peak E Derek   0.69             MV stenosis pressure 1/2 time   52.00             MV VTI   18.0             Neut # 2.43       3.30         Neut % 52.7       58.9         nRBC 0.0       0.0         QRS Duration         96       OHS QTC Calculation         437       Platelet Count 123       148         Potassium 4.3     3.9           Prealbumin 5.5               PROTEIN TOTAL 5.4     5.5           PV peak gradient   4             PV PEAK VELOCITY   1.06             Posterior Wall   1.00             Est. RA pres   3             RBC 3.15       3.37         RDW 13.2       13.2         RV TB RVSP   4             RV/LV Ratio   0.65             RVDD   2.60             Sodium 141     140           TAPSE   1.74             TDI SEPTAL   0.07             TDI LATERAL   0.08             Triscuspid Valve Regurgitation Peak Gradient   5             TR Max Derek   1.17             TV resting pulmonary artery pressure   8             WBC 4.61       5.60         ZLVIDD   -2.25             ZLVIDS   -0.24                                     Significant Imaging: I have reviewed all pertinent imaging results/findings.     Assessment/Plan:     Active Diagnoses:    Diagnosis Date Noted POA    PRINCIPAL PROBLEM:  Left wrist fracture, closed, initial encounter [S62.102A] 04/18/2024 Yes    Closed fracture of left proximal humerus [S42.202A] 04/18/2024 Yes      Problems Resolved During this Admission:   88 Yo M here following a fall  POD #4 S/P bridge plating left distad radius and IMN left Humerus    Diet: as tolerated  Pain: multimodal Prn  Periop ancef  PT/OT: eval and treat  DVTppx:Lovenox; none needed from ortho standpoint on DC  Activity: NWB LUE except platform walker ok for mobility  Sling for  comfort out of bed  Dressing changes daily  MRI pending of the pelvis with subtle irregularity of the anterior left superior pubic rami indicating subacute versus chronic fracture.  No acute fracture seen.  Follow up with Dr Malcolm's office in 3 weeks.     The above findings, diagnostics, and treatment plan were discussed with Dr Alcazar who is in agreement with the plan of care except as stated in additional documentation.      Yumiko Denton, LEANDRA   Orthopedic Trauma Surgery  [facility]

## 2024-04-23 ENCOUNTER — TELEPHONE (OUTPATIENT)
Dept: PRIMARY CARE CLINIC | Facility: CLINIC | Age: 89
End: 2024-04-23
Payer: MEDICARE

## 2024-04-23 NOTE — TELEPHONE ENCOUNTER
Spoke with pt's daughter. Stated that pt has been a resident at [facility] for over 1 year and sees the doctor over that facility. Stated that she would like to cancel the hospital follow up appointment. Advised that appointment would be cancelled.

## 2024-04-25 ENCOUNTER — PATIENT OUTREACH (OUTPATIENT)
Dept: ADMINISTRATIVE | Facility: CLINIC | Age: 89
End: 2024-04-25
Payer: MEDICARE

## 2024-04-25 NOTE — PROGRESS NOTES
C3 nurse spoke with Kaleb Ibarra 's daughter for a TCC post hospital discharge follow up call. The patient has a scheduled hospitals appointment with   Bridget Bennett PA-C (Orthopedic Surgery) on 5/15/2024 @ 1:45 pm.

## 2024-04-30 ENCOUNTER — TELEPHONE (OUTPATIENT)
Dept: ORTHOPEDICS | Facility: CLINIC | Age: 89
End: 2024-04-30
Payer: MEDICARE

## 2024-04-30 NOTE — TELEPHONE ENCOUNTER
Received call from Jing with [facility]. Patient received ultrasounds to [facility] and is positive for Dvt. Patient is currently taking Eliquis post-op . After speaking to providers I informed Jing patient need to follow up with vascular or PCP urgently for further treatment. Jing voiced a clear understanding and states she will relay information to physician at [facility].

## 2024-05-15 ENCOUNTER — PATIENT MESSAGE (OUTPATIENT)
Dept: ORTHOPEDICS | Facility: CLINIC | Age: 89
End: 2024-05-15

## 2024-05-15 ENCOUNTER — OFFICE VISIT (OUTPATIENT)
Dept: ORTHOPEDICS | Facility: CLINIC | Age: 89
End: 2024-05-15
Payer: MEDICARE

## 2024-05-15 ENCOUNTER — HOSPITAL ENCOUNTER (OUTPATIENT)
Dept: RADIOLOGY | Facility: CLINIC | Age: 89
Discharge: HOME | End: 2024-05-15
Attending: PHYSICIAN ASSISTANT
Payer: MEDICARE

## 2024-05-15 VITALS
BODY MASS INDEX: 19.44 KG/M2 | DIASTOLIC BLOOD PRESSURE: 78 MMHG | WEIGHT: 138.88 LBS | SYSTOLIC BLOOD PRESSURE: 115 MMHG | HEIGHT: 71 IN

## 2024-05-15 DIAGNOSIS — S42.202D CLOSED FRACTURE OF PROXIMAL END OF LEFT HUMERUS WITH ROUTINE HEALING, UNSPECIFIED FRACTURE MORPHOLOGY, SUBSEQUENT ENCOUNTER: ICD-10-CM

## 2024-05-15 DIAGNOSIS — S42.202D CLOSED FRACTURE OF PROXIMAL END OF LEFT HUMERUS WITH ROUTINE HEALING, UNSPECIFIED FRACTURE MORPHOLOGY, SUBSEQUENT ENCOUNTER: Primary | ICD-10-CM

## 2024-05-15 DIAGNOSIS — S52.502D CLOSED FRACTURE OF DISTAL END OF LEFT RADIUS WITH ROUTINE HEALING, UNSPECIFIED FRACTURE MORPHOLOGY, SUBSEQUENT ENCOUNTER: ICD-10-CM

## 2024-05-15 PROCEDURE — 99024 POSTOP FOLLOW-UP VISIT: CPT | Mod: POP,,, | Performed by: PHYSICIAN ASSISTANT

## 2024-05-15 PROCEDURE — 73110 X-RAY EXAM OF WRIST: CPT | Mod: LT,,, | Performed by: PHYSICIAN ASSISTANT

## 2024-05-15 PROCEDURE — 73030 X-RAY EXAM OF SHOULDER: CPT | Mod: LT,,, | Performed by: PHYSICIAN ASSISTANT

## 2024-05-15 RX ORDER — ACETAMINOPHEN 325 MG/1
325 TABLET ORAL EVERY 6 HOURS PRN
COMMUNITY

## 2024-05-15 NOTE — PROGRESS NOTES
"Subjective:       Patient ID: Kaleb Ibarra is a 89 y.o. male.  Chief Complaint   Patient presents with    Left Wrist - Post-op Evaluation     4 week f/u from ORIF left distal radius fx. ORIF left proximal humerus fx. States he is not currently doing physical therapy. Reports improvement in pain and discomfort.         HPI    Patient presents for 4 week follow up ORIF left distal radius fracture and IMN of the left non displaced proximal humerus. Doing quite well overall. States pain and discomfort has improved. He does have dementia. States he is not working with therapy. Staff from his facility is with him today states that he does have therapy in the facility. Some pain with active overhead motion. No wrist ROM due to bridge plate. Sutures remain in place today. No significant numbness or tingling distally    ROS:  Constitutional: Denies fever chills  Eyes: No change in vision  ENT: No ringing or current infections  CV: No chest pain  Resp: No labored breathing  MSK: Pain evident at site of injury located in HPI,   Integ: No signs of abrasions or lacerations  Neuro: No numbness or tingling  Lymphatic: No swelling outside the area of injury     Current Outpatient Medications on File Prior to Visit   Medication Sig Dispense Refill    acetaminophen (TYLENOL) 325 MG tablet Take 325 mg by mouth every 6 (six) hours as needed for Pain.      apixaban (ELIQUIS) 5 mg Tab Take 1 tablet (5 mg total) by mouth 2 (two) times daily. 60 tablet 0    multivitamin with minerals tablet Take 1 tablet by mouth once daily.       No current facility-administered medications on file prior to visit.          Objective:      /78   Ht 5' 11" (1.803 m)   Wt 63 kg (138 lb 14.2 oz)   BMI 19.37 kg/m²   Physical Exam  General the patient is alert and oriented x3 no acute distress nontoxic-appearing appropriate affect.    Constitutional: Vital signs are examined and stable.  Resp: No signs of labored breathing              LUE: " "-Skin: Incisions healing well without erythema, drainage, signs of dehiscence, No signs of new abrasions or lacerations, no scars           -MSK: no wrist ROM due to bridge plate. Able to flex and extend all digits; Tolerates passive ROM of the shoulder.           -Neuro:  Sensation intact to light touch C5-T1 dermatomes           -Lymphatic: No signs of  lymphadenopathy,            -CV:  Capillary refill is less than 2 seconds. Radial and ulnar pulses 2/4. Compartments soft and compressible                     Body mass index is 19.37 kg/m².  Ideal body weight: 75.3 kg (166 lb 0.1 oz)  No results found for: "HGBA1C"  Hgb   Date Value Ref Range Status   04/23/2024 10.1 (L) 14.0 - 18.0 g/dL Final   04/22/2024 9.8 (L) 14.0 - 18.0 g/dL Final     Hct   Date Value Ref Range Status   04/23/2024 30.9 (L) 42.0 - 52.0 % Final   04/22/2024 29.8 (L) 42.0 - 52.0 % Final     No results found for: "IRON"  No components found for: "FROLATE"  No results found for: "[id number]"  WBC   Date Value Ref Range Status   04/23/2024 5.41 4.50 - 11.50 x10(3)/mcL Final   04/22/2024 4.61 4.50 - 11.50 x10(3)/mcL Final       Radiology: 3 view x ray left wrist: hardware intact without signs of loosening or failure. No consolidation as expected in the acute post operative period.    3 view x ray left shoulder: intramedullary nail in place to the proximal humerus: no consolidation as expected in the acute post operative period.         Assessment:         1. Closed fracture of proximal end of left humerus with routine healing, unspecified fracture morphology, subsequent encounter  X-Ray Shoulder 2 or More Views Left      2. Closed fracture of distal end of left radius with routine healing, unspecified fracture morphology, subsequent encounter  X-Ray Wrist Complete Left              Plan:         Follow up in about 6 weeks (around 6/26/2024).    Kaleb was seen today for post-op evaluation.    Diagnoses and all orders for this visit:    Closed " Dementia fracture of proximal end of left humerus with routine healing, unspecified fracture morphology, subsequent encounter  -     X-Ray Shoulder 2 or More Views Left; Future    Closed fracture of distal end of left radius with routine healing, unspecified fracture morphology, subsequent encounter  -     X-Ray Wrist Complete Left; Future        -remain NWB. No wrist ROM due to bridge plate. Okay for full passive ROM of the shoulder. Begin active ROM at next visit. Continue shoulder pendulums. Sutures and staples removed today without complication.   - He may use his upper ext for mobility needs. Pain medication PRN.   -Follow up in 6 weeks for repeat x rays and evaluation.  -ED precautions given      Alexandra Blair Lemaire, PA-C [facility]   Orthopedic Trauma            Future Appointments   Date Time Provider Department Center   6/24/2024 10:15 AM Kraig Malcolm DO [facility] [place]

## 2024-05-16 NOTE — ADDENDUM NOTE
After Visit Summary   4/20/2018    Meredith Mello    MRN: [id number]           Patient Information     Date Of Birth          1991        Visit Information        Provider Department      4/20/2018 8:45 AM Alex Valentino MD [facility]        Today's Diagnoses     Tonsil asymmetry    -  1    Hypertrophy of tonsils        Tonsillar mass          Care Instructions    Scheduling Information  To schedule your CT/MRI scan, please contact [facility] at [phone] OR [facility] at [phone]    To schedule your Surgery, please contact our Specialty Schedulers at [phone]      ENT Clinic Locations Clinic Hours Telephone Number     [facility] [place]  [address]  [place] [address]   Monday:           1:00pm -- 5:00pm    Friday:              8:00am - 12:00pm   To schedule/reschedule an appointment with   Dr. Valentino,   please contact our   Specialty Scheduling Department at:     [phone]       [facility]  [address] [place] [address] Tuesday:          8:00am -- 2:00pm         Urgent Care Locations Clinic Hours Telephone Numbers     [facility]  [address] [place] [address]     Monday-Friday:     11:00am - 9:00pm    Saturday-Sunday:  9:00am - 5:00pm   [phone]     [facility]  [address] [place] [address]     Monday-Friday:      5:00pm - 9:00pm     Saturday-Sunday:  9:00am - 5:00pm   [phone]                 Follow-ups after your visit        Who to contact     If you have questions or need follow up information about today's clinic visit or your schedule please contact [facility] directly at [phone].  Normal or non-critical lab and imaging results will be communicated to you by MyChart, letter or phone within 4 business days after the clinic has received the results. If you do not hear from us within 7 days, please contact the clinic through MyChart or phone. If  Addended by: RACHEL SANTOYO on: 5/16/2024 04:20 PM     Modules accepted: Orders     "you have a critical or abnormal lab result, we will notify you by phone as soon as possible.  Submit refill requests through [facility] or call your pharmacy and they will forward the refill request to us. Please allow 3 business days for your refill to be completed.          Additional Information About Your Visit        RawFlowhart Information     [facility]IS lets you send messages to your doctor, view your test results, renew your prescriptions, schedule appointments and more. To sign up, go to [email or web address] . Click on \"Log in\" on the left side of the screen, which will take you to the Welcome page. Then click on \"Sign up Now\" on the right side of the page.     You will be asked to enter the access code listed below, as well as some personal information. Please follow the directions to create your username and password.     Your access code is: [email or web address]  Expires: 2018  9:32 AM     Your access code will  in 90 days. If you need help or a new code, please call your [facility] clinic or [phone].        Care EveryWhere ID     This is your Care EveryWhere ID. This could be used by other organizations to access your [facility] medical records  [id number]        Your Vitals Were     Pulse Respirations Height Pulse Oximetry BMI (Body Mass Index)       76 12 1.689 m (5' 6.5\") 100% 23.85 kg/m2        Blood Pressure from Last 3 Encounters:   18 111/71   18 119/71   18 112/74    Weight from Last 3 Encounters:   18 68 kg (150 lb)   18 68.9 kg (152 lb)   18 71.7 kg (158 lb)              We Performed the Following     Tracey-Operative Worksheet        Primary Care Provider Office Phone # Fax #    Rossana Ortiz [phone] [address] [facility] [place] [address]        Equal Access to Services     [facility] JACKIE : Neri Frank, waaxda luqadaha, qaybta kaalmalibby gómez, brii ocampo. So [facility] " [phone].    ATENCIÓN: Si charmaine vernon, tiene a dsouza disposición servicios gratuitos de asistencia lingüística. Katlyn ponce [phone].    We comply with applicable federal civil rights laws and [place] laws. We do not discriminate on the basis of race, color, national origin, age, disability, sex, sexual orientation, or gender identity.            Thank you!     Thank you for choosing [facility]  for your care. Our goal is always to provide you with excellent care. Hearing back from our patients is one way we can continue to improve our services. Please take a few minutes to complete the written survey that you may receive in the mail after your visit with us. Thank you!             Your Updated Medication List - Protect others around you: Learn how to safely use, store and throw away your medicines at www.disposemymeds.org.          This list is accurate as of 4/20/18  9:32 AM.  Always use your most recent med list.                   Brand Name Dispense Instructions for use Diagnosis    escitalopram 10 MG tablet    LEXAPRO    90 tablet    Take 1 tablet (10 mg) by mouth daily    SHERRI (generalized anxiety disorder)       levonorgestrel 20 MCG/24HR IUD    MIRENA     1 each by Intrauterine route once

## 2024-06-24 ENCOUNTER — HOSPITAL ENCOUNTER (OUTPATIENT)
Dept: RADIOLOGY | Facility: CLINIC | Age: 89
Discharge: HOME | End: 2024-06-24
Attending: ORTHOPAEDIC SURGERY
Payer: MEDICARE

## 2024-06-24 ENCOUNTER — OFFICE VISIT (OUTPATIENT)
Dept: ORTHOPEDICS | Facility: CLINIC | Age: 89
End: 2024-06-24
Payer: MEDICARE

## 2024-06-24 VITALS
SYSTOLIC BLOOD PRESSURE: 108 MMHG | HEIGHT: 71 IN | WEIGHT: 138.88 LBS | HEART RATE: 74 BPM | BODY MASS INDEX: 19.44 KG/M2 | DIASTOLIC BLOOD PRESSURE: 69 MMHG

## 2024-06-24 DIAGNOSIS — S52.502D CLOSED FRACTURE OF DISTAL END OF LEFT RADIUS WITH ROUTINE HEALING, UNSPECIFIED FRACTURE MORPHOLOGY, SUBSEQUENT ENCOUNTER: ICD-10-CM

## 2024-06-24 DIAGNOSIS — S42.202D CLOSED FRACTURE OF PROXIMAL END OF LEFT HUMERUS WITH ROUTINE HEALING, UNSPECIFIED FRACTURE MORPHOLOGY, SUBSEQUENT ENCOUNTER: Primary | ICD-10-CM

## 2024-06-24 DIAGNOSIS — S42.202D CLOSED FRACTURE OF PROXIMAL END OF LEFT HUMERUS WITH ROUTINE HEALING, UNSPECIFIED FRACTURE MORPHOLOGY, SUBSEQUENT ENCOUNTER: ICD-10-CM

## 2024-06-24 PROCEDURE — 99024 POSTOP FOLLOW-UP VISIT: CPT | Mod: POP,,, | Performed by: PHYSICIAN ASSISTANT

## 2024-06-24 PROCEDURE — 73030 X-RAY EXAM OF SHOULDER: CPT | Mod: LT,,, | Performed by: ORTHOPAEDIC SURGERY

## 2024-06-24 PROCEDURE — 73110 X-RAY EXAM OF WRIST: CPT | Mod: LT,,, | Performed by: ORTHOPAEDIC SURGERY

## 2024-06-24 RX ORDER — CITALOPRAM 10 MG/1
10 TABLET ORAL DAILY
COMMUNITY

## 2024-06-25 NOTE — PROGRESS NOTES
"Subjective:       Patient ID: Kaleb Ibarra is a 89 y.o. male.  Chief Complaint   Patient presents with    Left Wrist - Follow-up     9 week f/u from ORIF left distal radius fx.. ORIF left prox humerus fx. Reports intermittent pain. Currently working with therapy.         HPI    Patient presents for 9 week follow up ORIF left distal radius fracture and IMN of the left non displaced proximal humerus. Doing quite well overall. Little to no pain. Not using walker due to wrist. Okay to use platform. He does have dementia. Family is present with him today. Lives in nursing facility. Some pain with active overhead motion. No wrist ROM due to bridge plate.  Incisions are well healed. Overall good range of motion at th left shoulder. No significant numbness or tingling distally    ROS:  Constitutional: Denies fever chills  Eyes: No change in vision  ENT: No ringing or current infections  CV: No chest pain  Resp: No labored breathing  MSK: Pain evident at site of injury located in HPI,   Integ: No signs of abrasions or lacerations  Neuro: No numbness or tingling  Lymphatic: No swelling outside the area of injury     Current Outpatient Medications on File Prior to Visit   Medication Sig Dispense Refill    acetaminophen (TYLENOL) 325 MG tablet Take 325 mg by mouth every 6 (six) hours as needed for Pain.      apixaban (ELIQUIS) 5 mg Tab Take 5 mg by mouth 2 (two) times daily.      citalopram (CELEXA) 10 MG tablet Take 10 mg by mouth once daily.      multivitamin with minerals tablet Take 1 tablet by mouth once daily.       No current facility-administered medications on file prior to visit.          Objective:      /69   Pulse 74   Ht 5' 11" (1.803 m)   Wt 63 kg (138 lb 14.2 oz)   BMI 19.37 kg/m²   Physical Exam  General the patient is alert and oriented x3 no acute distress nontoxic-appearing appropriate affect.    Constitutional: Vital signs are examined and stable.  Resp: No signs of labored breathing          " "    LUE: -Skin: Incisions are well healed and matured at the shoulder and wrist           -MSK: no wrist ROM due to bridge plate. Able to flex and extend all digits; Tolerates active ROM of the shoulder to 90 forward flexion and abduction           -Neuro:  Sensation intact to light touch C5-T1 dermatomes           -Lymphatic: No signs of  lymphadenopathy,            -CV:  Capillary refill is less than 2 seconds. Radial and ulnar pulses 2/4. Compartments soft and compressible                     Body mass index is 19.37 kg/m².  Ideal body weight: 75.3 kg (166 lb 0.1 oz)  No results found for: "HGBA1C"  Hgb   Date Value Ref Range Status   04/23/2024 10.1 (L) 14.0 - 18.0 g/dL Final   04/22/2024 9.8 (L) 14.0 - 18.0 g/dL Final     Hct   Date Value Ref Range Status   04/23/2024 30.9 (L) 42.0 - 52.0 % Final   04/22/2024 29.8 (L) 42.0 - 52.0 % Final     No results found for: "IRON"  No components found for: "FROLATE"  No results found for: "CCHDLAWZ20AA"  WBC   Date Value Ref Range Status   04/23/2024 5.41 4.50 - 11.50 x10(3)/mcL Final   04/22/2024 4.61 4.50 - 11.50 x10(3)/mcL Final       Radiology: 3 view x ray left wrist: hardware intact without signs of loosening or failure. Some consolidation as compared to previous images but not fully consolidated. Stable alignment.     3 view x ray left shoulder: intramedullary nail in place to the proximal humerus. Continued consolidation as compared to previous images.         Assessment:         1. Closed fracture of proximal end of left humerus with routine healing, unspecified fracture morphology, subsequent encounter  X-Ray Shoulder 2 or More Views Left      2. Closed fracture of distal end of left radius with routine healing, unspecified fracture morphology, subsequent encounter  X-Ray Wrist Complete Left              Plan:         No follow-ups on file.    Kaleb was seen today for follow-up.    Diagnoses and all orders for this visit:    Closed fracture of proximal end of " left humerus with routine healing, unspecified fracture morphology, subsequent encounter  -     X-Ray Shoulder 2 or More Views Left; Future    Closed fracture of distal end of left radius with routine healing, unspecified fracture morphology, subsequent encounter  -     X-Ray Wrist Complete Left; Future        -remain NWB to wrist.okay to use platform walker for mobility and WB through the elbow. No wrist ROM due to bridge plate. Okay for full active ROM of the left shoulder. Will need bridge plate removal in approx 4-6 weeks. Daughter flys in from [place]. He will come for his next visit without her, we will plan for surgery and she will try to be with him for surgery.  - PRN medications. Transition to over the counter  -Follow up in 4 weeks for repeat x rays and evaluation with presurgical planning at that time for bridge plate removal. Discussed possible need for volar plating at that time. Patient family voiced understanding.  -ED precautions given      Alexandra Blair Lemaire, PA-C [facility]   Orthopedic Trauma            Future Appointments   Date Time Provider Department Center   7/22/2024  9:45 AM Kraig Malcolm DO [facility] [place]

## 2024-07-22 ENCOUNTER — OFFICE VISIT (OUTPATIENT)
Dept: ORTHOPEDICS | Facility: CLINIC | Age: 89
End: 2024-07-22
Payer: MEDICARE

## 2024-07-22 ENCOUNTER — HOSPITAL ENCOUNTER (OUTPATIENT)
Dept: RADIOLOGY | Facility: CLINIC | Age: 89
Discharge: HOME | End: 2024-07-22
Attending: ORTHOPAEDIC SURGERY
Payer: MEDICARE

## 2024-07-22 VITALS
HEART RATE: 44 BPM | WEIGHT: 138.88 LBS | DIASTOLIC BLOOD PRESSURE: 79 MMHG | SYSTOLIC BLOOD PRESSURE: 108 MMHG | BODY MASS INDEX: 19.44 KG/M2 | HEIGHT: 71 IN

## 2024-07-22 DIAGNOSIS — S52.502D CLOSED FRACTURE OF DISTAL END OF LEFT RADIUS WITH ROUTINE HEALING, UNSPECIFIED FRACTURE MORPHOLOGY, SUBSEQUENT ENCOUNTER: ICD-10-CM

## 2024-07-22 DIAGNOSIS — S42.202D CLOSED FRACTURE OF PROXIMAL END OF LEFT HUMERUS WITH ROUTINE HEALING, UNSPECIFIED FRACTURE MORPHOLOGY, SUBSEQUENT ENCOUNTER: Primary | ICD-10-CM

## 2024-07-22 DIAGNOSIS — S42.202D CLOSED FRACTURE OF PROXIMAL END OF LEFT HUMERUS WITH ROUTINE HEALING, UNSPECIFIED FRACTURE MORPHOLOGY, SUBSEQUENT ENCOUNTER: ICD-10-CM

## 2024-07-22 PROCEDURE — 73030 X-RAY EXAM OF SHOULDER: CPT | Mod: LT,,, | Performed by: ORTHOPAEDIC SURGERY

## 2024-07-22 PROCEDURE — 73110 X-RAY EXAM OF WRIST: CPT | Mod: LT,,, | Performed by: ORTHOPAEDIC SURGERY

## 2024-07-22 PROCEDURE — 99213 OFFICE O/P EST LOW 20 MIN: CPT | Mod: ,,, | Performed by: ORTHOPAEDIC SURGERY

## 2024-07-22 RX ORDER — MUPIROCIN 20 MG/G
OINTMENT TOPICAL
OUTPATIENT
Start: 2024-07-22

## 2024-07-22 RX ORDER — SODIUM CHLORIDE 0.9 % (FLUSH) 0.9 %
10 SYRINGE (ML) INJECTION
OUTPATIENT
Start: 2024-07-22

## 2024-07-22 NOTE — PROGRESS NOTES
"  Subjective:       Patient ID: Kaleb Ibarra is a 89 y.o. male.  Chief Complaint   Patient presents with    Left Wrist - Follow-up     3 MONTH F/U ORIF LEFT DISTAL RADIUS FX, LEFT PROX HUMERUS FX, AMBULATING WITH WALKER. NO COMPLAINTS.        Follow-up        Patient presents for 3 month follow up ORIF left distal radius fracture and IMN of the left non displaced proximal humerus. Doing quite well overall. Little to no pain. Using platform walker for mobility. He does have dementia. Lives in nursing facility. Some pain with active overhead motion, improving. No wrist ROM due to bridge plate. Incisions are well healed. Overall good range of motion of the digits and the left shoulder.    ROS:  Constitutional: Denies fever chills  Eyes: No change in vision  ENT: No ringing or current infections  CV: No chest pain  Resp: No labored breathing  MSK: Pain evident at site of injury located in HPI,   Integ: No signs of abrasions or lacerations  Neuro: No numbness or tingling  Lymphatic: No swelling outside the area of injury     Current Outpatient Medications on File Prior to Visit   Medication Sig Dispense Refill    acetaminophen (TYLENOL) 325 MG tablet Take 325 mg by mouth every 6 (six) hours as needed for Pain.      apixaban (ELIQUIS) 5 mg Tab Take 5 mg by mouth 2 (two) times daily.      citalopram (CELEXA) 10 MG tablet Take 10 mg by mouth once daily.      multivitamin with minerals tablet Take 1 tablet by mouth once daily.       No current facility-administered medications on file prior to visit.          Objective:      /79   Pulse (!) 44   Ht 5' 11" (1.803 m)   Wt 63 kg (138 lb 14.2 oz)   BMI 19.37 kg/m²   Physical Exam  General the patient is alert and oriented x3 no acute distress nontoxic-appearing appropriate affect.    Constitutional: Vital signs are examined and stable.  Resp: No signs of labored breathing              LUE: -Skin: Incisions are well healed and matured at the shoulder and wrist     " "      -MSK: no wrist ROM due to bridge plate. Able to flex and extend all digits; Tolerates active ROM of the shoulder to 90 forward flexion and abduction with minimal pain.           -Neuro:  Sensation intact to light touch C5-T1 dermatomes           -Lymphatic: No signs of  lymphadenopathy,            -CV:  Capillary refill is less than 2 seconds. Radial and ulnar pulses 2/4. Compartments soft and compressible                     Body mass index is 19.37 kg/m².  Ideal body weight: 75.3 kg (166 lb 0.1 oz)  No results found for: "HGBA1C"  Hgb   Date Value Ref Range Status   04/23/2024 10.1 (L) 14.0 - 18.0 g/dL Final   04/22/2024 9.8 (L) 14.0 - 18.0 g/dL Final     Hct   Date Value Ref Range Status   04/23/2024 30.9 (L) 42.0 - 52.0 % Final   04/22/2024 29.8 (L) 42.0 - 52.0 % Final     No results found for: "IRON"  No components found for: "FROLATE"  No results found for: "SLHZXURV49RT"  WBC   Date Value Ref Range Status   04/23/2024 5.41 4.50 - 11.50 x10(3)/mcL Final   04/22/2024 4.61 4.50 - 11.50 x10(3)/mcL Final       Radiology: 3 view x ray left wrist: hardware intact without signs of loosening or failure. Continued consolidation as compared to previous images. Stable alignment.     3 view x ray left shoulder: intramedullary nail in place to the proximal humerus. Continued consolidation as compared to previous images. Fully consolidated         Assessment:         1. Closed fracture of proximal end of left humerus with routine healing, unspecified fracture morphology, subsequent encounter  X-Ray Shoulder 2 or More Views Left      2. Closed fracture of distal end of left radius with routine healing, unspecified fracture morphology, subsequent encounter  X-Ray Wrist Complete Left              Plan:         Follow up in about 9 days (around 7/31/2024).    Kaleb was seen today for follow-up.    Diagnoses and all orders for this visit:    Closed fracture of proximal end of left humerus with routine healing, unspecified " fracture morphology, subsequent encounter  -     X-Ray Shoulder 2 or More Views Left; Future    Closed fracture of distal end of left radius with routine healing, unspecified fracture morphology, subsequent encounter  -     X-Ray Wrist Complete Left; Future        -remain NWB to wrist.okay to use platform walker for mobility and WB through the elbow. No wrist ROM due to bridge plate. Okay for full active ROM of the left shoulder. Will need bridge plate removal on 7/31. Discussed with his daughter. She will be in town for this. Will need consents morning of when she is here with him.  - OTC medications  -Follow up next week for bridge plate removal. removal. Discussed possible need for volar plating at that time. Patient family voiced understanding. Discussed with daughter over the phone. NPO midnight night before surgery. [facility] to call with arrival time day prior to surgery.  -ED precautions given      Alexandra Blair Lemaire, PA-C [facility]   Orthopedic Trauma            No future appointments.

## 2024-07-28 ENCOUNTER — HOSPITAL ENCOUNTER (INPATIENT)
Facility: HOSPITAL | Age: 89
LOS: 4 days | Discharge: NURSING, REHAB OR LONG-TERM CARE | DRG: 481 | End: 2024-08-01
Attending: EMERGENCY MEDICINE | Admitting: INTERNAL MEDICINE
Payer: MEDICARE

## 2024-07-28 ENCOUNTER — ANESTHESIA EVENT (OUTPATIENT)
Dept: SURGERY | Facility: HOSPITAL | Age: 89
End: 2024-07-28
Payer: MEDICARE

## 2024-07-28 ENCOUNTER — ANESTHESIA (OUTPATIENT)
Dept: SURGERY | Facility: HOSPITAL | Age: 89
End: 2024-07-28
Payer: MEDICARE

## 2024-07-28 DIAGNOSIS — S72.142A CLOSED DISPLACED INTERTROCHANTERIC FRACTURE OF LEFT FEMUR: Primary | ICD-10-CM

## 2024-07-28 DIAGNOSIS — S61.411A SKIN TEAR OF RIGHT HAND WITHOUT COMPLICATION, INITIAL ENCOUNTER: ICD-10-CM

## 2024-07-28 DIAGNOSIS — Z79.01 ANTICOAGULATED BY ANTICOAGULATION TREATMENT: ICD-10-CM

## 2024-07-28 DIAGNOSIS — Z01.818 PREOPERATIVE CLEARANCE: ICD-10-CM
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
## 2024-07-28 PROBLEM — S72.002A CLOSED LEFT HIP FRACTURE: Status: ACTIVE | Noted: 2024-07-28

## 2024-07-28 LAB
ABORH RETYPE: NORMAL
ALBUMIN SERPL-MCNC: 2.9 G/DL (ref 3.4–4.8)
ALBUMIN/GLOB SERPL: 0.7 RATIO (ref 1.1–2)
ALP SERPL-CCNC: 120 UNIT/L (ref 40–150)
ALT SERPL-CCNC: 17 UNIT/L (ref 0–55)
ANION GAP SERPL CALC-SCNC: 6 MEQ/L
APTT PPP: 32.4 SECONDS (ref 23.2–33.7)
AST SERPL-CCNC: 17 UNIT/L (ref 5–34)
BASOPHILS # BLD AUTO: 0.05 X10(3)/MCL
BASOPHILS NFR BLD AUTO: 0.6 %
BILIRUB SERPL-MCNC: 0.5 MG/DL
BUN SERPL-MCNC: 11.2 MG/DL (ref 8.4–25.7)
CALCIUM SERPL-MCNC: 9.3 MG/DL (ref 8.8–10)
CHLORIDE SERPL-SCNC: 105 MMOL/L (ref 98–111)
CO2 SERPL-SCNC: 28 MMOL/L (ref 23–31)
CREAT SERPL-MCNC: 0.76 MG/DL (ref 0.73–1.18)
CREAT/UREA NIT SERPL: 15
EOSINOPHIL # BLD AUTO: 0.05 X10(3)/MCL (ref 0–0.9)
EOSINOPHIL NFR BLD AUTO: 0.6 %
ERYTHROCYTE [DISTWIDTH] IN BLOOD BY AUTOMATED COUNT: 13.8 % (ref 11.5–17)
GFR SERPLBLD CREATININE-BSD FMLA CKD-EPI: >60 ML/MIN/1.73/M2
GLOBULIN SER-MCNC: 4.3 GM/DL (ref 2.4–3.5)
GLUCOSE SERPL-MCNC: 124 MG/DL (ref 75–121)
GROUP & RH: NORMAL
HCT VFR BLD AUTO: 43.9 % (ref 42–52)
HGB BLD-MCNC: 13.7 G/DL (ref 14–18)
IMM GRANULOCYTES # BLD AUTO: 0.04 X10(3)/MCL (ref 0–0.04)
IMM GRANULOCYTES NFR BLD AUTO: 0.5 %
INDIRECT COOMBS: NORMAL
INR PPP: 1.3
LYMPHOCYTES # BLD AUTO: 1.38 X10(3)/MCL (ref 0.6–4.6)
LYMPHOCYTES NFR BLD AUTO: 16.4 %
MCH RBC QN AUTO: 30.8 PG (ref 27–31)
MCHC RBC AUTO-ENTMCNC: 31.2 G/DL (ref 33–36)
MCV RBC AUTO: 98.7 FL (ref 80–94)
MONOCYTES # BLD AUTO: 0.9 X10(3)/MCL (ref 0.1–1.3)
MONOCYTES NFR BLD AUTO: 10.7 %
MRSA PCR SCRN (OHS): NOT DETECTED
NEUTROPHILS # BLD AUTO: 5.98 X10(3)/MCL (ref 2.1–9.2)
NEUTROPHILS NFR BLD AUTO: 71.2 %
NRBC BLD AUTO-RTO: 0 %
PLATELET # BLD AUTO: 159 X10(3)/MCL (ref 130–400)
PMV BLD AUTO: 10.6 FL (ref 7.4–10.4)
POCT GLUCOSE: 156 MG/DL (ref 70–110)
POTASSIUM SERPL-SCNC: 4.1 MMOL/L (ref 3.5–5.1)
PROT SERPL-MCNC: 7.2 GM/DL (ref 5.8–7.6)
PROTHROMBIN TIME: 16.8 SECONDS (ref 12.5–14.5)
RBC # BLD AUTO: 4.45 X10(6)/MCL
SODIUM SERPL-SCNC: 139 MMOL/L (ref 136–145)
SPECIMEN OUTDATE: NORMAL
WBC # BLD AUTO: 8.4 X10(3)/MCL (ref 4.5–11.5)

## 2024-07-28 PROCEDURE — 85610 PROTHROMBIN TIME: CPT | Performed by: EMERGENCY MEDICINE

## 2024-07-28 PROCEDURE — 37000008 HC ANESTHESIA 1ST 15 MINUTES: Performed by: ORTHOPAEDIC SURGERY

## 2024-07-28 PROCEDURE — C1713 ANCHOR/SCREW BN/BN,TIS/BN: HCPCS | Performed by: ORTHOPAEDIC SURGERY

## 2024-07-28 PROCEDURE — 63600175 PHARM REV CODE 636 W HCPCS: Performed by: ORTHOPAEDIC SURGERY

## 2024-07-28 PROCEDURE — 63600175 PHARM REV CODE 636 W HCPCS: Performed by: EMERGENCY MEDICINE

## 2024-07-28 PROCEDURE — 25000003 PHARM REV CODE 250: Performed by: NURSE ANESTHETIST, CERTIFIED REGISTERED

## 2024-07-28 PROCEDURE — 25000003 PHARM REV CODE 250

## 2024-07-28 PROCEDURE — 37000009 HC ANESTHESIA EA ADD 15 MINS: Performed by: ORTHOPAEDIC SURGERY

## 2024-07-28 PROCEDURE — D9220A PRA ANESTHESIA: Mod: ANES,,, | Performed by: ANESTHESIOLOGY

## 2024-07-28 PROCEDURE — 0QS736Z REPOSITION LEFT UPPER FEMUR WITH INTRAMEDULLARY INTERNAL FIXATION DEVICE, PERCUTANEOUS APPROACH: ICD-10-PCS | Performed by: ORTHOPAEDIC SURGERY

## 2024-07-28 PROCEDURE — 90715 TDAP VACCINE 7 YRS/> IM: CPT | Performed by: EMERGENCY MEDICINE

## 2024-07-28 PROCEDURE — G0390 TRAUMA RESPONS W/HOSP CRITI: HCPCS

## 2024-07-28 PROCEDURE — 86900 BLOOD TYPING SEROLOGIC ABO: CPT | Performed by: ORTHOPAEDIC SURGERY

## 2024-07-28 PROCEDURE — C1769 GUIDE WIRE: HCPCS | Performed by: ORTHOPAEDIC SURGERY

## 2024-07-28 PROCEDURE — 63600175 PHARM REV CODE 636 W HCPCS

## 2024-07-28 PROCEDURE — 3E0234Z INTRODUCTION OF SERUM, TOXOID AND VACCINE INTO MUSCLE, PERCUTANEOUS APPROACH: ICD-10-PCS | Performed by: INTERNAL MEDICINE

## 2024-07-28 PROCEDURE — 36000710: Performed by: ORTHOPAEDIC SURGERY

## 2024-07-28 PROCEDURE — 11000001 HC ACUTE MED/SURG PRIVATE ROOM

## 2024-07-28 PROCEDURE — 63600175 PHARM REV CODE 636 W HCPCS: Performed by: NURSE ANESTHETIST, CERTIFIED REGISTERED

## 2024-07-28 PROCEDURE — 71000015 HC POSTOP RECOV 1ST HR: Performed by: ORTHOPAEDIC SURGERY

## 2024-07-28 PROCEDURE — D9220A PRA ANESTHESIA: Mod: CRNA,,, | Performed by: NURSE ANESTHETIST, CERTIFIED REGISTERED

## 2024-07-28 PROCEDURE — 96374 THER/PROPH/DIAG INJ IV PUSH: CPT

## 2024-07-28 PROCEDURE — 93005 ELECTROCARDIOGRAM TRACING: CPT

## 2024-07-28 PROCEDURE — 63600175 PHARM REV CODE 636 W HCPCS: Performed by: ANESTHESIOLOGY

## 2024-07-28 PROCEDURE — 99285 EMERGENCY DEPT VISIT HI MDM: CPT | Mod: 25

## 2024-07-28 PROCEDURE — 87641 MR-STAPH DNA AMP PROBE: CPT | Performed by: EMERGENCY MEDICINE

## 2024-07-28 PROCEDURE — 36000711: Performed by: ORTHOPAEDIC SURGERY

## 2024-07-28 PROCEDURE — 36415 COLL VENOUS BLD VENIPUNCTURE: CPT | Performed by: INTERNAL MEDICINE

## 2024-07-28 PROCEDURE — 27245 TREAT THIGH FRACTURE: CPT | Mod: LT,,, | Performed by: ORTHOPAEDIC SURGERY

## 2024-07-28 PROCEDURE — 85025 COMPLETE CBC W/AUTO DIFF WBC: CPT | Performed by: EMERGENCY MEDICINE

## 2024-07-28 PROCEDURE — 85730 THROMBOPLASTIN TIME PARTIAL: CPT | Performed by: EMERGENCY MEDICINE

## 2024-07-28 PROCEDURE — 96376 TX/PRO/DX INJ SAME DRUG ADON: CPT

## 2024-07-28 PROCEDURE — 80053 COMPREHEN METABOLIC PANEL: CPT | Performed by: EMERGENCY MEDICINE

## 2024-07-28 PROCEDURE — 86850 RBC ANTIBODY SCREEN: CPT | Performed by: ORTHOPAEDIC SURGERY

## 2024-07-28 PROCEDURE — 96375 TX/PRO/DX INJ NEW DRUG ADDON: CPT

## 2024-07-28 PROCEDURE — 27245 TREAT THIGH FRACTURE: CPT | Mod: AS,LT,,

## 2024-07-28 PROCEDURE — 86901 BLOOD TYPING SEROLOGIC RH(D): CPT | Performed by: ORTHOPAEDIC SURGERY

## 2024-07-28 PROCEDURE — 71000033 HC RECOVERY, INTIAL HOUR: Performed by: ORTHOPAEDIC SURGERY

## 2024-07-28 PROCEDURE — 99223 1ST HOSP IP/OBS HIGH 75: CPT | Mod: FS,57,, | Performed by: ORTHOPAEDIC SURGERY

## 2024-07-28 PROCEDURE — 27201423 OPTIME MED/SURG SUP & DEVICES STERILE SUPPLY: Performed by: ORTHOPAEDIC SURGERY

## 2024-07-28 PROCEDURE — 90471 IMMUNIZATION ADMIN: CPT | Performed by: EMERGENCY MEDICINE

## 2024-07-28 PROCEDURE — 99283 EMERGENCY DEPT VISIT LOW MDM: CPT | Mod: ,,,

## 2024-07-28 DEVICE — NAIL TFN ADVANCE 12X400MM 130D: Type: IMPLANTABLE DEVICE | Site: FEMUR | Status: FUNCTIONAL

## 2024-07-28 DEVICE — SCREW XL25 IM NAIL 40X5MM: Type: IMPLANTABLE DEVICE | Site: FEMUR | Status: FUNCTIONAL

## 2024-07-28 DEVICE — SCREW FEM NECK PERF GOLD 105MM: Type: IMPLANTABLE DEVICE | Site: FEMUR | Status: FUNCTIONAL

## 2024-07-28 RX ORDER — ETOMIDATE 2 MG/ML
INJECTION INTRAVENOUS
Status: DISCONTINUED | OUTPATIENT
Start: 2024-07-28 | End: 2024-07-28

## 2024-07-28 RX ORDER — DEXAMETHASONE SODIUM PHOSPHATE 4 MG/ML
INJECTION, SOLUTION INTRA-ARTICULAR; INTRALESIONAL; INTRAMUSCULAR; INTRAVENOUS; SOFT TISSUE
Status: DISCONTINUED | OUTPATIENT
Start: 2024-07-28 | End: 2024-07-28

## 2024-07-28 RX ORDER — PHENYLEPHRINE HYDROCHLORIDE 10 MG/ML
INJECTION INTRAVENOUS
Status: DISCONTINUED | OUTPATIENT
Start: 2024-07-28 | End: 2024-07-28

## 2024-07-28 RX ORDER — IPRATROPIUM BROMIDE AND ALBUTEROL SULFATE 2.5; .5 MG/3ML; MG/3ML
3 SOLUTION RESPIRATORY (INHALATION)
Status: DISCONTINUED | OUTPATIENT
Start: 2024-07-28 | End: 2024-07-28 | Stop reason: HOSPADM

## 2024-07-28 RX ORDER — PHENYLEPHRINE HCL IN 0.9% NACL 1 MG/10 ML
SYRINGE (ML) INTRAVENOUS
Status: DISCONTINUED | OUTPATIENT
Start: 2024-07-28 | End: 2024-07-28

## 2024-07-28 RX ORDER — CEFAZOLIN SODIUM 2 G/50ML
2 SOLUTION INTRAVENOUS
Status: COMPLETED | OUTPATIENT
Start: 2024-07-28 | End: 2024-07-29

## 2024-07-28 RX ORDER — MORPHINE SULFATE 4 MG/ML
4 INJECTION, SOLUTION INTRAMUSCULAR; INTRAVENOUS EVERY 4 HOURS PRN
Status: DISCONTINUED | OUTPATIENT
Start: 2024-07-28 | End: 2024-08-01 | Stop reason: HOSPADM

## 2024-07-28 RX ORDER — FENTANYL CITRATE 50 UG/ML
INJECTION, SOLUTION INTRAMUSCULAR; INTRAVENOUS
Status: DISCONTINUED | OUTPATIENT
Start: 2024-07-28 | End: 2024-07-28

## 2024-07-28 RX ORDER — SODIUM CHLORIDE 0.9 % (FLUSH) 0.9 %
10 SYRINGE (ML) INJECTION
Status: DISCONTINUED | OUTPATIENT
Start: 2024-07-28 | End: 2024-08-01 | Stop reason: HOSPADM

## 2024-07-28 RX ORDER — ONDANSETRON HYDROCHLORIDE 2 MG/ML
4 INJECTION, SOLUTION INTRAVENOUS DAILY PRN
Status: DISCONTINUED | OUTPATIENT
Start: 2024-07-28 | End: 2024-07-28 | Stop reason: HOSPADM

## 2024-07-28 RX ORDER — SODIUM CHLORIDE, SODIUM GLUCONATE, SODIUM ACETATE, POTASSIUM CHLORIDE AND MAGNESIUM CHLORIDE 30; 37; 368; 526; 502 MG/100ML; MG/100ML; MG/100ML; MG/100ML; MG/100ML
INJECTION, SOLUTION INTRAVENOUS CONTINUOUS
OUTPATIENT
Start: 2024-07-28 | End: 2024-08-27

## 2024-07-28 RX ORDER — HYDROMORPHONE HYDROCHLORIDE 2 MG/ML
0.2 INJECTION, SOLUTION INTRAMUSCULAR; INTRAVENOUS; SUBCUTANEOUS EVERY 5 MIN PRN
Status: DISCONTINUED | OUTPATIENT
Start: 2024-07-28 | End: 2024-07-28 | Stop reason: HOSPADM

## 2024-07-28 RX ORDER — ACETAMINOPHEN 10 MG/ML
INJECTION, SOLUTION INTRAVENOUS
Status: DISCONTINUED | OUTPATIENT
Start: 2024-07-28 | End: 2024-07-28

## 2024-07-28 RX ORDER — METHOCARBAMOL 500 MG/1
500 TABLET, FILM COATED ORAL 3 TIMES DAILY
Status: DISCONTINUED | OUTPATIENT
Start: 2024-07-28 | End: 2024-08-01 | Stop reason: HOSPADM

## 2024-07-28 RX ORDER — FENTANYL CITRATE 50 UG/ML
INJECTION, SOLUTION INTRAMUSCULAR; INTRAVENOUS CODE/TRAUMA/SEDATION MEDICATION
Status: COMPLETED | OUTPATIENT
Start: 2024-07-28 | End: 2024-07-28

## 2024-07-28 RX ORDER — ROCURONIUM BROMIDE 10 MG/ML
INJECTION, SOLUTION INTRAVENOUS
Status: DISCONTINUED | OUTPATIENT
Start: 2024-07-28 | End: 2024-07-28

## 2024-07-28 RX ORDER — FENTANYL CITRATE 50 UG/ML
50 INJECTION, SOLUTION INTRAMUSCULAR; INTRAVENOUS
Status: COMPLETED | OUTPATIENT
Start: 2024-07-28 | End: 2024-07-28

## 2024-07-28 RX ORDER — TALC
6 POWDER (GRAM) TOPICAL NIGHTLY PRN
Status: DISCONTINUED | OUTPATIENT
Start: 2024-07-28 | End: 2024-08-01 | Stop reason: HOSPADM

## 2024-07-28 RX ORDER — MEPERIDINE HYDROCHLORIDE 25 MG/ML
12.5 INJECTION INTRAMUSCULAR; INTRAVENOUS; SUBCUTANEOUS EVERY 10 MIN PRN
Status: DISCONTINUED | OUTPATIENT
Start: 2024-07-28 | End: 2024-07-28 | Stop reason: HOSPADM

## 2024-07-28 RX ORDER — ONDANSETRON HYDROCHLORIDE 2 MG/ML
4 INJECTION, SOLUTION INTRAVENOUS EVERY 8 HOURS PRN
Status: DISCONTINUED | OUTPATIENT
Start: 2024-07-28 | End: 2024-08-01 | Stop reason: HOSPADM

## 2024-07-28 RX ORDER — HYDROMORPHONE HYDROCHLORIDE 2 MG/ML
0.4 INJECTION, SOLUTION INTRAMUSCULAR; INTRAVENOUS; SUBCUTANEOUS EVERY 5 MIN PRN
Status: DISCONTINUED | OUTPATIENT
Start: 2024-07-28 | End: 2024-07-28 | Stop reason: HOSPADM

## 2024-07-28 RX ORDER — CEFAZOLIN SODIUM 2 G/50ML
2 SOLUTION INTRAVENOUS ONCE
Status: COMPLETED | OUTPATIENT
Start: 2024-07-28 | End: 2024-07-28

## 2024-07-28 RX ORDER — LIDOCAINE HYDROCHLORIDE 20 MG/ML
INJECTION, SOLUTION EPIDURAL; INFILTRATION; INTRACAUDAL; PERINEURAL
Status: DISCONTINUED | OUTPATIENT
Start: 2024-07-28 | End: 2024-07-28

## 2024-07-28 RX ORDER — ONDANSETRON HYDROCHLORIDE 2 MG/ML
INJECTION, SOLUTION INTRAVENOUS
Status: DISCONTINUED | OUTPATIENT
Start: 2024-07-28 | End: 2024-07-28

## 2024-07-28 RX ORDER — PROCHLORPERAZINE EDISYLATE 5 MG/ML
5 INJECTION INTRAMUSCULAR; INTRAVENOUS EVERY 30 MIN PRN
Status: DISCONTINUED | OUTPATIENT
Start: 2024-07-28 | End: 2024-07-28 | Stop reason: HOSPADM

## 2024-07-28 RX ORDER — VANCOMYCIN HYDROCHLORIDE 1 G/20ML
INJECTION, POWDER, LYOPHILIZED, FOR SOLUTION INTRAVENOUS
Status: DISCONTINUED | OUTPATIENT
Start: 2024-07-28 | End: 2024-07-28 | Stop reason: HOSPADM

## 2024-07-28 RX ORDER — HYDROCODONE BITARTRATE AND ACETAMINOPHEN 5; 325 MG/1; MG/1
1 TABLET ORAL EVERY 4 HOURS PRN
Status: DISCONTINUED | OUTPATIENT
Start: 2024-07-28 | End: 2024-08-01 | Stop reason: HOSPADM

## 2024-07-28 RX ORDER — FENTANYL CITRATE 50 UG/ML
INJECTION, SOLUTION INTRAMUSCULAR; INTRAVENOUS
Status: COMPLETED
Start: 2024-07-28 | End: 2024-07-28

## 2024-07-28 RX ORDER — GLUCAGON 1 MG
1 KIT INJECTION
Status: DISCONTINUED | OUTPATIENT
Start: 2024-07-28 | End: 2024-07-28 | Stop reason: HOSPADM

## 2024-07-28 RX ADMIN — ROCURONIUM BROMIDE 50 MG: 10 SOLUTION INTRAVENOUS at 03:07

## 2024-07-28 RX ADMIN — Medication 100 MCG: at 04:07

## 2024-07-28 RX ADMIN — CEFAZOLIN SODIUM 2 G: 2 SOLUTION INTRAVENOUS at 11:07

## 2024-07-28 RX ADMIN — TETANUS TOXOID, REDUCED DIPHTHERIA TOXOID AND ACELLULAR PERTUSSIS VACCINE, ADSORBED 0.5 ML: 5; 2.5; 8; 8; 2.5 SUSPENSION INTRAMUSCULAR at 09:07

## 2024-07-28 RX ADMIN — Medication 100 MCG: at 03:07

## 2024-07-28 RX ADMIN — ONDANSETRON 4 MG: 2 INJECTION INTRAMUSCULAR; INTRAVENOUS at 12:07

## 2024-07-28 RX ADMIN — CEFAZOLIN SODIUM 2 G: 2 SOLUTION INTRAVENOUS at 03:07

## 2024-07-28 RX ADMIN — FENTANYL CITRATE 50 MCG: 50 INJECTION, SOLUTION INTRAMUSCULAR; INTRAVENOUS at 03:07

## 2024-07-28 RX ADMIN — ETOMIDATE 15 MG: 2 INJECTION INTRAVENOUS at 03:07

## 2024-07-28 RX ADMIN — SODIUM CHLORIDE, SODIUM GLUCONATE, SODIUM ACETATE, POTASSIUM CHLORIDE AND MAGNESIUM CHLORIDE: 526; 502; 368; 37; 30 INJECTION, SOLUTION INTRAVENOUS at 03:07

## 2024-07-28 RX ADMIN — METHOCARBAMOL 500 MG: 500 TABLET ORAL at 09:07

## 2024-07-28 RX ADMIN — FENTANYL CITRATE 50 MCG: 50 INJECTION, SOLUTION INTRAMUSCULAR; INTRAVENOUS at 10:07

## 2024-07-28 RX ADMIN — ONDANSETRON 4 MG: 2 INJECTION INTRAMUSCULAR; INTRAVENOUS at 03:07

## 2024-07-28 RX ADMIN — MORPHINE SULFATE 4 MG: 4 INJECTION, SOLUTION INTRAMUSCULAR; INTRAVENOUS at 12:07

## 2024-07-28 RX ADMIN — FENTANYL CITRATE 50 MCG: 50 INJECTION, SOLUTION INTRAMUSCULAR; INTRAVENOUS at 09:07

## 2024-07-28 RX ADMIN — SUGAMMADEX 200 MG: 100 INJECTION, SOLUTION INTRAVENOUS at 03:07

## 2024-07-28 RX ADMIN — HYDROMORPHONE HYDROCHLORIDE 0.4 MG: 2 INJECTION INTRAMUSCULAR; INTRAVENOUS; SUBCUTANEOUS at 04:07

## 2024-07-28 RX ADMIN — LIDOCAINE HYDROCHLORIDE 3 ML: 20 INJECTION, SOLUTION INTRAVENOUS at 03:07

## 2024-07-28 RX ADMIN — DEXAMETHASONE SODIUM PHOSPHATE 4 MG: 4 INJECTION, SOLUTION INTRA-ARTICULAR; INTRALESIONAL; INTRAMUSCULAR; INTRAVENOUS; SOFT TISSUE at 03:07

## 2024-07-28 RX ADMIN — ACETAMINOPHEN 1000 MG: 10 INJECTION, SOLUTION INTRAVENOUS at 03:07

## 2024-07-28 NOTE — ANESTHESIA PROCEDURE NOTES
Intubation    Date/Time: 7/28/2024 3:07 PM    Performed by: Vitaliy Cai CRNA  Authorized by: Mauro Walton Jr., MD    Intubation:     Induction:  Intravenous    Intubated:  Postinduction    Mask Ventilation:  Easy mask    Attempts:  1    Attempted By:  CRNA    Method of Intubation:  Direct    Blade:  Ponce 2    Laryngeal View Grade: Grade I - full view of cords      Difficult Airway Encountered?: No      Complications:  None    Airway Device:  Oral endotracheal tube    Airway Device Size:  7.5    Style/Cuff Inflation:  Cuffed    Inflation Amount (mL):  7    Tube secured:  22    Secured at:  The lips    Placement Verified By:  Capnometry    Complicating Factors:  None    Findings Post-Intubation:  BS equal bilateral and atraumatic/condition of teeth unchanged

## 2024-07-28 NOTE — TRANSFER OF CARE
"Anesthesia Transfer of Care Note    Patient: Anthony Ibarra    Procedure(s) Performed: Procedure(s) (LRB):  INSERTION, INTRAMEDULLARY HANNA, FEMUR, LEFT (Left)    Patient location: PACU    Anesthesia Type: general    Transport from OR: Transported from OR on room air with adequate spontaneous ventilation    Post pain: adequate analgesia    Post assessment: no apparent anesthetic complications    Post vital signs: stable    Level of consciousness: sedated    Nausea/Vomiting: no nausea/vomiting    Complications: none    Transfer of care protocol was followed      Last vitals: Visit Vitals  /77   Pulse 82   Temp 36.6 °C (97.8 °F) (Oral)   Resp 10   Ht 5' 11" (1.803 m)   Wt 59.9 kg (132 lb)   SpO2 100%   BMI 18.41 kg/m²     "

## 2024-07-28 NOTE — ANESTHESIA PREPROCEDURE EVALUATION
07/28/2024  Charles Sanchez is a 89 y.o., adult nursing home patient admitted earlier today as L2 trauma after a fall with chronic AFib, multiple myeloma, previous CVA with some expressive aphasia and dementia, prostate cancer, upper extremity DVT further workup reveals close left femur fracture requiring left IM cyndie.  Patient was taking Eliquis up until admission.  EKG shows rate controlled AFib.  Consent obtained from daughter over the phone.  She states that he has not been to see a doctor in years.  Patient's cousin at bedside notes that patient is scheduled to have hardware removed from left upper extremity in the near future and is inquiring about possibly having that done as well.      Pre-op Assessment    I have reviewed the Patient Summary Reports.    I have reviewed the NPO Status.   I have reviewed the Medications.     Review of Systems  Anesthesia Hx:               Denies Personal Hx of Anesthesia complications.                    Social:  Non-Smoker       Hematology/Oncology:       -- Anemia:                                  Cardiovascular:         Dysrhythmias atrial fibrillation           Functional Capacity 2 METS                         Neurological:   CVA             Dementia                             Physical Exam  General: Cooperative, Alert, Cachexia and Confusion    Airway:  Mallampati: II   Mouth Opening: Normal  TM Distance: Normal  Tongue: Normal  Neck ROM: Normal ROM    Dental:  Periodontal disease    Chest/Lungs:  Clear to auscultation, Normal Respiratory Rate    Heart:  Rate: Normal  Rhythm: Irregularly Irregular        Anesthesia Plan  Type of Anesthesia, risks & benefits discussed:    Anesthesia Type: Gen ETT  Intra-op Monitoring Plan: Standard ASA Monitors  Post Op Pain Control Plan: multimodal analgesia and IV/PO Opioids PRN  Induction:  IV  Airway Plan:  Direct  Informed Consent: Informed consent signed with the Patient and all parties understand the risks and agree with anesthesia plan.  All questions answered.   ASA Score: 4  Day of Surgery Review of History & Physical: H&P Update referred to the surgeon/provider.  Anesthesia Plan Notes: Benzodiazepine avoidance   Etomidate induction   Limit narcotics with IV Tylenol/Precedex/low-dose ketamine    Attempt to get more medical information from nursing home (H&P not completed upon my review of records)    Ready For Surgery From Anesthesia Perspective.     .

## 2024-07-29 LAB
BASOPHILS # BLD AUTO: 0.02 X10(3)/MCL
BASOPHILS NFR BLD AUTO: 0.2 %
EOSINOPHIL # BLD AUTO: 0 X10(3)/MCL (ref 0–0.9)
EOSINOPHIL NFR BLD AUTO: 0 %
ERYTHROCYTE [DISTWIDTH] IN BLOOD BY AUTOMATED COUNT: 13.8 % (ref 11.5–17)
HCT VFR BLD AUTO: 35.2 % (ref 42–52)
HGB BLD-MCNC: 11.4 G/DL (ref 14–18)
IMM GRANULOCYTES # BLD AUTO: 0.05 X10(3)/MCL (ref 0–0.04)
IMM GRANULOCYTES NFR BLD AUTO: 0.4 %
LYMPHOCYTES # BLD AUTO: 0.76 X10(3)/MCL (ref 0.6–4.6)
LYMPHOCYTES NFR BLD AUTO: 6 %
MCH RBC QN AUTO: 31.1 PG (ref 27–31)
MCHC RBC AUTO-ENTMCNC: 32.4 G/DL (ref 33–36)
MCV RBC AUTO: 96.2 FL (ref 80–94)
MONOCYTES # BLD AUTO: 1.21 X10(3)/MCL (ref 0.1–1.3)
MONOCYTES NFR BLD AUTO: 9.6 %
NEUTROPHILS # BLD AUTO: 10.56 X10(3)/MCL (ref 2.1–9.2)
NEUTROPHILS NFR BLD AUTO: 83.8 %
NRBC BLD AUTO-RTO: 0 %
PLATELET # BLD AUTO: 161 X10(3)/MCL (ref 130–400)
PMV BLD AUTO: 10.7 FL (ref 7.4–10.4)
RBC # BLD AUTO: 3.66 X10(6)/MCL (ref 4.7–6.1)
WBC # BLD AUTO: 12.6 X10(3)/MCL (ref 4.5–11.5)

## 2024-07-29 PROCEDURE — 25000003 PHARM REV CODE 250

## 2024-07-29 PROCEDURE — 85025 COMPLETE CBC W/AUTO DIFF WBC: CPT | Performed by: INTERNAL MEDICINE

## 2024-07-29 PROCEDURE — 63600175 PHARM REV CODE 636 W HCPCS

## 2024-07-29 PROCEDURE — 63600175 PHARM REV CODE 636 W HCPCS: Performed by: INTERNAL MEDICINE

## 2024-07-29 PROCEDURE — 97162 PT EVAL MOD COMPLEX 30 MIN: CPT

## 2024-07-29 PROCEDURE — 36415 COLL VENOUS BLD VENIPUNCTURE: CPT | Performed by: INTERNAL MEDICINE

## 2024-07-29 PROCEDURE — 11000001 HC ACUTE MED/SURG PRIVATE ROOM

## 2024-07-29 PROCEDURE — 94799 UNLISTED PULMONARY SVC/PX: CPT

## 2024-07-29 PROCEDURE — 25000003 PHARM REV CODE 250: Performed by: EMERGENCY MEDICINE

## 2024-07-29 PROCEDURE — 97166 OT EVAL MOD COMPLEX 45 MIN: CPT

## 2024-07-29 PROCEDURE — 99900035 HC TECH TIME PER 15 MIN (STAT)

## 2024-07-29 RX ORDER — ENOXAPARIN SODIUM 100 MG/ML
40 INJECTION SUBCUTANEOUS EVERY 24 HOURS
Status: DISCONTINUED | OUTPATIENT
Start: 2024-07-29 | End: 2024-08-01 | Stop reason: HOSPADM

## 2024-07-29 RX ADMIN — METHOCARBAMOL 500 MG: 500 TABLET ORAL at 08:07

## 2024-07-29 RX ADMIN — Medication 6 MG: at 08:07

## 2024-07-29 RX ADMIN — HYDROCODONE BITARTRATE AND ACETAMINOPHEN 1 TABLET: 5; 325 TABLET ORAL at 08:07

## 2024-07-29 RX ADMIN — CEFAZOLIN SODIUM 2 G: 2 SOLUTION INTRAVENOUS at 01:07

## 2024-07-29 RX ADMIN — HYDROCODONE BITARTRATE AND ACETAMINOPHEN 1 TABLET: 5; 325 TABLET ORAL at 04:07

## 2024-07-29 RX ADMIN — CEFAZOLIN SODIUM 2 G: 2 SOLUTION INTRAVENOUS at 08:07

## 2024-07-29 RX ADMIN — ENOXAPARIN SODIUM 40 MG: 40 INJECTION SUBCUTANEOUS at 04:07

## 2024-07-29 NOTE — ANESTHESIA POSTPROCEDURE EVALUATION
Anesthesia Post Evaluation    Patient: Anthony Ibarra    Procedure(s) Performed: Procedure(s) (LRB):  INSERTION, INTRAMEDULLARY HANNA, FEMUR, LEFT (Left)    Final Anesthesia Type: general      Patient location during evaluation: floor  Patient participation: Yes- Able to Participate  Level of consciousness: awake and alert  Post-procedure vital signs: reviewed and stable  Pain management: adequate  Airway patency: patent    PONV status at discharge: No PONV  Anesthetic complications: no      Cardiovascular status: blood pressure returned to baseline  Respiratory status: spontaneous ventilation and room air  Hydration status: euvolemic  Follow-up not needed.              Vitals Value Taken Time   BP 98/66 07/28/24 1840   Temp  07/29/24 1248   Pulse 75 07/28/24 1844   Resp 16 07/28/24 1844   SpO2 94 % 07/28/24 1844   Vitals shown include unfiled device data.      Event Time   Out of Recovery 18:15:00         Pain/Nini Score: Pain Rating Prior to Med Admin: 9 (7/29/2024  4:50 AM)  Pain Rating Post Med Admin: 3 (7/28/2024  1:39 PM)  Nini Score: 9 (7/28/2024  6:45 PM)

## 2024-07-30 LAB
BASOPHILS # BLD AUTO: 0.02 X10(3)/MCL
BASOPHILS NFR BLD AUTO: 0.2 %
EOSINOPHIL # BLD AUTO: 0.01 X10(3)/MCL (ref 0–0.9)
EOSINOPHIL NFR BLD AUTO: 0.1 %
ERYTHROCYTE [DISTWIDTH] IN BLOOD BY AUTOMATED COUNT: 14.1 % (ref 11.5–17)
HCT VFR BLD AUTO: 27.5 % (ref 42–52)
HGB BLD-MCNC: 9.2 G/DL (ref 14–18)
IMM GRANULOCYTES # BLD AUTO: 0.06 X10(3)/MCL (ref 0–0.04)
IMM GRANULOCYTES NFR BLD AUTO: 0.5 %
LYMPHOCYTES # BLD AUTO: 1.31 X10(3)/MCL (ref 0.6–4.6)
LYMPHOCYTES NFR BLD AUTO: 11.6 %
MCH RBC QN AUTO: 31.5 PG (ref 27–31)
MCHC RBC AUTO-ENTMCNC: 33.5 G/DL (ref 33–36)
MCV RBC AUTO: 94.2 FL (ref 80–94)
MONOCYTES # BLD AUTO: 1.47 X10(3)/MCL (ref 0.1–1.3)
MONOCYTES NFR BLD AUTO: 13 %
NEUTROPHILS # BLD AUTO: 8.46 X10(3)/MCL (ref 2.1–9.2)
NEUTROPHILS NFR BLD AUTO: 74.6 %
NRBC BLD AUTO-RTO: 0 %
PLATELET # BLD AUTO: 121 X10(3)/MCL (ref 130–400)
PLATELETS.RETICULATED NFR BLD AUTO: 6.1 % (ref 0.9–11.2)
PMV BLD AUTO: 11.6 FL (ref 7.4–10.4)
RBC # BLD AUTO: 2.92 X10(6)/MCL (ref 4.7–6.1)
WBC # BLD AUTO: 11.33 X10(3)/MCL (ref 4.5–11.5)

## 2024-07-30 PROCEDURE — 99900031 HC PATIENT EDUCATION (STAT)

## 2024-07-30 PROCEDURE — 99900035 HC TECH TIME PER 15 MIN (STAT)

## 2024-07-30 PROCEDURE — 97535 SELF CARE MNGMENT TRAINING: CPT

## 2024-07-30 PROCEDURE — 97530 THERAPEUTIC ACTIVITIES: CPT

## 2024-07-30 PROCEDURE — 94799 UNLISTED PULMONARY SVC/PX: CPT

## 2024-07-30 PROCEDURE — 25000003 PHARM REV CODE 250

## 2024-07-30 PROCEDURE — 94760 N-INVAS EAR/PLS OXIMETRY 1: CPT

## 2024-07-30 PROCEDURE — 25000003 PHARM REV CODE 250: Performed by: EMERGENCY MEDICINE

## 2024-07-30 PROCEDURE — 11000001 HC ACUTE MED/SURG PRIVATE ROOM

## 2024-07-30 RX ADMIN — HYDROCODONE BITARTRATE AND ACETAMINOPHEN 1 TABLET: 5; 325 TABLET ORAL at 08:07

## 2024-07-30 RX ADMIN — METHOCARBAMOL 500 MG: 500 TABLET ORAL at 10:07

## 2024-07-30 RX ADMIN — HYDROCODONE BITARTRATE AND ACETAMINOPHEN 1 TABLET: 5; 325 TABLET ORAL at 07:07

## 2024-07-30 RX ADMIN — METHOCARBAMOL 500 MG: 500 TABLET ORAL at 08:07

## 2024-07-31 ENCOUNTER — ANESTHESIA (OUTPATIENT)
Dept: SURGERY | Facility: HOSPITAL | Age: 89
End: 2024-07-31
Payer: MEDICARE

## 2024-07-31 ENCOUNTER — ANESTHESIA EVENT (OUTPATIENT)
Dept: SURGERY | Facility: HOSPITAL | Age: 89
End: 2024-07-31
Payer: MEDICARE

## 2024-07-31 LAB
ABO + RH BLD: NORMAL
ANION GAP SERPL CALC-SCNC: 4 MEQ/L
BASOPHILS # BLD AUTO: 0.02 X10(3)/MCL
BASOPHILS NFR BLD AUTO: 0.2 %
BLD PROD TYP BPU: NORMAL
BLOOD UNIT EXPIRATION DATE: NORMAL
BLOOD UNIT TYPE CODE: 5100
BUN SERPL-MCNC: 39 MG/DL (ref 8.4–25.7)
CALCIUM SERPL-MCNC: 8.2 MG/DL (ref 8.8–10)
CHLORIDE SERPL-SCNC: 103 MMOL/L (ref 98–111)
CO2 SERPL-SCNC: 30 MMOL/L (ref 23–31)
CREAT SERPL-MCNC: 1.01 MG/DL (ref 0.73–1.18)
CREAT/UREA NIT SERPL: 39
CROSSMATCH INTERPRETATION: NORMAL
DISPENSE STATUS: NORMAL
EOSINOPHIL # BLD AUTO: 0.11 X10(3)/MCL (ref 0–0.9)
EOSINOPHIL NFR BLD AUTO: 1.2 %
ERYTHROCYTE [DISTWIDTH] IN BLOOD BY AUTOMATED COUNT: 14.2 % (ref 11.5–17)
GFR SERPLBLD CREATININE-BSD FMLA CKD-EPI: >60 ML/MIN/1.73/M2
GLUCOSE SERPL-MCNC: 121 MG/DL (ref 75–121)
HCT VFR BLD AUTO: 22.6 % (ref 42–52)
HGB BLD-MCNC: 7.5 G/DL (ref 14–18)
IMM GRANULOCYTES # BLD AUTO: 0.03 X10(3)/MCL (ref 0–0.04)
IMM GRANULOCYTES NFR BLD AUTO: 0.3 %
LYMPHOCYTES # BLD AUTO: 1.77 X10(3)/MCL (ref 0.6–4.6)
LYMPHOCYTES NFR BLD AUTO: 19.3 %
MCH RBC QN AUTO: 31.1 PG (ref 27–31)
MCHC RBC AUTO-ENTMCNC: 33.2 G/DL (ref 33–36)
MCV RBC AUTO: 93.8 FL (ref 80–94)
MONOCYTES # BLD AUTO: 1.32 X10(3)/MCL (ref 0.1–1.3)
MONOCYTES NFR BLD AUTO: 14.4 %
NEUTROPHILS # BLD AUTO: 5.94 X10(3)/MCL (ref 2.1–9.2)
NEUTROPHILS NFR BLD AUTO: 64.6 %
NRBC BLD AUTO-RTO: 0 %
PLATELET # BLD AUTO: 97 X10(3)/MCL (ref 130–400)
PLATELETS.RETICULATED NFR BLD AUTO: 5.4 % (ref 0.9–11.2)
PMV BLD AUTO: 11.3 FL (ref 7.4–10.4)
POTASSIUM SERPL-SCNC: 4.2 MMOL/L (ref 3.5–5.1)
RBC # BLD AUTO: 2.41 X10(6)/MCL (ref 4.7–6.1)
SODIUM SERPL-SCNC: 137 MMOL/L (ref 136–145)
UNIT NUMBER: NORMAL
WBC # BLD AUTO: 9.19 X10(3)/MCL (ref 4.5–11.5)

## 2024-07-31 PROCEDURE — 25000003 PHARM REV CODE 250

## 2024-07-31 PROCEDURE — 27000221 HC OXYGEN, UP TO 24 HOURS

## 2024-07-31 PROCEDURE — 85025 COMPLETE CBC W/AUTO DIFF WBC: CPT | Performed by: INTERNAL MEDICINE

## 2024-07-31 PROCEDURE — 37000009 HC ANESTHESIA EA ADD 15 MINS: Performed by: ORTHOPAEDIC SURGERY

## 2024-07-31 PROCEDURE — 86923 COMPATIBILITY TEST ELECTRIC: CPT | Performed by: ANESTHESIOLOGY

## 2024-07-31 PROCEDURE — 0PPQ04Z REMOVAL OF INTERNAL FIXATION DEVICE FROM LEFT METACARPAL, OPEN APPROACH: ICD-10-PCS | Performed by: ORTHOPAEDIC SURGERY

## 2024-07-31 PROCEDURE — 0LN80ZZ RELEASE LEFT HAND TENDON, OPEN APPROACH: ICD-10-PCS | Performed by: ORTHOPAEDIC SURGERY

## 2024-07-31 PROCEDURE — 63600175 PHARM REV CODE 636 W HCPCS: Performed by: ORTHOPAEDIC SURGERY

## 2024-07-31 PROCEDURE — 94799 UNLISTED PULMONARY SVC/PX: CPT

## 2024-07-31 PROCEDURE — 36000706: Performed by: ORTHOPAEDIC SURGERY

## 2024-07-31 PROCEDURE — 99900035 HC TECH TIME PER 15 MIN (STAT)

## 2024-07-31 PROCEDURE — 63600175 PHARM REV CODE 636 W HCPCS: Performed by: INTERNAL MEDICINE

## 2024-07-31 PROCEDURE — 25270 REPAIR FOREARM TENDON/MUSCLE: CPT | Mod: 58,AS,LT, | Performed by: PHYSICIAN ASSISTANT

## 2024-07-31 PROCEDURE — 26445 RELEASE HAND/FINGER TENDON: CPT | Mod: 58,51,LT, | Performed by: ORTHOPAEDIC SURGERY

## 2024-07-31 PROCEDURE — P9016 RBC LEUKOCYTES REDUCED: HCPCS | Performed by: ANESTHESIOLOGY

## 2024-07-31 PROCEDURE — 25270 REPAIR FOREARM TENDON/MUSCLE: CPT | Mod: 58,LT,, | Performed by: ORTHOPAEDIC SURGERY

## 2024-07-31 PROCEDURE — 80048 BASIC METABOLIC PNL TOTAL CA: CPT | Performed by: INTERNAL MEDICINE

## 2024-07-31 PROCEDURE — 63600175 PHARM REV CODE 636 W HCPCS: Mod: JZ,JG

## 2024-07-31 PROCEDURE — 63600175 PHARM REV CODE 636 W HCPCS

## 2024-07-31 PROCEDURE — 37000008 HC ANESTHESIA 1ST 15 MINUTES: Performed by: ORTHOPAEDIC SURGERY

## 2024-07-31 PROCEDURE — 30233N1 TRANSFUSION OF NONAUTOLOGOUS RED BLOOD CELLS INTO PERIPHERAL VEIN, PERCUTANEOUS APPROACH: ICD-10-PCS | Performed by: INTERNAL MEDICINE

## 2024-07-31 PROCEDURE — 71000033 HC RECOVERY, INTIAL HOUR: Performed by: ORTHOPAEDIC SURGERY

## 2024-07-31 PROCEDURE — 36415 COLL VENOUS BLD VENIPUNCTURE: CPT | Performed by: INTERNAL MEDICINE

## 2024-07-31 PROCEDURE — 0LQ60ZZ REPAIR LEFT LOWER ARM AND WRIST TENDON, OPEN APPROACH: ICD-10-PCS | Performed by: ORTHOPAEDIC SURGERY

## 2024-07-31 PROCEDURE — 63600175 PHARM REV CODE 636 W HCPCS: Performed by: PHYSICIAN ASSISTANT

## 2024-07-31 PROCEDURE — 11000001 HC ACUTE MED/SURG PRIVATE ROOM

## 2024-07-31 PROCEDURE — 99900031 HC PATIENT EDUCATION (STAT)

## 2024-07-31 PROCEDURE — 36000707: Performed by: ORTHOPAEDIC SURGERY

## 2024-07-31 PROCEDURE — 0PPJ04Z REMOVAL OF INTERNAL FIXATION DEVICE FROM LEFT RADIUS, OPEN APPROACH: ICD-10-PCS | Performed by: ORTHOPAEDIC SURGERY

## 2024-07-31 RX ORDER — LIDOCAINE HYDROCHLORIDE 20 MG/ML
INJECTION INTRAVENOUS
Status: DISCONTINUED | OUTPATIENT
Start: 2024-07-31 | End: 2024-07-31

## 2024-07-31 RX ORDER — MIDAZOLAM HYDROCHLORIDE 1 MG/ML
1 INJECTION INTRAMUSCULAR; INTRAVENOUS ONCE
Status: DISCONTINUED | OUTPATIENT
Start: 2024-07-31 | End: 2024-07-31 | Stop reason: HOSPADM

## 2024-07-31 RX ORDER — PHENYLEPHRINE HCL IN 0.9% NACL 1 MG/10 ML
SYRINGE (ML) INTRAVENOUS
Status: DISCONTINUED | OUTPATIENT
Start: 2024-07-31 | End: 2024-07-31

## 2024-07-31 RX ORDER — DEXAMETHASONE SODIUM PHOSPHATE 4 MG/ML
INJECTION, SOLUTION INTRA-ARTICULAR; INTRALESIONAL; INTRAMUSCULAR; INTRAVENOUS; SOFT TISSUE
Status: DISCONTINUED | OUTPATIENT
Start: 2024-07-31 | End: 2024-07-31

## 2024-07-31 RX ORDER — ONDANSETRON HYDROCHLORIDE 2 MG/ML
INJECTION, SOLUTION INTRAVENOUS
Status: DISCONTINUED | OUTPATIENT
Start: 2024-07-31 | End: 2024-07-31

## 2024-07-31 RX ORDER — ACETAMINOPHEN 500 MG
1000 TABLET ORAL ONCE
Status: DISCONTINUED | OUTPATIENT
Start: 2024-07-31 | End: 2024-07-31 | Stop reason: HOSPADM

## 2024-07-31 RX ORDER — MEPERIDINE HYDROCHLORIDE 25 MG/ML
12.5 INJECTION INTRAMUSCULAR; INTRAVENOUS; SUBCUTANEOUS ONCE
Status: DISCONTINUED | OUTPATIENT
Start: 2024-07-31 | End: 2024-07-31 | Stop reason: HOSPADM

## 2024-07-31 RX ORDER — IPRATROPIUM BROMIDE AND ALBUTEROL SULFATE 2.5; .5 MG/3ML; MG/3ML
3 SOLUTION RESPIRATORY (INHALATION) ONCE AS NEEDED
Status: DISCONTINUED | OUTPATIENT
Start: 2024-07-31 | End: 2024-07-31 | Stop reason: HOSPADM

## 2024-07-31 RX ORDER — CEFAZOLIN SODIUM 2 G/50ML
2 SOLUTION INTRAVENOUS
Status: DISCONTINUED | OUTPATIENT
Start: 2024-07-31 | End: 2024-07-31 | Stop reason: HOSPADM

## 2024-07-31 RX ORDER — HYDROMORPHONE HYDROCHLORIDE 2 MG/ML
0.5 INJECTION, SOLUTION INTRAMUSCULAR; INTRAVENOUS; SUBCUTANEOUS EVERY 5 MIN PRN
Status: DISCONTINUED | OUTPATIENT
Start: 2024-07-31 | End: 2024-07-31 | Stop reason: HOSPADM

## 2024-07-31 RX ORDER — LIDOCAINE HYDROCHLORIDE 10 MG/ML
1 INJECTION, SOLUTION EPIDURAL; INFILTRATION; INTRACAUDAL; PERINEURAL ONCE
Status: DISCONTINUED | OUTPATIENT
Start: 2024-07-31 | End: 2024-07-31 | Stop reason: HOSPADM

## 2024-07-31 RX ORDER — GLYCOPYRROLATE 0.2 MG/ML
INJECTION INTRAMUSCULAR; INTRAVENOUS
Status: DISCONTINUED | OUTPATIENT
Start: 2024-07-31 | End: 2024-07-31

## 2024-07-31 RX ORDER — DIPHENHYDRAMINE HYDROCHLORIDE 50 MG/ML
25 INJECTION INTRAMUSCULAR; INTRAVENOUS EVERY 6 HOURS PRN
Status: DISCONTINUED | OUTPATIENT
Start: 2024-07-31 | End: 2024-07-31 | Stop reason: HOSPADM

## 2024-07-31 RX ORDER — ETOMIDATE 2 MG/ML
INJECTION INTRAVENOUS
Status: DISCONTINUED | OUTPATIENT
Start: 2024-07-31 | End: 2024-07-31

## 2024-07-31 RX ORDER — METOCLOPRAMIDE HYDROCHLORIDE 5 MG/ML
10 INJECTION INTRAMUSCULAR; INTRAVENOUS EVERY 10 MIN PRN
Status: DISCONTINUED | OUTPATIENT
Start: 2024-07-31 | End: 2024-07-31 | Stop reason: HOSPADM

## 2024-07-31 RX ORDER — HYDROCODONE BITARTRATE AND ACETAMINOPHEN 500; 5 MG/1; MG/1
TABLET ORAL
Status: DISCONTINUED | OUTPATIENT
Start: 2024-07-31 | End: 2024-07-31 | Stop reason: HOSPADM

## 2024-07-31 RX ORDER — FAMOTIDINE 10 MG/ML
20 INJECTION INTRAVENOUS ONCE
Status: DISCONTINUED | OUTPATIENT
Start: 2024-07-31 | End: 2024-07-31 | Stop reason: HOSPADM

## 2024-07-31 RX ORDER — ONDANSETRON HYDROCHLORIDE 2 MG/ML
4 INJECTION, SOLUTION INTRAVENOUS ONCE
Status: DISCONTINUED | OUTPATIENT
Start: 2024-07-31 | End: 2024-07-31 | Stop reason: HOSPADM

## 2024-07-31 RX ORDER — CEFAZOLIN SODIUM 2 G/50ML
2 SOLUTION INTRAVENOUS ONCE
OUTPATIENT
Start: 2024-07-31

## 2024-07-31 RX ORDER — HYDROCODONE BITARTRATE AND ACETAMINOPHEN 5; 325 MG/1; MG/1
1 TABLET ORAL
Status: DISCONTINUED | OUTPATIENT
Start: 2024-07-31 | End: 2024-07-31 | Stop reason: HOSPADM

## 2024-07-31 RX ORDER — LIDOCAINE HYDROCHLORIDE 10 MG/ML
1 INJECTION, SOLUTION EPIDURAL; INFILTRATION; INTRACAUDAL; PERINEURAL ONCE
Status: DISCONTINUED | OUTPATIENT
Start: 2024-07-31 | End: 2024-08-01 | Stop reason: HOSPADM

## 2024-07-31 RX ORDER — HYDROMORPHONE HYDROCHLORIDE 2 MG/ML
0.2 INJECTION, SOLUTION INTRAMUSCULAR; INTRAVENOUS; SUBCUTANEOUS EVERY 5 MIN PRN
Status: DISCONTINUED | OUTPATIENT
Start: 2024-07-31 | End: 2024-07-31 | Stop reason: HOSPADM

## 2024-07-31 RX ORDER — ESMOLOL HYDROCHLORIDE 10 MG/ML
INJECTION INTRAVENOUS
Status: DISCONTINUED | OUTPATIENT
Start: 2024-07-31 | End: 2024-07-31

## 2024-07-31 RX ORDER — VANCOMYCIN HYDROCHLORIDE 1 G/20ML
INJECTION, POWDER, LYOPHILIZED, FOR SOLUTION INTRAVENOUS
Status: DISCONTINUED | OUTPATIENT
Start: 2024-07-31 | End: 2024-07-31 | Stop reason: HOSPADM

## 2024-07-31 RX ORDER — ROPIVACAINE HYDROCHLORIDE 5 MG/ML
30 INJECTION, SOLUTION EPIDURAL; INFILTRATION; PERINEURAL ONCE
Status: DISCONTINUED | OUTPATIENT
Start: 2024-07-31 | End: 2024-08-01 | Stop reason: HOSPADM

## 2024-07-31 RX ORDER — FENTANYL CITRATE 50 UG/ML
INJECTION, SOLUTION INTRAMUSCULAR; INTRAVENOUS
Status: DISCONTINUED | OUTPATIENT
Start: 2024-07-31 | End: 2024-07-31

## 2024-07-31 RX ORDER — GLUCAGON 1 MG
1 KIT INJECTION
Status: DISCONTINUED | OUTPATIENT
Start: 2024-07-31 | End: 2024-08-01 | Stop reason: HOSPADM

## 2024-07-31 RX ORDER — CEFAZOLIN SODIUM 2 G/50ML
2 SOLUTION INTRAVENOUS
Status: DISCONTINUED | OUTPATIENT
Start: 2024-07-31 | End: 2024-08-01

## 2024-07-31 RX ORDER — SODIUM CHLORIDE 9 MG/ML
INJECTION, SOLUTION INTRAVENOUS CONTINUOUS
Status: DISCONTINUED | OUTPATIENT
Start: 2024-07-31 | End: 2024-08-01 | Stop reason: HOSPADM

## 2024-07-31 RX ORDER — EPHEDRINE SULFATE 50 MG/ML
INJECTION, SOLUTION INTRAVENOUS
Status: DISCONTINUED | OUTPATIENT
Start: 2024-07-31 | End: 2024-07-31

## 2024-07-31 RX ADMIN — CEFAZOLIN SODIUM 2 G: 2 SOLUTION INTRAVENOUS at 09:07

## 2024-07-31 RX ADMIN — ETOMIDATE 14 MG: 2 INJECTION INTRAVENOUS at 07:07

## 2024-07-31 RX ADMIN — GLYCOPYRROLATE 0.2 MG: 0.2 INJECTION INTRAMUSCULAR; INTRAVENOUS at 07:07

## 2024-07-31 RX ADMIN — EPHEDRINE SULFATE 10 MG: 50 INJECTION INTRAVENOUS at 07:07

## 2024-07-31 RX ADMIN — ONDANSETRON 4 MG: 2 INJECTION INTRAMUSCULAR; INTRAVENOUS at 07:07

## 2024-07-31 RX ADMIN — CEFAZOLIN SODIUM 2 G: 2 SOLUTION INTRAVENOUS at 07:07

## 2024-07-31 RX ADMIN — Medication 100 MCG: at 07:07

## 2024-07-31 RX ADMIN — SODIUM CHLORIDE, SODIUM GLUCONATE, SODIUM ACETATE, POTASSIUM CHLORIDE AND MAGNESIUM CHLORIDE: 526; 502; 368; 37; 30 INJECTION, SOLUTION INTRAVENOUS at 07:07

## 2024-07-31 RX ADMIN — ENOXAPARIN SODIUM 40 MG: 40 INJECTION SUBCUTANEOUS at 05:07

## 2024-07-31 RX ADMIN — CEFAZOLIN SODIUM 2 G: 2 SOLUTION INTRAVENOUS at 05:07

## 2024-07-31 RX ADMIN — DEXAMETHASONE SODIUM PHOSPHATE 4 MG: 4 INJECTION, SOLUTION INTRA-ARTICULAR; INTRALESIONAL; INTRAMUSCULAR; INTRAVENOUS; SOFT TISSUE at 07:07

## 2024-07-31 RX ADMIN — FENTANYL CITRATE 25 MCG: 50 INJECTION, SOLUTION INTRAMUSCULAR; INTRAVENOUS at 07:07

## 2024-07-31 RX ADMIN — LIDOCAINE HYDROCHLORIDE 60 MG: 20 INJECTION INTRAVENOUS at 07:07

## 2024-07-31 RX ADMIN — METHOCARBAMOL 500 MG: 500 TABLET ORAL at 04:07

## 2024-07-31 RX ADMIN — ESMOLOL HYDROCHLORIDE 10 MG: 100 INJECTION, SOLUTION INTRAVENOUS at 07:07

## 2024-07-31 RX ADMIN — METHOCARBAMOL 500 MG: 500 TABLET ORAL at 10:07

## 2024-07-31 NOTE — ANESTHESIA PREPROCEDURE EVALUATION
07/31/2024  Anthony Ibarra is a 89 y.o., male history of dementia presents to ED via EMS from NH as level 2 trauma following fall onto his L hip.  EMS reports pt did not hit his head.  He complains of 8/10 L hip pain.  Pt is on Eliquis.  He received 50mcg fentanyl en route.  Pt was able to answer questions en route.  Pt denies chest pain.       Nursing home records reviewed, past medical history of dementia, multiple falls, thoracic compression fracture, superficial vein thrombosis, atrial fibrillation.    His femur was rodded on 7/28/24 and now presents for hardware removal from   Left forearm.    H/H: 7/22  PLT: 97      Pre-op Assessment    I have reviewed the Patient Summary Reports.     I have reviewed the Nursing Notes. I have reviewed the NPO Status.   I have reviewed the Medications.     Review of Systems  Anesthesia Hx:  No problems with previous Anesthesia                Social:  Non-Smoker       Hematology/Oncology:       -- Anemia:                                  Neurological:       Dementia severe                            Physical Exam  General: Cooperative, Alert, Cachexia and Confusion    Airway:  Mallampati: II   Mouth Opening: Normal  TM Distance: Normal  Tongue: Normal  Neck ROM: Normal ROM    Dental:  Intact    Chest/Lungs:  Clear to auscultation, Normal Respiratory Rate    Heart:  Rate: Normal  Rhythm: Regular Rhythm  Sounds: Normal    Abdomen:  Normal, Soft, Nontender        Anesthesia Plan  Type of Anesthesia, risks & benefits discussed:    Anesthesia Type: Gen Supraglottic Airway  Intra-op Monitoring Plan: Standard ASA Monitors  Post Op Pain Control Plan: multimodal analgesia  Induction:  IV  Airway Plan: Direct  Informed Consent: Informed consent signed with the Patient and all parties understand the risks and agree with anesthesia plan.  All questions answered.   ASA Score: 4  Day of  Surgery Review of History & Physical: H&P Update referred to the surgeon/provider.  Anesthesia Plan Notes: LMA, Transfuse 1 unit PRBC    Ready For Surgery From Anesthesia Perspective.     .

## 2024-07-31 NOTE — ANESTHESIA POSTPROCEDURE EVALUATION
Anesthesia Post Evaluation    Patient: Anthony Ibarra    Procedure(s) Performed: Procedure(s) (LRB):  REMOVAL,ORTHOPEDIC HARDWARE,UPPER EXTREMITY, EXTENSOR TENDON REPAIR LEFT FOREARM (Left)    Final Anesthesia Type: general      Patient location during evaluation: PACU  Patient participation: Yes- Able to Participate  Level of consciousness: awake and alert  Post-procedure vital signs: reviewed and stable  Pain management: adequate  Airway patency: patent  BRIAN mitigation strategies: Multimodal analgesia  PONV status at discharge: No PONV  Anesthetic complications: no      Cardiovascular status: hemodynamically stable  Respiratory status: unassisted  Hydration status: euvolemic  Follow-up not needed.              Vitals Value Taken Time   /64 07/31/24 0901   Temp 36.4 °C (97.5 °F) 07/31/24 0810   Pulse 72 07/31/24 0903   Resp 15 07/31/24 0902   SpO2 96 % 07/31/24 0903   Vitals shown include unfiled device data.      No case tracking events are documented in the log.      Pain/Nini Score: Pain Rating Prior to Med Admin: 7 (7/30/2024  7:44 PM)  Pain Rating Post Med Admin: 2 (7/30/2024  8:44 PM)  Nini Score: 8 (7/31/2024  8:45 AM)

## 2024-07-31 NOTE — TRANSFER OF CARE
"Anesthesia Transfer of Care Note    Patient: Anthony Ibarra    Procedure(s) Performed: Procedure(s) (LRB):  REMOVAL,ORTHOPEDIC HARDWARE,UPPER EXTREMITY (Left)    Patient location: PACU    Anesthesia Type: general    Transport from OR: Transported from OR on room air with adequate spontaneous ventilation    Post pain: adequate analgesia    Post assessment: no apparent anesthetic complications    Post vital signs: stable    Level of consciousness: responds to stimulation and sedated    Nausea/Vomiting: no nausea/vomiting    Complications: none    Transfer of care protocol was followed      Last vitals: Visit Vitals  /72   Pulse 101   Temp 36.6 °C (97.9 °F) (Oral)   Resp 20   Ht 5' 11" (1.803 m)   Wt 59.9 kg (132 lb 0.9 oz)   SpO2 99%   BMI 18.42 kg/m²     "

## 2024-07-31 NOTE — ANESTHESIA PROCEDURE NOTES
Intubation    Date/Time: 7/31/2024 7:23 AM    Performed by: Willie Mcgee CRNA  Authorized by: Cem Andrade DO    Intubation:     Induction:  Intravenous    Intubated:  Postinduction    Mask Ventilation:  Easy mask    Attempts:  2    Attempted By:  CRNA    Attempted By (2nd Attempt):  CRNA    Difficult Airway Encountered?: No      Complications:  None    Airway Device:  Supraglottic airway/LMA    Airway Device Size:  5.0    Style/Cuff Inflation:  Uncuffed    Placement Verified By:  Capnometry    Complicating Factors:  None    Findings Post-Intubation:  BS equal bilateral and atraumatic/condition of teeth unchanged  Notes:      LMA 4 initially placed by CRNA with no leak. After moving patient to the bed, insufficient tidal volumes, and unable to obtain adequate seal. Removed and replaced with LMA 5 by CRNA.

## 2024-08-01 VITALS
HEART RATE: 74 BPM | OXYGEN SATURATION: 94 % | SYSTOLIC BLOOD PRESSURE: 99 MMHG | WEIGHT: 132.06 LBS | TEMPERATURE: 98 F | HEIGHT: 71 IN | DIASTOLIC BLOOD PRESSURE: 61 MMHG | RESPIRATION RATE: 18 BRPM | BODY MASS INDEX: 18.49 KG/M2

## 2024-08-01 LAB
BASOPHILS # BLD AUTO: 0.01 X10(3)/MCL
BASOPHILS NFR BLD AUTO: 0.1 %
EOSINOPHIL # BLD AUTO: 0.01 X10(3)/MCL (ref 0–0.9)
EOSINOPHIL NFR BLD AUTO: 0.1 %
ERYTHROCYTE [DISTWIDTH] IN BLOOD BY AUTOMATED COUNT: 15.2 % (ref 11.5–17)
HCT VFR BLD AUTO: 24.4 % (ref 42–52)
HGB BLD-MCNC: 8.3 G/DL (ref 14–18)
IMM GRANULOCYTES # BLD AUTO: 0.05 X10(3)/MCL (ref 0–0.04)
IMM GRANULOCYTES NFR BLD AUTO: 0.6 %
LYMPHOCYTES # BLD AUTO: 1.17 X10(3)/MCL (ref 0.6–4.6)
LYMPHOCYTES NFR BLD AUTO: 13.6 %
MCH RBC QN AUTO: 31.4 PG (ref 27–31)
MCHC RBC AUTO-ENTMCNC: 34 G/DL (ref 33–36)
MCV RBC AUTO: 92.4 FL (ref 80–94)
MONOCYTES # BLD AUTO: 0.98 X10(3)/MCL (ref 0.1–1.3)
MONOCYTES NFR BLD AUTO: 11.4 %
NEUTROPHILS # BLD AUTO: 6.37 X10(3)/MCL (ref 2.1–9.2)
NEUTROPHILS NFR BLD AUTO: 74.2 %
NRBC BLD AUTO-RTO: 0 %
PLATELET # BLD AUTO: 97 X10(3)/MCL (ref 130–400)
PLATELETS.RETICULATED NFR BLD AUTO: 6.9 % (ref 0.9–11.2)
PMV BLD AUTO: 11.9 FL (ref 7.4–10.4)
RBC # BLD AUTO: 2.64 X10(6)/MCL (ref 4.7–6.1)
WBC # BLD AUTO: 8.59 X10(3)/MCL (ref 4.5–11.5)

## 2024-08-01 PROCEDURE — 63600175 PHARM REV CODE 636 W HCPCS: Performed by: INTERNAL MEDICINE

## 2024-08-01 PROCEDURE — 25000003 PHARM REV CODE 250: Performed by: INTERNAL MEDICINE

## 2024-08-01 PROCEDURE — 25000003 PHARM REV CODE 250: Performed by: EMERGENCY MEDICINE

## 2024-08-01 PROCEDURE — 97535 SELF CARE MNGMENT TRAINING: CPT

## 2024-08-01 PROCEDURE — 97116 GAIT TRAINING THERAPY: CPT

## 2024-08-01 PROCEDURE — 97530 THERAPEUTIC ACTIVITIES: CPT

## 2024-08-01 PROCEDURE — 25000003 PHARM REV CODE 250

## 2024-08-01 RX ORDER — CEFAZOLIN SODIUM 2 G/50ML
2 SOLUTION INTRAVENOUS ONCE
Status: COMPLETED | OUTPATIENT
Start: 2024-08-01 | End: 2024-08-01

## 2024-08-01 RX ADMIN — HYDROCODONE BITARTRATE AND ACETAMINOPHEN 1 TABLET: 5; 325 TABLET ORAL at 04:08

## 2024-08-01 RX ADMIN — METHOCARBAMOL 500 MG: 500 TABLET ORAL at 09:08

## 2024-08-01 RX ADMIN — THERA TABS 1 TABLET: TAB at 09:08

## 2024-08-01 RX ADMIN — METHOCARBAMOL 500 MG: 500 TABLET ORAL at 03:08

## 2024-08-01 RX ADMIN — CEFAZOLIN SODIUM 2 G: 2 SOLUTION INTRAVENOUS at 02:08

## 2024-08-02 ENCOUNTER — LAB REQUISITION (OUTPATIENT)
Dept: LAB | Facility: HOSPITAL | Age: 89
End: 2024-08-02
Payer: MEDICARE

## 2024-08-02 DIAGNOSIS — D64.9 ANEMIA, UNSPECIFIED: ICD-10-CM

## 2024-08-02 DIAGNOSIS — R53.1 WEAKNESS: ICD-10-CM

## 2024-08-02 LAB
ALBUMIN SERPL-MCNC: 2.6 G/DL (ref 3.4–4.8)
ALBUMIN/GLOB SERPL: 0.8 RATIO (ref 1.1–2)
ALP SERPL-CCNC: 86 UNIT/L (ref 40–150)
ALT SERPL-CCNC: 6 UNIT/L (ref 0–55)
ANION GAP SERPL CALC-SCNC: 8 MEQ/L
AST SERPL-CCNC: 18 UNIT/L (ref 5–34)
BASOPHILS # BLD AUTO: 0.05 X10(3)/MCL
BASOPHILS NFR BLD AUTO: 0.6 %
BILIRUB SERPL-MCNC: 0.5 MG/DL
BUN SERPL-MCNC: 21.6 MG/DL (ref 8.4–25.7)
CALCIUM SERPL-MCNC: 8.6 MG/DL (ref 8.8–10)
CHLORIDE SERPL-SCNC: 103 MMOL/L (ref 98–111)
CHOLEST SERPL-MCNC: 121 MG/DL
CHOLEST/HDLC SERPL: 3 {RATIO} (ref 0–5)
CO2 SERPL-SCNC: 30 MMOL/L (ref 23–31)
CREAT SERPL-MCNC: 0.7 MG/DL (ref 0.73–1.18)
CREAT/UREA NIT SERPL: 31
EOSINOPHIL # BLD AUTO: 0.24 X10(3)/MCL (ref 0–0.9)
EOSINOPHIL NFR BLD AUTO: 2.9 %
ERYTHROCYTE [DISTWIDTH] IN BLOOD BY AUTOMATED COUNT: 15 % (ref 11.5–17)
GFR SERPLBLD CREATININE-BSD FMLA CKD-EPI: >60 ML/MIN/1.73/M2
GLOBULIN SER-MCNC: 3.3 GM/DL (ref 2.4–3.5)
GLUCOSE SERPL-MCNC: 98 MG/DL (ref 75–121)
HCT VFR BLD AUTO: 28.1 % (ref 42–52)
HDLC SERPL-MCNC: 36 MG/DL (ref 35–60)
HGB BLD-MCNC: 9.2 G/DL (ref 14–18)
IMM GRANULOCYTES # BLD AUTO: 0.07 X10(3)/MCL (ref 0–0.04)
IMM GRANULOCYTES NFR BLD AUTO: 0.9 %
LDLC SERPL CALC-MCNC: 71 MG/DL (ref 50–140)
LYMPHOCYTES # BLD AUTO: 1.51 X10(3)/MCL (ref 0.6–4.6)
LYMPHOCYTES NFR BLD AUTO: 18.6 %
MCH RBC QN AUTO: 30.6 PG (ref 27–31)
MCHC RBC AUTO-ENTMCNC: 32.7 G/DL (ref 33–36)
MCV RBC AUTO: 93.4 FL (ref 80–94)
MONOCYTES # BLD AUTO: 1 X10(3)/MCL (ref 0.1–1.3)
MONOCYTES NFR BLD AUTO: 12.3 %
NEUTROPHILS # BLD AUTO: 5.27 X10(3)/MCL (ref 2.1–9.2)
NEUTROPHILS NFR BLD AUTO: 64.7 %
NRBC BLD AUTO-RTO: 0 %
PLATELET # BLD AUTO: 137 X10(3)/MCL (ref 130–400)
PLATELETS.RETICULATED NFR BLD AUTO: 6.9 % (ref 0.9–11.2)
PMV BLD AUTO: 12 FL (ref 7.4–10.4)
POTASSIUM SERPL-SCNC: 4.7 MMOL/L (ref 3.5–5.1)
PROT SERPL-MCNC: 5.9 GM/DL (ref 5.8–7.6)
PSA SERPL-MCNC: <0.1 NG/ML
RBC # BLD AUTO: 3.01 X10(6)/MCL (ref 4.7–6.1)
SODIUM SERPL-SCNC: 141 MMOL/L (ref 136–145)
TRIGL SERPL-MCNC: 72 MG/DL (ref 34–140)
TSH SERPL-ACNC: 1.13 UIU/ML (ref 0.35–4.94)
VLDLC SERPL CALC-MCNC: 14 MG/DL
WBC # BLD AUTO: 8.14 X10(3)/MCL (ref 4.5–11.5)

## 2024-08-02 PROCEDURE — 80053 COMPREHEN METABOLIC PANEL: CPT | Performed by: FAMILY MEDICINE

## 2024-08-02 PROCEDURE — 84443 ASSAY THYROID STIM HORMONE: CPT | Performed by: FAMILY MEDICINE

## 2024-08-02 PROCEDURE — 84153 ASSAY OF PSA TOTAL: CPT | Performed by: FAMILY MEDICINE

## 2024-08-02 PROCEDURE — 85025 COMPLETE CBC W/AUTO DIFF WBC: CPT | Performed by: FAMILY MEDICINE

## 2024-08-02 PROCEDURE — 80061 LIPID PANEL: CPT | Performed by: FAMILY MEDICINE

## 2024-08-06 ENCOUNTER — LAB REQUISITION (OUTPATIENT)
Dept: LAB | Facility: HOSPITAL | Age: 89
End: 2024-08-06
Payer: MEDICARE

## 2024-08-06 DIAGNOSIS — D64.9 ANEMIA, UNSPECIFIED: ICD-10-CM

## 2024-08-06 LAB
BASOPHILS # BLD AUTO: 0.04 X10(3)/MCL
BASOPHILS NFR BLD AUTO: 0.5 %
EOSINOPHIL # BLD AUTO: 0.16 X10(3)/MCL (ref 0–0.9)
EOSINOPHIL NFR BLD AUTO: 2.1 %
ERYTHROCYTE [DISTWIDTH] IN BLOOD BY AUTOMATED COUNT: 16.3 % (ref 11.5–17)
HCT VFR BLD AUTO: 29.8 % (ref 42–52)
HGB BLD-MCNC: 9.6 G/DL (ref 14–18)
IMM GRANULOCYTES # BLD AUTO: 0.09 X10(3)/MCL (ref 0–0.04)
IMM GRANULOCYTES NFR BLD AUTO: 1.2 %
LYMPHOCYTES # BLD AUTO: 1.64 X10(3)/MCL (ref 0.6–4.6)
LYMPHOCYTES NFR BLD AUTO: 21.5 %
MCH RBC QN AUTO: 31.4 PG (ref 27–31)
MCHC RBC AUTO-ENTMCNC: 32.2 G/DL (ref 33–36)
MCV RBC AUTO: 97.4 FL (ref 80–94)
MONOCYTES # BLD AUTO: 1.02 X10(3)/MCL (ref 0.1–1.3)
MONOCYTES NFR BLD AUTO: 13.4 %
NEUTROPHILS # BLD AUTO: 4.68 X10(3)/MCL (ref 2.1–9.2)
NEUTROPHILS NFR BLD AUTO: 61.3 %
NRBC BLD AUTO-RTO: 0 %
PLATELET # BLD AUTO: 218 X10(3)/MCL (ref 130–400)
PMV BLD AUTO: 11.1 FL (ref 7.4–10.4)
RBC # BLD AUTO: 3.06 X10(6)/MCL (ref 4.7–6.1)
WBC # BLD AUTO: 7.63 X10(3)/MCL (ref 4.5–11.5)

## 2024-08-06 PROCEDURE — 85025 COMPLETE CBC W/AUTO DIFF WBC: CPT | Performed by: FAMILY MEDICINE

## 2024-08-15 ENCOUNTER — HOSPITAL ENCOUNTER (OUTPATIENT)
Dept: RADIOLOGY | Facility: CLINIC | Age: 89
Discharge: HOME | End: 2024-08-15
Attending: ORTHOPAEDIC SURGERY
Payer: MEDICARE

## 2024-08-15 ENCOUNTER — OFFICE VISIT (OUTPATIENT)
Dept: ORTHOPEDICS | Facility: CLINIC | Age: 89
End: 2024-08-15
Payer: MEDICARE

## 2024-08-15 VITALS — DIASTOLIC BLOOD PRESSURE: 67 MMHG | HEART RATE: 60 BPM | SYSTOLIC BLOOD PRESSURE: 106 MMHG

## 2024-08-15 DIAGNOSIS — S72.142D CLOSED INTERTROCHANTERIC FRACTURE OF LEFT FEMUR WITH ROUTINE HEALING, SUBSEQUENT ENCOUNTER: Primary | ICD-10-CM

## 2024-08-15 DIAGNOSIS — S72.142D CLOSED INTERTROCHANTERIC FRACTURE OF LEFT FEMUR WITH ROUTINE HEALING, SUBSEQUENT ENCOUNTER: ICD-10-CM

## 2024-08-15 DIAGNOSIS — S52.502D CLOSED FRACTURE OF DISTAL END OF LEFT RADIUS WITH ROUTINE HEALING, UNSPECIFIED FRACTURE MORPHOLOGY, SUBSEQUENT ENCOUNTER: ICD-10-CM

## 2024-08-15 PROCEDURE — 73110 X-RAY EXAM OF WRIST: CPT | Mod: LT,,, | Performed by: ORTHOPAEDIC SURGERY

## 2024-08-15 PROCEDURE — 73552 X-RAY EXAM OF FEMUR 2/>: CPT | Mod: LT,,, | Performed by: ORTHOPAEDIC SURGERY

## 2024-08-15 NOTE — PROGRESS NOTES
Subjective:       Patient ID: Kaleb Ibarra is a 89 y.o. male.  Chief Complaint   Patient presents with    Left Wrist - Post-op Evaluation     2 WEEK F/U FROM LEFT WRIST HW REMOVAL. 2.5 WEEK F/U FROM IMN LEFT IT FEMUR FX. REPORTS ACHING PAIN.         HPI    Patient presents for 2.5 week follow up HW removal left wrist. IMN left femur with Dr. Alcazar. Doing well overall. Lives in a nursing home.Transported via ambulance today. Doing well overall. Pain is well controlled. He states he is working with therapy but has dementia so history is not reliable. Staples and sutures remain in place.     ROS:  Constitutional: Denies fever chills  Eyes: No change in vision  ENT: No ringing or current infections  CV: No chest pain  Resp: No labored breathing  MSK: Pain evident at site of injury located in HPI,   Integ: No signs of abrasions or lacerations  Neuro: No numbness or tingling  Lymphatic: No swelling outside the area of injury     Current Outpatient Medications on File Prior to Visit   Medication Sig Dispense Refill    acetaminophen (TYLENOL) 325 MG tablet Take 325 mg by mouth every 6 (six) hours as needed for Pain.      apixaban (ELIQUIS) 2.5 mg Tab Take 1 tablet (2.5 mg total) by mouth 2 (two) times daily. 60 tablet 3    apixaban (ELIQUIS) 5 mg Tab Take 5 mg by mouth 2 (two) times daily.      citalopram (CELEXA) 10 MG tablet Take 10 mg by mouth once daily.      multivitamin Tab Take 1 tablet by mouth once daily. 30 tablet 1    multivitamin with minerals tablet Take 1 tablet by mouth once daily.       No current facility-administered medications on file prior to visit.          Objective:      /67   Pulse 60   Physical Exam  General the patient is alert and oriented x3 no acute distress nontoxic-appearing appropriate affect.    Constitutional: Vital signs are examined and stable.  Resp: No signs of labored breathing      LUE: --Skin: incisions healing well without erythema drainage,signs of dehiscence, No  "signs of new abrasions or lacerations, no scars           -MSK: STR 5/5 AIN/PIN/Median/Radial/Ulnar motor           -Neuro:  Sensation intact to light touch C5-T1 dermatomes           -Lymphatic: No signs of lymphadenopathy, No signs of swelling,           -CV:Capillary refill is less than 2 seconds. Radial and ulnar pulses 2/4. Compartments Soft and compressible                       LLE: -Skin: Incisions healing well without erythema,drainage, signs of dehiscence; staples removed today without complication, No signs of new abrasions or lacerations, no scars           -MSK: Hip and Knee F/E, EHL/FHL, Gastroc/Tib anterior Strength 5/5           -Neuro:  Sensation intact to light touch L3-S1 dermatomes           -Lymphatic: No signs of lymphadenopathy           -CV: Capillary refill is less than 2 seconds. DP/PT pulses 2/4. Compartments soft and compressible            There is no height or weight on file to calculate BMI.  Patient weight not recorded  No results found for: "HGBA1C"  Hgb   Date Value Ref Range Status   08/06/2024 9.6 (L) 14.0 - 18.0 g/dL Final   08/02/2024 9.2 (L) 14.0 - 18.0 g/dL Final     Hct   Date Value Ref Range Status   08/06/2024 29.8 (L) 42.0 - 52.0 % Final   08/02/2024 28.1 (L) 42.0 - 52.0 % Final     No results found for: "IRON"  No components found for: "FROLATE"  No results found for: "BTEBVXQC64MB"  WBC   Date Value Ref Range Status   08/06/2024 7.63 4.50 - 11.50 x10(3)/mcL Final   08/02/2024 8.14 4.50 - 11.50 x10(3)/mcL Final       Radiology: 3 view x ray left wrist: bridge plate removed. Distal radius fracture appears fully consolidated. No drift of fracture fragments.    3 view x ray left femur: hardware intact to left IT femur fracture. Stable alignment. No collapse as compared to post operative images.         Assessment:         1. Closed intertrochanteric fracture of left femur with routine healing, subsequent encounter  CANCELED: X-Ray Hip 2 or 3 views Left with Pelvis when " performed      2. Closed fracture of distal end of left radius with routine healing, unspecified fracture morphology, subsequent encounter  X-Ray Wrist Complete Left              Plan:         Follow up in about 6 weeks (around 9/26/2024), or if symptoms worsen or fail to improve.    Kaleb was seen today for post-op evaluation.    Diagnoses and all orders for this visit:    Closed intertrochanteric fracture of left femur with routine healing, subsequent encounter  -     Cancel: X-Ray Hip 2 or 3 views Left with Pelvis when performed; Future    Closed fracture of distal end of left radius with routine healing, unspecified fracture morphology, subsequent encounter  -     X-Ray Wrist Complete Left; Future        -WBAT LUE, ROMAT. WBAT ROMAT LLE. Doing well overall. Will need to continue to work with therapy in his nursing home. Does have dementia, no family with him today making history difficult.  -Continue pain control PRN.   -all sutures and staples removed today without complication.   -Follow up with Dr. Alcazar in approx 6 weeks for repeat evaluation of the femur.   -ED precautions given    The above findings, diagnostics, and treatment plan were discussed with Dr. Malcolm who is in agreement with the plan of care except as stated in additional documentation.       Bridget Bennett PA-C          Future Appointments   Date Time Provider Department Center   9/25/2024  9:15 AM Fabricio Alcazar MD [facility]

## 2024-09-25 ENCOUNTER — HOSPITAL ENCOUNTER (OUTPATIENT)
Dept: RADIOLOGY | Facility: CLINIC | Age: 89
Discharge: HOME | End: 2024-09-25
Attending: ORTHOPAEDIC SURGERY
Payer: MEDICARE

## 2024-09-25 ENCOUNTER — OFFICE VISIT (OUTPATIENT)
Dept: ORTHOPEDICS | Facility: CLINIC | Age: 89
End: 2024-09-25
Payer: MEDICARE

## 2024-09-25 VITALS
SYSTOLIC BLOOD PRESSURE: 103 MMHG | BODY MASS INDEX: 18.49 KG/M2 | DIASTOLIC BLOOD PRESSURE: 63 MMHG | RESPIRATION RATE: 18 BRPM | WEIGHT: 132.06 LBS | HEART RATE: 65 BPM | HEIGHT: 71 IN

## 2024-09-25 DIAGNOSIS — S72.142D CLOSED INTERTROCHANTERIC FRACTURE OF LEFT FEMUR WITH ROUTINE HEALING, SUBSEQUENT ENCOUNTER: ICD-10-CM

## 2024-09-25 DIAGNOSIS — S72.142D CLOSED INTERTROCHANTERIC FRACTURE OF LEFT FEMUR WITH ROUTINE HEALING, SUBSEQUENT ENCOUNTER: Primary | ICD-10-CM

## 2024-09-25 DIAGNOSIS — S52.502D CLOSED FRACTURE OF DISTAL END OF LEFT RADIUS WITH ROUTINE HEALING, UNSPECIFIED FRACTURE MORPHOLOGY, SUBSEQUENT ENCOUNTER: ICD-10-CM

## 2024-09-25 PROCEDURE — 73552 X-RAY EXAM OF FEMUR 2/>: CPT | Mod: LT,,, | Performed by: ORTHOPAEDIC SURGERY

## 2024-09-25 PROCEDURE — 99024 POSTOP FOLLOW-UP VISIT: CPT | Mod: POP,,,

## 2024-09-25 NOTE — PROGRESS NOTES
"  Subjective:       Patient ID: Kaleb Ibarra is a 89 y.o. male.  Chief Complaint   Patient presents with    Left Hip - Follow-up     8 WEEK F/U LEFT IT FEMUR FX, PRESENTS IN WHEELCHAIR,  REPORTS IMPROVEMENT        HPI    Patient presents for 8 week follow up s/p intramedullary nailing of left femur for intertrochanteric fracture and hardware removal left wrist.Doing well overall. Lives in a nursing home.  Pain is well controlled.  He has a staff member present with him today that reports he walks with therapy.  He presents in a wheelchair today.  Patient has a history of dementia and is a poor historian.  He has no complaints today.    ROS:  Constitutional: Denies fever chills  Eyes: No change in vision  ENT: No ringing or current infections  CV: No chest pain  Resp: No labored breathing  MSK: Pain evident at site of injury located in HPI,   Integ: No signs of abrasions or lacerations  Neuro: No numbness or tingling  Lymphatic: No swelling outside the area of injury     Current Outpatient Medications on File Prior to Visit   Medication Sig Dispense Refill    acetaminophen (TYLENOL) 325 MG tablet Take 325 mg by mouth every 6 (six) hours as needed for Pain.      apixaban (ELIQUIS) 2.5 mg Tab Take 1 tablet (2.5 mg total) by mouth 2 (two) times daily. 60 tablet 3    apixaban (ELIQUIS) 5 mg Tab Take 5 mg by mouth 2 (two) times daily.      citalopram (CELEXA) 10 MG tablet Take 10 mg by mouth once daily.      multivitamin Tab Take 1 tablet by mouth once daily. 30 tablet 1    multivitamin with minerals tablet Take 1 tablet by mouth once daily.       No current facility-administered medications on file prior to visit.          Objective:      /63   Pulse 65   Resp 18   Ht 5' 11" (1.803 m)   Wt 59.9 kg (132 lb 0.9 oz)   BMI 18.42 kg/m²   Physical Exam  General the patient is alert and oriented x3 no acute distress nontoxic-appearing appropriate affect.    Constitutional: Vital signs are examined and " "stable.  Resp: No signs of labored breathing      LUE: gross motor intact.  Skin warm and dry.                      LLE: -Skin: warm and dry           -MSK: Hip and Knee F/E, EHL/FHL, Gastroc/Tib anterior motor intact.  Tolerates passive range of motion of knee and hip.  No pain with hip flexion or internal rotation.  Mild pain with external rotation of hip with passive range of motion.           -Neuro:  Sensation intact to light touch L3-S1 dermatomes           -Lymphatic: No signs of lymphadenopathy           -CV: Capillary refill is less than 2 seconds. PT pulse palpable. Compartments soft and compressible            Body mass index is 18.42 kg/m².  Ideal body weight: 75.3 kg (166 lb 0.1 oz)  No results found for: "HGBA1C"  Hgb   Date Value Ref Range Status   08/06/2024 9.6 (L) 14.0 - 18.0 g/dL Final   08/02/2024 9.2 (L) 14.0 - 18.0 g/dL Final     Hct   Date Value Ref Range Status   08/06/2024 29.8 (L) 42.0 - 52.0 % Final   08/02/2024 28.1 (L) 42.0 - 52.0 % Final     No results found for: "IRON"  No components found for: "FROLATE"  No results found for: "ODATNFTF73DI"  WBC   Date Value Ref Range Status   08/06/2024 7.63 4.50 - 11.50 x10(3)/mcL Final   08/02/2024 8.14 4.50 - 11.50 x10(3)/mcL Final       Radiology:   3 view x ray left femur: hardware intact to left IT femur fracture. Stable alignment. No collapse as compared to post operative images.         Assessment:         1. Closed intertrochanteric fracture of left femur with routine healing, subsequent encounter  X-Ray Femur 2 View Left      2. Closed fracture of distal end of left radius with routine healing, unspecified fracture morphology, subsequent encounter                Plan:         No follow-ups on file.    Kaleb was seen today for follow-up.    Diagnoses and all orders for this visit:    Closed intertrochanteric fracture of left femur with routine healing, subsequent encounter  -     X-Ray Femur 2 View Left; Future    Closed fracture of distal " end of left radius with routine healing, unspecified fracture morphology, subsequent encounter        -WBAT LUE, ROMAT. WBAT ROMAT LLE. Doing well overall. Will need to continue to work with therapy in his nursing home.   -Continue pain control PRN.   -Follow up with Dr. Alcazar in approx 6 weeks for repeat evaluation of the femur.   -ED precautions given    The above findings, diagnostics, and treatment plan were discussed with Dr. Alcazar who is in agreement with the plan of care except as stated in additional documentation.       Candy Guadarrama PA-C  [facility]          Future Appointments   Date Time Provider Department Center   11/6/2024  8:30 AM Fabricio Alcazar MD [facility] [place]

## 2024-11-06 ENCOUNTER — HOSPITAL ENCOUNTER (OUTPATIENT)
Dept: RADIOLOGY | Facility: CLINIC | Age: 89
Discharge: HOME | End: 2024-11-06
Attending: ORTHOPAEDIC SURGERY
Payer: MEDICARE

## 2024-11-06 ENCOUNTER — OFFICE VISIT (OUTPATIENT)
Dept: ORTHOPEDICS | Facility: CLINIC | Age: 89
End: 2024-11-06
Payer: MEDICARE

## 2024-11-06 VITALS
BODY MASS INDEX: 18.49 KG/M2 | DIASTOLIC BLOOD PRESSURE: 67 MMHG | HEIGHT: 71 IN | HEART RATE: 76 BPM | WEIGHT: 132.06 LBS | SYSTOLIC BLOOD PRESSURE: 103 MMHG

## 2024-11-06 DIAGNOSIS — S52.502D CLOSED FRACTURE OF DISTAL END OF LEFT RADIUS WITH ROUTINE HEALING, UNSPECIFIED FRACTURE MORPHOLOGY, SUBSEQUENT ENCOUNTER: ICD-10-CM

## 2024-11-06 DIAGNOSIS — S72.142D CLOSED INTERTROCHANTERIC FRACTURE OF LEFT FEMUR WITH ROUTINE HEALING, SUBSEQUENT ENCOUNTER: ICD-10-CM

## 2024-11-06 DIAGNOSIS — S72.142D CLOSED INTERTROCHANTERIC FRACTURE OF LEFT FEMUR WITH ROUTINE HEALING, SUBSEQUENT ENCOUNTER: Primary | ICD-10-CM

## 2024-11-06 PROCEDURE — 73110 X-RAY EXAM OF WRIST: CPT | Mod: LT,,, | Performed by: ORTHOPAEDIC SURGERY

## 2024-11-06 PROCEDURE — 73502 X-RAY EXAM HIP UNI 2-3 VIEWS: CPT | Mod: LT,,, | Performed by: ORTHOPAEDIC SURGERY

## 2024-11-06 PROCEDURE — 99213 OFFICE O/P EST LOW 20 MIN: CPT | Mod: ,,, | Performed by: ORTHOPAEDIC SURGERY

## 2024-11-06 NOTE — PROGRESS NOTES
Subjective:       Patient ID: Kaleb Ibarra is a 89 y.o. male.    Chief Complaint   Patient presents with    Follow-up     3 mos, LEFT WRIST HWR.SX 7/31/24, IMN LEFT IT FEMUR FX 7/28/24, presents in wheelchair, states doing good, no other complaints,         Patient is here today for follow-up evaluation status post intramedullary nailing of left intertrochanteric femur fracture.  He is doing well today.  He has no complaints of any pain in the hip.  He is a resident nursing facility.  No family with him today.  He had removal of the hardware from his wrist as well and has no complaints or problems with his wrist.    Follow-up  Pertinent negatives include no abdominal pain, chest pain, chills, congestion, coughing, fever, nausea, neck pain, numbness or vomiting.       Review of Systems   Constitutional: Negative for chills, fever and malaise/fatigue.   HENT:  Negative for congestion and hearing loss.    Eyes:  Negative for visual disturbance.   Cardiovascular:  Negative for chest pain and syncope.   Respiratory:  Negative for cough and shortness of breath.    Hematologic/Lymphatic: Does not bruise/bleed easily.   Skin:  Negative for color change and suspicious lesions.   Musculoskeletal:  Negative for falls and neck pain.   Gastrointestinal:  Negative for abdominal pain, nausea and vomiting.   Genitourinary:  Negative for dysuria and hematuria.   Neurological:  Negative for numbness and sensory change.   Psychiatric/Behavioral:  Negative for altered mental status. The patient is not nervous/anxious.         Current Outpatient Medications on File Prior to Visit   Medication Sig Dispense Refill    acetaminophen (TYLENOL) 325 MG tablet Take 325 mg by mouth every 6 (six) hours as needed for Pain.      apixaban (ELIQUIS) 2.5 mg Tab Take 1 tablet (2.5 mg total) by mouth 2 (two) times daily. 60 tablet 3    apixaban (ELIQUIS) 5 mg Tab Take 5 mg by mouth 2 (two) times daily.      citalopram (CELEXA) 10 MG tablet Take 10  "mg by mouth once daily.      multivitamin Tab Take 1 tablet by mouth once daily. 30 tablet 1    multivitamin with minerals tablet Take 1 tablet by mouth once daily.       No current facility-administered medications on file prior to visit.          Objective:      /67   Pulse 76   Ht 5' 11" (1.803 m)   Wt 59.9 kg (132 lb 0.9 oz)   BMI 18.42 kg/m²   Physical Exam  Constitutional:       General: He is not in acute distress.     Appearance: Normal appearance. He is not ill-appearing.   HENT:      Head: Normocephalic and atraumatic.      Nose: No congestion.   Eyes:      Extraocular Movements: Extraocular movements intact.   Cardiovascular:      Rate and Rhythm: Normal rate and regular rhythm.      Pulses: Normal pulses.   Pulmonary:      Effort: Pulmonary effort is normal.      Breath sounds: Normal breath sounds.   Abdominal:      General: There is no distension.      Palpations: Abdomen is soft.      Tenderness: There is no abdominal tenderness.   Musculoskeletal:      Comments: Left lower extremity:  Surgical incisions are all well healed.  He tolerates circumduction of the hip and range motion of the knee.  He has good range motion actively ankle and digits distally.    Left wrist: Surgical incisions well healed.  Good range motion of the digits.  Good  strength.  Mild stiffness with range motion of the wrist.  2+ radial pulse.   Skin:     General: Skin is warm and dry.   Neurological:      Mental Status: He is alert and oriented to person, place, and time. Mental status is at baseline.   Psychiatric:         Mood and Affect: Mood normal.         Behavior: Behavior normal.         Thought Content: Thought content normal.         Judgment: Judgment normal.        Body mass index is 18.42 kg/m².    Radiology:   AP pelvis and left hip two views:  Hardware intact.  Alignment maintained.  Fracture completely consolidated.      Assessment:         1. Closed intertrochanteric fracture of left femur with " routine healing, subsequent encounter  X-Ray Hip 2 or 3 views Left with Pelvis when performed      2. Closed fracture of distal end of left radius with routine healing, unspecified fracture morphology, subsequent encounter  X-Ray Wrist Complete Left              Plan:       He is doing well his fractures are healed.  He has no discomfort in his neurovascularly intact.  Continue to work with PT on mobilization, weight-bearing as tolerated left upper extremity and left lower extremity.  He is released to follow up as needed from this point forward.  No need for future x-rays future visits unless he has new injuries.  All questions and concerns were addressed today.      This note/OR report was created with the assistance of  voice recognition software or phone  dictation.  There may be transcription errors as a result of using this technology however minimal. Effort has been made to assure accuracy of transcription but any obvious errors or omissions should be clarified with the author of the document.       Fabricio Alcazar MD  Orthopedic Trauma  [facility]      No follow-ups on file.    Closed intertrochanteric fracture of left femur with routine healing, subsequent encounter  -     X-Ray Hip 2 or 3 views Left with Pelvis when performed; Future; Expected date: 11/06/2024    Closed fracture of distal end of left radius with routine healing, unspecified fracture morphology, subsequent encounter  -     X-Ray Wrist Complete Left; Future; Expected date: 11/06/2024              Orders Placed This Encounter   Procedures    X-Ray Hip 2 or 3 views Left with Pelvis when performed     Standing Status:   Future     Number of Occurrences:   1     Standing Expiration Date:   11/5/2025     Order Specific Question:   May the Radiologist modify the order per protocol to meet the clinical needs of the patient?     Answer:   Yes     Order Specific Question:   Release to patient     Answer:   Immediate    X-Ray Wrist  Complete Left     Standing Status:   Future     Number of Occurrences:   1     Standing Expiration Date:   11/5/2025     Order Specific Question:   May the Radiologist modify the order per protocol to meet the clinical needs of the patient?     Answer:   Yes     Order Specific Question:   Release to patient     Answer:   Immediate       No future appointments.

## 2024-11-24 ENCOUNTER — HOSPITAL ENCOUNTER (EMERGENCY)
Facility: HOSPITAL | Age: 89
Discharge: HOME | End: 2024-11-24
Attending: STUDENT IN AN ORGANIZED HEALTH CARE EDUCATION/TRAINING PROGRAM
Payer: MEDICARE

## 2024-11-24 VITALS
HEIGHT: 71 IN | RESPIRATION RATE: 15 BRPM | WEIGHT: 132 LBS | BODY MASS INDEX: 18.48 KG/M2 | TEMPERATURE: 97 F | HEART RATE: 80 BPM | DIASTOLIC BLOOD PRESSURE: 70 MMHG | OXYGEN SATURATION: 95 % | SYSTOLIC BLOOD PRESSURE: 112 MMHG
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
## 2024-11-24 DIAGNOSIS — S40.012A CONTUSION OF LEFT SHOULDER, INITIAL ENCOUNTER: Primary | ICD-10-CM

## 2024-11-24 PROCEDURE — 99284 EMERGENCY DEPT VISIT MOD MDM: CPT | Mod: 25

## 2024-11-24 PROCEDURE — 25000003 PHARM REV CODE 250: Performed by: STUDENT IN AN ORGANIZED HEALTH CARE EDUCATION/TRAINING PROGRAM

## 2024-11-24 RX ORDER — LIDOCAINE 560 MG/1
1 PATCH PERCUTANEOUS; TOPICAL; TRANSDERMAL DAILY
Qty: 15 PATCH | Refills: 0 | Status: SHIPPED | OUTPATIENT
Start: 2024-11-24 | End: 2024-12-09

## 2024-11-24 RX ORDER — METHOCARBAMOL 500 MG/1
500 TABLET, FILM COATED ORAL
Status: COMPLETED | OUTPATIENT
Start: 2024-11-24 | End: 2024-11-24

## 2024-11-24 RX ORDER — METHOCARBAMOL 500 MG/1
500 TABLET, FILM COATED ORAL 2 TIMES DAILY
Qty: 10 TABLET | Refills: 0 | Status: SHIPPED | OUTPATIENT
Start: 2024-11-24 | End: 2024-11-29

## 2024-11-24 RX ORDER — ACETAMINOPHEN 500 MG
1000 TABLET ORAL
Status: COMPLETED | OUTPATIENT
Start: 2024-11-24 | End: 2024-11-24

## 2024-11-24 RX ADMIN — METHOCARBAMOL 500 MG: 500 TABLET ORAL at 04:11

## 2024-11-24 RX ADMIN — ACETAMINOPHEN 1000 MG: 500 TABLET ORAL at 04:11

## 2024-11-24 NOTE — FIRST PROVIDER EVALUATION
Medical screening examination initiated.  I have conducted a focused provider triage encounter, findings are as follows:  Chief Complaint   Patient presents with    Fall     Arrives via [facility] with reports of fall. On blood thinner. -LOC. -head trauma. Baseline GCS 14. C/o left shoulder pain        Brief history of present illness:  89-year-old male presents to ED for evaluation left shoulder pain after fall.  Denies any head injury.  Patient awake sitting on stretcher.    There were no vitals filed for this visit.    Pertinent physical exam:  Patient awake sitting on stretcher    Brief workup plan:  X-ray    Preliminary workup initiated; this workup will be continued and followed by the physician or advanced practice provider that is assigned to the patient when roomed.

## 2024-11-24 NOTE — ED PROVIDER NOTES
Encounter Date: 11/24/2024    SCRIBE #1 NOTE: I, Franky Ponce, am scribing for, and in the presence of,  Murphy Cherry MD. I have scribed the following portions of the note - Other sections scribed: HPI, ROS, PE.       History     Chief Complaint   Patient presents with    Fall     Arrives via [facility] with reports of fall. On blood thinner. -LOC. -head trauma. Baseline GCS 14. C/o left shoulder pain      Pt is a 89 yo male with a PMHx of prostate cancer and dementia presenting to the ED via EMS from a NH for a fall prior to arrival. Per EMS, pt was found on the floor by the nursing facility and complained of pain near his left shoulder joint. EMS further reports the pt denies any LOC or head trauma.     Pt reports that he was sitting on the bed and tried getting off and his arm got hung up on the bed frame as he fell to the floor. Pt denies hitting his head or LOC. Pt only complains of significant left shoulder pain. Pt mentions previous surgery to his left shoulder.     The history is provided by the patient and the EMS personnel. No  was used.     Review of patient's allergies indicates:  No Known Allergies  Past Medical History:   Diagnosis Date    Cancer     prostate    Compression fracture of body of thoracic vertebra     Rhabdomyolysis 02/08/2023    Uses roller walker      Past Surgical History:   Procedure Laterality Date    FRACTURE SURGERY      INTRAMEDULLARY RODDING OF FEMUR Left 07/28/2024    Procedure: INSERTION, INTRAMEDULLARY HANNA, FEMUR, LEFT;  Surgeon: Fabricio Alcazar MD;  Location: [facility] OR;  Service: Orthopedics;  Laterality: Left;  Supine, hana table    synthes long tfna    last case    JOINT REPLACEMENT      OPEN REDUCTION AND INTERNAL FIXATION (ORIF) OF FRACTURE OF DISTAL RADIUS Left 04/18/2024    Procedure: ORIF, FRACTURE, RADIUS, DISTAL;  Surgeon: Kraig Malcolm DO;  Location: [facility] OR;  Service: Orthopedics;  Laterality: Left;  supine hand table c arm skeletal dynamics.     OPEN REDUCTION AND INTERNAL FIXATION (ORIF) OF FRACTURE OF PROXIMAL HUMERUS  04/18/2024    Procedure: ORIF, FRACTURE, HUMERUS, PROXIMAL;  Surgeon: Kraig Malcolm DO;  Location: [facility] OR;  Service: Orthopedics;;    PROSTATE SURGERY N/A     REMOVAL,ORTHOPEDIC HARDWARE,UPPER EXTREMITY Left 07/31/2024    Procedure: REMOVAL,ORTHOPEDIC HARDWARE,UPPER EXTREMITY, EXTENSOR TENDON REPAIR LEFT FOREARM;  Surgeon: Kraig Malcolm DO;  Location: [facility] OR;  Service: Orthopedics;  Laterality: Left;    SPINE SURGERY       Family History   Problem Relation Name Age of Onset    Diabetes Brother David Ibarra      Social History     Tobacco Use    Smoking status: Never     Passive exposure: Never    Smokeless tobacco: Never   Substance Use Topics    Alcohol use: Not Currently     Alcohol/week: 2.0 standard drinks of alcohol     Comment: socially    Drug use: Not Currently     Review of Systems   Constitutional:  Negative for fever.   HENT:  Negative for sore throat.    Eyes:  Negative for visual disturbance.   Respiratory:  Negative for shortness of breath.    Cardiovascular:  Negative for chest pain.   Gastrointestinal:  Negative for abdominal pain.   Genitourinary:  Negative for dysuria.   Musculoskeletal:  Negative for back pain, joint swelling and neck pain.        Shoulder pain   Skin:  Negative for rash and wound.   Neurological:  Negative for syncope and weakness.   Psychiatric/Behavioral:  Negative for confusion.    All other systems reviewed and are negative.      Physical Exam     Initial Vitals [11/24/24 1611]   BP Pulse Resp Temp SpO2   104/65 85 18 97.3 °F (36.3 °C) (!) 94 %      MAP       --         Physical Exam    Nursing note and vitals reviewed.  Constitutional: He appears well-developed and well-nourished. He is not diaphoretic. No distress.   HENT:   Head: Normocephalic.   Contusion to scalp.  No other abrasions, contusions, lacerations to the scalp or face.  No superior inferior orbital ridge tenderness to palpation.   No zygomatic arch tenderness to palpation.  No epistaxis.  No CSF rhinorrhea.  No septal hematoma.  No intraoral injuries noted.  Normal external ear.  No raccoon eyes.  No Prado sign.     Eyes: Conjunctivae and EOM are normal. Pupils are equal, round, and reactive to light.   Neck:   Normal range of motion.  Cardiovascular:  Normal rate, regular rhythm, normal heart sounds and intact distal pulses.           No murmur heard.  Pulmonary/Chest: Breath sounds normal. No respiratory distress. He has no wheezes. He has no rales.   Abdominal: Abdomen is soft. He exhibits no distension. There is no abdominal tenderness.   Musculoskeletal:         General: Tenderness present. No edema.      Cervical back: Normal range of motion.      Comments: Mild tenderness to palpation to left shoulder with decreased ROM Surgical scar on anterior aspect of left proximal arm that is well-healed.     Neurological: He is alert and oriented to person, place, and time. No cranial nerve deficit.   Skin: Skin is warm and dry. Capillary refill takes less than 2 seconds. No rash noted. No erythema.   Psychiatric: He has a normal mood and affect.         ED Course   Procedures  Labs Reviewed - No data to display       Imaging Results              CT Cervical Spine Without Contrast (Final result)  Result time 11/24/24 17:01:47      Final result by Wil Yeh MD (11/24/24 17:01:47)                   Narrative:    EXAMINATION  CT CERVICAL SPINE WITHOUT CONTRAST    CLINICAL HISTORY  fall, on thinners;    TECHNIQUE  Non-contrast helical-acquisition CT images of the cervical spine were obtained and multiplanar reformats accomplished by a CT technologist at a separate workstation, pushed to PACS for physician review.    COMPARISON  28 July 2024    FINDINGS  Images were reviewed in bone, soft tissue, and lung windows.    Exam quality: adequate for evaluation    Degenerative endplate, facet, and disc space changes again noted throughout the  visualized spinal column.  There is no convincing acute cortical displacement, retropulsion, or acute vertebral body compression deformity.  The C1 lateral masses are well aligned on C2.  No traumatic listhesis or other interval change of, alignment identified.  Remote mild T2 superior endplate compression deformity unchanged in comparison.    No prevertebral soft tissue swelling or hematoma is evident.  Regional paraspinal musculature is unremarkable.  Scattered vascular calcification again noted.  Air digestive structures and visualized upper thoracic cavity unremarkable.    Findings of the partially visualized intracranial structures discussed within dedicated head CT report.    IMPRESSION  1. No convincing evidence of acute or new focal cervical spine abnormality.  2. Similar appearance of chronic mild T2 vertebral body compression.  3. No significant interval change of multilevel degenerative alterations.  Additional chronic secondary details discussed above.  ==========    Please note ligament, spinal cord, and/or vascular abnormalities cannot be excluded on the basis of this examination.  Additional imaging recommended if there is elevated clinical concern for high-grade soft tissue injury.    RADIATION DOSE  Automated tube current modulation, weight-based exposure dosing, and/or iterative reconstruction technique utilized to reach lowest reasonably achievable exposure rate.    DLP: 1145 mGy*cm      Electronically signed by: Wil Yeh  Date:    11/24/2024  Time:    17:01                                     CT Head Without Contrast (Final result)  Result time 11/24/24 16:59:52      Final result by Loc Hernandez MD (11/24/24 16:59:52)                   Impression:      No acute intracranial findings.      Electronically signed by: Loc Hernandez  Date:    11/24/2024  Time:    16:59               Narrative:    EXAMINATION:  CT HEAD WITHOUT CONTRAST    CLINICAL HISTORY:  Fall, on thinners;    TECHNIQUE:  CT  imaging of the head performed from the skull base to the vertex without intravenous contrast.  DLP 1145 mGycm. Automatic exposure control, adjustment of mA/kV or iterative reconstruction technique was used to reduce radiation.    COMPARISON:  28 July 2024    FINDINGS:  There is no acute cortical infarct, hemorrhage or mass lesion.  No new parenchymal attenuation abnormality.  Ventricular size is stable.  There are vascular calcifications.    Visualized paranasal sinuses and mastoid air cells are clear. The calvarium is grossly intact.                                       X-Ray Shoulder Trauma Left (Final result)  Result time 11/24/24 16:37:20      Final result by Wil Yeh MD (11/24/24 16:37:20)                   Narrative:    EXAMINATION  XR SHOULDER TRAUMA 3 VIEW LEFT    CLINICAL HISTORY  Unspecified fall, initial encounter    TECHNIQUE  A total of 3 images submitted of the left shoulder.    COMPARISON  22 July 2024    FINDINGS  Regional degenerative changes and proximal humerus internal fixation hardware similar in comparison.  No convincing acutely displaced fracture or dislocation is identified. There are no findings indicative of a joint effusion. No aggressive osseous lesion, periarticular erosion, or periosteal reaction is appreciated.    The included soft tissues are without acute abnormality.    IMPRESSION  1. No convincing acute radiographic abnormality.  2. Chronic secondary details discussed above.      Electronically signed by: Wil Yeh  Date:    11/24/2024  Time:    16:37                                     Medications   acetaminophen tablet 1,000 mg (1,000 mg Oral Given 11/24/24 1658)   methocarbamoL tablet 500 mg (500 mg Oral Given 11/24/24 1657)     Medical Decision Making  Judging by the patient's chief complaint and pertinent history, the patient has the following possible differential diagnoses, including but not limited to the following.  Some of these are deemed to be lower  likelihood and some more likely based on my physical exam and history combined with possible lab work and/or imaging studies.   Please see the pertinent studies, and refer to the HPI.  Some of these diagnoses will take further evaluation to fully rule out, perhaps as an outpatient and the patient was encouraged to follow up when discharged for more comprehensive evaluation.      abrasion, contusion, fracture, traumatic ICH, TBI, concussion, spinal injury, fracture,     Patient 89-year-old male presents to emergency department for left shoulder pain.  See HPI.  See physical exam.  Fell out of bed unwitnessed.  Imaging obtained CTA of the head and neck without any acute abnormalities.  X-ray showed without evidence of fracture.  Likely contusion.  Continue supportive care.Discussed all results with patient and family including incidental findings.  Discussed need for follow up with primary care provider.  Discussed return precautions.  Vital signs stable.  Hemodynamically stable for continued outpatient management with strict return precautions. Patient and family verbalized understanding and agreed to plan.           Problems Addressed:  Contusion of left shoulder, initial encounter: acute illness or injury that poses a threat to life or bodily functions  Fall: acute illness or injury that poses a threat to life or bodily functions    Amount and/or Complexity of Data Reviewed  Independent Historian: EMS     Details: Per EMS, pt was found on the floor by the nursing facility and complained of pain near his left shoulder joint. EMS further reports the pt denies any LOC or head trauma.  Labs: ordered.  Radiology: ordered.    Risk  OTC drugs.  Prescription drug management.  Parenteral controlled substances.  Decision regarding hospitalization.            Scribe Attestation:   Scribe #1: I performed the above scribed service and the documentation accurately describes the services I performed. I attest to the accuracy of  the note.    Attending Attestation:           Physician Attestation for Scribe:  Physician Attestation Statement for Scribe #1: I, Murphy Cherry MD, reviewed documentation, as scribed by Franky Ponce in my presence, and it is both accurate and complete.                                    Clinical Impression:  Final diagnoses:  [W19.XXXA] Fall  [S40.012A] Contusion of left shoulder, initial encounter (Primary)          ED Disposition Condition    Discharge Stable          ED Prescriptions       Medication Sig Dispense Start Date End Date Auth. Provider    methocarbamoL (ROBAXIN) 500 MG Tab () Take 1 tablet (500 mg total) by mouth 2 (two) times a day. for 5 days 10 tablet 2024 Murphy Cherry MD    LIDOcaine 4 % PtMd () Apply 1 patch topically once daily. for 15 days 15 patch 2024 Murphy Cherry MD          Follow-up Information       Follow up With Specialties Details Why Contact Info    Jossue Martinez MD Internal Medicine   [facility] [id number] [phone]      Primary Care  Call in 1 day  Please call [phone] for a primary care provider.    Kraig Malcolm DO Orthopedic Surgery   [address] [id number] [phone]      Cem Gaines Jr., MD Orthopedic Surgery   [address] [id number] [phone]      Fabricio Alcazar MD Orthopedic Surgery   [address] [place] [id number] [phone]      Marito Javier MD Orthopedic Surgery   [address] [id number] [phone]               Murphy Cherry MD  24 [phone]

## 2024-11-25 NOTE — DISCHARGE INSTRUCTIONS
You may take Tylenol as needed for pain.  You may apply lidocaine patch to left shoulder.  If your pain is unrelieved you may take Robaxin.      Follow-up with orthopedic surgery.  If you continued to have pain you may require MRI.  Follow-up with orthopedic surgery.    Return to the emergency department if any new or worsening symptoms, chest pain, shortness of breath, nausea, vomiting, or any other concerns.

## 2024-12-23 ENCOUNTER — LAB REQUISITION (OUTPATIENT)
Dept: LAB | Facility: HOSPITAL | Age: 89
End: 2024-12-23
Payer: MEDICARE

## 2024-12-23 DIAGNOSIS — R41.0 DISORIENTATION, UNSPECIFIED: ICD-10-CM

## 2024-12-23 DIAGNOSIS — Z87.440 PERSONAL HISTORY OF URINARY (TRACT) INFECTIONS: ICD-10-CM

## 2024-12-23 LAB
BILIRUB UR QL STRIP.AUTO: NEGATIVE
CLARITY UR: CLEAR
COLOR UR AUTO: YELLOW
GLUCOSE UR QL STRIP: NEGATIVE
HGB UR QL STRIP: NEGATIVE
KETONES UR QL STRIP: NEGATIVE
LEUKOCYTE ESTERASE UR QL STRIP: NEGATIVE
NITRITE UR QL STRIP: NEGATIVE
PH UR STRIP: 6 [PH]
PROT UR QL STRIP: NEGATIVE
SP GR UR STRIP.AUTO: 1.02 (ref 1–1.03)
UROBILINOGEN UR STRIP-ACNC: 4

## 2024-12-23 PROCEDURE — 81003 URINALYSIS AUTO W/O SCOPE: CPT | Performed by: NURSE PRACTITIONER

## 2024-12-23 PROCEDURE — 87086 URINE CULTURE/COLONY COUNT: CPT | Performed by: NURSE PRACTITIONER

## 2024-12-26 LAB — BACTERIA UR CULT: NO GROWTH

## 2025-01-02 ENCOUNTER — HOSPITAL ENCOUNTER (INPATIENT)
Facility: HOSPITAL | Age: OVER 89
LOS: 6 days | Discharge: HOSPICE | DRG: 564 | End: 2025-01-08
Attending: STUDENT IN AN ORGANIZED HEALTH CARE EDUCATION/TRAINING PROGRAM | Admitting: SURGERY
Payer: MEDICARE

## 2025-01-02 DIAGNOSIS — S36.113A LIVER LACERATION: ICD-10-CM

## 2025-01-02 DIAGNOSIS — I49.5 SICK SINUS SYNDROME: ICD-10-CM

## 2025-01-02 DIAGNOSIS — R00.1 BRADYCARDIA: ICD-10-CM

## 2025-01-02 DIAGNOSIS — R58 BLEEDING: ICD-10-CM

## 2025-01-02 DIAGNOSIS — T81.718A PSEUDOANEURYSM FOLLOWING PROCEDURE: ICD-10-CM

## 2025-01-02 DIAGNOSIS — R94.31 EKG ABNORMALITIES: ICD-10-CM

## 2025-01-02 DIAGNOSIS — S42.002A CLOSED DISPLACED FRACTURE OF LEFT CLAVICLE, UNSPECIFIED PART OF CLAVICLE, INITIAL ENCOUNTER: ICD-10-CM

## 2025-01-02 DIAGNOSIS — I25.10 CAD (CORONARY ARTERY DISEASE): ICD-10-CM

## 2025-01-02 DIAGNOSIS — S42.102A CLOSED FRACTURE OF LEFT SCAPULA, UNSPECIFIED PART OF SCAPULA, INITIAL ENCOUNTER: ICD-10-CM
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
## 2025-01-02 DIAGNOSIS — I72.9 PSEUDOANEURYSM FOLLOWING PROCEDURE: ICD-10-CM

## 2025-01-02 LAB
ABORH RETYPE: NORMAL
ALBUMIN SERPL-MCNC: 2.7 G/DL (ref 3.4–4.8)
ALBUMIN/GLOB SERPL: 0.6 RATIO (ref 1.1–2)
ALP SERPL-CCNC: 121 UNIT/L (ref 40–150)
ALT SERPL-CCNC: 12 UNIT/L (ref 0–55)
AMPHET UR QL SCN: NEGATIVE
ANION GAP SERPL CALC-SCNC: 10 MEQ/L
APTT PPP: 31.2 SECONDS (ref 23.2–33.7)
AST SERPL-CCNC: 25 UNIT/L (ref 5–34)
BACTERIA #/AREA URNS AUTO: ABNORMAL /HPF
BARBITURATE SCN PRESENT UR: NEGATIVE
BASOPHILS # BLD AUTO: 0.03 X10(3)/MCL
BASOPHILS NFR BLD AUTO: 0.3 %
BENZODIAZ UR QL SCN: NEGATIVE
BILIRUB SERPL-MCNC: 0.2 MG/DL
BILIRUB UR QL STRIP.AUTO: NEGATIVE
BUN SERPL-MCNC: 16.7 MG/DL (ref 8.4–25.7)
CALCIUM SERPL-MCNC: 8.8 MG/DL (ref 8.8–10)
CANNABINOIDS UR QL SCN: NEGATIVE
CHLORIDE SERPL-SCNC: 103 MMOL/L (ref 98–107)
CLARITY UR: CLEAR
CO2 SERPL-SCNC: 25 MMOL/L (ref 23–31)
COCAINE UR QL SCN: NEGATIVE
COLOR UR AUTO: ABNORMAL
CREAT SERPL-MCNC: 0.78 MG/DL (ref 0.72–1.25)
CREAT/UREA NIT SERPL: 21
EOSINOPHIL # BLD AUTO: 0.05 X10(3)/MCL (ref 0–0.9)
EOSINOPHIL NFR BLD AUTO: 0.5 %
ERYTHROCYTE [DISTWIDTH] IN BLOOD BY AUTOMATED COUNT: 14.3 % (ref 11.5–17)
ETHANOL SERPL-MCNC: <10 MG/DL
FENTANYL UR QL SCN: POSITIVE
GFR SERPLBLD CREATININE-BSD FMLA CKD-EPI: >60 ML/MIN/1.73/M2
GLOBULIN SER-MCNC: 4.7 GM/DL (ref 2.4–3.5)
GLUCOSE SERPL-MCNC: 129 MG/DL (ref 82–115)
GLUCOSE UR QL STRIP: NORMAL
GROUP & RH: NORMAL
HCT VFR BLD AUTO: 38.5 % (ref 42–52)
HGB BLD-MCNC: 13 G/DL (ref 14–18)
HGB UR QL STRIP: NEGATIVE
IMM GRANULOCYTES # BLD AUTO: 0.08 X10(3)/MCL (ref 0–0.04)
IMM GRANULOCYTES NFR BLD AUTO: 0.8 %
INDIRECT COOMBS: NORMAL
INR PPP: 1.2
KETONES UR QL STRIP: NEGATIVE
LACTATE SERPL-SCNC: 2.3 MMOL/L (ref 0.5–2.2)
LACTATE SERPL-SCNC: 2.6 MMOL/L (ref 0.5–2.2)
LACTATE SERPL-SCNC: 3.6 MMOL/L (ref 0.5–2.2)
LEUKOCYTE ESTERASE UR QL STRIP: NEGATIVE
LYMPHOCYTES # BLD AUTO: 0.73 X10(3)/MCL (ref 0.6–4.6)
LYMPHOCYTES NFR BLD AUTO: 7.2 %
MCH RBC QN AUTO: 32.2 PG (ref 27–31)
MCHC RBC AUTO-ENTMCNC: 33.8 G/DL (ref 33–36)
MCV RBC AUTO: 95.3 FL (ref 80–94)
MDMA UR QL SCN: NEGATIVE
MONOCYTES # BLD AUTO: 0.89 X10(3)/MCL (ref 0.1–1.3)
MONOCYTES NFR BLD AUTO: 8.7 %
NEUTROPHILS # BLD AUTO: 8.41 X10(3)/MCL (ref 2.1–9.2)
NEUTROPHILS NFR BLD AUTO: 82.5 %
NITRITE UR QL STRIP: NEGATIVE
NRBC BLD AUTO-RTO: 0 %
OPIATES UR QL SCN: NEGATIVE
PCP UR QL: NEGATIVE
PH UR STRIP: 5.5 [PH]
PH UR: 5.5 [PH] (ref 3–11)
PLATELET # BLD AUTO: 149 X10(3)/MCL (ref 130–400)
PMV BLD AUTO: 11.3 FL (ref 7.4–10.4)
POTASSIUM SERPL-SCNC: 4.6 MMOL/L (ref 3.5–5.1)
PROT SERPL-MCNC: 7.4 GM/DL (ref 5.8–7.6)
PROT UR QL STRIP: NEGATIVE
PROTHROMBIN TIME: 15.1 SECONDS (ref 12.5–14.5)
RBC # BLD AUTO: 4.04 X10(6)/MCL
RBC #/AREA URNS AUTO: ABNORMAL /HPF
SODIUM SERPL-SCNC: 138 MMOL/L (ref 136–145)
SP GR UR STRIP.AUTO: >1.05 (ref 1–1.03)
SPECIFIC GRAVITY, URINE AUTO (.000) (OHS): >1.05 (ref 1–1.03)
SPECIMEN OUTDATE: NORMAL
SQUAMOUS #/AREA URNS LPF: ABNORMAL /HPF
UROBILINOGEN UR STRIP-ACNC: NORMAL
WBC # BLD AUTO: 10.19 X10(3)/MCL (ref 4.5–11.5)
WBC #/AREA URNS AUTO: ABNORMAL /HPF

## 2025-01-02 PROCEDURE — 85025 COMPLETE CBC W/AUTO DIFF WBC: CPT | Performed by: STUDENT IN AN ORGANIZED HEALTH CARE EDUCATION/TRAINING PROGRAM

## 2025-01-02 PROCEDURE — 63600175 PHARM REV CODE 636 W HCPCS: Performed by: SPECIALIST

## 2025-01-02 PROCEDURE — 96375 TX/PRO/DX INJ NEW DRUG ADDON: CPT

## 2025-01-02 PROCEDURE — G0390 TRAUMA RESPONS W/HOSP CRITI: HCPCS | Performed by: STUDENT IN AN ORGANIZED HEALTH CARE EDUCATION/TRAINING PROGRAM

## 2025-01-02 PROCEDURE — C1887 CATHETER, GUIDING: HCPCS | Performed by: SPECIALIST

## 2025-01-02 PROCEDURE — C1760 CLOSURE DEV, VASC: HCPCS | Performed by: SPECIALIST

## 2025-01-02 PROCEDURE — 83605 ASSAY OF LACTIC ACID: CPT | Performed by: STUDENT IN AN ORGANIZED HEALTH CARE EDUCATION/TRAINING PROGRAM

## 2025-01-02 PROCEDURE — 80053 COMPREHEN METABOLIC PANEL: CPT | Performed by: STUDENT IN AN ORGANIZED HEALTH CARE EDUCATION/TRAINING PROGRAM

## 2025-01-02 PROCEDURE — 96374 THER/PROPH/DIAG INJ IV PUSH: CPT

## 2025-01-02 PROCEDURE — 99291 CRITICAL CARE FIRST HOUR: CPT

## 2025-01-02 PROCEDURE — 25000003 PHARM REV CODE 250: Performed by: STUDENT IN AN ORGANIZED HEALTH CARE EDUCATION/TRAINING PROGRAM

## 2025-01-02 PROCEDURE — 90715 TDAP VACCINE 7 YRS/> IM: CPT | Performed by: STUDENT IN AN ORGANIZED HEALTH CARE EDUCATION/TRAINING PROGRAM

## 2025-01-02 PROCEDURE — C1894 INTRO/SHEATH, NON-LASER: HCPCS | Performed by: SPECIALIST

## 2025-01-02 PROCEDURE — 90471 IMMUNIZATION ADMIN: CPT | Performed by: STUDENT IN AN ORGANIZED HEALTH CARE EDUCATION/TRAINING PROGRAM

## 2025-01-02 PROCEDURE — 83605 ASSAY OF LACTIC ACID: CPT

## 2025-01-02 PROCEDURE — 63600175 PHARM REV CODE 636 W HCPCS: Performed by: STUDENT IN AN ORGANIZED HEALTH CARE EDUCATION/TRAINING PROGRAM

## 2025-01-02 PROCEDURE — 63600175 PHARM REV CODE 636 W HCPCS

## 2025-01-02 PROCEDURE — 99152 MOD SED SAME PHYS/QHP 5/>YRS: CPT | Performed by: SPECIALIST

## 2025-01-02 PROCEDURE — B41BYZZ FLUOROSCOPY OF OTHER INTRA-ABDOMINAL ARTERIES USING OTHER CONTRAST: ICD-10-PCS | Performed by: SPECIALIST

## 2025-01-02 PROCEDURE — 86923 COMPATIBILITY TEST ELECTRIC: CPT | Performed by: PHYSICIAN ASSISTANT

## 2025-01-02 PROCEDURE — 36247 INS CATH ABD/L-EXT ART 3RD: CPT | Performed by: SPECIALIST

## 2025-01-02 PROCEDURE — 85610 PROTHROMBIN TIME: CPT | Performed by: STUDENT IN AN ORGANIZED HEALTH CARE EDUCATION/TRAINING PROGRAM

## 2025-01-02 PROCEDURE — 80307 DRUG TEST PRSMV CHEM ANLYZR: CPT | Performed by: STUDENT IN AN ORGANIZED HEALTH CARE EDUCATION/TRAINING PROGRAM

## 2025-01-02 PROCEDURE — 99223 1ST HOSP IP/OBS HIGH 75: CPT | Mod: GC,,, | Performed by: STUDENT IN AN ORGANIZED HEALTH CARE EDUCATION/TRAINING PROGRAM

## 2025-01-02 PROCEDURE — 25500020 PHARM REV CODE 255: Performed by: SPECIALIST

## 2025-01-02 PROCEDURE — 25000003 PHARM REV CODE 250: Performed by: SURGERY

## 2025-01-02 PROCEDURE — 25500020 PHARM REV CODE 255: Performed by: STUDENT IN AN ORGANIZED HEALTH CARE EDUCATION/TRAINING PROGRAM

## 2025-01-02 PROCEDURE — 82077 ASSAY SPEC XCP UR&BREATH IA: CPT | Performed by: STUDENT IN AN ORGANIZED HEALTH CARE EDUCATION/TRAINING PROGRAM

## 2025-01-02 PROCEDURE — 86900 BLOOD TYPING SEROLOGIC ABO: CPT | Performed by: STUDENT IN AN ORGANIZED HEALTH CARE EDUCATION/TRAINING PROGRAM

## 2025-01-02 PROCEDURE — C1769 GUIDE WIRE: HCPCS | Performed by: SPECIALIST

## 2025-01-02 PROCEDURE — 20000000 HC ICU ROOM

## 2025-01-02 PROCEDURE — 85730 THROMBOPLASTIN TIME PARTIAL: CPT | Performed by: STUDENT IN AN ORGANIZED HEALTH CARE EDUCATION/TRAINING PROGRAM

## 2025-01-02 PROCEDURE — 96361 HYDRATE IV INFUSION ADD-ON: CPT

## 2025-01-02 PROCEDURE — 27000221 HC OXYGEN, UP TO 24 HOURS

## 2025-01-02 PROCEDURE — 36247 INS CATH ABD/L-EXT ART 3RD: CPT | Mod: LT,,, | Performed by: SPECIALIST

## 2025-01-02 PROCEDURE — 99153 MOD SED SAME PHYS/QHP EA: CPT | Performed by: SPECIALIST

## 2025-01-02 PROCEDURE — 75726 ARTERY X-RAYS ABDOMEN: CPT | Mod: 26,,, | Performed by: SPECIALIST

## 2025-01-02 PROCEDURE — 75726 ARTERY X-RAYS ABDOMEN: CPT | Performed by: SPECIALIST

## 2025-01-02 PROCEDURE — 36415 COLL VENOUS BLD VENIPUNCTURE: CPT

## 2025-01-02 PROCEDURE — 99223 1ST HOSP IP/OBS HIGH 75: CPT | Mod: FS,25,, | Performed by: SPECIALIST

## 2025-01-02 PROCEDURE — 63600175 PHARM REV CODE 636 W HCPCS: Performed by: NURSE PRACTITIONER

## 2025-01-02 PROCEDURE — 3E0234Z INTRODUCTION OF SERUM, TOXOID AND VACCINE INTO MUSCLE, PERCUTANEOUS APPROACH: ICD-10-PCS | Performed by: SPECIALIST

## 2025-01-02 PROCEDURE — 81001 URINALYSIS AUTO W/SCOPE: CPT | Performed by: STUDENT IN AN ORGANIZED HEALTH CARE EDUCATION/TRAINING PROGRAM

## 2025-01-02 RX ORDER — IOPAMIDOL 755 MG/ML
INJECTION, SOLUTION INTRAVASCULAR
Status: DISCONTINUED | OUTPATIENT
Start: 2025-01-02 | End: 2025-01-02 | Stop reason: HOSPADM

## 2025-01-02 RX ORDER — CEFAZOLIN SODIUM 1 G/3ML
1 INJECTION, POWDER, FOR SOLUTION INTRAMUSCULAR; INTRAVENOUS
Status: COMPLETED | OUTPATIENT
Start: 2025-01-02 | End: 2025-01-02

## 2025-01-02 RX ORDER — LOPERAMIDE HCL 2 MG
2 TABLET ORAL EVERY 6 HOURS PRN
COMMUNITY

## 2025-01-02 RX ORDER — CEFAZOLIN SODIUM 1 G/3ML
INJECTION, POWDER, FOR SOLUTION INTRAMUSCULAR; INTRAVENOUS
Status: COMPLETED
Start: 2025-01-02 | End: 2025-01-02

## 2025-01-02 RX ORDER — OXYCODONE HYDROCHLORIDE 10 MG/1
10 TABLET ORAL EVERY 6 HOURS PRN
Status: DISCONTINUED | OUTPATIENT
Start: 2025-01-02 | End: 2025-01-07

## 2025-01-02 RX ORDER — ACETAMINOPHEN 325 MG/1
650 TABLET ORAL EVERY 8 HOURS PRN
Status: DISCONTINUED | OUTPATIENT
Start: 2025-01-02 | End: 2025-01-07

## 2025-01-02 RX ORDER — SODIUM CHLORIDE, SODIUM LACTATE, POTASSIUM CHLORIDE, CALCIUM CHLORIDE 600; 310; 30; 20 MG/100ML; MG/100ML; MG/100ML; MG/100ML
INJECTION, SOLUTION INTRAVENOUS CONTINUOUS
Status: DISCONTINUED | OUTPATIENT
Start: 2025-01-02 | End: 2025-01-08 | Stop reason: HOSPADM

## 2025-01-02 RX ORDER — OXYCODONE HYDROCHLORIDE 5 MG/1
5 TABLET ORAL EVERY 6 HOURS PRN
Status: DISCONTINUED | OUTPATIENT
Start: 2025-01-02 | End: 2025-01-07

## 2025-01-02 RX ORDER — ACETAMINOPHEN 325 MG/1
650 TABLET ORAL EVERY 6 HOURS
Status: DISCONTINUED | OUTPATIENT
Start: 2025-01-02 | End: 2025-01-03

## 2025-01-02 RX ORDER — CITALOPRAM 10 MG/1
10 TABLET ORAL DAILY
COMMUNITY
End: 2025-01-10 | Stop reason: HOSPADM

## 2025-01-02 RX ORDER — PROTAMINE SULFATE 10 MG/ML
INJECTION, SOLUTION INTRAVENOUS
Status: DISCONTINUED | OUTPATIENT
Start: 2025-01-02 | End: 2025-01-02 | Stop reason: HOSPADM

## 2025-01-02 RX ORDER — TALC
6 POWDER (GRAM) TOPICAL NIGHTLY PRN
Status: DISCONTINUED | OUTPATIENT
Start: 2025-01-02 | End: 2025-01-07

## 2025-01-02 RX ORDER — MORPHINE SULFATE 4 MG/ML
2 INJECTION, SOLUTION INTRAMUSCULAR; INTRAVENOUS EVERY 4 HOURS PRN
Status: DISCONTINUED | OUTPATIENT
Start: 2025-01-02 | End: 2025-01-07

## 2025-01-02 RX ORDER — METHOCARBAMOL 100 MG/ML
1000 INJECTION, SOLUTION INTRAMUSCULAR; INTRAVENOUS EVERY 8 HOURS
Status: DISPENSED | OUTPATIENT
Start: 2025-01-02 | End: 2025-01-05

## 2025-01-02 RX ORDER — DEXTROMETHORPHAN HYDROBROMIDE, GUAIFENESIN 5; 100 MG/5ML; MG/5ML
650 LIQUID ORAL EVERY 8 HOURS PRN
COMMUNITY
End: 2025-01-10 | Stop reason: HOSPADM

## 2025-01-02 RX ORDER — SODIUM CHLORIDE 0.9 % (FLUSH) 0.9 %
10 SYRINGE (ML) INJECTION
Status: DISCONTINUED | OUTPATIENT
Start: 2025-01-02 | End: 2025-01-08 | Stop reason: HOSPADM

## 2025-01-02 RX ORDER — FENTANYL CITRATE 50 UG/ML
50 INJECTION, SOLUTION INTRAMUSCULAR; INTRAVENOUS
Status: COMPLETED | OUTPATIENT
Start: 2025-01-02 | End: 2025-01-02

## 2025-01-02 RX ORDER — HEPARIN SODIUM 1000 [USP'U]/ML
INJECTION, SOLUTION INTRAVENOUS; SUBCUTANEOUS
Status: DISCONTINUED | OUTPATIENT
Start: 2025-01-02 | End: 2025-01-02 | Stop reason: HOSPADM

## 2025-01-02 RX ORDER — HYDRALAZINE HYDROCHLORIDE 20 MG/ML
INJECTION INTRAMUSCULAR; INTRAVENOUS
Status: DISCONTINUED | OUTPATIENT
Start: 2025-01-02 | End: 2025-01-02 | Stop reason: HOSPADM

## 2025-01-02 RX ORDER — PREDNISOLONE/MOXIFLO/NEPAFENAC 1-0.5-0.1%
1 SUSPENSION, DROPS(FINAL DOSAGE FORM)(ML) OPHTHALMIC (EYE) 4 TIMES DAILY
COMMUNITY

## 2025-01-02 RX ORDER — ONDANSETRON 4 MG/1
4 TABLET, FILM COATED ORAL EVERY 8 HOURS PRN
COMMUNITY

## 2025-01-02 RX ORDER — METHOCARBAMOL 500 MG/1
500 TABLET, FILM COATED ORAL 4 TIMES DAILY
Status: DISCONTINUED | OUTPATIENT
Start: 2025-01-02 | End: 2025-01-02

## 2025-01-02 RX ORDER — ONDANSETRON 4 MG/1
8 TABLET, ORALLY DISINTEGRATING ORAL EVERY 8 HOURS PRN
Status: DISCONTINUED | OUTPATIENT
Start: 2025-01-02 | End: 2025-01-08 | Stop reason: HOSPADM

## 2025-01-02 RX ORDER — LIDOCAINE HYDROCHLORIDE 10 MG/ML
1 INJECTION, SOLUTION EPIDURAL; INFILTRATION; INTRACAUDAL; PERINEURAL ONCE AS NEEDED
Status: DISCONTINUED | OUTPATIENT
Start: 2025-01-02 | End: 2025-01-08 | Stop reason: HOSPADM

## 2025-01-02 RX ORDER — MUPIROCIN 20 MG/G
OINTMENT TOPICAL 2 TIMES DAILY
Status: DISPENSED | OUTPATIENT
Start: 2025-01-02 | End: 2025-01-07

## 2025-01-02 RX ORDER — FENTANYL CITRATE 50 UG/ML
INJECTION, SOLUTION INTRAMUSCULAR; INTRAVENOUS
Status: DISCONTINUED | OUTPATIENT
Start: 2025-01-02 | End: 2025-01-02 | Stop reason: HOSPADM

## 2025-01-02 RX ORDER — SODIUM CHLORIDE 9 MG/ML
1000 INJECTION, SOLUTION INTRAVENOUS
Status: COMPLETED | OUTPATIENT
Start: 2025-01-02 | End: 2025-01-02

## 2025-01-02 RX ADMIN — CLOSTRIDIUM TETANI TOXOID ANTIGEN (FORMALDEHYDE INACTIVATED), CORYNEBACTERIUM DIPHTHERIAE TOXOID ANTIGEN (FORMALDEHYDE INACTIVATED), BORDETELLA PERTUSSIS TOXOID ANTIGEN (GLUTARALDEHYDE INACTIVATED), BORDETELLA PERTUSSIS FILAMENTOUS HEMAGGLUTININ ANTIGEN (FORMALDEHYDE INACTIVATED), BORDETELLA PERTUSSIS PERTACTIN ANTIGEN, AND BORDETELLA PERTUSSIS FIMBRIAE 2/3 ANTIGEN 0.5 ML: 5; 2; 2.5; 5; 3; 5 INJECTION, SUSPENSION INTRAMUSCULAR at 03:01

## 2025-01-02 RX ADMIN — SODIUM CHLORIDE 1000 ML: 9 INJECTION, SOLUTION INTRAVENOUS at 04:01

## 2025-01-02 RX ADMIN — IOHEXOL 100 ML: 350 INJECTION, SOLUTION INTRAVENOUS at 03:01

## 2025-01-02 RX ADMIN — METHOCARBAMOL 1000 MG: 100 INJECTION INTRAMUSCULAR; INTRAVENOUS at 10:01

## 2025-01-02 RX ADMIN — FENTANYL CITRATE 50 MCG: 50 INJECTION, SOLUTION INTRAMUSCULAR; INTRAVENOUS at 02:01

## 2025-01-02 RX ADMIN — SODIUM CHLORIDE, POTASSIUM CHLORIDE, SODIUM LACTATE AND CALCIUM CHLORIDE: 600; 310; 30; 20 INJECTION, SOLUTION INTRAVENOUS at 09:01

## 2025-01-02 RX ADMIN — CEFAZOLIN 1000 MG: 330 INJECTION, POWDER, FOR SOLUTION INTRAMUSCULAR; INTRAVENOUS at 02:01

## 2025-01-02 RX ADMIN — CEFAZOLIN SODIUM 1000 MG: 1 INJECTION, POWDER, FOR SOLUTION INTRAMUSCULAR; INTRAVENOUS at 02:01

## 2025-01-02 RX ADMIN — MUPIROCIN: 20 OINTMENT TOPICAL at 10:01

## 2025-01-02 NOTE — PROGRESS NOTES
Pt has nondisplaced left disatl clavicle fracture and left scapula fracture. NWB ROMAT LUE Sling for comfort    F/u 2 weeks    Gold

## 2025-01-02 NOTE — Clinical Note
An angiography was performed of the proximal, mid and distal suprarenal abdominal aorta  via power injection.

## 2025-01-02 NOTE — CONSULTS
"   Trauma Surgery   Activation Note    Patient Name: Abraham Sanchez  MRN: [id number]   YOB: 1960  Date: 01/02/2025    LEVEL 2 TRAUMA     Subjective:   History of present illness: Patient is an approximately 65 year old male who presents following fall on thinners with head strike. Patient is currently on Eliquis, last dose 3 days ago, holding for upcoming cataract surgery. Patient reporting upper back pain. No other complaints reported.     Primary Survey:  A patent   B Bialteral breath sounds   C 2+ distal pulses   D GCS 15(E 4, V 5, M 6)    E exposed, log-rolled and examined (see below)   F See below     VITAL SIGNS: 24 HR MIN & MAX LAST   No data recorded      BP  Min: 110/89  Max: 110/89  110/89    Pulse  Min: 87  Max: 87  87    Resp  Min: 18  Max: 18  18    SpO2  Min: 97 %  Max: 97 %  97 %      HT: 5' 8" (172.7 cm)  WT: 68 kg (150 lb)  BMI: 22.8     FAST: deferred    Medications/transfusions received en-route:   Medications/transfusions received in trauma bay:     Scheduled Meds:    ceFAZolin  1 g Intravenous ED 1 Time    fentaNYL  50 mcg Intravenous ED 1 Time    Tdap  0.5 mL Intramuscular ED 1 Time      Continuous Infusions:    PRN Meds:      ROS: 12 point ROS negative except as stated in HPI    Allergies: Unknown  PMH: Unknown  PSH: Unknown  Social history: Unknown  Objective:   Secondary Survey:   General: Well developed, well nourished, no acute distress, AAOx3  Neuro: CNII-XII grossly intact  HEENT:  Normocephalic, PERRL, cervical collar in place. Left forehead hematoma with abrasion. Left posterior scalp laceration, abrasion.   CV:  RRR  Pulse: 2+ RP b/l, 2+ DP b/l   Resp/chest:  Non-labored breathing, satting on room air  GI:  Abdomen soft, non-tender, non-distended  :  deferred.   Rectal: deferred.  Extremities: Moves all 4 spontaneously and purposefully, no obvious gross deformities.  Back/Spine: Midline t-spine tenderness. No palpable step offs or deformities.  Skin/wounds:  " "Warm, well perfused. Abrasion to left hand.   Psych: Normal mood and affect.    Labs:  Troponin:  No results for input(s): "TROPONINI" in the last 72 hours.  CBC:  No results for input(s): "WBC", "RBC", "HGB", "HCT", "PLT", "MCV", "MCH", "MCHC" in the last 72 hours.  CMP:  No results for input(s): "GLU", "CALCIUM", "ALBUMIN", "PROT", "NA", "K", "CO2", "CL", "BUN", "CREATININE", "ALKPHOS", "ALT", "AST", "BILITOT" in the last 72 hours.  Lactic Acid:  No results for input(s): "LACTATE" in the last 72 hours.  ETOH:  No results for input(s): "ETHANOL" in the last 72 hours.   Urine Drug Screen:  No results for input(s): "COCAINE", "OPIATE", "BARBITURATE", "AMPHETAMINE", "FENTANYL", "CANNABINOIDS", "MDMA" in the last 72 hours.    Invalid input(s): "BENZODIAZEPINE", "PHENCYCLIDINE"   ABG:  No results for input(s): "PH", "PO2", "PCO2", "HCO3", "BE" in the last 168 hours.   I have reviewed all pertinent lab results within the past 24 hours.    Imaging:  Imaging Results    None        I have reviewed all pertinent imaging results/findings within the past 24 hours.    Assessment & Plan:   Patient is an approximately 65 year old male who presents following fall on thinners with head strike.     Small liver laceration with active extravasation, G4  - Interventional vascular consult  - Plan for angio, possible embolization  - Q4H H&H  - Q1H vitals    Left 8th rib fracture  - Pain control  - Frequent IS, flutter valve    Left clavicle fracture  Left scapula fracture  - Orthopedics consult     Fall  - Consults: Interventional vascular, Orthopedics  - Pain: MMPC  - Bowel regimen: Scheduled, PRN   - Anti-emetics: PRN  - Diet: NPO for now  - VTE ppx: SCDs   - Hold Lovenox given injury  - ID: -  - Labs: q4H H&H, daily labs  - PT/OT: -  - Respiratory: Frequent IS, flutter valve  - Dispo: Admit to STEFAN Goldman MD  General Surgery PGY-1  [facility] "

## 2025-01-02 NOTE — Clinical Note
An angiography was performed of the proximal, mid and distal hepatic artery via power injection.  Including celiac arteries

## 2025-01-02 NOTE — CONSULTS
Vascular Surgery - Consult Note  Conner Nava PA-C    SUBJECTIVE:     Reason for Consult: Liver laceration with extravasation    History of Present Illness: Abraham Sanchez is a 65 y.o. adult male with PAF on Eliquis and dementia who presented to [facility] ED as a level 2 trauma following a fall on blood thinners with head strike.  Patient was admitted to the trauma team and a CTA was ordered, noting a grade 4 liver laceration with active extravasation.  He was also found to a left 8th rib fracture, left clavicle fracture, and left scapula fracture.  Patient is a resident of a nursing home.    Review of patient's allergies indicates:  No Known Allergies    Past Medical History:   Diagnosis Date    Dementia without behavioral disturbance     Dysphagia     Paroxysmal atrial fibrillation     Unspecified cataract      History reviewed. No pertinent surgical history.  No family history on file.  Social History     Tobacco Use    Smoking status: Never    Smokeless tobacco: Never   Substance Use Topics    Drug use: Never        Prior to Admission medications    Medication Sig Start Date End Date Taking? Authorizing Provider   acetaminophen (TYLENOL) 650 MG TbSR Take 650 mg by mouth every 8 (eight) hours as needed (Pain).    Provider, Historical   apixaban (ELIQUIS) 2.5 mg Tab Take by mouth 2 (two) times daily.    Provider, Historical   citalopram (CELEXA) 10 MG tablet Take 10 mg by mouth once daily.    Provider, Historical   loperamide (IMODIUM A-D) 2 mg Tab Take 2 mg by mouth every 6 (six) hours as needed (Diarrhea).    Provider, Historical   multivitamin with minerals tablet Take 1 tablet by mouth once daily.    Provider, Historical   ondansetron (ZOFRAN) 4 MG tablet Take 4 mg by mouth every 8 (eight) hours as needed for Nausea.    Provider, Historical   prednisoLONE-moxiflo-nepafenac 1-0.5-0.1 % DrpS Place 1 drop into both eyes 4 (four) times daily.    Provider, Historical       Review of Systems:  Review of  Systems   Unable to perform ROS: Dementia       OBJECTIVE:     Vital Signs (Most Recent):  Temp: 97.7 °F (36.5 °C) (01/02/25 1528)  Pulse: 74 (01/02/25 1700)  Resp: 17 (01/02/25 1700)  BP: 138/80 (01/02/25 1700)  SpO2: 95 % (01/02/25 1700)    Admission: Weight: 68 kg (150 lb) (01/02/25 1445)   Most Recent: Weight: 68 kg (150 lb) (01/02/25 1445)    Physical Exam:  Vascular Physical Exam  Vitals and nursing note reviewed.   Constitutional:       General: [place] is not in acute distress.     Interventions: Cervical collar in place.   HENT:      Head: Normocephalic. No right periorbital erythema or left periorbital erythema.        Nose: Nose normal.   Cardiovascular:      Rate and Rhythm: Normal rate and regular rhythm.      Pulses:           Dorsalis pedis pulses are 2+ on the right side and 2+ on the left side.     Pulmonary:      Effort: Pulmonary effort is normal.   Abdominal:      General: There is no distension.      Palpations: Abdomen is soft.      Tenderness: There is no abdominal tenderness. There is no guarding or rebound.   Musculoskeletal:      Right lower leg: No edema.      Left lower leg: No edema.      Comments: Left upper extremity in a sling with limited range of motion   Lymphadenopathy:      Cervical: No cervical adenopathy.   Skin:     Capillary Refill: Capillary refill takes less than 2 seconds.      Right Leg: Skin is warm, dry and intact.      Left Leg: Skin is warm, dry and intact.      Comments: Multiple skin avulsions and abrasions noted to the hands   Neurological:      General: No focal deficit present.      Mental Status: [place] is alert.      Cranial Nerves: No cranial nerve deficit.   Psychiatric:         Mood and Affect: Mood normal.        Diagnostic Results:  CT Chest Abdomen Pelvis With IV Contrast (XPD) NO Oral Contrast  FINDINGS:  The lungs are adequately aerated.  No pneumothorax is seen.  No pulmonary contusion is seen.  No pleural effusion is seen.  No infiltrate is seen.      The thoracic aorta is normal in caliber.  No dissection or aneurysm is seen.  No retrosternal hematoma is seen.     The abdominal aorta appears grossly unremarkable.  No dissection or posttraumatic changes are seen.     The heart appears normal.     There is perihepatic  blood seen along the inferior margin of the liver.  There is a blush of contrast seen in segment 6 of the liver on images number 98 through 102.  Findings are consistent with possible active bleed..  Due to the active blush of contrast displaces the injury and a grade 4 liver laceration.  The area of hemorrhage seems to be from a branch of the right portal vein.     There are multiple gallstones in the gallbladder...     The spleen appears normal.  No splenic laceration is seen.  The pancreas appears grossly unremarkable.  No pancreatic mass or lesion is seen.  No inflammation is seen.     No adrenal abnormality is seen.  No adrenal nodule is seen.     The kidneys are well perfused.  No hydronephrosis is seen.  No hydroureter is seen.  No retroperitoneal hematoma is seen.     Visualized portions of the bowel shows no acute abnormality.  No colitis is seen.  No diverticulitis is seen.  No colonic mass is seen.     No free air is seen.  No free fluid is seen.     Urinary bladder appears unremarkable.     There is an acute appearing fracture of the very distal left clavicle.  It is nondisplaced.  It is best seen on coronal image 49 and axial image 11 series 9.  There is a subacute appearing fracture of the head of the clavicle on the left side.  There is a comminuted fracture of the scapula on the left side.  There is a fracture of the left 8th rib posteriorly.  No sternal fracture is seen.  No thoracic spine fracture is seen.  No lumbar spine fracture is seen.  There is old pelvic fracture of the iliac wing     Impression:     Liver laceration along the inferior margin of the liver with its perihepatic blood seen and what appears to be an area of  active extravasation of contrast along the inferior margin of the liver.  This would place this as a grade 4 injury due to active hemorrhage although the laceration size is small.     Acute appearing fracture of the left distal clavicle     Comminuted fracture of the body of the scapula on the left side     Left 8th rib fracture posteriorly     Cholelithiasis    ASSESSMENT/PLAN:     Patient is approximately 65-year-old male from the nursing home following a fall on blood thinners with a head strike.  Imaging noting a grade 4 liver laceration with active extravasation.  We will proceed to cath lab for angiography with possible coil embolization.  I did speak to the daughter, Sarah Ibarra, on the phone and she does give verbal consent. Will proceed with intervention.    The above findings, diagnostics, and treatment plan were discussed with Dr. Clark who is in agreement with the plan of care except as stated in additional documentation.      Thank you for your consult. Please feel free to reach me with any questions.    Conner Nava PA-C  [facility]  [phone]

## 2025-01-02 NOTE — H&P
"   Critical Care Surgery   H&P    Patient Name: Abraham Sanchez  MRN: [id number]   YOB: 1960  Date: 01/02/2025    LEVEL 2 TRAUMA     Subjective:   History of present illness: Patient is an approximately 65 year old male who presents following fall on thinners with head strike. Patient is currently on Eliquis, last dose 3 days ago, holding for upcoming cataract surgery. Patient reporting upper back pain. No other complaints reported.     Primary Survey:  A patent   B Bialteral breath sounds   C 2+ distal pulses   D GCS 15(E 4, V 5, M 6)    E exposed, log-rolled and examined (see below)   F See below     VITAL SIGNS: 24 HR MIN & MAX LAST   No data recorded      BP  Min: 110/89  Max: 110/89  110/89    Pulse  Min: 87  Max: 87  87    Resp  Min: 18  Max: 18  18    SpO2  Min: 97 %  Max: 97 %  97 %      HT: 5' 8" (172.7 cm)  WT: 68 kg (150 lb)  BMI: 22.8     FAST: deferred    Medications/transfusions received en-route:   Medications/transfusions received in trauma bay:     Scheduled Meds:    ceFAZolin  1 g Intravenous ED 1 Time    fentaNYL  50 mcg Intravenous ED 1 Time    Tdap  0.5 mL Intramuscular ED 1 Time      Continuous Infusions:    PRN Meds:      ROS: 12 point ROS negative except as stated in HPI    Allergies: Unknown  PMH: Unknown  PSH: Unknown  Social history: Unknown  Objective:   Secondary Survey:   General: Well developed, well nourished, no acute distress, AAOx3  Neuro: CNII-XII grossly intact  HEENT:  Normocephalic, PERRL, cervical collar in place. Left forehead hematoma with abrasion. Left posterior scalp laceration, abrasion.   CV:  RRR  Pulse: 2+ RP b/l, 2+ DP b/l   Resp/chest:  Non-labored breathing, satting on room air  GI:  Abdomen soft, non-tender, non-distended  :  deferred.   Rectal: deferred.  Extremities: Moves all 4 spontaneously and purposefully, no obvious gross deformities.  Back/Spine: Midline t-spine tenderness. No palpable step offs or deformities.  Skin/wounds:  Warm, " "well perfused. Abrasion to left hand.   Psych: Normal mood and affect.    Labs:  Troponin:  No results for input(s): "TROPONINI" in the last 72 hours.  CBC:  No results for input(s): "WBC", "RBC", "HGB", "HCT", "PLT", "MCV", "MCH", "MCHC" in the last 72 hours.  CMP:  No results for input(s): "GLU", "CALCIUM", "ALBUMIN", "PROT", "NA", "K", "CO2", "CL", "BUN", "CREATININE", "ALKPHOS", "ALT", "AST", "BILITOT" in the last 72 hours.  Lactic Acid:  No results for input(s): "LACTATE" in the last 72 hours.  ETOH:  No results for input(s): "ETHANOL" in the last 72 hours.   Urine Drug Screen:  No results for input(s): "COCAINE", "OPIATE", "BARBITURATE", "AMPHETAMINE", "FENTANYL", "CANNABINOIDS", "MDMA" in the last 72 hours.    Invalid input(s): "BENZODIAZEPINE", "PHENCYCLIDINE"   ABG:  No results for input(s): "PH", "PO2", "PCO2", "HCO3", "BE" in the last 168 hours.   I have reviewed all pertinent lab results within the past 24 hours.    Imaging:  Imaging Results    None        I have reviewed all pertinent imaging results/findings within the past 24 hours.    Assessment & Plan:   Patient is an approximately 65 year old male who presents following fall on thinners with head strike.     Small liver laceration with active extravasation, G4  - Interventional vascular consult  - Plan for angio, possible embolization  - Q4H H&H  - Q1H vitals    Left 8th rib fracture  - Pain control  - Frequent IS, flutter valve    Left clavicle fracture  Left scapula fracture  - Orthopedics consult   - NWB LUE; ROMAT  - Sling    Elevated lactic acid  - mIVF  - Trend until normalized     Fall  - Consults: Interventional vascular, Orthopedics  - Pain: MMPC  - Bowel regimen: Scheduled, PRN   - Anti-emetics: PRN  - Diet: NPO for now  - VTE ppx: SCDs   - Hold Lovenox given injury  - ID: -  - Labs: q4H H&H, daily labs  - PT/OT: -  - Respiratory: Frequent IS, flutter valve  - Dispo: Admit to STEFAN Goldman MD  General Surgery PGY-1  [facility] " Ismael Melgoza

## 2025-01-03 LAB
ALBUMIN SERPL-MCNC: 2.4 G/DL (ref 3.4–4.8)
ALBUMIN SERPL-MCNC: 2.8 G/DL (ref 3.4–4.8)
ALBUMIN/GLOB SERPL: 0.6 RATIO (ref 1.1–2)
ALBUMIN/GLOB SERPL: 0.7 RATIO (ref 1.1–2)
ALP SERPL-CCNC: 104 UNIT/L (ref 40–150)
ALP SERPL-CCNC: 86 UNIT/L (ref 40–150)
ALT SERPL-CCNC: 10 UNIT/L (ref 0–55)
ALT SERPL-CCNC: 11 UNIT/L (ref 0–55)
ANION GAP SERPL CALC-SCNC: 10 MEQ/L
ANION GAP SERPL CALC-SCNC: 8 MEQ/L
AST SERPL-CCNC: 16 UNIT/L (ref 5–34)
AST SERPL-CCNC: 17 UNIT/L (ref 5–34)
BILIRUB SERPL-MCNC: 0.4 MG/DL
BILIRUB SERPL-MCNC: 0.4 MG/DL
BUN SERPL-MCNC: 10.7 MG/DL (ref 8.4–25.7)
BUN SERPL-MCNC: 13.8 MG/DL (ref 8.4–25.7)
CALCIUM SERPL-MCNC: 8.3 MG/DL (ref 8.8–10)
CALCIUM SERPL-MCNC: 8.7 MG/DL (ref 8.8–10)
CHLORIDE SERPL-SCNC: 105 MMOL/L (ref 98–111)
CHLORIDE SERPL-SCNC: 108 MMOL/L (ref 98–111)
CO2 SERPL-SCNC: 21 MMOL/L (ref 23–31)
CO2 SERPL-SCNC: 23 MMOL/L (ref 23–31)
CREAT SERPL-MCNC: 0.65 MG/DL (ref 0.72–1.25)
CREAT SERPL-MCNC: 0.74 MG/DL (ref 0.72–1.25)
CREAT/UREA NIT SERPL: 16
CREAT/UREA NIT SERPL: 19
ERYTHROCYTE [DISTWIDTH] IN BLOOD BY AUTOMATED COUNT: 14.4 % (ref 11.5–17)
GFR SERPLBLD CREATININE-BSD FMLA CKD-EPI: >60 ML/MIN/1.73/M2
GFR SERPLBLD CREATININE-BSD FMLA CKD-EPI: >60 ML/MIN/1.73/M2
GLOBULIN SER-MCNC: 4.1 GM/DL (ref 2.4–3.5)
GLOBULIN SER-MCNC: 4.3 GM/DL (ref 2.4–3.5)
GLUCOSE SERPL-MCNC: 111 MG/DL (ref 75–121)
GLUCOSE SERPL-MCNC: 142 MG/DL (ref 75–121)
HCT VFR BLD AUTO: 27.2 % (ref 42–52)
HCT VFR BLD AUTO: 37.1 % (ref 42–52)
HCT VFR BLD AUTO: 38 % (ref 42–52)
HGB BLD-MCNC: 12.4 G/DL (ref 14–18)
HGB BLD-MCNC: 12.6 G/DL (ref 14–18)
HGB BLD-MCNC: 9 G/DL (ref 14–18)
LACTATE SERPL-SCNC: 1.3 MMOL/L (ref 0.5–2.2)
LACTATE SERPL-SCNC: 2.9 MMOL/L (ref 0.5–2.2)
LACTATE SERPL-SCNC: 3.6 MMOL/L (ref 0.5–2.2)
LACTATE SERPL-SCNC: 4.2 MMOL/L (ref 0.5–2.2)
LACTATE SERPL-SCNC: 6.8 MMOL/L (ref 0.5–2.2)
LACTATE SERPL-SCNC: 8 MMOL/L (ref 0.5–2.2)
MCH RBC QN AUTO: 31.5 PG (ref 27–31)
MCHC RBC AUTO-ENTMCNC: 33.2 G/DL (ref 33–36)
MCV RBC AUTO: 95 FL (ref 80–94)
NRBC BLD AUTO-RTO: 0 %
PLATELET # BLD AUTO: 150 X10(3)/MCL (ref 130–400)
PMV BLD AUTO: 10.6 FL (ref 7.4–10.4)
POTASSIUM SERPL-SCNC: 3.9 MMOL/L (ref 3.5–5.1)
POTASSIUM SERPL-SCNC: 4 MMOL/L (ref 3.5–5.1)
PROT SERPL-MCNC: 6.5 GM/DL (ref 5.8–7.6)
PROT SERPL-MCNC: 7.1 GM/DL (ref 5.8–7.6)
RBC # BLD AUTO: 4 X10(6)/MCL
SODIUM SERPL-SCNC: 137 MMOL/L (ref 136–145)
SODIUM SERPL-SCNC: 138 MMOL/L (ref 136–145)
WBC # BLD AUTO: 11.85 X10(3)/MCL (ref 4.5–11.5)

## 2025-01-03 PROCEDURE — 25000003 PHARM REV CODE 250: Performed by: SURGERY

## 2025-01-03 PROCEDURE — 94640 AIRWAY INHALATION TREATMENT: CPT

## 2025-01-03 PROCEDURE — 94761 N-INVAS EAR/PLS OXIMETRY MLT: CPT

## 2025-01-03 PROCEDURE — 63600175 PHARM REV CODE 636 W HCPCS

## 2025-01-03 PROCEDURE — 83605 ASSAY OF LACTIC ACID: CPT | Performed by: SURGERY

## 2025-01-03 PROCEDURE — 25000003 PHARM REV CODE 250

## 2025-01-03 PROCEDURE — 94799 UNLISTED PULMONARY SVC/PX: CPT

## 2025-01-03 PROCEDURE — 85014 HEMATOCRIT: CPT

## 2025-01-03 PROCEDURE — 36415 COLL VENOUS BLD VENIPUNCTURE: CPT | Performed by: SURGERY

## 2025-01-03 PROCEDURE — 99900035 HC TECH TIME PER 15 MIN (STAT)

## 2025-01-03 PROCEDURE — 83605 ASSAY OF LACTIC ACID: CPT

## 2025-01-03 PROCEDURE — 99900031 HC PATIENT EDUCATION (STAT)

## 2025-01-03 PROCEDURE — 80053 COMPREHEN METABOLIC PANEL: CPT | Performed by: SURGERY

## 2025-01-03 PROCEDURE — 93010 ELECTROCARDIOGRAM REPORT: CPT | Mod: ,,, | Performed by: INTERNAL MEDICINE

## 2025-01-03 PROCEDURE — 85027 COMPLETE CBC AUTOMATED: CPT

## 2025-01-03 PROCEDURE — 92610 EVALUATE SWALLOWING FUNCTION: CPT

## 2025-01-03 PROCEDURE — 93005 ELECTROCARDIOGRAM TRACING: CPT

## 2025-01-03 PROCEDURE — 94760 N-INVAS EAR/PLS OXIMETRY 1: CPT

## 2025-01-03 PROCEDURE — 99233 SBSQ HOSP IP/OBS HIGH 50: CPT | Mod: ,,, | Performed by: SURGERY

## 2025-01-03 PROCEDURE — 27000221 HC OXYGEN, UP TO 24 HOURS

## 2025-01-03 PROCEDURE — 25000242 PHARM REV CODE 250 ALT 637 W/ HCPCS: Performed by: NURSE PRACTITIONER

## 2025-01-03 PROCEDURE — 63600175 PHARM REV CODE 636 W HCPCS: Performed by: NURSE PRACTITIONER

## 2025-01-03 PROCEDURE — 36415 COLL VENOUS BLD VENIPUNCTURE: CPT

## 2025-01-03 PROCEDURE — 11000001 HC ACUTE MED/SURG PRIVATE ROOM

## 2025-01-03 PROCEDURE — 80053 COMPREHEN METABOLIC PANEL: CPT

## 2025-01-03 RX ORDER — CITALOPRAM 10 MG/1
10 TABLET ORAL DAILY
Status: DISCONTINUED | OUTPATIENT
Start: 2025-01-03 | End: 2025-01-07

## 2025-01-03 RX ORDER — IPRATROPIUM BROMIDE AND ALBUTEROL SULFATE 2.5; .5 MG/3ML; MG/3ML
3 SOLUTION RESPIRATORY (INHALATION) EVERY 4 HOURS
Status: DISCONTINUED | OUTPATIENT
Start: 2025-01-03 | End: 2025-01-08 | Stop reason: HOSPADM

## 2025-01-03 RX ORDER — METOPROLOL TARTRATE 1 MG/ML
5 INJECTION, SOLUTION INTRAVENOUS EVERY 5 MIN PRN
Status: COMPLETED | OUTPATIENT
Start: 2025-01-03 | End: 2025-01-08

## 2025-01-03 RX ADMIN — IPRATROPIUM BROMIDE AND ALBUTEROL SULFATE 3 ML: 2.5; .5 SOLUTION RESPIRATORY (INHALATION) at 04:01

## 2025-01-03 RX ADMIN — IPRATROPIUM BROMIDE AND ALBUTEROL SULFATE 3 ML: 2.5; .5 SOLUTION RESPIRATORY (INHALATION) at 12:01

## 2025-01-03 RX ADMIN — MUPIROCIN: 20 OINTMENT TOPICAL at 09:01

## 2025-01-03 RX ADMIN — METHOCARBAMOL 1000 MG: 100 INJECTION INTRAMUSCULAR; INTRAVENOUS at 05:01

## 2025-01-03 RX ADMIN — IPRATROPIUM BROMIDE AND ALBUTEROL SULFATE 3 ML: 2.5; .5 SOLUTION RESPIRATORY (INHALATION) at 05:01

## 2025-01-03 RX ADMIN — SODIUM CHLORIDE, POTASSIUM CHLORIDE, SODIUM LACTATE AND CALCIUM CHLORIDE: 600; 310; 30; 20 INJECTION, SOLUTION INTRAVENOUS at 05:01

## 2025-01-03 RX ADMIN — SODIUM CHLORIDE, POTASSIUM CHLORIDE, SODIUM LACTATE AND CALCIUM CHLORIDE: 600; 310; 30; 20 INJECTION, SOLUTION INTRAVENOUS at 09:01

## 2025-01-03 RX ADMIN — METHOCARBAMOL 1000 MG: 100 INJECTION INTRAMUSCULAR; INTRAVENOUS at 01:01

## 2025-01-03 RX ADMIN — SODIUM CHLORIDE, POTASSIUM CHLORIDE, SODIUM LACTATE AND CALCIUM CHLORIDE 500 ML: 600; 310; 30; 20 INJECTION, SOLUTION INTRAVENOUS at 06:01

## 2025-01-03 RX ADMIN — CITALOPRAM HYDROBROMIDE 10 MG: 10 TABLET ORAL at 11:01

## 2025-01-03 RX ADMIN — IPRATROPIUM BROMIDE AND ALBUTEROL SULFATE 3 ML: 2.5; .5 SOLUTION RESPIRATORY (INHALATION) at 08:01

## 2025-01-03 RX ADMIN — ACETAMINOPHEN 650 MG: 325 TABLET, FILM COATED ORAL at 11:01

## 2025-01-03 RX ADMIN — MORPHINE SULFATE 2 MG: 4 INJECTION, SOLUTION INTRAMUSCULAR; INTRAVENOUS at 03:01

## 2025-01-03 RX ADMIN — METOPROLOL TARTRATE 5 MG: 1 INJECTION, SOLUTION INTRAVENOUS at 06:01

## 2025-01-03 RX ADMIN — SODIUM CHLORIDE 1000 ML: 9 INJECTION, SOLUTION INTRAVENOUS at 01:01

## 2025-01-03 RX ADMIN — METHOCARBAMOL 1000 MG: 100 INJECTION INTRAMUSCULAR; INTRAVENOUS at 09:01

## 2025-01-03 NOTE — PT/OT/SLP EVAL
"[facility]  Speech Language Pathology Department  Clinical Swallow Evaluation    Patient Name:  Kaleb Ibarra   MRN:  [id number]    Recommendations     General recommendations:  SLP follow up with nursing x1 regarding diet tolerance  Solid texture recommendation:  Puree Diet - IDDSI Level 4  Liquid consistency recommendation: Mildly thick liquids - IDDSI Level 2   Medications: crushed in puree  Swallow strategies/precautions: small bites/sips, slow rate, NO straws, supervision with meals, and assist with feeding as needed  Precautions: aspiration    History     Kaleb Ibarra is a/n 89 y.o. adult NH resident admitted to the trauma ICU following a fall while on blood thinners.  Pt seen by this department in April 2024 at which time a MBS study was completed and Minced & Moist solids with mildly thick liquids were recommended.  Images were reviewed.    Past Medical History:   Diagnosis Date    Dementia without behavioral disturbance     Dysphagia     Paroxysmal atrial fibrillation     Unspecified cataract      Home diet texture/consistency:  "mechanical soft with chopped meats" and thin liquids  Current method of nutrition: PO diet (Minced & Moist solids and thin liquids)    Imaging   Results for orders placed during the hospital encounter of 01/02/25    X-Ray Chest 1 View    Narrative  EXAMINATION:  XR CHEST 1 VIEW    CPT 84187    CLINICAL HISTORY:  increasing O2 requirements;    COMPARISON:  January 2, 2025    FINDINGS:  Examination reveals mediastinal silhouette to be within normal limits cardiac silhouette is not enlarged some linear densities identified in the left retrocardiac region likely representing subsegmental atelectatic changes.    Lung fields are otherwise clear and free of gross infiltrates other atelectatic changes or effusions    Impression  Atelectatic changes in the left retrocardiac region.    Otherwise no active pulmonary disease and no significant change as compared with " the previous exam      Electronically signed by: Gokul Turner  Date:    01/03/2025  Time:    07:54    Subjective     Patient awake, alert, and confused.    Patient goals: to go home   Spiritual/Cultural/Yazdanism Beliefs/Practices that affect care: no    Pain/Comfort: Pain Rating 1: 0/10    Respiratory Status:  room air    Restraints/positioning devices: none    Objective     ORAL MUSCULATURE  Dentition: own teeth  Secretion Management: adequate  Mucosal Quality: good  Facial Movement: general weakness  Buccal Strength & Mobility: WFL  Mandibular Strength & Mobility: WFL  Oral Labial Strength & Mobility: WFL  Lingual Strength & Mobility: WFL  Vocal Quality: adequate    PO TRIALS  Consistency Fed By Oral Symptoms Pharyngeal Symptoms   Mildly thick liquid by spoon SLP None None   Mildly thick liquid by cup Self with assistance None None   Puree SLP None None   Ice chips SLP None None   Thin liquid by spoon SLP None Wet vocal quality after swallow   Thin liquid by cup Self with assistance None Wet vocal quality after swallow  Throat clear after swallow   Pt refused trials of liquids via straw    Assessment     Oropharyngeal dysphagia requiring diet modification to improve swallow safety and efficiency.    Outcome Measures     Functional Oral Intake Scale: 5 - Total oral diet with multiple consistencies, by requiring special preparation or compensations    Goals     Multidisciplinary Problems       SLP Goals       Not on file                  Education     No learner present/available.    Plan     SLP Follow-Up:  Yes   Plan of Care reviewed with:  patient     Time Tracking     SLP Treatment Date:   01/03/25  Speech Start Time:  1350  Speech Stop Time:  1405     Speech Total Time (min):  15 min    Billable minutes:  Swallow and Oral Function Evaluation, 15 minutes     01/03/2025

## 2025-01-03 NOTE — PLAN OF CARE
Frail elderly lives alone [facility] w 4-5 steps no rails. No help at home. Has a cousin Iraj Ibarra who lives 4 miles from him and another cousin who he can't remember the name right now.   Uses a rollator  Drives   No PCP states he goes the the ED in [place] if he gets sick  Pt fell at home has left clavicle and scapula fx. NWB LUE  Pt has no services at home  Discussed skilled and or long term care. Pt would like to eventually return home but is agreeable to seeing if we can get a skilled bed at a nursing home. He would like the [place] area followed by [facility] as his locations of preference. We discussed swing bed at [place] and their average length of stays. We will try for nursing home campus as I anticipate he may need support longer which pt agrees is perhaps the case.   Pt has medicare and  for life.   We discuss if pt had resources to cover 24/7 at home, he does not. Pt did not refer me to any family member  PASSR completed and given to Eric WAGGONER with request to find skilled with the above preferences.     1216 Pt resides at [facility]. This confirmed by Jessica.   Following sent to Eric WAGGONER:he is from [facility] I confirmd with Jessica. can you fax info to them including the MAR Today  fx  and also to fx [phone]. I told them he will likely dc over weekend back to nursing home level care. They are to calling the ambulance to get him into will call status as they dont have transport over weekend

## 2025-01-03 NOTE — PLAN OF CARE
Message left for Dahlia Pereira at [facility] to call me back. I am wanting confirmation that they did place pt into will call status w [facility] in anticipation of dc over weekend.

## 2025-01-03 NOTE — OP NOTE
[facility] - Cath Lab Services  General Surgery  Operative Note    SUMMARY     Date of Procedure: 1/2/2025     Procedure: Celiac artery angiography    Surgeons and Role:     * Barbie Clark MD - Primary    Assisting Surgeon: None    Pre-Operative Diagnosis: Bleeding [R58]    Post-Operative Diagnosis: Post-Op Diagnosis Codes:     * Bleeding [R58]    Anesthesia: RN IV Sedation    Operative Findings (including complications, if any):  Normal hepatic arterial tree without evidence of extravasation.  After our angiography we are able access his through chart (not the trauma chart).  This did reveal that the abnormality in the liver was apparent on a CT scan 2 years ago.  It appears that the finding diagnosed is a grade 4 liver laceration by initial radiologist interpretation is likely a chronic finding and not a true new traumatic injury.  We did have development of a sizable hematoma to the right groin at completion despite use of a closure device.     Description of Technical Procedures: Mr. Ibarra was taken to the fluoroscopy suite and placed in a supine position.  His bilateral groins were prepped and draped in the usual sterile fashion.  He did have some confusion consistent with his chronic history of dementia it did at times have to control his movements.  B-mode ultrasound was utilized to identify the left common femoral artery and 1% lidocaine was injected in the subcutaneous tissues.  A mini stick needle was utilized to cannulate the artery and a mini stick wire and sheath were placed we then exchanged for a 5 Burkinan sheath.  We then utilized a Glidewire and a David catheter.  We positioned our catheter at the T12-L1 level and performed angiography.  We did have some trouble visualizing the celiac artery takeoff well but we are eventually able to generate imaging and cannulate the vessel.  We are not able to send a Nitrex wire our Glidewire down in a manner in which we could develop a  stable system.  Account of this we exchanged for a steerable sheath in engage the celiac artery with this.  We then utilized a Glidewire and Glide catheter to select the hepatic artery.  We initially took a picture in the common hepatic artery and then advanced further into the right hepatic artery.  Subsequent angiography demonstrated good filling of the entirety of the right lobe of the liver with no evidence of extravasation or vascular abnormality.  We did take pictures at different obliquities and again saw no abnormalities.    We were able to acquire his date of birth and access his true chart (as opposed to the Trauma chart).  We reviewed an old CT scan from 2022 showing evidence of the same contrast filling abnormality inferior portion of the right lobe of the liver.  The current CT scan showed no evidence of significant liver fracture or hematoma.  Has a appear that the abnormality that was identified as an grade 4 liver laceration is likely just chronic abnormality.    We exchanged for a 7 Sinhala sheath and then utilized a Mynx closure device to the right groin.  While initially appeared to have good closure, he soon developed a large right groin hematoma.  We held prolonged pressure and gave a small amount of protamine.  He remained hemodynamically stable.  Bedside ultrasound did not show any large extravasation or pseudoaneurysm.  I did confer with the trauma team and we will have him observed in the trauma unit overnight.  He did have a palpable dorsalis pedis pulse at completion.    Significant Surgical Tasks Conducted by the Assistant(s), if Applicable: none    Estimated Blood Loss (EBL): minimal           Implants: none    Specimens:   Specimen (24h ago, onward)      None                    Condition: Good    Disposition: PACU - hemodynamically stable.    Attestation: I was present and scrubbed for the entire procedure.

## 2025-01-03 NOTE — PROGRESS NOTES
[facility] [address]  Vascular Surgery  Progress Note    Patient Name: Kaleb Ibarra  MRN: [id number]  Admission Date: 1/2/2025  Primary Care Provider: Pretty, Primary Doctor    Subjective:     Interval History: Doing well this morning;  Denies any right groin or right leg pain;     Post-Op Info:  Procedure(s) (LRB):  AORTOGRAM, ABDOMINAL (N/A)   1 Day Post-Op      Medications:  Continuous Infusions:   lactated ringers   Intravenous Continuous 100 mL/hr at 01/03/25 0534 Rate Verify at 01/03/25 0534     Scheduled Meds:   acetaminophen  650 mg Oral Q6H    albuterol-ipratropium  3 mL Nebulization Q4H    lactated ringers  500 mL Intravenous Once    methocarbamol injection  1,000 mg Intravenous Q8H    mupirocin   Nasal BID     PRN Meds:  Current Facility-Administered Medications:     acetaminophen, 650 mg, Oral, Q8H PRN    LIDOcaine (PF) 10 mg/ml (1%), 1 mL, Intradermal, Once PRN    melatonin, 6 mg, Oral, Nightly PRN    morphine, 2 mg, Intravenous, Q4H PRN    ondansetron, 8 mg, Oral, Q8H PRN    oxyCODONE, 5 mg, Oral, Q6H PRN    oxyCODONE, 10 mg, Oral, Q6H PRN    sodium chloride 0.9%, 10 mL, Intravenous, PRN     Objective:     Vital Signs (Most Recent):  Temp: 98.1 °F (36.7 °C) (01/03/25 0400)  Pulse: 97 (01/03/25 0600)  Resp: 15 (01/03/25 0600)  BP: 127/69 (01/03/25 0600)  SpO2: (!) 93 % (01/03/25 0600) Vital Signs (24h Range):  Temp:  [97.7 °F (36.5 °C)-98.5 °F (36.9 °C)] 98.1 °F (36.7 °C)  Pulse:  [71-97] 97  Resp:  [10-33] 15  SpO2:  [88 %-99 %] 93 %  BP: ()/(59-89) 127/69         Physical Exam    In trauma ICU  Alert, pleasant and appears comfortable  Breathing easily  Right groin appropriate - soft - no palpable masses  Right foot warm with palpable DP pulses    Significant Labs:  BMP:   Recent Labs   Lab 01/03/25  0424      K 3.9      CO2 23   BUN 13.8   CREATININE 0.74   CALCIUM 8.7*     CBC:   Recent Labs   Lab 01/03/25 0424   WBC 11.85*   RBC 4.00   HGB 12.6*   HCT 38.0*   PLT  150   MCV 95.0*   MCH 31.5*   MCHC 33.2       Significant Diagnostics:      Assessment/Plan:     POD#1 s/p fall and s/p mesenteric angiography for suspected liver hemorrhage:   Will right groin ultrasound to rule of pseudoaneurysm  No further intervention from a vascular standpoint            Barbie Clark MD  Vascular Surgery  [facility] - [facility] [address] [facility]

## 2025-01-04 LAB
ABO + RH BLD: NORMAL
ABO + RH BLD: NORMAL
ALBUMIN SERPL-MCNC: 2.3 G/DL (ref 3.4–4.8)
ALBUMIN/GLOB SERPL: 0.6 RATIO (ref 1.1–2)
ALP SERPL-CCNC: 81 UNIT/L (ref 40–150)
ALT SERPL-CCNC: 9 UNIT/L (ref 0–55)
ANION GAP SERPL CALC-SCNC: 7 MEQ/L
AST SERPL-CCNC: 22 UNIT/L (ref 5–34)
BILIRUB SERPL-MCNC: 0.5 MG/DL
BLD PROD TYP BPU: NORMAL
BLD PROD TYP BPU: NORMAL
BLOOD UNIT EXPIRATION DATE: NORMAL
BLOOD UNIT EXPIRATION DATE: NORMAL
BLOOD UNIT TYPE CODE: 5100
BLOOD UNIT TYPE CODE: 5100
BUN SERPL-MCNC: 10.9 MG/DL (ref 8.4–25.7)
CALCIUM SERPL-MCNC: 8.5 MG/DL (ref 8.8–10)
CHLORIDE SERPL-SCNC: 110 MMOL/L (ref 98–111)
CO2 SERPL-SCNC: 23 MMOL/L (ref 23–31)
CREAT SERPL-MCNC: 0.64 MG/DL (ref 0.72–1.25)
CREAT/UREA NIT SERPL: 17
CROSSMATCH INTERPRETATION: NORMAL
CROSSMATCH INTERPRETATION: NORMAL
DISPENSE STATUS: NORMAL
DISPENSE STATUS: NORMAL
ERYTHROCYTE [DISTWIDTH] IN BLOOD BY AUTOMATED COUNT: 14.6 % (ref 11.5–17)
GFR SERPLBLD CREATININE-BSD FMLA CKD-EPI: >60 ML/MIN/1.73/M2
GLOBULIN SER-MCNC: 4 GM/DL (ref 2.4–3.5)
GLUCOSE SERPL-MCNC: 104 MG/DL (ref 75–121)
HCT VFR BLD AUTO: 30.8 % (ref 42–52)
HGB BLD-MCNC: 10.4 G/DL (ref 14–18)
MCH RBC QN AUTO: 32.3 PG (ref 27–31)
MCHC RBC AUTO-ENTMCNC: 33.8 G/DL (ref 33–36)
MCV RBC AUTO: 95.7 FL (ref 80–94)
NRBC BLD AUTO-RTO: 0 %
OHS QRS DURATION: 96 MS
OHS QTC CALCULATION: 639 MS
PLATELET # BLD AUTO: 113 X10(3)/MCL (ref 130–400)
PLATELETS.RETICULATED NFR BLD AUTO: 5.2 % (ref 0.9–11.2)
PMV BLD AUTO: 11.6 FL (ref 7.4–10.4)
POTASSIUM SERPL-SCNC: 4.2 MMOL/L (ref 3.5–5.1)
PROT SERPL-MCNC: 6.3 GM/DL (ref 5.8–7.6)
RBC # BLD AUTO: 3.22 X10(6)/MCL
SODIUM SERPL-SCNC: 140 MMOL/L (ref 136–145)
UNIT NUMBER: NORMAL
UNIT NUMBER: NORMAL
WBC # BLD AUTO: 15.05 X10(3)/MCL (ref 4.5–11.5)

## 2025-01-04 PROCEDURE — 94760 N-INVAS EAR/PLS OXIMETRY 1: CPT

## 2025-01-04 PROCEDURE — 80053 COMPREHEN METABOLIC PANEL: CPT

## 2025-01-04 PROCEDURE — 99900031 HC PATIENT EDUCATION (STAT)

## 2025-01-04 PROCEDURE — 63600175 PHARM REV CODE 636 W HCPCS

## 2025-01-04 PROCEDURE — 99233 SBSQ HOSP IP/OBS HIGH 50: CPT | Mod: GC,,, | Performed by: STUDENT IN AN ORGANIZED HEALTH CARE EDUCATION/TRAINING PROGRAM

## 2025-01-04 PROCEDURE — 97166 OT EVAL MOD COMPLEX 45 MIN: CPT

## 2025-01-04 PROCEDURE — 94640 AIRWAY INHALATION TREATMENT: CPT

## 2025-01-04 PROCEDURE — 92526 ORAL FUNCTION THERAPY: CPT

## 2025-01-04 PROCEDURE — 63600175 PHARM REV CODE 636 W HCPCS: Performed by: SURGERY

## 2025-01-04 PROCEDURE — 63600175 PHARM REV CODE 636 W HCPCS: Performed by: NURSE PRACTITIONER

## 2025-01-04 PROCEDURE — 92610 EVALUATE SWALLOWING FUNCTION: CPT

## 2025-01-04 PROCEDURE — 85027 COMPLETE CBC AUTOMATED: CPT

## 2025-01-04 PROCEDURE — 99900035 HC TECH TIME PER 15 MIN (STAT)

## 2025-01-04 PROCEDURE — 11000001 HC ACUTE MED/SURG PRIVATE ROOM

## 2025-01-04 PROCEDURE — 25000242 PHARM REV CODE 250 ALT 637 W/ HCPCS: Performed by: NURSE PRACTITIONER

## 2025-01-04 PROCEDURE — 27000221 HC OXYGEN, UP TO 24 HOURS

## 2025-01-04 PROCEDURE — 36415 COLL VENOUS BLD VENIPUNCTURE: CPT

## 2025-01-04 PROCEDURE — 97162 PT EVAL MOD COMPLEX 30 MIN: CPT

## 2025-01-04 PROCEDURE — 94761 N-INVAS EAR/PLS OXIMETRY MLT: CPT

## 2025-01-04 PROCEDURE — 92611 MOTION FLUOROSCOPY/SWALLOW: CPT

## 2025-01-04 RX ORDER — DIPHENHYDRAMINE HYDROCHLORIDE 50 MG/ML
25 INJECTION INTRAMUSCULAR; INTRAVENOUS EVERY 6 HOURS PRN
Status: DISCONTINUED | OUTPATIENT
Start: 2025-01-04 | End: 2025-01-08 | Stop reason: HOSPADM

## 2025-01-04 RX ADMIN — IPRATROPIUM BROMIDE AND ALBUTEROL SULFATE 3 ML: 2.5; .5 SOLUTION RESPIRATORY (INHALATION) at 08:01

## 2025-01-04 RX ADMIN — METHOCARBAMOL 1000 MG: 100 INJECTION INTRAMUSCULAR; INTRAVENOUS at 09:01

## 2025-01-04 RX ADMIN — MORPHINE SULFATE 2 MG: 4 INJECTION, SOLUTION INTRAMUSCULAR; INTRAVENOUS at 02:01

## 2025-01-04 RX ADMIN — DIPHENHYDRAMINE HYDROCHLORIDE 25 MG: 50 INJECTION INTRAMUSCULAR; INTRAVENOUS at 09:01

## 2025-01-04 RX ADMIN — METHOCARBAMOL 1000 MG: 100 INJECTION INTRAMUSCULAR; INTRAVENOUS at 05:01

## 2025-01-04 RX ADMIN — METHOCARBAMOL 1000 MG: 100 INJECTION INTRAMUSCULAR; INTRAVENOUS at 01:01

## 2025-01-04 RX ADMIN — MUPIROCIN: 20 OINTMENT TOPICAL at 08:01

## 2025-01-04 RX ADMIN — IPRATROPIUM BROMIDE AND ALBUTEROL SULFATE 3 ML: 2.5; .5 SOLUTION RESPIRATORY (INHALATION) at 03:01

## 2025-01-04 RX ADMIN — MUPIROCIN: 20 OINTMENT TOPICAL at 09:01

## 2025-01-04 RX ADMIN — IPRATROPIUM BROMIDE AND ALBUTEROL SULFATE 3 ML: 2.5; .5 SOLUTION RESPIRATORY (INHALATION) at 04:01

## 2025-01-04 RX ADMIN — IPRATROPIUM BROMIDE AND ALBUTEROL SULFATE 3 ML: 2.5; .5 SOLUTION RESPIRATORY (INHALATION) at 07:01

## 2025-01-04 RX ADMIN — IPRATROPIUM BROMIDE AND ALBUTEROL SULFATE 3 ML: 2.5; .5 SOLUTION RESPIRATORY (INHALATION) at 12:01

## 2025-01-04 NOTE — PROGRESS NOTES
TERTIARY TRAUMA SURVEY (TTS)    List Injuries Identified to Date:   1. Left clavicvle  2. Left rib fx  3. Liver abnormalitity    []Problems list reviewed  List Operations and Procedures:   1. Angiogram of live    History reviewed. No pertinent surgical history.    Incidental findings:   1. Unknown liver mass vs malformation    Past Medical History:   1. Clinically blind  2.   Dementia without behavioral disturbance      Dysphagia      Paroxysmal atrial fibrillation      Unspecified catarac        Active Ambulatory Problems     Diagnosis Date Noted    No Active Ambulatory Problems     Resolved Ambulatory Problems     Diagnosis Date Noted    No Resolved Ambulatory Problems     Past Medical History:   Diagnosis Date    Dementia without behavioral disturbance     Dysphagia     Paroxysmal atrial fibrillation     Unspecified cataract      Past Medical History:   Diagnosis Date    Dementia without behavioral disturbance     Dysphagia     Paroxysmal atrial fibrillation     Unspecified cataract        Tertiary Physical Exam:     Physical Exam  General: Well developed, well nourished, no acute distress, AAOx3  Neuro: CNII-XII grossly intact dementia  HEENT:  Normocephalic, PERRL, cervical collar in place. Left forehead hematoma with abrasion. Left posterior scalp laceration, abrasion.   CV:  RRR  Pulse: 2+ RP b/l, 2+ DP b/l   Resp/chest:  Non-labored breathing, satting on room air  GI:  Abdomen soft, non-tender, non-distended  :  deferred.   Rectal: deferred.  Extremities: Moves all 4 spontaneously and purposefully, no obvious gross deformities.  Back/Spine: Midline t-spine tenderness. No palpable step offs or deformities.  Skin/wounds:  Warm, well perfused. Abrasion to left hand.   Psych: Normal mood and affect.  Imaging Review:     Imaging Results              CT Chest Abdomen Pelvis With IV Contrast (XPD) NO Oral Contrast (Edited Result - FINAL)  Result time 01/02/25 18:44:38      Addendum (preliminary) 1 of 1 by  Jenny Lama MD (01/02/25 18:44:38)      There are multiple compression deformities seen in the thoracic and lumbar spine of unknown chronicity.  Correlation with direct physical examination is recommended.  If the physical exam is ambiguous MRI correlation may be beneficial if acute fracture is suspected.      Electronically signed by: Shankar Lama  Date:    01/02/2025  Time:    18:44                 Final result by Jenny Lama MD (01/02/25 15:51:56)                   Impression:      Liver laceration along the inferior margin of the liver with its perihepatic blood seen and what appears to be an area of active extravasation of contrast along the inferior margin of the liver.  This would place this as a grade 4 injury due to active hemorrhage although the laceration size is small.    Acute appearing fracture of the left distal clavicle    Comminuted fracture of the body of the scapula on the left side    Left 8th rib fracture posteriorly    Cholelithiasis    Findings discussed with the trauma team provider Veda at time of dictation      Electronically signed by: Shankar Lama  Date:    01/02/2025  Time:    15:51               Narrative:    EXAMINATION:  CT CHEST ABDOMEN PELVIS WITH IV CONTRAST (XPD)    CLINICAL HISTORY:  Trauma;    TECHNIQUE:  Low dose axial images, sagittal and coronal reformations were obtained from the thoracic inlet to the pubic symphysis following the IV contrast administration. Automatic exposure control is utilized to reduce patient radiation exposure.    COMPARISON:  None    FINDINGS:  The lungs are adequately aerated.  No pneumothorax is seen.  No pulmonary contusion is seen.  No pleural effusion is seen.  No infiltrate is seen.    The thoracic aorta is normal in caliber.  No dissection or aneurysm is seen.  No retrosternal hematoma is seen.    The abdominal aorta appears grossly unremarkable.  No dissection or posttraumatic changes are seen.    The heart appears  normal.    There is perihepatic  blood seen along the inferior margin of the liver.  There is a blush of contrast seen in segment 6 of the liver on images number 98 through 102.  Findings are consistent with possible active bleed..  Due to the active blush of contrast displaces the injury and a grade 4 liver laceration.  The area of hemorrhage seems to be from a branch of the right portal vein.    There are multiple gallstones in the gallbladder...    The spleen appears normal.  No splenic laceration is seen.  The pancreas appears grossly unremarkable.  No pancreatic mass or lesion is seen.  No inflammation is seen.    No adrenal abnormality is seen.  No adrenal nodule is seen.    The kidneys are well perfused.  No hydronephrosis is seen.  No hydroureter is seen.  No retroperitoneal hematoma is seen.    Visualized portions of the bowel shows no acute abnormality.  No colitis is seen.  No diverticulitis is seen.  No colonic mass is seen.    No free air is seen.  No free fluid is seen.    Urinary bladder appears unremarkable.    There is an acute appearing fracture of the very distal left clavicle.  It is nondisplaced.  It is best seen on coronal image 49 and axial image 11 series 9.  There is a subacute appearing fracture of the head of the clavicle on the left side.  There is a comminuted fracture of the scapula on the left side.  There is a fracture of the left 8th rib posteriorly.  No sternal fracture is seen.  No thoracic spine fracture is seen.  No lumbar spine fracture is seen.  There is old pelvic fracture of the iliac wing                                       CT Cervical Spine Without Contrast (Final result)  Result time 01/02/25 15:48:11      Final result by Lashay Henry MD (01/02/25 15:48:11)                   Impression:      No acute abnormality identified.      Electronically signed by: Lashay Henry  Date:    01/02/2025  Time:    15:48               Narrative:    EXAMINATION:  CT CERVICAL  SPINE WITHOUT CONTRAST    CLINICAL HISTORY:  Trauma;    TECHNIQUE:  Noncontrast CT images of the cervical spine. Axial, coronal, and sagittal reformatted images were obtained. Dose length product is hree 14 mGycm. Automatic exposure control, adjustment of mA/kV or iterative reconstruction technique was used to limit radiation dose.    COMPARISON:  None    FINDINGS:  The cervical spine is visualized through the level T1.    There is no acute fracture identified.  There are multilevel degenerative changes with disc height loss, marginal osteophyte formation and facet arthropathy.  There is no paraspinal hematoma.                                       CT Head Without Contrast (Final result)  Result time 01/02/25 15:37:35      Final result by Lashay Henry MD (01/02/25 15:37:35)                   Impression:      1. No acute intracranial abnormality.  2. Chronic microvascular ischemic changes.      Electronically signed by: Lashay Henry  Date:    01/02/2025  Time:    15:37               Narrative:    EXAMINATION:  CT HEAD WITHOUT CONTRAST    CLINICAL HISTORY:  Trauma;    TECHNIQUE:  Axial scans were obtained from skull base to the vertex.    Coronal and sagittal reconstructions obtained from the axial data.    Automatic exposure control was utilized to limit radiation dose.    Contrast: None    Radiation Dose:    Total DLP: 1171 mGy*cm    COMPARISON:  None    FINDINGS:  There is no acute intracranial hemorrhage or edema. The gray-white matter differentiation is preserved.  Scattered hypodensities in the subcortical and periventricular white matter likely represent chronic microvascular ischemic changes.    There is no mass effect or midline shift.  There is diffuse parenchymal volume loss.  The basal cisterns are patent. There is no abnormal extra-axial fluid collection.  The carotid and vertebral artery calcifications are noted.    There is soft tissue swelling in the posterior scalp.  The calvarium and  skull base are intact. The visualized paranasal sinuses and the mastoid air cells are clear.                                       X-Ray Chest 1 View (Final result)  Result time 01/02/25 16:07:51      Final result by Larry Marlow MD (01/02/25 16:07:51)                   Impression:      No acute pulmonary process identified.      Electronically signed by: Larry Marlow  Date:    01/02/2025  Time:    16:07               Narrative:    EXAMINATION:  XR CHEST 1 VIEW    CLINICAL HISTORY:  r/o bleeding or hemorrhage;    TECHNIQUE:  Frontal view(s) of the chest.    COMPARISON:  No relevant comparison studies available at the time of dictation.    FINDINGS:  Normal cardiac silhouette.  The lungs are well-inflated.  Minimal left lower lung atelectasis.  No significant pleural fluid.  No pneumothorax appreciated.  Minimally displaced distal left clavicle fracture.                                       X-Ray Pelvis Routine AP (Final result)  Result time 01/02/25 16:09:41      Final result by Larry Marlow MD (01/02/25 16:09:41)                   Impression:      Suboptimal assessment, but no definitive acute osseous findings.      Electronically signed by: Larry Marlow  Date:    01/02/2025  Time:    16:09               Narrative:    EXAMINATION:  XR PELVIS ROUTINE AP    CLINICAL HISTORY:  r/o bleeding or hemorrhage;    TECHNIQUE:  AP view of the pelvis.    COMPARISON:  None    FINDINGS:  Prior internal fixation of the left femur.  Suboptimal evaluation due to positioning and decreased overall bone mineralization.  No definitive acute fracture.  Hips are aligned.                                       Lab Review:   CBC:  Recent Labs   Lab Result Units 01/02/25  1608 01/03/25  0032 01/03/25  0424 01/03/25  0828 01/04/25  0433   WBC x10(3)/mcL 10.19  --  11.85*  --  15.05*   RBC x10(6)/mcL 4.04  --  4.00  --  3.22   Hgb g/dL 13.0*   < > 12.6*   < > 10.4*   Hct % 38.5*   < > 38.0*   < > 30.8*   Platelet x10(3)/mcL 149  --  " 150  --  113*   MCV fL 95.3*  --  95.0*  --  95.7*   MCH pg 32.2*  --  31.5*  --  32.3*   MCHC g/dL 33.8  --  33.2  --  33.8    < > = values in this interval not displayed.       CMP:  Recent Labs   Lab Result Units 01/02/25  1604 01/03/25  0424 01/03/25  1907 01/04/25  0433   Calcium mg/dL 8.8 8.7* 8.3* 8.5*   Albumin g/dL 2.7* 2.8* 2.4* 2.3*   Sodium mmol/L 138 138 137 140   Potassium mmol/L 4.6 3.9 4.0 4.2   CO2 mmol/L 25 23 21* 23   Chloride mmol/L 103 105 108 110   Blood Urea Nitrogen mg/dL 16.7 13.8 10.7 10.9   Creatinine mg/dL 0.78 0.74 0.65* 0.64*   ALP unit/L 121 104 86 81   ALT unit/L 12 11 10 9   AST unit/L 25 17 16 22   Bilirubin Total mg/dL 0.2 0.4 0.4 0.5       Troponin:  No results for input(s): "TROPONINI" in the last 2160 hours.    ETOH:  Recent Labs     01/02/25  1604   ETHANOL <10.0        Urine Drug Screen:  Recent Labs     01/02/25  1740   FENTANYL Positive*   MDMA Negative        Plan:   Left clavicvle - nonop for now nwb   2. Left rib fx - O2 as needed IS  3. Liver abnormalitity - nothing to do not bleeding    Jt Dumas       "

## 2025-01-04 NOTE — PLAN OF CARE
Problem: Occupational Therapy  Goal: Occupational Therapy Goal  Description: LTG: Pt will perform basic ADLs and ADL t/fs with min A using LRAD by d/c.    STG: to be met by 2/1  1. Pt will follow >80% of simple one step commands  2. Pt will perform grooming EOB with min A.   3. Pt will perform functional grasp/reach/release of items >80% of trials in prep for increased participation in ADL tasks.   4. Pt will perform sit<>stand with mod A in prep for ADL t/fs.   Outcome: Progressing     Patient with fair-poor tolerance for today's session. Patient presents generalized deconditioning, global weakness, L UE weightbearing restrictions, fearful of falling, requires moderate assist x2 for bed mobility and moderate assist of 2 for functional mobility with increased time impeding independence in meaningful activities of daily living. Patient to benefit from continued skilled OT intervention to address aforementioned deficits to reduce risk of falls and improve occupational performance prior to next level of care.

## 2025-01-04 NOTE — PT/OT/SLP EVAL
Occupational Therapy  Evaluation    Name: Kaleb Ibarra  MRN: [id number]  Admitting Diagnosis:   1. Fall, initial encounter    2. Liver laceration    3. Bleeding    4. Closed fracture of left scapula, unspecified part of scapula, initial encounter    5. Closed displaced fracture of left clavicle, unspecified part of clavicle, initial encounter    6. Pseudoaneurysm following procedure    7. EKG abnormalities      Recent Surgery: Procedure(s) (LRB):  AORTOGRAM, ABDOMINAL (N/A) 2 Days Post-Op    Recommendations:     Discharge therapy intensity: Moderate Intensity Therapy   Discharge Equipment Recommendations:  to be determined by next level of care  Barriers to discharge:  Unable to determine secondary to patient with decreased accuracy as historian  and unsure of PLOF     Assessment:     Kaleb Ibarra is a 89 y.o. adult with a medical diagnosis of fall with L shoulder pain, L clavicle/ scapula fractures, L 8th ri fracture.  He presents with the following performance deficits affecting function: weakness, impaired endurance, impaired self care skills, impaired functional mobility, visual deficits, impaired cognition, decreased upper extremity function, pain, orthopedic precautions.     Patient with fair-poor tolerance for today's session. Patient presents cognitive deficits (poor historian), generalized deconditioning, global weakness, L UE weightbearing restrictions, fearful of falling, requires moderate assist x2 for bed mobility and moderate assist of 2 for functional mobility with increased time impeding independence in meaningful activities of daily living. Patient to benefit from continued skilled OT intervention to address aforementioned deficits to reduce risk of falls and improve occupational performance prior to next level of care.    Rehab Prognosis: Fair; patient would benefit from acute skilled OT services to address these deficits and reach maximum level of function.       Plan:     Patient to be seen 4  x/week to address the above listed problems via self-care/home management, therapeutic activities, therapeutic exercises  Plan of Care Expires: 02/01/25  Plan of Care Reviewed with: patient    Subjective     Chief Complaint: L UE pain   Patient/Family Comments/goals: Return home     Occupational Profile:  Living Environment: Lives at NH per chart review   Previous level of function: Unable to determine   Roles and Routines: Unable to determine   Equipment Used at Home: other (see comments) (Unable to determine)  Assistance upon Discharge: Unable to determine     Pain/Comfort:  Pain Rating 1: 10/10  Location - Side 1: Left  Location 1: arm (arm)  Pain Addressed 1: Pre-medicate for activity, Reposition, Distraction, Cessation of Activity    Patients cultural, spiritual, Anglican conflicts given the current situation: no    Objective:     OT communicated with RN prior to session.      Patient was found supine with bed alarm, blood pressure cuff, pulse ox (continuous), SCD, telemetry upon OT entry to room.    General Precautions: Standard, fall, vision impaired  Orthopedic Precautions: LUE non weight bearing  Braces: UE Sling    Bed Mobility:    Patient completed Supine to Sit with maximal assistance of 2 people; patient assisting with B LE advancement     Functional Mobility/Transfers:  Patient completed Sit <> Stand Transfer with moderate assistance and of 2 persons  with  hand-held assist   Patient completed Bed <> Chair Transfer using Step Transfer technique with moderate assistance and of 2 persons with hand-held assist  Functional Mobility: Patient complete lateral steps EOB > recliner chair (toward L side) req mod A x2 for weightshiftign R <> L LE, safety, balance, and max vc's for sequencing. Patient with severely narrow base of support, anterior trunk flexion, and max fear of falling     Activities of Daily Living:  Upper Body Dressing: total assistance Don L sling short sitting EOB  Lower Body Dressing:  "total assistance Adjust B socks over heel level supine in bed     [facility] 6 Click ADL:  [facility] Total Score: 11    Functional Cognition:  Affect: Cooperative and Anxious  Orientation: oriented to Person  Attention: Easily distracted  Initiation: Impaired.    Command Following: Follows simple one-step commands with 25% accuracy  Communication: able to communicate basic wants/needs  Memory: Impaired STM and Poor immediate recall  Safety Awareness: Impaired.    Insight into Deficits: Impaired.      Visual Perceptual Skills:  Chronically impaired; patient with limited visual tracking to auditory/ tactile, poor visual attention to task, and reports chronic low vision with intermittent ability to report "colors," yet unsure of patient accuracy.     Upper Extremity Function:  Right Upper Extremity:   WFL  Shoulder flexion 0-100* in scaption  4/5 strength and maximal grimacing noted with light-moderate resistance     Left Upper Extremity:  Range of Motion: Impaired. Patient mook's WFL wrist flexion/extension with gravity eliminated, impaired forearm supination/pronation, limited elbow flexion/extension, and requests to defer shoulder ROM at this time secondary to pain   Strength: Impaired. 3+/5 gross grasp, 3-/5 elbow flexion/extension and supination/pronation base don AROM   Coordination: Impaired.    Sensation: Impaired. Patient endorsing hand numbness/tingling     Balance:   Impaired. Patient requires moderate assistance for sitting balance EOB with noted posterior leaning and moderate assist x2 for standing secondary to limited balance/ safety/fearful of falling     Therapeutic Positioning  Risk for acquired pressure injuries is increased due to relative decrease in mobility d/t hospitalization  and inability to communicate toileting needs.    OT interventions performed during the course of today's session:   Positioning recommendations were communicated to care team     Skin assessment: all bony prominences were assessed "    Findings: no redness or breakdown noted    OT recommendations for therapeutic positioning throughout hospitalization:   Follow [facility] Pressure Injury Prevention Protocol    Patient Education:  Patient provided with verbal education education regarding OT role/goals/POC, post op precautions, fall prevention, and safety awareness.  Understanding was verbalized, however additional teaching warranted.     Patient left up in chair with all lines intact, call button in reach, pressure relief boots, chair alarm on, and RN notified.    GOALS:   Multidisciplinary Problems       Occupational Therapy Goals          Problem: Occupational Therapy    Goal Priority Disciplines Outcome Interventions   Occupational Therapy Goal     OT, PT/OT Progressing    Description: LTG: Pt will perform basic ADLs and ADL t/fs with min A using LRAD by d/c.    STG: to be met by 2/1  1. Pt will follow >80% of simple one step commands  2. Pt will perform grooming EOB with min A.   3. Pt will perform functional grasp/reach/release of items >80% of trials in prep for increased participation in ADL tasks.   4. Pt will perform sit<>stand with mod A in prep for ADL t/fs.                        History:     Past Medical History:   Diagnosis Date    Dementia without behavioral disturbance     Dysphagia     Paroxysmal atrial fibrillation     Unspecified cataract        History reviewed. No pertinent surgical history.    Time Tracking:     OT Date of Treatment: 01/04/25  OT Start Time: 0940  OT Stop Time: 1003  OT Total Time (min): 23 min    Billable Minutes:Evaluation 23 1/4/2025

## 2025-01-04 NOTE — PT/OT/SLP EVAL
[facility]  Speech Language Pathology Department  Clinical Swallow Re-evaluation    Patient Name:  Kaleb Ibarra   MRN:  [id number]    Recommendations     General recommendations:  Modified Barium Swallow Study  Solid texture recommendation:  NPO  Liquid consistency recommendation: NPO   Medications: NPO  Precautions: aspiration    History       Kaleb Ibarra is a/n 89 y.o. adult NH resident admitted to the trauma ICU following a fall while on blood thinners.  Pt seen by this department in April 2024 at which time a MBS study was completed and Minced & Moist solids with mildly thick liquids were recommended.     Past Medical History:   Diagnosis Date    Dementia without behavioral disturbance     Dysphagia     Paroxysmal atrial fibrillation     Unspecified cataract        Imaging   Results for orders placed during the hospital encounter of 01/02/25    X-Ray Chest 1 View    Narrative  EXAMINATION:  XR CHEST 1 VIEW    CPT 71109    CLINICAL HISTORY:  increasing O2 requirements;    COMPARISON:  January 2, 2025    FINDINGS:  Examination reveals mediastinal silhouette to be within normal limits cardiac silhouette is not enlarged some linear densities identified in the left retrocardiac region likely representing subsegmental atelectatic changes.    Lung fields are otherwise clear and free of gross infiltrates other atelectatic changes or effusions    Impression  Atelectatic changes in the left retrocardiac region.    Otherwise no active pulmonary disease and no significant change as compared with the previous exam      Electronically signed by: Gokul Turner  Date:    01/03/2025  Time:    07:54    No results found for this or any previous visit.    No results found for this or any previous visit.    Subjective     Patient awake and sitting in chair . Nursing contacted SLP to re-assess patient due to increased wet vocal quality and not tolerating modified diet.     Spiritual/Cultural/Sikhism  "Beliefs/Practices that affect care: no    Pain/Comfort: Pain Rating 1: 0/10      Objective     PO TRIALS  Mildly thick liquids: x1- required max assistance for sip from cup due, continuously putting cup to L ear area. Patient with multiple sips with weak yet productive coughing to follow. Suspected to clear. Patient states "I don't know why I am having trouble with this".    Assessment     SLP rec: strict NPO with MBSS warranted    MD is recommending stat CT, MBSS to follow.     Outcome Measures     Functional Oral Intake Scale: 1 - Nothing by mouth      Education     Patient provided with verbal education regarding POC.  Understanding was verbalized.    Plan     SLP Follow-Up:  Yes   Plan of Care reviewed with:  patient     Time Tracking     SLP Treatment Date:   01/04/25  Speech Start Time:  1010  Speech Stop Time:  1020     Speech Total Time (min):  10 min    Billable minutes:  Swallow and Oral Function Evaluation, 10 minutes     01/04/2025           "

## 2025-01-04 NOTE — PROGRESS NOTES
"   Trauma Surgery   Progress Note  Patient Name: Kaleb Ibarra                   : 1934     MRN: [id number]   Date of Admission: 2025  Code Status: Full Code  Date of Exam: 2025  HD#2  POD#2 Days Post-Op  Attending Provider: Tommie Guaman MD    Subjective:   Interval history:  NAEON  AFVSS  Failed hadley  Off oxygen satting >93%    Home Meds:   Current Outpatient Medications   Medication Instructions    acetaminophen (TYLENOL) 650 mg, Every 8 hours PRN    apixaban (ELIQUIS) 2.5 mg Tab 2 times daily    citalopram (CELEXA) 10 mg, Daily    loperamide (IMODIUM A-D) 2 mg, Every 6 hours PRN    multivitamin with minerals tablet 1 tablet, Daily    ondansetron (ZOFRAN) 4 mg, Every 8 hours PRN    prednisoLONE-moxiflo-nepafenac 1-0.5-0.1 % DrpS 1 drop, Both Eyes, 4 times daily      Scheduled Meds:   albuterol-ipratropium  3 mL Nebulization Q4H    citalopram  10 mg Oral Daily    methocarbamol injection  1,000 mg Intravenous Q8H    mupirocin   Nasal BID     Continuous Infusions:   lactated ringers   Intravenous Continuous 125 mL/hr at 25 0609 Rate Verify at 25 0609     PRN Meds:  Current Facility-Administered Medications:     acetaminophen, 650 mg, Oral, Q8H PRN    LIDOcaine (PF) 10 mg/ml (1%), 1 mL, Intradermal, Once PRN    melatonin, 6 mg, Oral, Nightly PRN    metoprolol, 5 mg, Intravenous, Q5 Min PRN    morphine, 2 mg, Intravenous, Q4H PRN    ondansetron, 8 mg, Oral, Q8H PRN    oxyCODONE, 5 mg, Oral, Q6H PRN    oxyCODONE, 10 mg, Oral, Q6H PRN    sodium chloride 0.9%, 10 mL, Intravenous, PRN     Objective:     VITAL SIGNS: 24 HR MIN & MAX LAST   Temp  Min: 97.9 °F (36.6 °C)  Max: 98.5 °F (36.9 °C)  98.5 °F (36.9 °C)   BP  Min: 94/61  Max: 140/70  (!) 106/56    Pulse  Min: 78  Max: 107  83    Resp  Min: 12  Max: 36  (!) 25    SpO2  Min: 85 %  Max: 99 %  96 %      HT: 5' 8" (172.7 cm)  WT: 68 kg (150 lb)  BMI: 22.8     Intake/output:  Intake/Output - Last 3 Shifts          0700   0659  " 0700  01/04 0659    I.V. (mL/kg) 1863.7 (27.4) 3739.5 (55)    IV Piggyback 183.5 1000    Total Intake(mL/kg) 2047.3 (30.1) 4739.5 (69.7)    Urine (mL/kg/hr) 400 500 (0.3)    Other  0    Stool  0    Total Output 400 500    Net +1647.3 +4239.5          Urine Occurrence  1 x    Stool Occurrence  2 x            Intake/Output Summary (Last 24 hours) at 1/4/2025 0624  Last data filed at 1/4/2025 0609  Gross per 24 hour   Intake 4739.47 ml   Output 500 ml   Net 4239.47 ml         Lines/drains/airway:       Peripheral IV - Single Lumen 01/02/25 1424 18 G Anterior;Right Forearm (Active)   Site Assessment Clean;Dry;Intact 01/04/25 0400   Line Securement Device Secured with sutureless device 01/03/25 0750   Extremity Assessment Distal to IV No abnormal discoloration;No redness;No swelling 01/04/25 0400   Line Status Capped;Flushed;Saline locked 01/04/25 0400   Dressing Status Clean;Dry;Intact 01/04/25 0400   Dressing Intervention Integrity maintained 01/04/25 0400   Number of days: 1            Peripheral IV - Single Lumen 01/03/25 1745 20 G Posterior;Right Forearm (Active)   Site Assessment Clean;Intact;Dry 01/04/25 0400   Extremity Assessment Distal to IV No abnormal discoloration;No redness;No swelling 01/04/25 0400   Line Status Flushed;Infusing 01/04/25 0400   Dressing Status Clean;Dry;Intact 01/04/25 0400   Dressing Intervention Integrity maintained 01/04/25 0400   Number of days: 0            Sheath 01/02/25 Right proximal;anterior (Active)   Insertion Site no hematoma 01/04/25 0400   Drainage Characteristics/Odor Sanguineous 01/03/25 0800   Drainage Amount Scant 01/03/25 0800   Number of days: 2       Male External Urinary Catheter 01/02/25 2015 Small (Active)   Securement Method secured to lower ABD w/ adhesive device 01/04/25 0400   Skin no redness;no breakdown 01/04/25 0400   Tolerance no signs/symptoms of discomfort 01/04/25 0400   Output (mL) 200 mL 01/04/25 0545   Catheter Change Date 01/03/25 01/04/25 0400  "  Catheter Change Time 2000 01/04/25 0400   Number of days: 1       Physical examination:  Gen: NAD, AAOx3, answering questions appropriately  HEENT: NC  CV: RR  Resp: NWOB  Abd: S/NT/ND  Msk: moving all extremities spontaneously and purposefully  Neuro: CN II-XII grossly intact, GCS 15  Skin/wounds: L hand abrasions    Labs:  Renal:  Recent Labs     01/02/25  1604 01/03/25 0424 01/03/25 1907 01/04/25  0433   BUN 16.7 13.8 10.7 10.9   CREATININE 0.78 0.74 0.65* 0.64*     No results for input(s): "LACTIC" in the last 72 hours.  FEN/GI:  Recent Labs     01/02/25 1604 01/03/25 0424 01/03/25 1907 01/04/25 0433    138 137 140   K 4.6 3.9 4.0 4.2    105 108 110   CO2 25 23 21* 23   CALCIUM 8.8 8.7* 8.3* 8.5*   ALBUMIN 2.7* 2.8* 2.4* 2.3*   BILITOT 0.2 0.4 0.4 0.5   AST 25 17 16 22   ALKPHOS 121 104 86 81   ALT 12 11 10 9     Heme:  Recent Labs     01/02/25  1457 01/02/25 1608 01/02/25 1608 01/03/25  0032 01/03/25 0424 01/03/25  0828 01/04/25  0433   HGB  --  13.0*   < > 9.0* 12.6* 12.4* 10.4*   HCT  --  38.5*   < > 27.2* 38.0* 37.1* 30.8*   PLT  --  149  --   --  150  --  113*   INR 1.2  --   --   --   --   --   --     < > = values in this interval not displayed.     ID:  Recent Labs     01/02/25 1608 01/03/25 0424 01/04/25 0433   WBC 10.19 11.85* 15.05*     CBG:  Recent Labs     01/02/25  1604 01/03/25 0424 01/03/25 1907 01/04/25  0433   GLUCOSE 129* 142* 111 104      No results for input(s): "POCTGLUCOSE" in the last 72 hours.   Cardiovascular:  No results for input(s): "TROPONINI", "CKTOTAL", "CKMB", "BNP" in the last 168 hours.  I have reviewed all pertinent lab results within the past 24 hours.    Imaging:  X-Ray Chest 1 View   Final Result      Atelectatic changes in the left retrocardiac region.      Otherwise no active pulmonary disease and no significant change as compared with the previous exam         Electronically signed by: Gokul Turner   Date:    01/03/2025   Time:    07:54    "   CT Chest Abdomen Pelvis With IV Contrast (XPD) NO Oral Contrast   Final Result   Addendum (preliminary) 1 of 1      There are multiple compression deformities seen in the thoracic and lumbar    spine of unknown chronicity.  Correlation with direct physical examination    is recommended.  If the physical exam is ambiguous MRI correlation may be    beneficial if acute fracture is suspected.         Electronically signed by: Shankar Lama   Date:    01/02/2025   Time:    18:44      Final      Liver laceration along the inferior margin of the liver with its perihepatic blood seen and what appears to be an area of active extravasation of contrast along the inferior margin of the liver.  This would place this as a grade 4 injury due to active hemorrhage although the laceration size is small.      Acute appearing fracture of the left distal clavicle      Comminuted fracture of the body of the scapula on the left side      Left 8th rib fracture posteriorly      Cholelithiasis      Findings discussed with the trauma team provider Veda at time of dictation         Electronically signed by: Shankar Lama   Date:    01/02/2025   Time:    15:51      CT Cervical Spine Without Contrast   Final Result      No acute abnormality identified.         Electronically signed by: Lashay Henry   Date:    01/02/2025   Time:    15:48      CT Head Without Contrast   Final Result      1. No acute intracranial abnormality.   2. Chronic microvascular ischemic changes.         Electronically signed by: Lashay Henry   Date:    01/02/2025   Time:    15:37      X-Ray Chest 1 View   Final Result      No acute pulmonary process identified.         Electronically signed by: Larry Marlow   Date:    01/02/2025   Time:    16:07      X-Ray Pelvis Routine AP   Final Result      Suboptimal assessment, but no definitive acute osseous findings.         Electronically signed by: Larry Marlow   Date:    01/02/2025   Time:    16:09         I have  reviewed all pertinent imaging results/findings within the past 24 hours.    Micro/Path/Other:  Microbiology Results (last 7 days)       ** No results found for the last 168 hours. **           Pathology Results  (Last 7 days)      None             Problems list:  There are no active problems to display for this patient.     There are no hospital problems to display for this patient.      Assessment & Plan:   Patient is an approximately 65 year old male who presents following fall on thinners with head strike.      Small liver laceration with active extravasation, G4  - s/p mesenteric angiography for suspected liver hemorrhage      Left 8th rib fracture  - Pain control  - Frequent IS, flutter valve     Left clavicle fracture  Left scapula fracture  - NWB ROMAT LUE Sling for comfort      Fall  - Consults: Interventional vascular, Orthopedics  - MMPC  - Bowel regimen  - Anti-emetics: PRN  - Diet: NPO  - VTE ppx: SCDs               - Hold Lovenox given injury  - daily labs   - PT/OT  - Frequent IS, flutter valve    Dysphagia  Consult speech      1/4/2025     The above findings, diagnostics, and treatment plan were discussed with Dr. Tommie Guaman who will follow with further assessments and recommendations. Please call with any questions, concerns, or clinical status changes.  This note/OR report was created with the assistance of  voice recognition software or phone  dictation.  There may be transcription errors as a result of using this technology however minimal. Effort has been made to assure accuracy of transcription but any obvious errors or omissions should be clarified with the author of the document.

## 2025-01-04 NOTE — PROCEDURES
[facility]  Speech Language Pathology Department  Modified Barium Swallow (MBS) Study    Patient Name:  Kaleb Ibarra   MRN:  [id number]    Recommendations     General recommendations:  dysphagia therapy  Repeat MBS study: once appropriate  Solid texture recommendation:  NPO  Liquid consistency recommendation: NPO   Medications: NPO  General precautions: aspiration    History     Kaleb Ibarra is a/n 89 y.o. adult NH resident admitted to the trauma ICU following a fall while on blood thinners. Pt seen by this department in April 2024 at which time a MBS study was completed and Minced & Moist solids with mildly thick liquids were recommended.     Nursing reports patient was not tolerating diet (puree and mildly thick). Patient coughing on mildly thick at bedside today.      Past Medical History:   Diagnosis Date    Dementia without behavioral disturbance     Dysphagia     Paroxysmal atrial fibrillation     Unspecified cataract        A MBS Study was completed to assess the efficiency of his swallow function, rule out aspiration and make recommendations regarding safe dietary consistencies, effective compensatory strategies, and safe eating environment.       Imaging   Results for orders placed during the hospital encounter of 01/02/25    X-Ray Chest 1 View    Narrative  EXAMINATION:  XR CHEST 1 VIEW    CPT 12611    CLINICAL HISTORY:  increasing O2 requirements;    COMPARISON:  January 2, 2025    FINDINGS:  Examination reveals mediastinal silhouette to be within normal limits cardiac silhouette is not enlarged some linear densities identified in the left retrocardiac region likely representing subsegmental atelectatic changes.    Lung fields are otherwise clear and free of gross infiltrates other atelectatic changes or effusions    Impression  Atelectatic changes in the left retrocardiac region.    Otherwise no active pulmonary disease and no significant change as compared with the previous  exam      Electronically signed by: Gokul Turner  Date:    01/03/2025  Time:    07:54    No results found for this or any previous visit.    No results found for this or any previous visit.    Subjective     Patient awake and with new confusion . Patient was no longer oriented.     Spiritual/Cultural/Jewish Beliefs/Practices that affect care: no  Pain/Comfort: Pain Rating 1: 0/10      Fluoroscopic Findings     Oral Musculature  Dentition: own teeth  Secretion Management: adequate  Mucosal Quality: good  Facial Movement: general weakness  Buccal Strength & Mobility: WFL  Mandibular Strength & Mobility: WFL  Oral Labial Strength & Mobility: WFL  Lingual Strength & Mobility: WFL  Vocal Quality: adequate    Setup  Upright in bed  Unable to self feed due to deficits  Adequate head control    Visualization  Lateral view    Oral Phase:   Reduced lip closure around utensil/straw  Prolonged/disorganized bolus formation  Poor bolus cohesion  Loss of bolus control to valleculae    Pharyngeal Phase:   Severely reduced BOT retraction, hyolaryngeal excursion/elevation, and epiglottic inversion.   Significantly impacted pharyngeal stripping wave  Patient with multiple swallow for singular trial with significantly reduced clearance    PO trials:  Mildly thick by spoon: valleculae aggregation, swallow x3 with 0% clearance. Eventually fell to pyriform sinuses,  clearance after an additional 3 swallows.     Mildly thick by cup: max assistance to reach oral cavity, 50% clearance following 4 swallows. Moderate valleculae and pyriform sinus residue after all swallows.     Cervical Esophageal Phase:   decreased UES opening due to weakness of pharyngeal swallow.     Additional Comments:  Trails dc due to severity of dysphagia  Wet vocal quality to follow    Assessment     Swallow function is negatively impacted by severe weakness of orophayrngeal muscles.     Patient appears to be at high risk for aspiration related pneumonia when  considering severity of dysphagia.     SLP rec: strict NPO- Dr. Nunez and nurse notified.    Outcome Measures     Functional Oral Intake Scale: 1 - Nothing by mouth    Goals     Multidisciplinary Problems       SLP Goals          Problem: SLP    Goal Priority Disciplines Outcome   SLP Goal     SLP    Description: LTG: Pt will tolerate baseline diet with no clinical signs/sx of aspiration.     ST. CTAR exercises  2.  Mildly thick trials  3.  Puree trials                     Education     Patient provided with verbal education regarding results and recommendations.  Understanding was verbalized.    Plan     SLP Follow-Up:  Yes    Patient to be seen:  daily   Plan of Care expires:  25  Plan of Care reviewed with:  patient     Time Tracking     SLP Treatment Date:   25  Speech Start Time:  1200  Speech Stop Time:  1230     Speech Total Time (min):  30 min    Billable minutes:   Motion Fluoroscopic Evaluation, Video Recording, 30 minutes     2025

## 2025-01-04 NOTE — PT/OT/SLP EVAL
Physical Therapy Evaluation    Patient Name:  Kaleb Ibarra   MRN:  [id number]    Recommendations:     Discharge therapy intensity: Moderate Intensity Therapy   Discharge Equipment Recommendations: to be determined by next level of care   Barriers to discharge:  medical dx, impaired mobility, decreased independence     Assessment:     Kaleb Ibarra is a 89 y.o. adult admitted with a medical diagnosis of L 8th rib fracture, L clavicle fracture, L scapula fracture, grade 4 liver laceration with extravasation s/p celiac artery angio.  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    He presents with the following impairments/functional limitations: weakness, gait instability, decreased upper extremity function, impaired endurance, impaired balance, decreased lower extremity function, visual deficits, impaired cognition, impaired self care skills, impaired functional mobility, pain, orthopedic precautions.    Pt with fair tolerance to PT evla. Pt is somewhat confused and a poor historian. However, per RN, pt was at a NH and would stay in bed most of the day but would be able to mobilize short distances and get to a chair with assistance. Today, pt requires modA for bed mobility and modAx2 to stand and take a few steps to go from bed to chair. Pt requires increased cues 2/2 decreased motor planning as well as increased time to complete activity. Appears as if pt would benefit from moderate intensity therapy beyond this stay. Will continue progressing as able.     Rehab Prognosis: Good; patient would benefit from acute skilled PT services to address these deficits and reach maximum level of function.    Recent Surgery: Procedure(s) (LRB):  AORTOGRAM, ABDOMINAL (N/A) 2 Days Post-Op    Plan:     During this hospitalization, patient would benefit from acute PT services 5 x/week to address the identified rehab impairments via gait training, therapeutic activities, therapeutic exercises and progress toward the following goals:    Plan of  Care Expires:  02/04/25    Subjective     Chief Complaint: pain  Patient/Family Comments/goals: to get stronger   Pain/Comfort:  Pain Rating 1:  (unable to provide numerical rating; complains of pain to L UE with PROM or touch)    Patients cultural, spiritual, Shinto conflicts given the current situation: no    Living Environment:  Pt is a NH resident?  Prior to admission, patients level of function was requiring assistance for transfers; limited ambulation .    Equipment used at home: rollator.  DME owned (not currently used): none.    Upon discharge, patient will have assistance from NH staff?.    Objective:     Communicated with NSG prior to session.  Patient found HOB elevated with blood pressure cuff, pulse ox (continuous), telemetry, peripheral IV, SCD (L UE sling)  upon PT entry to room.    General Precautions: Standard, fall  Orthopedic Precautions:LUE non weight bearing (ROMAT)   Braces: UE Sling  Respiratory Status: Room air      Exams:  Cognitive Exam:  Patient is oriented to Person  BLE Strength: grossly 4/5  Skin integrity: Visible skin intact      Functional Mobility:  Bed Mobility:     Supine to Sit: moderate assistance  Transfers:     Sit to Stand:  moderate assistance and of 2 persons with hand-held assist  Bed to Chair: moderate assistance and of 2 persons with  hand-held assist  using  Step Transfer  Gait: pt able to amb about 4 ft in order to complete stand step transfer; pt with excessive forward flexed trunk posture; B decreased step length and foot clearance; NBOS; increased VC/TC for step sequencing       AM-PAC 6 CLICK MOBILITY  Total Score:12       Treatment & Education:  Patient provided with verbal education  regarding PT role/goals/POC, fall prevention, safety awareness, and discharge/DME recommendations.  Understanding was verbalized, however additional teaching warranted.     Patient left up in chair with all lines intact, call button in reach, RN notified, and sling donned,  pillow under L UE .    GOALS:   Multidisciplinary Problems       Physical Therapy Goals          Problem: Physical Therapy    Goal Priority Disciplines Outcome Interventions   Physical Therapy Goal     PT, PT/OT Progressing    Description: Goals to be met by: 25     Patient will increase functional independence with mobility by performin. Supine to sit with Stand-by Assistance  2. Sit to supine with Stand-by Assistance  3. Sit to stand transfer with Stand-by Assistance  4. Gait  x 50 feet with Minimal Assistance using Single-point Cane/quad cane.                          History:     Past Medical History:   Diagnosis Date    Dementia without behavioral disturbance     Dysphagia     Paroxysmal atrial fibrillation     Unspecified cataract        History reviewed. No pertinent surgical history.    Time Tracking:     PT Received On: 25  PT Start Time: 940     PT Stop Time: 959  PT Total Time (min): 19 min     Billable Minutes: Evaluation mod      2025

## 2025-01-04 NOTE — PLAN OF CARE
Problem: Physical Therapy  Goal: Physical Therapy Goal  Description: Goals to be met by: 25     Patient will increase functional independence with mobility by performin. Supine to sit with Stand-by Assistance  2. Sit to supine with Stand-by Assistance  3. Sit to stand transfer with Stand-by Assistance  4. Gait  x 50 feet with Minimal Assistance using Single-point Cane/quad cane.     Outcome: Progressing

## 2025-01-05 LAB
ALBUMIN SERPL-MCNC: 2.4 G/DL (ref 3.4–4.8)
ALBUMIN/GLOB SERPL: 0.6 RATIO (ref 1.1–2)
ALLENS TEST BLOOD GAS (OHS): YES
ALP SERPL-CCNC: 96 UNIT/L (ref 40–150)
ALT SERPL-CCNC: 12 UNIT/L (ref 0–55)
ANION GAP SERPL CALC-SCNC: 11 MEQ/L
AST SERPL-CCNC: 24 UNIT/L (ref 5–34)
BACTERIA #/AREA URNS AUTO: ABNORMAL /HPF
BASE EXCESS BLD CALC-SCNC: -0.5 MMOL/L (ref -2–2)
BILIRUB SERPL-MCNC: 0.7 MG/DL
BILIRUB UR QL STRIP.AUTO: NEGATIVE
BLOOD GAS SAMPLE TYPE (OHS): ABNORMAL
BUN SERPL-MCNC: 9.1 MG/DL (ref 8.4–25.7)
CA-I BLD-SCNC: 1.2 MMOL/L (ref 1.12–1.23)
CALCIUM SERPL-MCNC: 8.7 MG/DL (ref 8.8–10)
CHLORIDE SERPL-SCNC: 106 MMOL/L (ref 98–111)
CLARITY UR: CLEAR
CO2 BLDA-SCNC: 23.6 MMOL/L
CO2 SERPL-SCNC: 22 MMOL/L (ref 23–31)
COHGB MFR BLDA: 3.6 % (ref 0.5–1.5)
COLOR UR AUTO: ABNORMAL
CREAT SERPL-MCNC: 0.65 MG/DL (ref 0.72–1.25)
CREAT/UREA NIT SERPL: 14
DRAWN BY BLOOD GAS (OHS): ABNORMAL
ERYTHROCYTE [DISTWIDTH] IN BLOOD BY AUTOMATED COUNT: 14.3 % (ref 11.5–17)
GFR SERPLBLD CREATININE-BSD FMLA CKD-EPI: >60 ML/MIN/1.73/M2
GLOBULIN SER-MCNC: 4.2 GM/DL (ref 2.4–3.5)
GLUCOSE SERPL-MCNC: 71 MG/DL (ref 75–121)
GLUCOSE UR QL STRIP: NORMAL
HCO3 BLDA-SCNC: 22.6 MMOL/L (ref 22–26)
HCT VFR BLD AUTO: 31.7 % (ref 42–52)
HGB BLD-MCNC: 10.8 G/DL (ref 14–18)
HGB UR QL STRIP: NEGATIVE
HYALINE CASTS #/AREA URNS LPF: ABNORMAL /LPF
KETONES UR QL STRIP: ABNORMAL
LEUKOCYTE ESTERASE UR QL STRIP: NEGATIVE
LPM (OHS): 5
MCH RBC QN AUTO: 31.6 PG (ref 27–31)
MCHC RBC AUTO-ENTMCNC: 34.1 G/DL (ref 33–36)
MCV RBC AUTO: 92.7 FL (ref 80–94)
METHGB MFR BLDA: 0.6 % (ref 0.4–1.5)
MUCOUS THREADS URNS QL MICRO: ABNORMAL /LPF
NITRITE UR QL STRIP: NEGATIVE
NRBC BLD AUTO-RTO: 0 %
O2 HB BLOOD GAS (OHS): 94.3 % (ref 94–97)
OXYGEN DEVICE BLOOD GAS (OHS): ABNORMAL
OXYHGB MFR BLDA: 10.4 G/DL (ref 12–16)
PCO2 BLDA: 31 MMHG (ref 35–45)
PH BLDA: 7.47 [PH] (ref 7.35–7.45)
PH UR STRIP: 5 [PH]
PLATELET # BLD AUTO: 127 X10(3)/MCL (ref 130–400)
PLATELETS.RETICULATED NFR BLD AUTO: 3.2 % (ref 0.9–11.2)
PMV BLD AUTO: 10.3 FL (ref 7.4–10.4)
PO2 BLDA: 63 MMHG (ref 80–100)
POCT GLUCOSE: 82 MG/DL (ref 70–110)
POTASSIUM BLOOD GAS (OHS): 3.3 MMOL/L (ref 3.5–5)
POTASSIUM SERPL-SCNC: 3.5 MMOL/L (ref 3.5–5.1)
PROT SERPL-MCNC: 6.6 GM/DL (ref 5.8–7.6)
PROT UR QL STRIP: NEGATIVE
RBC # BLD AUTO: 3.42 X10(6)/MCL
RBC #/AREA URNS AUTO: ABNORMAL /HPF
SAMPLE SITE BLOOD GAS (OHS): ABNORMAL
SAO2 % BLDA: 93.2 %
SODIUM BLOOD GAS (OHS): 133 MMOL/L (ref 137–145)
SODIUM SERPL-SCNC: 139 MMOL/L (ref 136–145)
SP GR UR STRIP.AUTO: 1.02 (ref 1–1.03)
SQUAMOUS #/AREA URNS LPF: ABNORMAL /HPF
UROBILINOGEN UR STRIP-ACNC: NORMAL
WBC # BLD AUTO: 12.41 X10(3)/MCL (ref 4.5–11.5)
WBC #/AREA URNS AUTO: ABNORMAL /HPF

## 2025-01-05 PROCEDURE — 81001 URINALYSIS AUTO W/SCOPE: CPT | Performed by: NURSE PRACTITIONER

## 2025-01-05 PROCEDURE — 36415 COLL VENOUS BLD VENIPUNCTURE: CPT | Performed by: NURSE PRACTITIONER

## 2025-01-05 PROCEDURE — 94640 AIRWAY INHALATION TREATMENT: CPT

## 2025-01-05 PROCEDURE — 87040 BLOOD CULTURE FOR BACTERIA: CPT | Performed by: NURSE PRACTITIONER

## 2025-01-05 PROCEDURE — 63600175 PHARM REV CODE 636 W HCPCS: Performed by: STUDENT IN AN ORGANIZED HEALTH CARE EDUCATION/TRAINING PROGRAM

## 2025-01-05 PROCEDURE — 99900031 HC PATIENT EDUCATION (STAT)

## 2025-01-05 PROCEDURE — 94761 N-INVAS EAR/PLS OXIMETRY MLT: CPT

## 2025-01-05 PROCEDURE — 11000001 HC ACUTE MED/SURG PRIVATE ROOM

## 2025-01-05 PROCEDURE — 36600 WITHDRAWAL OF ARTERIAL BLOOD: CPT

## 2025-01-05 PROCEDURE — 36415 COLL VENOUS BLD VENIPUNCTURE: CPT

## 2025-01-05 PROCEDURE — 25000242 PHARM REV CODE 250 ALT 637 W/ HCPCS: Performed by: NURSE PRACTITIONER

## 2025-01-05 PROCEDURE — 27000221 HC OXYGEN, UP TO 24 HOURS

## 2025-01-05 PROCEDURE — 85027 COMPLETE CBC AUTOMATED: CPT

## 2025-01-05 PROCEDURE — 80053 COMPREHEN METABOLIC PANEL: CPT

## 2025-01-05 PROCEDURE — 25000003 PHARM REV CODE 250: Performed by: NURSE PRACTITIONER

## 2025-01-05 PROCEDURE — 99900035 HC TECH TIME PER 15 MIN (STAT)

## 2025-01-05 PROCEDURE — 94760 N-INVAS EAR/PLS OXIMETRY 1: CPT

## 2025-01-05 PROCEDURE — 63600175 PHARM REV CODE 636 W HCPCS: Performed by: SURGERY

## 2025-01-05 PROCEDURE — 82803 BLOOD GASES ANY COMBINATION: CPT

## 2025-01-05 PROCEDURE — 25000003 PHARM REV CODE 250: Performed by: SURGERY

## 2025-01-05 PROCEDURE — 63600175 PHARM REV CODE 636 W HCPCS: Performed by: NURSE PRACTITIONER

## 2025-01-05 PROCEDURE — 99233 SBSQ HOSP IP/OBS HIGH 50: CPT | Mod: GC,,, | Performed by: STUDENT IN AN ORGANIZED HEALTH CARE EDUCATION/TRAINING PROGRAM

## 2025-01-05 PROCEDURE — 63600175 PHARM REV CODE 636 W HCPCS

## 2025-01-05 RX ORDER — FUROSEMIDE 10 MG/ML
20 INJECTION INTRAMUSCULAR; INTRAVENOUS ONCE
Status: COMPLETED | OUTPATIENT
Start: 2025-01-05 | End: 2025-01-05

## 2025-01-05 RX ORDER — ACETAMINOPHEN 10 MG/ML
1000 INJECTION, SOLUTION INTRAVENOUS ONCE
Status: COMPLETED | OUTPATIENT
Start: 2025-01-05 | End: 2025-01-05

## 2025-01-05 RX ADMIN — SODIUM CHLORIDE, POTASSIUM CHLORIDE, SODIUM LACTATE AND CALCIUM CHLORIDE: 600; 310; 30; 20 INJECTION, SOLUTION INTRAVENOUS at 02:01

## 2025-01-05 RX ADMIN — PIPERACILLIN SODIUM AND TAZOBACTAM SODIUM 4.5 G: 4; .5 INJECTION, POWDER, LYOPHILIZED, FOR SOLUTION INTRAVENOUS at 08:01

## 2025-01-05 RX ADMIN — METHOCARBAMOL 1000 MG: 100 INJECTION INTRAMUSCULAR; INTRAVENOUS at 05:01

## 2025-01-05 RX ADMIN — IPRATROPIUM BROMIDE AND ALBUTEROL SULFATE 3 ML: 2.5; .5 SOLUTION RESPIRATORY (INHALATION) at 01:01

## 2025-01-05 RX ADMIN — IPRATROPIUM BROMIDE AND ALBUTEROL SULFATE 3 ML: 2.5; .5 SOLUTION RESPIRATORY (INHALATION) at 04:01

## 2025-01-05 RX ADMIN — IPRATROPIUM BROMIDE AND ALBUTEROL SULFATE 3 ML: 2.5; .5 SOLUTION RESPIRATORY (INHALATION) at 03:01

## 2025-01-05 RX ADMIN — MORPHINE SULFATE 2 MG: 4 INJECTION, SOLUTION INTRAMUSCULAR; INTRAVENOUS at 12:01

## 2025-01-05 RX ADMIN — ACETAMINOPHEN 1000 MG: 10 INJECTION, SOLUTION INTRAVENOUS at 01:01

## 2025-01-05 RX ADMIN — METOPROLOL TARTRATE 5 MG: 1 INJECTION, SOLUTION INTRAVENOUS at 12:01

## 2025-01-05 RX ADMIN — MUPIROCIN: 20 OINTMENT TOPICAL at 08:01

## 2025-01-05 RX ADMIN — FUROSEMIDE 20 MG: 10 INJECTION, SOLUTION INTRAMUSCULAR; INTRAVENOUS at 01:01

## 2025-01-05 RX ADMIN — IPRATROPIUM BROMIDE AND ALBUTEROL SULFATE 3 ML: 2.5; .5 SOLUTION RESPIRATORY (INHALATION) at 07:01

## 2025-01-05 RX ADMIN — DIPHENHYDRAMINE HYDROCHLORIDE 25 MG: 50 INJECTION INTRAMUSCULAR; INTRAVENOUS at 02:01

## 2025-01-05 RX ADMIN — IPRATROPIUM BROMIDE AND ALBUTEROL SULFATE 3 ML: 2.5; .5 SOLUTION RESPIRATORY (INHALATION) at 08:01

## 2025-01-05 NOTE — CONSULTS
Inpatient consult to Cardiology  Consult performed by: Anthony Marinelli ANP  Consult ordered by: Santhosh Busby FNP  Reason for consult: Tachycardia        [facility]    Cardiology  Consult Note    Patient Name: Kaleb Ibarra  MRN: [id number]  Admission Date: 1/2/2025  Hospital Length of Stay: 3 days  Code Status: Full Code   Attending Provider: Tommie Guaman MD   Consulting Provider: ANSON Robbins  Primary Care Physician: No, Primary Doctor  Principal Problem:<principal problem not specified>    Patient information was obtained from patient, past medical records, and ER records.     Subjective:     Chief Complaint/Reason for Consult: Tachycardia with Hx of PAF    HPI: Mr Ibarra is a 89 y/o male who is known to [facility], Dr. Siddiqui (Last Seen in 2020). The patient sustained a fall striking his head while on blood thinners for Stroke Risk Reduction in the Setting of AF. He presented at a Level 2 Trauma Activation and was worked up and found to have: Grade 4 Liver Laceration with Active Extravasation, L 8th Rib Fracture, L Clavicular Fracture and L Scapula Fracture. He is a resident of a local NH. He was admitted to the Trauma ICU. He did undergo a Celiac Artery Angiogram which was unremarkable. He was found to have Tachycardia and an EKG confirmed A.Fib with RVR. [facility] was consulted for AF.    PMH: Tobacco Use, Elevated Calcium Score, Prostate Cancer, Dementia, Dysphagia, Cataracts  PSH: Prostate Surgery   Family History: Reviewed and Unremarkable for Heart Disease  Social History: Denies Illicit Drug, ETOH and Tobacco Use; Former Smoker, 1/2 PPD for 10 + Years; Quit in 1961    Previous Cardiac Diagnostics: None    Review of patient's allergies indicates:  No Known Allergies  No current facility-administered medications on file prior to encounter.     Current Outpatient Medications on File Prior to Encounter   Medication Sig    acetaminophen (TYLENOL) 650 MG TbSR Take 650 mg by mouth every 8  (eight) hours as needed (Pain).    apixaban (ELIQUIS) 2.5 mg Tab Take by mouth 2 (two) times daily.    citalopram (CELEXA) 10 MG tablet Take 10 mg by mouth once daily.    loperamide (IMODIUM A-D) 2 mg Tab Take 2 mg by mouth every 6 (six) hours as needed (Diarrhea).    multivitamin with minerals tablet Take 1 tablet by mouth once daily.    ondansetron (ZOFRAN) 4 MG tablet Take 4 mg by mouth every 8 (eight) hours as needed for Nausea.    prednisoLONE-moxiflo-nepafenac 1-0.5-0.1 % DrpS Place 1 drop into both eyes 4 (four) times daily.     Review of Systems   Constitutional:  Positive for fatigue.   Respiratory:  Negative for shortness of breath.    Cardiovascular:  Negative for chest pain, palpitations and leg swelling.   All other systems reviewed and are negative.    Objective:     Vital Signs (Most Recent):  Temp: 98.5 °F (36.9 °C) (01/05/25 0000)  Pulse: 60 (01/05/25 0840)  Resp: 16 (01/05/25 0840)  BP: 97/61 (01/05/25 0800)  SpO2: 98 % (01/05/25 0840) Vital Signs (24h Range):  Temp:  [98 °F (36.7 °C)-98.7 °F (37.1 °C)] 98.5 °F (36.9 °C)  Pulse:  [] 60  Resp:  [14-26] 16  SpO2:  [91 %-99 %] 98 %  BP: ()/(58-86) 97/61   Weight: 68 kg (150 lb)  Body mass index is 22.81 kg/m².  SpO2: 98 %       Intake/Output Summary (Last 24 hours) at 1/5/2025 0900  Last data filed at 1/5/2025 0552  Gross per 24 hour   Intake 1571.76 ml   Output 500 ml   Net 1071.76 ml     Lines/Drains/Airways       Drain  Duration             Male External Urinary Catheter 01/02/25 2015 Small 2 days              Peripheral Intravenous Line  Duration                  Sheath 01/02/25 Right proximal;anterior 3 days         Peripheral IV - Single Lumen 01/04/25 1519 20 G Right Antecubital <1 day         Peripheral IV - Single Lumen 01/04/25 1520 20 G Anterior;Distal;Right Upper Arm <1 day                  Significant Labs:   Chemistries:   Recent Labs   Lab 01/02/25  1604 01/03/25  0424 01/03/25  1907 01/04/25  0433 01/05/25  0239     138 137 140 139   K 4.6 3.9 4.0 4.2 3.5    105 108 110 106   CO2 25 23 21* 23 22*   BUN 16.7 13.8 10.7 10.9 9.1   CREATININE 0.78 0.74 0.65* 0.64* 0.65*   CALCIUM 8.8 8.7* 8.3* 8.5* 8.7*   BILITOT 0.2 0.4 0.4 0.5 0.7   ALKPHOS 121 104 86 81 96   ALT 12 11 10 9 12   AST 25 17 16 22 24   GLUCOSE 129* 142* 111 104 71*        CBC/Anemia Labs: Coags:    Recent Labs   Lab 01/03/25  0424 01/03/25  0828 01/04/25  0433 01/05/25  0239   WBC 11.85*  --  15.05* 12.41*   HGB 12.6* 12.4* 10.4* 10.8*   HCT 38.0* 37.1* 30.8* 31.7*     --  113* 127*   MCV 95.0*  --  95.7* 92.7   RDW 14.4  --  14.6 14.3    Recent Labs   Lab 01/02/25  1457   INR 1.2   APTT 31.2        Significant Imaging:  Imaging Results              CT Chest Abdomen Pelvis With IV Contrast (XPD) NO Oral Contrast (Edited Result - FINAL)  Result time 01/02/25 18:44:38      Addendum (preliminary) 1 of 1 by Jenny Lama MD (01/02/25 18:44:38)      There are multiple compression deformities seen in the thoracic and lumbar spine of unknown chronicity.  Correlation with direct physical examination is recommended.  If the physical exam is ambiguous MRI correlation may be beneficial if acute fracture is suspected.      Electronically signed by: Shankar Lama  Date:    01/02/2025  Time:    18:44                 Final result by Jenny Lama MD (01/02/25 15:51:56)                   Impression:      Liver laceration along the inferior margin of the liver with its perihepatic blood seen and what appears to be an area of active extravasation of contrast along the inferior margin of the liver.  This would place this as a grade 4 injury due to active hemorrhage although the laceration size is small.    Acute appearing fracture of the left distal clavicle    Comminuted fracture of the body of the scapula on the left side    Left 8th rib fracture posteriorly    Cholelithiasis    Findings discussed with the trauma team provider Veda at time of  dictation      Electronically signed by: Shankar Lama  Date:    01/02/2025  Time:    15:51               Narrative:    EXAMINATION:  CT CHEST ABDOMEN PELVIS WITH IV CONTRAST (XPD)    CLINICAL HISTORY:  Trauma;    TECHNIQUE:  Low dose axial images, sagittal and coronal reformations were obtained from the thoracic inlet to the pubic symphysis following the IV contrast administration. Automatic exposure control is utilized to reduce patient radiation exposure.    COMPARISON:  None    FINDINGS:  The lungs are adequately aerated.  No pneumothorax is seen.  No pulmonary contusion is seen.  No pleural effusion is seen.  No infiltrate is seen.    The thoracic aorta is normal in caliber.  No dissection or aneurysm is seen.  No retrosternal hematoma is seen.    The abdominal aorta appears grossly unremarkable.  No dissection or posttraumatic changes are seen.    The heart appears normal.    There is perihepatic  blood seen along the inferior margin of the liver.  There is a blush of contrast seen in segment 6 of the liver on images number 98 through 102.  Findings are consistent with possible active bleed..  Due to the active blush of contrast displaces the injury and a grade 4 liver laceration.  The area of hemorrhage seems to be from a branch of the right portal vein.    There are multiple gallstones in the gallbladder...    The spleen appears normal.  No splenic laceration is seen.  The pancreas appears grossly unremarkable.  No pancreatic mass or lesion is seen.  No inflammation is seen.    No adrenal abnormality is seen.  No adrenal nodule is seen.    The kidneys are well perfused.  No hydronephrosis is seen.  No hydroureter is seen.  No retroperitoneal hematoma is seen.    Visualized portions of the bowel shows no acute abnormality.  No colitis is seen.  No diverticulitis is seen.  No colonic mass is seen.    No free air is seen.  No free fluid is seen.    Urinary bladder appears unremarkable.    There is an acute  appearing fracture of the very distal left clavicle.  It is nondisplaced.  It is best seen on coronal image 49 and axial image 11 series 9.  There is a subacute appearing fracture of the head of the clavicle on the left side.  There is a comminuted fracture of the scapula on the left side.  There is a fracture of the left 8th rib posteriorly.  No sternal fracture is seen.  No thoracic spine fracture is seen.  No lumbar spine fracture is seen.  There is old pelvic fracture of the iliac wing                                       CT Cervical Spine Without Contrast (Final result)  Result time 01/02/25 15:48:11      Final result by Lashay Henry MD (01/02/25 15:48:11)                   Impression:      No acute abnormality identified.      Electronically signed by: Lashay Henry  Date:    01/02/2025  Time:    15:48               Narrative:    EXAMINATION:  CT CERVICAL SPINE WITHOUT CONTRAST    CLINICAL HISTORY:  Trauma;    TECHNIQUE:  Noncontrast CT images of the cervical spine. Axial, coronal, and sagittal reformatted images were obtained. Dose length product is hree 14 mGycm. Automatic exposure control, adjustment of mA/kV or iterative reconstruction technique was used to limit radiation dose.    COMPARISON:  None    FINDINGS:  The cervical spine is visualized through the level T1.    There is no acute fracture identified.  There are multilevel degenerative changes with disc height loss, marginal osteophyte formation and facet arthropathy.  There is no paraspinal hematoma.                                       CT Head Without Contrast (Final result)  Result time 01/02/25 15:37:35      Final result by Lashay Henry MD (01/02/25 15:37:35)                   Impression:      1. No acute intracranial abnormality.  2. Chronic microvascular ischemic changes.      Electronically signed by: Lashay Henry  Date:    01/02/2025  Time:    15:37               Narrative:    EXAMINATION:  CT HEAD WITHOUT  CONTRAST    CLINICAL HISTORY:  Trauma;    TECHNIQUE:  Axial scans were obtained from skull base to the vertex.    Coronal and sagittal reconstructions obtained from the axial data.    Automatic exposure control was utilized to limit radiation dose.    Contrast: None    Radiation Dose:    Total DLP: 1171 mGy*cm    COMPARISON:  None    FINDINGS:  There is no acute intracranial hemorrhage or edema. The gray-white matter differentiation is preserved.  Scattered hypodensities in the subcortical and periventricular white matter likely represent chronic microvascular ischemic changes.    There is no mass effect or midline shift.  There is diffuse parenchymal volume loss.  The basal cisterns are patent. There is no abnormal extra-axial fluid collection.  The carotid and vertebral artery calcifications are noted.    There is soft tissue swelling in the posterior scalp.  The calvarium and skull base are intact. The visualized paranasal sinuses and the mastoid air cells are clear.                                       X-Ray Chest 1 View (Final result)  Result time 01/02/25 16:07:51      Final result by Larry Marlow MD (01/02/25 16:07:51)                   Impression:      No acute pulmonary process identified.      Electronically signed by: Larry Marlow  Date:    01/02/2025  Time:    16:07               Narrative:    EXAMINATION:  XR CHEST 1 VIEW    CLINICAL HISTORY:  r/o bleeding or hemorrhage;    TECHNIQUE:  Frontal view(s) of the chest.    COMPARISON:  No relevant comparison studies available at the time of dictation.    FINDINGS:  Normal cardiac silhouette.  The lungs are well-inflated.  Minimal left lower lung atelectasis.  No significant pleural fluid.  No pneumothorax appreciated.  Minimally displaced distal left clavicle fracture.                                       X-Ray Pelvis Routine AP (Final result)  Result time 01/02/25 16:09:41      Final result by Larry Marlow MD (01/02/25 16:09:41)                    Impression:      Suboptimal assessment, but no definitive acute osseous findings.      Electronically signed by: Larry Marlow  Date:    01/02/2025  Time:    16:09               Narrative:    EXAMINATION:  XR PELVIS ROUTINE AP    CLINICAL HISTORY:  r/o bleeding or hemorrhage;    TECHNIQUE:  AP view of the pelvis.    COMPARISON:  None    FINDINGS:  Prior internal fixation of the left femur.  Suboptimal evaluation due to positioning and decreased overall bone mineralization.  No definitive acute fracture.  Hips are aligned.                                    EKG:       Telemetry: In and Out of SR/PAF with CVR    Physical Exam  Constitutional:       General: He is not in acute distress.     Appearance: Normal appearance.   HENT:      Head: Normocephalic.      Mouth/Throat:      Mouth: Mucous membranes are dry.   Cardiovascular:      Rate and Rhythm: Normal rate and regular rhythm.      Pulses: Normal pulses.      Heart sounds: No murmur heard.  Pulmonary:      Effort: Pulmonary effort is normal. No respiratory distress.      Breath sounds: Normal breath sounds.      Comments: NC O2  Abdominal:      Palpations: Abdomen is soft.   Musculoskeletal:         General: No swelling. Normal range of motion.   Skin:     General: Skin is warm.   Neurological:      General: No focal deficit present.      Mental Status: He is alert.      Comments: Some Confused Conversation   Psychiatric:         Mood and Affect: Mood normal.         Behavior: Behavior normal.       Home Medications:   No current facility-administered medications on file prior to encounter.     Current Outpatient Medications on File Prior to Encounter   Medication Sig Dispense Refill    acetaminophen (TYLENOL) 650 MG TbSR Take 650 mg by mouth every 8 (eight) hours as needed (Pain).      apixaban (ELIQUIS) 2.5 mg Tab Take by mouth 2 (two) times daily.      citalopram (CELEXA) 10 MG tablet Take 10 mg by mouth once daily.      loperamide (IMODIUM A-D) 2 mg Tab  Take 2 mg by mouth every 6 (six) hours as needed (Diarrhea).      multivitamin with minerals tablet Take 1 tablet by mouth once daily.      ondansetron (ZOFRAN) 4 MG tablet Take 4 mg by mouth every 8 (eight) hours as needed for Nausea.      prednisoLONE-moxiflo-nepafenac 1-0.5-0.1 % DrpS Place 1 drop into both eyes 4 (four) times daily.       Current Schedule Inpatient Medications:   albuterol-ipratropium  3 mL Nebulization Q4H    citalopram  10 mg Oral Daily    methocarbamol injection  1,000 mg Intravenous Q8H    mupirocin   Nasal BID     Continuous Infusions:   lactated ringers   Intravenous Continuous 125 mL/hr at 01/05/25 0244 New Bag at 01/05/25 0244     Assessment:   PAF with RVR - Now In and Out of SR/PAF with CVR  Ground Level Fall Striking Head - Presenting at Level II Trauma  Grade 4 Liver Laceration with Active Extravasation, L 8th Rib Fracture, L Clavicular Fracture and L Scapula Fracture  No Hx of GIB    Plan:   Ok to Hold AC given Polytrauma  No Rate Lowering Medications given Borderline BP  Will Continue to Follow  Labs and EKG in AM: CBC, CMP and Mg    Thank you for your consult.     Anthony Marinelli, ANP  Cardiology  [facility]

## 2025-01-06 LAB
ALBUMIN SERPL-MCNC: 2 G/DL (ref 3.4–4.8)
ALBUMIN/GLOB SERPL: 0.6 RATIO (ref 1.1–2)
ALP SERPL-CCNC: 70 UNIT/L (ref 40–150)
ALT SERPL-CCNC: 11 UNIT/L (ref 0–55)
ANION GAP SERPL CALC-SCNC: 8 MEQ/L
AST SERPL-CCNC: 25 UNIT/L (ref 5–34)
BILIRUB SERPL-MCNC: 0.7 MG/DL
BUN SERPL-MCNC: 12 MG/DL (ref 8.4–25.7)
CALCIUM SERPL-MCNC: 8.1 MG/DL (ref 8.8–10)
CHLORIDE SERPL-SCNC: 107 MMOL/L (ref 98–111)
CO2 SERPL-SCNC: 21 MMOL/L (ref 23–31)
CREAT SERPL-MCNC: 0.65 MG/DL (ref 0.72–1.25)
CREAT/UREA NIT SERPL: 18
CRP SERPL-MCNC: 215.7 MG/L
ERYTHROCYTE [DISTWIDTH] IN BLOOD BY AUTOMATED COUNT: 14.5 % (ref 11.5–17)
GFR SERPLBLD CREATININE-BSD FMLA CKD-EPI: >60 ML/MIN/1.73/M2
GLOBULIN SER-MCNC: 3.5 GM/DL (ref 2.4–3.5)
GLUCOSE SERPL-MCNC: 81 MG/DL (ref 75–121)
HCT VFR BLD AUTO: 31 % (ref 42–52)
HGB BLD-MCNC: 9.9 G/DL (ref 14–18)
MCH RBC QN AUTO: 32.1 PG (ref 27–31)
MCHC RBC AUTO-ENTMCNC: 31.9 G/DL (ref 33–36)
MCV RBC AUTO: 100.6 FL (ref 80–94)
NRBC BLD AUTO-RTO: 0 %
OHS QRS DURATION: 108 MS
OHS QTC CALCULATION: 416 MS
PLATELET # BLD AUTO: 95 X10(3)/MCL (ref 130–400)
PLATELETS.RETICULATED NFR BLD AUTO: 4.3 % (ref 0.9–11.2)
PMV BLD AUTO: 11.2 FL (ref 7.4–10.4)
POTASSIUM SERPL-SCNC: 3.5 MMOL/L (ref 3.5–5.1)
PREALB SERPL-MCNC: 4.4 MG/DL (ref 16–42)
PROT SERPL-MCNC: 5.5 GM/DL (ref 5.8–7.6)
RBC # BLD AUTO: 3.08 X10(6)/MCL
SODIUM SERPL-SCNC: 136 MMOL/L (ref 136–145)
WBC # BLD AUTO: 11.57 X10(3)/MCL (ref 4.5–11.5)

## 2025-01-06 PROCEDURE — 84134 ASSAY OF PREALBUMIN: CPT

## 2025-01-06 PROCEDURE — 94760 N-INVAS EAR/PLS OXIMETRY 1: CPT

## 2025-01-06 PROCEDURE — 93005 ELECTROCARDIOGRAM TRACING: CPT

## 2025-01-06 PROCEDURE — 63600175 PHARM REV CODE 636 W HCPCS: Performed by: NURSE PRACTITIONER

## 2025-01-06 PROCEDURE — 97530 THERAPEUTIC ACTIVITIES: CPT

## 2025-01-06 PROCEDURE — 92526 ORAL FUNCTION THERAPY: CPT

## 2025-01-06 PROCEDURE — 11000001 HC ACUTE MED/SURG PRIVATE ROOM

## 2025-01-06 PROCEDURE — 99900035 HC TECH TIME PER 15 MIN (STAT)

## 2025-01-06 PROCEDURE — 94640 AIRWAY INHALATION TREATMENT: CPT

## 2025-01-06 PROCEDURE — 25000242 PHARM REV CODE 250 ALT 637 W/ HCPCS: Performed by: NURSE PRACTITIONER

## 2025-01-06 PROCEDURE — 97530 THERAPEUTIC ACTIVITIES: CPT | Mod: CO

## 2025-01-06 PROCEDURE — 36415 COLL VENOUS BLD VENIPUNCTURE: CPT

## 2025-01-06 PROCEDURE — 93010 ELECTROCARDIOGRAM REPORT: CPT | Mod: ,,, | Performed by: STUDENT IN AN ORGANIZED HEALTH CARE EDUCATION/TRAINING PROGRAM

## 2025-01-06 PROCEDURE — 80053 COMPREHEN METABOLIC PANEL: CPT

## 2025-01-06 PROCEDURE — 99900031 HC PATIENT EDUCATION (STAT)

## 2025-01-06 PROCEDURE — 86140 C-REACTIVE PROTEIN: CPT

## 2025-01-06 PROCEDURE — 25000003 PHARM REV CODE 250: Performed by: NURSE PRACTITIONER

## 2025-01-06 PROCEDURE — 27000221 HC OXYGEN, UP TO 24 HOURS

## 2025-01-06 PROCEDURE — 85027 COMPLETE CBC AUTOMATED: CPT

## 2025-01-06 RX ORDER — ATROPINE SULFATE 0.1 MG/ML
INJECTION INTRAVENOUS
Status: DISPENSED
Start: 2025-01-06 | End: 2025-01-06

## 2025-01-06 RX ADMIN — PIPERACILLIN SODIUM AND TAZOBACTAM SODIUM 4.5 G: 4; .5 INJECTION, POWDER, LYOPHILIZED, FOR SOLUTION INTRAVENOUS at 04:01

## 2025-01-06 RX ADMIN — IPRATROPIUM BROMIDE AND ALBUTEROL SULFATE 3 ML: 2.5; .5 SOLUTION RESPIRATORY (INHALATION) at 07:01

## 2025-01-06 RX ADMIN — MUPIROCIN: 20 OINTMENT TOPICAL at 08:01

## 2025-01-06 RX ADMIN — SODIUM CHLORIDE, POTASSIUM CHLORIDE, SODIUM LACTATE AND CALCIUM CHLORIDE: 600; 310; 30; 20 INJECTION, SOLUTION INTRAVENOUS at 01:01

## 2025-01-06 RX ADMIN — IPRATROPIUM BROMIDE AND ALBUTEROL SULFATE 3 ML: 2.5; .5 SOLUTION RESPIRATORY (INHALATION) at 08:01

## 2025-01-06 RX ADMIN — PIPERACILLIN SODIUM AND TAZOBACTAM SODIUM 4.5 G: 4; .5 INJECTION, POWDER, LYOPHILIZED, FOR SOLUTION INTRAVENOUS at 01:01

## 2025-01-06 RX ADMIN — IPRATROPIUM BROMIDE AND ALBUTEROL SULFATE 3 ML: 2.5; .5 SOLUTION RESPIRATORY (INHALATION) at 12:01

## 2025-01-06 RX ADMIN — IPRATROPIUM BROMIDE AND ALBUTEROL SULFATE 3 ML: 2.5; .5 SOLUTION RESPIRATORY (INHALATION) at 04:01

## 2025-01-06 RX ADMIN — IPRATROPIUM BROMIDE AND ALBUTEROL SULFATE 3 ML: 2.5; .5 SOLUTION RESPIRATORY (INHALATION) at 11:01

## 2025-01-06 RX ADMIN — PIPERACILLIN SODIUM AND TAZOBACTAM SODIUM 4.5 G: 4; .5 INJECTION, POWDER, LYOPHILIZED, FOR SOLUTION INTRAVENOUS at 08:01

## 2025-01-06 NOTE — PROGRESS NOTES
Trauma Surgery   Progress Note  Patient Name: Kaleb Ibarra                   : 1934     MRN: [id number]   Date of Admission: 2025  Code Status: Full Code  Date of Exam: 2025  HD#4  POD#4 Days Post-Op  Attending Provider: Tommie Guaman MD    Subjective:   Interval history:  AF HDS. NAEO.  Working with PT/OT at time of rounds.   Cardiology following for afib with RVR 2 nights ago.   Failed swallow study with SLP, now NPO.  Working with PT/OT - recommending moderate intensity therapy.       Home Meds:   Current Outpatient Medications   Medication Instructions    acetaminophen (TYLENOL) 650 mg, Every 8 hours PRN    apixaban (ELIQUIS) 2.5 mg Tab 2 times daily    citalopram (CELEXA) 10 mg, Daily    loperamide (IMODIUM A-D) 2 mg, Every 6 hours PRN    multivitamin with minerals tablet 1 tablet, Daily    ondansetron (ZOFRAN) 4 mg, Every 8 hours PRN    prednisoLONE-moxiflo-nepafenac 1-0.5-0.1 % DrpS 1 drop, Both Eyes, 4 times daily      Scheduled Meds:   albuterol-ipratropium  3 mL Nebulization Q4H    atropine        citalopram  10 mg Oral Daily    mupirocin   Nasal BID    piperacillin-tazobactam (Zosyn) IV (PEDS and ADULTS) (extended infusion is not appropriate)  4.5 g Intravenous Q8H     Continuous Infusions:   lactated ringers   Intravenous Continuous 125 mL/hr at 25 0609 Rate Verify at 25 0609     PRN Meds:  Current Facility-Administered Medications:     acetaminophen, 650 mg, Oral, Q8H PRN    atropine, , ,     diphenhydrAMINE, 25 mg, Intravenous, Q6H PRN    LIDOcaine (PF) 10 mg/ml (1%), 1 mL, Intradermal, Once PRN    melatonin, 6 mg, Oral, Nightly PRN    metoprolol, 5 mg, Intravenous, Q5 Min PRN    morphine, 2 mg, Intravenous, Q4H PRN    ondansetron, 8 mg, Oral, Q8H PRN    oxyCODONE, 5 mg, Oral, Q6H PRN    oxyCODONE, 10 mg, Oral, Q6H PRN    sodium chloride 0.9%, 10 mL, Intravenous, PRN     Objective:     VITAL SIGNS: 24 HR MIN & MAX LAST   Temp  Min: 97.4 °F (36.3 °C)  Max: 101.6 °F (38.7  "°C)  97.7 °F (36.5 °C)   BP  Min: 74/49  Max: 149/101  (!) 104/54    Pulse  Min: 61  Max: 134  70    Resp  Min: 10  Max: 34  19    SpO2  Min: 86 %  Max: 100 %  97 %      HT: 5' 8" (172.7 cm)  WT: 68 kg (150 lb)  BMI: 22.8     Intake/output:  Intake/Output - Last 3 Shifts         01/04 0700 01/05 0659 01/05 0700 01/06 0659 01/06 0700 01/07 0659    I.V. (mL/kg) 1571.8 (23.1) 1204.8 (17.7)     IV Piggyback  138.2     Total Intake(mL/kg) 1571.8 (23.1) 1343 (19.7)     Urine (mL/kg/hr) 500 (0.3) 300 (0.2)     Other  0     Stool       Total Output 500 300     Net +1071.8 +1043            Urine Occurrence 1 x 2 x             Intake/Output Summary (Last 24 hours) at 1/6/2025 1158  Last data filed at 1/6/2025 0609  Gross per 24 hour   Intake 1342.97 ml   Output 300 ml   Net 1042.97 ml         Lines/drains/airway:       Peripheral IV - Single Lumen 01/02/25 1424 18 G Anterior;Right Forearm (Active)   Site Assessment Clean;Dry;Intact 01/04/25 0400   Line Securement Device Secured with sutureless device 01/03/25 0750   Extremity Assessment Distal to IV No abnormal discoloration;No redness;No swelling 01/04/25 0400   Line Status Capped;Flushed;Saline locked 01/04/25 0400   Dressing Status Clean;Dry;Intact 01/04/25 0400   Dressing Intervention Integrity maintained 01/04/25 0400   Number of days: 1            Peripheral IV - Single Lumen 01/03/25 1745 20 G Posterior;Right Forearm (Active)   Site Assessment Clean;Intact;Dry 01/04/25 0400   Extremity Assessment Distal to IV No abnormal discoloration;No redness;No swelling 01/04/25 0400   Line Status Flushed;Infusing 01/04/25 0400   Dressing Status Clean;Dry;Intact 01/04/25 0400   Dressing Intervention Integrity maintained 01/04/25 0400   Number of days: 0            Sheath 01/02/25 Right proximal;anterior (Active)   Insertion Site no hematoma 01/04/25 0400   Drainage Characteristics/Odor Sanguineous 01/03/25 0800   Drainage Amount Scant 01/03/25 0800   Number of days: 2       Male " "External Urinary Catheter 01/02/25 2015 Small (Active)   Securement Method secured to lower ABD w/ adhesive device 01/04/25 0400   Skin no redness;no breakdown 01/04/25 0400   Tolerance no signs/symptoms of discomfort 01/04/25 0400   Output (mL) 200 mL 01/04/25 0545   Catheter Change Date 01/03/25 01/04/25 0400   Catheter Change Time 2000 01/04/25 0400   Number of days: 1       Physical examination:  Gen: NAD, AAOx3, answering questions appropriately  HEENT: NC  CV: RR  Resp: NWOB  Abd: S/NT/ND  Msk: moving all extremities spontaneously and purposefully  Neuro: CN II-XII grossly intact, GCS 15  Skin/wounds: L hand abrasions    Labs:  Renal:  Recent Labs     01/03/25 1907 01/04/25 0433 01/05/25 0239 01/06/25  0405   BUN 10.7 10.9 9.1 12.0   CREATININE 0.65* 0.64* 0.65* 0.65*     No results for input(s): "LACTIC" in the last 72 hours.  FEN/GI:  Recent Labs     01/03/25 1907 01/04/25 0433 01/05/25 0239 01/06/25  0405    140 139 136   K 4.0 4.2 3.5 3.5    110 106 107   CO2 21* 23 22* 21*   CALCIUM 8.3* 8.5* 8.7* 8.1*   ALBUMIN 2.4* 2.3* 2.4* 2.0*   BILITOT 0.4 0.5 0.7 0.7   AST 16 22 24 25   ALKPHOS 86 81 96 70   ALT 10 9 12 11     Heme:  Recent Labs     01/04/25 0433 01/05/25 0239 01/06/25  0405   HGB 10.4* 10.8* 9.9*   HCT 30.8* 31.7* 31.0*   * 127* 95*     ID:  Recent Labs     01/04/25 0433 01/05/25 0239 01/06/25  0405   WBC 15.05* 12.41* 11.57*     CBG:  Recent Labs     01/03/25 1907 01/04/25 0433 01/05/25 0239 01/06/25  0405   GLUCOSE 111 104 71* 81      Recent Labs     01/05/25  0519   POCTGLUCOSE 82      Cardiovascular:  No results for input(s): "TROPONINI", "CKTOTAL", "CKMB", "BNP" in the last 168 hours.  I have reviewed all pertinent lab results within the past 24 hours.    Imaging:  X-Ray Chest 1 View   Final Result      Little interval change.         Electronically signed by: Larry Marlow   Date:    01/06/2025   Time:    06:02      X-Ray Chest 1 View   Final Result    "   Progression of congestive failure.         Electronically signed by: Derrick Tarango   Date:    01/05/2025   Time:    21:13      X-Ray Chest 1 View   Final Result      Interval for development or significant worsening of bilateral infiltrates and right-sided pleural fluid.         Electronically signed by: Evelio Mace MD   Date:    01/05/2025   Time:    13:44      Fl Modified Barium Swallow Speech   Final Result      CT Head Without Contrast   Final Result      1.  No acute intracranial findings identified.      2.  Chronic microangiopathic ischemia and atrophy..         Electronically signed by: Derrick Tarango   Date:    01/04/2025   Time:    12:17      X-Ray Chest 1 View   Final Result      Atelectatic changes in the left retrocardiac region.      Otherwise no active pulmonary disease and no significant change as compared with the previous exam         Electronically signed by: Gokul Turner   Date:    01/03/2025   Time:    07:54      CT Chest Abdomen Pelvis With IV Contrast (XPD) NO Oral Contrast   Final Result   Addendum (preliminary) 1 of 1      There are multiple compression deformities seen in the thoracic and lumbar    spine of unknown chronicity.  Correlation with direct physical examination    is recommended.  If the physical exam is ambiguous MRI correlation may be    beneficial if acute fracture is suspected.         Electronically signed by: Shankar Lama   Date:    01/02/2025   Time:    18:44      Final      Liver laceration along the inferior margin of the liver with its perihepatic blood seen and what appears to be an area of active extravasation of contrast along the inferior margin of the liver.  This would place this as a grade 4 injury due to active hemorrhage although the laceration size is small.      Acute appearing fracture of the left distal clavicle      Comminuted fracture of the body of the scapula on the left side      Left 8th rib fracture posteriorly      Cholelithiasis       Findings discussed with the trauma team provider Veda at time of dictation         Electronically signed by: Shankar Lama   Date:    01/02/2025   Time:    15:51      CT Cervical Spine Without Contrast   Final Result      No acute abnormality identified.         Electronically signed by: Lashay Henry   Date:    01/02/2025   Time:    15:48      CT Head Without Contrast   Final Result      1. No acute intracranial abnormality.   2. Chronic microvascular ischemic changes.         Electronically signed by: Lashay Henry   Date:    01/02/2025   Time:    15:37      X-Ray Chest 1 View   Final Result      No acute pulmonary process identified.         Electronically signed by: Larry Marlow   Date:    01/02/2025   Time:    16:07      X-Ray Pelvis Routine AP   Final Result      Suboptimal assessment, but no definitive acute osseous findings.         Electronically signed by: Larry Marlow   Date:    01/02/2025   Time:    16:09         I have reviewed all pertinent imaging results/findings within the past 24 hours.    Micro/Path/Other:  Microbiology Results (last 7 days)       Procedure Component Value Units Date/Time    Blood Culture [[id number]] Collected: 01/05/25 2050    Order Status: Resulted Specimen: Blood Updated: 01/05/25 2115    Blood Culture [[id number]] Collected: 01/05/25 2052    Order Status: Resulted Specimen: Blood Updated: 01/05/25 2115           Pathology Results  (Last 7 days)      None             Problems list:  There are no active problems to display for this patient.     There are no hospital problems to display for this patient.      Assessment & Plan:   Patient is an approximately 65 year old male who presents following fall on thinners with head strike.      Small liver laceration with active extravasation, G4  - s/p mesenteric angiography for suspected liver hemorrhage   - Chronic findings per Vascular     Left 8th rib fracture  - Pain control  - Frequent IS, flutter valve     Left  clavicle fracture  Left scapula fracture  - NWB ROMAT LUE Sling for comfort     Dysphagia  - Consult speech'  - Failed swallow study 1/5  - NPO    Vtach runs, congestive failure changes on CXR  - Cardiology following    Dysphagia  - Consult speech'  - Failed swallow study 1/5  - NPO    Vtach runs, congestive failure changes on CXR  - Cardiology following  - Plan for Leadless PPM Implant 1/6    Fall  - Consults: Interventional vascular, Orthopedics, Cardiology  - MMPC  - Bowel regimen  - Anti-emetics: PRN  - Diet: NPO  - VTE ppx: SCDs               - Hold Lovenox given injury  - daily labs   - PT/OT  - Frequent IS, flutter valve      Willis Goldman MD  General Surgery PGY-1  [facility]

## 2025-01-06 NOTE — PT/OT/SLP PROGRESS
Physical Therapy Treatment    Patient Name:  Kaleb Ibarra   MRN:  [id number]    Recommendations:     Discharge therapy intensity: Moderate Intensity Therapy   Discharge Equipment Recommendations: to be determined by next level of care  Barriers to discharge: Impaired mobility and Ongoing medical needs    Assessment:     Kaleb Ibarra is a 89 y.o. adult admitted with a medical diagnosis of  L 8th rib fracture, L clavicle fracture, L scapula fracture, grade 4 liver laceration with extravasation s/p celiac artery angio.  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 .  He presents with the following impairments/functional limitations: weakness, gait instability, impaired balance, impaired endurance, impaired self care skills, impaired functional mobility     Rehab Prognosis: Good; patient would benefit from acute skilled PT services to address these deficits and reach maximum level of function.    Recent Surgery: Procedure(s) (LRB):  [id number] (N/A)      Plan:     During this hospitalization, patient would benefit from acute PT services 5 x/week to address the identified rehab impairments via gait training, therapeutic activities, therapeutic exercises and progress toward the following goals:    Plan of Care Expires:  02/04/25    Subjective     Chief Complaint:   Patient/Family Comments/goals:   Pain/Comfort:         Objective:     Communicated with nurse prior to session.  Patient found up in chair with bed alarm, blood pressure cuff, SCD, telemetry, pulse ox (continuous) upon PT entry to room.     General Precautions: Standard, aspiration  Orthopedic Precautions: LUE non weight bearing  Braces: UE Sling  Respiratory Status: Room air  Blood Pressure:   Skin Integrity: Visible skin intact      Functional Mobility:  Bed Mobility:     Sit to Supine: of moda x 2 persons  Transfers:     Sit to Stand:  of moda x 2  persons with no AD  Bed to Chair: of moda x 2  persons with  no AD  using  Stand Pivot  Gait: pt took  2-3 steps with moda wit no ad and PT in front. Pt started sreaming to put him back in bed so we complied    Therapeutic Activities/Exercises:      Education:  Patient provided with verbal education education regarding PT role/goals/POC, fall prevention, and safety awareness.  Additional teaching is warranted.     Patient left supine with all lines intact and call button in reach    GOALS:   Multidisciplinary Problems       Physical Therapy Goals          Problem: Physical Therapy    Goal Priority Disciplines Outcome Interventions   Physical Therapy Goal     PT, PT/OT Progressing    Description: Goals to be met by: 25     Patient will increase functional independence with mobility by performin. Supine to sit with Stand-by Assistance  2. Sit to supine with Stand-by Assistance  3. Sit to stand transfer with Stand-by Assistance  4. Gait  x 50 feet with Minimal Assistance using Single-point Cane/quad cane.                          Time Tracking:     PT Received On:    PT Start Time: 1210     PT Stop Time: 1230  PT Total Time (min): 20 min     Billable Minutes: Therapeutic Activity 20    Treatment Type: Treatment  PT/PTA: PT     Number of PTA visits since last PT visit: 2025

## 2025-01-06 NOTE — PT/OT/SLP PROGRESS
[facility]   Speech Language Pathology Department  Dysphagia Therapy Progress Note    Patient Name:  Kaleb Ibarra   MRN:  [id number]  Admitting Diagnosis: fall    Recommendations     General recommendations:  continue dysphagia therapy as established and consider palliative/hospice care with PO as tolerated for pleasure/quality of life  Solid texture recommendation:  NPO  Liquid consistency recommendation: NPO   Medications: NPO    Discharge therapy intensity: Moderate Intensity Therapy   Barriers to safe discharge:  acuity of illness    Subjective     Patient awake, alert, and cooperative.  Confusion noted  Spiritual/Cultural/Sikhism Beliefs/Practices that affect care: no    Pain/Comfort: Pain Rating 1: 0/10    Respiratory Status:  4L via nasal cannula    Objective     Oral Musculature  Secretion Management: adequate  Mucosal Quality: good  Vocal Quality: adequate    Therapeutic PO Trials:  Consistency Fed By Oral Symptoms Pharyngeal Symptoms   Puree SLP Bolus holding  Slowed oral transit time Reduced laryngeal movement to palpation  Multiple swallows (6+)  Wet vocal quality after swallow  Throat clear after swallow   Mildly thick liquid by straw SLP Weak sucking  Bolus holding  Slowed oral transit time Reduced laryngeal movement to palpation  Multiple swallows (6+)  Wet vocal quality after swallow  Cough after swallow     Assessment     Pt continues to present with oropharyngeal dysphagia and remains unsafe for PO diet.    Outcome Measures     Functional Oral Intake Scale: 1 - Nothing by mouth    Goals     Multidisciplinary Problems       SLP Goals          Problem: SLP    Goal Priority Disciplines Outcome   SLP Goal     SLP    Description: LTG: Pt will tolerate baseline diet with no clinical signs/sx of aspiration.     ST. CTAR exercises  2.  Mildly thick trials  3.  Puree trials                     Patient Education     No learner present/available.    Plan     Will continue to  follow and tx as appropriate.    SLP Follow-Up:  Yes   Patient to be seen:  daily   Plan of Care expires:  01/11/25  Plan of Care reviewed with:  patient       Time Tracking     SLP Treatment Date:   01/06/25  Speech Start Time:  0925  Speech Stop Time:  0940     Speech Total Time (min):  15 min    Billable minutes:  Treatment of Swallow Dysfunction, 15 minutes       01/06/2025

## 2025-01-06 NOTE — PROGRESS NOTES
Trauma Surgery   Progress Note  Patient Name: Kaleb Ibarra                   : 1934     MRN: [id number]   Date of Admission: 2025  Code Status: Full Code  Date of Exam: 2025  HD#3  POD#3 Days Post-Op  Attending Provider: Tommie Guaman MD    Subjective:   Interval history:  Two separate runs of V-tach overnight.  First 6 beat 2nd 15 be.  Chest x-ray showing progressive congestive failure.  T-max 101.6°.  Now hypotensive.  White blood cell count decreased.  Cardiology consulted.  UA negative.  Blood cultures pending.    Home Meds:   Current Outpatient Medications   Medication Instructions    acetaminophen (TYLENOL) 650 mg, Every 8 hours PRN    apixaban (ELIQUIS) 2.5 mg Tab 2 times daily    citalopram (CELEXA) 10 mg, Daily    loperamide (IMODIUM A-D) 2 mg, Every 6 hours PRN    multivitamin with minerals tablet 1 tablet, Daily    ondansetron (ZOFRAN) 4 mg, Every 8 hours PRN    prednisoLONE-moxiflo-nepafenac 1-0.5-0.1 % DrpS 1 drop, Both Eyes, 4 times daily      Scheduled Meds:   albuterol-ipratropium  3 mL Nebulization Q4H    citalopram  10 mg Oral Daily    methocarbamol injection  1,000 mg Intravenous Q8H    mupirocin   Nasal BID    piperacillin-tazobactam (Zosyn) IV (PEDS and ADULTS) (extended infusion is not appropriate)  4.5 g Intravenous Q8H     Continuous Infusions:   lactated ringers   Intravenous Continuous 125 mL/hr at 25 0244 New Bag at 25 0244     PRN Meds:  Current Facility-Administered Medications:     acetaminophen, 650 mg, Oral, Q8H PRN    diphenhydrAMINE, 25 mg, Intravenous, Q6H PRN    LIDOcaine (PF) 10 mg/ml (1%), 1 mL, Intradermal, Once PRN    melatonin, 6 mg, Oral, Nightly PRN    metoprolol, 5 mg, Intravenous, Q5 Min PRN    morphine, 2 mg, Intravenous, Q4H PRN    ondansetron, 8 mg, Oral, Q8H PRN    oxyCODONE, 5 mg, Oral, Q6H PRN    oxyCODONE, 10 mg, Oral, Q6H PRN    sodium chloride 0.9%, 10 mL, Intravenous, PRN     Objective:     VITAL SIGNS: 24 HR MIN & MAX LAST   Temp  " Min: 97.4 °F (36.3 °C)  Max: 101.6 °F (38.7 °C)  97.4 °F (36.3 °C)   BP  Min: 74/49  Max: 149/101  (!) 81/52    Pulse  Min: 60  Max: 134  88    Resp  Min: 14  Max: 34  16    SpO2  Min: 86 %  Max: 98 %  97 % (ra)      HT: 5' 8" (172.7 cm)  WT: 68 kg (150 lb)  BMI: 22.8     Intake/output:  Intake/Output - Last 3 Shifts         01/03 0700 01/04 0659 01/04 0700 01/05 0659 01/05 0700 01/06 0659    I.V. (mL/kg) 3739.5 (55) 1571.8 (23.1)     IV Piggyback 1000      Total Intake(mL/kg) 4739.5 (69.7) 1571.8 (23.1)     Urine (mL/kg/hr) 500 (0.3) 500 (0.3)     Other 0      Stool 0      Total Output 500 500     Net +4239.5 +1071.8            Urine Occurrence 1 x 1 x     Stool Occurrence 2 x              Intake/Output Summary (Last 24 hours) at 1/5/2025 2127  Last data filed at 1/5/2025 0552  Gross per 24 hour   Intake 917.35 ml   Output --   Net 917.35 ml         Lines/drains/airway:       Peripheral IV - Single Lumen 01/02/25 1424 18 G Anterior;Right Forearm (Active)   Site Assessment Clean;Dry;Intact 01/04/25 0400   Line Securement Device Secured with sutureless device 01/03/25 0750   Extremity Assessment Distal to IV No abnormal discoloration;No redness;No swelling 01/04/25 0400   Line Status Capped;Flushed;Saline locked 01/04/25 0400   Dressing Status Clean;Dry;Intact 01/04/25 0400   Dressing Intervention Integrity maintained 01/04/25 0400   Number of days: 1            Peripheral IV - Single Lumen 01/03/25 1745 20 G Posterior;Right Forearm (Active)   Site Assessment Clean;Intact;Dry 01/04/25 0400   Extremity Assessment Distal to IV No abnormal discoloration;No redness;No swelling 01/04/25 0400   Line Status Flushed;Infusing 01/04/25 0400   Dressing Status Clean;Dry;Intact 01/04/25 0400   Dressing Intervention Integrity maintained 01/04/25 0400   Number of days: 0            Sheath 01/02/25 Right proximal;anterior (Active)   Insertion Site no hematoma 01/04/25 0400   Drainage Characteristics/Odor Sanguineous 01/03/25 " "0800   Drainage Amount Scant 01/03/25 0800   Number of days: 2       Male External Urinary Catheter 01/02/25 2015 Small (Active)   Securement Method secured to lower ABD w/ adhesive device 01/04/25 0400   Skin no redness;no breakdown 01/04/25 0400   Tolerance no signs/symptoms of discomfort 01/04/25 0400   Output (mL) 200 mL 01/04/25 0545   Catheter Change Date 01/03/25 01/04/25 0400   Catheter Change Time 2000 01/04/25 0400   Number of days: 1       Physical examination:  Gen: NAD, AAOx3, answering questions appropriately  HEENT: NC  CV: RR  Resp: NWOB  Abd: S/NT/ND  Msk: moving all extremities spontaneously and purposefully  Neuro: CN II-XII grossly intact, GCS 15  Skin/wounds: L hand abrasions    Labs:  Renal:  Recent Labs     01/03/25 0424 01/03/25 1907 01/04/25 0433 01/05/25 0239   BUN 13.8 10.7 10.9 9.1   CREATININE 0.74 0.65* 0.64* 0.65*     No results for input(s): "LACTIC" in the last 72 hours.  FEN/GI:  Recent Labs     01/03/25 0424 01/03/25 1907 01/04/25 0433 01/05/25  0239    137 140 139   K 3.9 4.0 4.2 3.5    108 110 106   CO2 23 21* 23 22*   CALCIUM 8.7* 8.3* 8.5* 8.7*   ALBUMIN 2.8* 2.4* 2.3* 2.4*   BILITOT 0.4 0.4 0.5 0.7   AST 17 16 22 24   ALKPHOS 104 86 81 96   ALT 11 10 9 12     Heme:  Recent Labs     01/03/25 0424 01/03/25 0828 01/04/25 0433 01/05/25  0239   HGB 12.6* 12.4* 10.4* 10.8*   HCT 38.0* 37.1* 30.8* 31.7*     --  113* 127*     ID:  Recent Labs     01/03/25 0424 01/04/25 0433 01/05/25  0239   WBC 11.85* 15.05* 12.41*     CBG:  Recent Labs     01/03/25  0424 01/03/25  1907 01/04/25  0433 01/05/25  0239   GLUCOSE 142* 111 104 71*      Recent Labs     01/05/25  0519   POCTGLUCOSE 82      Cardiovascular:  No results for input(s): "TROPONINI", "CKTOTAL", "CKMB", "BNP" in the last 168 hours.  I have reviewed all pertinent lab results within the past 24 hours.    Imaging:  X-Ray Chest 1 View   Final Result      Progression of congestive failure.       "   Electronically signed by: Derrick Tarango   Date:    01/05/2025   Time:    21:13      X-Ray Chest 1 View   Final Result      Interval for development or significant worsening of bilateral infiltrates and right-sided pleural fluid.         Electronically signed by: Evelio Mace MD   Date:    01/05/2025   Time:    13:44      Fl Modified Barium Swallow Speech   Final Result      CT Head Without Contrast   Final Result      1.  No acute intracranial findings identified.      2.  Chronic microangiopathic ischemia and atrophy..         Electronically signed by: Derrick Tarango   Date:    01/04/2025   Time:    12:17      X-Ray Chest 1 View   Final Result      Atelectatic changes in the left retrocardiac region.      Otherwise no active pulmonary disease and no significant change as compared with the previous exam         Electronically signed by: Gokul Turner   Date:    01/03/2025   Time:    07:54      CT Chest Abdomen Pelvis With IV Contrast (XPD) NO Oral Contrast   Final Result   Addendum (preliminary) 1 of 1      There are multiple compression deformities seen in the thoracic and lumbar    spine of unknown chronicity.  Correlation with direct physical examination    is recommended.  If the physical exam is ambiguous MRI correlation may be    beneficial if acute fracture is suspected.         Electronically signed by: Shankar Lama   Date:    01/02/2025   Time:    18:44      Final      Liver laceration along the inferior margin of the liver with its perihepatic blood seen and what appears to be an area of active extravasation of contrast along the inferior margin of the liver.  This would place this as a grade 4 injury due to active hemorrhage although the laceration size is small.      Acute appearing fracture of the left distal clavicle      Comminuted fracture of the body of the scapula on the left side      Left 8th rib fracture posteriorly      Cholelithiasis      Findings discussed with the trauma team  provider Veda at time of dictation         Electronically signed by: Shankar Lama   Date:    01/02/2025   Time:    15:51      CT Cervical Spine Without Contrast   Final Result      No acute abnormality identified.         Electronically signed by: Lashay Henry   Date:    01/02/2025   Time:    15:48      CT Head Without Contrast   Final Result      1. No acute intracranial abnormality.   2. Chronic microvascular ischemic changes.         Electronically signed by: Lashay Henry   Date:    01/02/2025   Time:    15:37      X-Ray Chest 1 View   Final Result      No acute pulmonary process identified.         Electronically signed by: Larry Marlow   Date:    01/02/2025   Time:    16:07      X-Ray Pelvis Routine AP   Final Result      Suboptimal assessment, but no definitive acute osseous findings.         Electronically signed by: Larry Marlow   Date:    01/02/2025   Time:    16:09         I have reviewed all pertinent imaging results/findings within the past 24 hours.    Micro/Path/Other:  Microbiology Results (last 7 days)       Procedure Component Value Units Date/Time    Blood Culture [[id number]] Collected: 01/05/25 2050    Order Status: Sent Specimen: Blood Updated: 01/05/25 2115    Blood Culture [[id number]] Collected: 01/05/25 2052    Order Status: Sent Specimen: Blood Updated: 01/05/25 2115           Pathology Results  (Last 7 days)      None             Problems list:  There are no active problems to display for this patient.     There are no hospital problems to display for this patient.      Assessment & Plan:   Patient is an approximately 65 year old male who presents following fall on thinners with head strike.      Small liver laceration with active extravasation, G4  - s/p mesenteric angiography for suspected liver hemorrhage   - Chronic findings per Vascular     Left 8th rib fracture  - Pain control  - Frequent IS, flutter valve     Left clavicle fracture  Left scapula fracture  - NWB LA CHANG  Sling for comfort      Fall  - Consults: Interventional vascular, Orthopedics  - MMPC  - Bowel regimen  - Anti-emetics: PRN  - Diet: NPO  - VTE ppx: SCDs               - Hold Lovenox given injury  - daily labs   - PT/OT  - Frequent IS, flutter valve    Dysphagia  - Consult speech    Vtach runs, congestive failure changes on CXR  -  Cardiology following  - EKG done  - Labs in AM

## 2025-01-06 NOTE — PLAN OF CARE
PPM implant planned for today.   Speech evaluating dysphagia  Respiratory working with pt  at this time  Nursing tells me daughter did not agree to palliative consult at this time.    I called daughter Sarah who lives in [place]. She tells me that the last time she saw her dad was Thanksgiving and he was having falls then. She talked with two physicians today and feels she has mixed info and is not comfortable with her decision to get the PPM. On one hand if the reasons for falls is due to his heart and this will assist his heart and therefore reducing falls this is something she is leaning toward. She has concerns about the amount of anesthesia that may be needed for the procedure. She does feel she needs more info . This discussed with nursing who will arrange for  who is associated with the PPM and also Anesthesia  to call aSrah    1240 followed up with Sarah who confirms Dr called her and she has decided no PPM at this time. She tells me with his other injuries she is avoiding anything that is not urgent at this time and may consider PPM later. We discussed Palliaitve and my  observations of pt during this stay. We discussed if she could come and see her father as I think how he was  at the end of November is different from what I am seeing now. Sarah is open to both Palliative  consult and making arrangements for the 6 hour flight. ( School is to start up for her tomorrow so she would need to make arrangements.)  We discussed that she may have more info after talking with Palliative care to help her to determine when to come , proceedures etc. She is agreeable to this plan  Nursing will generate Palliative referral

## 2025-01-06 NOTE — PROGRESS NOTES
Care Update:  PT with PAF with CVR - HR in the 30s at Times with SBP in the 70s.   Long Discussion with Sarah Ibarra, Daughter about Clinical Condition and Acute Findings  Will Plan for Leadless PPM Implant Today with Dr. German, EP Today   Risk, Benefits and Alternatives Reviewed and Discussed with the PT and their Family and they wish to proceed with above Procedure. (Consents on Chart)  Will Continue to Follow

## 2025-01-06 NOTE — CONSULTS
Patient Name: Kaleb Ibarra   MRN: [id number]   Admission Date: 1/2/2025   Hospital Length of Stay: 4   Attending Provider: Rufino Espinoza Jr., *   Consulting Provider: Racquel MOBLEY  Reason for Consult: Goals of Care  Primary Care Physician: No, Primary Doctor     Principal Problem: <principal problem not specified>     Patient information was obtained from relative(s) and ER records.      Final diagnoses:  [S36.113A] Liver laceration  [W19.XXXA] Fall, initial encounter (Primary)  [S42.102A] Closed fracture of left scapula, unspecified part of scapula, initial encounter  [S42.002A] Closed displaced fracture of left clavicle, unspecified part of clavicle, initial encounter     Assessment/Plan:     I reviewed the patient and family's understanding of the seriousness of the illness and its expected prognosis. We discussed the patient's goals of care and treatment preferences.  I clarified current code status. I identified the surrogate decision maker or health care POA.  I answered all questions and we formulated a plan including recommendations for symptom management and how to best achieve goals of care.  Advance Care Planning     Date: 01/06/2025    [facility]  I engaged the family in a voluntary conversation about advance care planning and we specifically addressed what the goals of care would be moving forward, in light of the patient's change in clinical status, specifically current medical condition.  We did specifically address the patient's likely prognosis, which is poor.  We explored the patient's values and preferences for future care.  The family endorses that what is most important right now is to focus on curative/life-prolongation (regardless of treatment burdens)    Accordingly, we have decided that the best plan to meet the patient's goals includes continuing with treatment    Called and spoke with daughter, Sarah, and introduced services. Patient is no longer  and has two daughters. Sarah  lives in [place] and is his HCPOA. Her sister lives in [place], but they share the decision making.    We discussed his current condition and prognosis at length. I explained that he is currently unsafe for PO intake due to the risk of ongoing aspiration pneumonia. We discussed the possibility of PEG tube versus pleasure feedings. I explained that if they decide against a PEG, that he could still eat but that they understand the risk of recurrent pneumonia. I explained that hospice would be the best option if they elected for pleasure feeding. She was unfamiliar with what hospice would offer. I explained that the goal would be comfort and quality of life versus quantity. She states she does not think her father would want or tolerate a feeding tube.     We then discussed code status. They have currently elected a Full Code. Explaining in detail Full Code versus Do Not Resuscitate. I explained that given his current deconditioning, comorbities, and age, that performing chest compression, shocking, medication, and placing him on mechanical ventilation would likely not be in his best interest. I informed her that he would likely sustain further rib fractures and it would be painful. She verbalized understanding of this. She stated she wanted her father to decided but I explained that he does not have the capacity to make that decision. She will discuss further with her sister.      Recommendations:     Palliative care will continue to follow with ongoing discussions regarding goals of care.      History of Present Illness:     This is a 89-year-old male nursing home resident with a past medical history of dementia, prostate cancer, cataracts who presented to the ED on 1/2/25 after sustaining a fall on blood thinners and hitting his head.  He sustained a small liver laceration with active extravasation, left 8th rib fracture, left clavicular and scapular fracture. All of which are nonsurgical.  Cis was consulted  for atrial fibrillation with RVR.  Nursing reports runs of V-tach yesterday.  Speech therapy has evaluated and he remains NPO due to ongoing dysphagia. Palliative Medicine was consulted for goals of care.      Active Ambulatory Problems     Diagnosis Date Noted    No Active Ambulatory Problems     Resolved Ambulatory Problems     Diagnosis Date Noted    No Resolved Ambulatory Problems     Past Medical History:   Diagnosis Date    Dementia without behavioral disturbance     Dysphagia     Paroxysmal atrial fibrillation     Unspecified cataract         History reviewed. No pertinent surgical history.     Review of patient's allergies indicates:  No Known Allergies       Current Facility-Administered Medications:     acetaminophen tablet 650 mg, 650 mg, Oral, Q8H PRN, Willis Goldman MD    albuterol-ipratropium 2.5 mg-0.5 mg/3 mL nebulizer solution 3 mL, 3 mL, Nebulization, Q4H, Santhosh Busby FNP, 3 mL at 01/06/25 1238    atropine 0.1 mg/mL injection, , , ,     citalopram tablet 10 mg, 10 mg, Oral, Daily, Jt Nunez MD, 10 mg at 01/03/25 1159    diphenhydrAMINE injection 25 mg, 25 mg, Intravenous, Q6H PRN, Jt Nunez MD, 25 mg at 01/05/25 0240    lactated ringers infusion, , Intravenous, Continuous, Santhosh Busby FNP, Last Rate: 125 mL/hr at 01/06/25 1326, New Bag at 01/06/25 1326    LIDOcaine (PF) 10 mg/ml (1%) injection 10 mg, 1 mL, Intradermal, Once PRN, Willis Goldman MD    melatonin tablet 6 mg, 6 mg, Oral, Nightly PRN, Willis Goldman MD    metoprolol injection 5 mg, 5 mg, Intravenous, Q5 Min PRN, Jt Nunez MD, 5 mg at 01/05/25 1239    morphine injection 2 mg, 2 mg, Intravenous, Q4H PRN, Willis Goldman MD, 2 mg at 01/05/25 1201    mupirocin 2 % ointment, , Nasal, BID, Rick Pelaez MD, Given at 01/06/25 0858    ondansetron disintegrating tablet 8 mg, 8 mg, Oral, Q8H PRN, Willis Goldman MD    oxyCODONE immediate release tablet 5 mg, 5 mg, Oral, Q6H PRN, Jones  "MD Willis    oxyCODONE immediate release tablet Tab 10 mg, 10 mg, Oral, Q6H PRN, Willis Goldman MD    piperacillin-tazobactam (ZOSYN) 4.5 g in D5W 100 mL IVPB (MB+), 4.5 g, Intravenous, Q8H, Santhosh Busby, FNP, Last Rate: 25 mL/hr at 01/06/25 1324, 4.5 g at 01/06/25 1324    sodium chloride 0.9% flush 10 mL, 10 mL, Intravenous, PRN, Willis Goldman MD       Current Facility-Administered Medications:     acetaminophen, 650 mg, Oral, Q8H PRN    atropine, , ,     diphenhydrAMINE, 25 mg, Intravenous, Q6H PRN    LIDOcaine (PF) 10 mg/ml (1%), 1 mL, Intradermal, Once PRN    melatonin, 6 mg, Oral, Nightly PRN    metoprolol, 5 mg, Intravenous, Q5 Min PRN    morphine, 2 mg, Intravenous, Q4H PRN    ondansetron, 8 mg, Oral, Q8H PRN    oxyCODONE, 5 mg, Oral, Q6H PRN    oxyCODONE, 10 mg, Oral, Q6H PRN    sodium chloride 0.9%, 10 mL, Intravenous, PRN     History reviewed. No pertinent family history.     Review of Systems         Objective:   BP (!) 101/55   Pulse (!) 59   Temp 98 °F (36.7 °C) (Axillary)   Resp 12   Ht 5' 8" (1.727 m)   Wt 68 kg (150 lb)   SpO2 100%   BMI 22.81 kg/m²      Physical Exam  Constitutional:       General: He is sleeping.      Appearance: He is underweight. He is ill-appearing.   HENT:      Head: Normocephalic and atraumatic.   Cardiovascular:      Rate and Rhythm: Rhythm irregular.   Pulmonary:      Effort: Pulmonary effort is normal.   Neurological:      Mental Status: He is easily aroused. He is confused.             Review of Symptoms      Symptom Assessment (ESAS 0-10 Scale)  Pain:  0  Dyspnea:  0  Anxiety:  0  Nausea:  0  Depression:  0  Anorexia:  0  Fatigue:  0  Insomnia:  0  Restlessness:  0  Agitation:  0         Bowel Management Plan (BMP):  Yes      Living Arrangements:  Lives in nursing home    Psychosocial/Cultural:   See Palliative Psychosocial Note: Yes  **Primary  to Follow**  Palliative Care  Consult: No      Advance Care Planning   Advance " Directives:     Decision Making:  Family answered questions  Goals of Care: The family endorses that what is most important right now is to focus on curative/life-prolongation (regardless of treatment burdens)    Accordingly, we have decided that the best plan to meet the patient's goals includes continuing with treatment          PAINAD: NA    Caregiver burden formerly assessed: Yes        > 50% of 70 min of encounter was spent in chart review, face to face discussion of goals of care, symptom assessment, coordination of care and emotional support.         LEANDRA Livingston  Palliative Medicine  [facility]

## 2025-01-06 NOTE — PT/OT/SLP PROGRESS
Occupational Therapy   Treatment    Name: Kaleb Ibarra  MRN: [id number]  Admitting Diagnosis:  Fall       Recommendations:     Recommended therapy intensity at discharge: Moderate Intensity Therapy   Discharge Equipment Recommendations:  to be determined by next level of care  Barriers to discharge:       Assessment:     Kaleb Ibarra is a 89 y.o. male with a medical diagnosis of Fall.  He presents with increased L UE pain, poor adherence to WB pnxs with increased cueing required. Overall much encouragement required to participate in session, pt. Is a high fall risk, recommending Mod intensity therapy pending progress. Performance deficits affecting function are weakness, impaired endurance, impaired self care skills, impaired functional mobility, visual deficits, impaired cognition, decreased upper extremity function, pain, orthopedic precautions.     Rehab Prognosis:  Fair; patient would benefit from acute skilled OT services to address these deficits and reach maximum level of function.       Plan:     Patient to be seen 4 x/week to address the above listed problems via self-care/home management, therapeutic activities, therapeutic exercises  Plan of Care Expires: 02/01/25  Plan of Care Reviewed with: patient    Subjective     Pain/Comfort:       Objective:     Communicated with: RN prior to session.  Patient found HOB elevated with   upon OT entry to room.    General Precautions: Standard, aspiration    Orthopedic Precautions:LUE non weight bearing  Braces: UE Sling  Respiratory Status: Room air  Vital Signs: Blood Pressure: 115/63     Occupational Performance:   (Bed Mobility- Max A) Cueing required for adherence to WB pxns  (Sitting balance EOB- Mod A initially progressing to CGA using R UE for support with balance. )  L UE sling adjusted appropriately EOB.   (Sit to stand- Max A) HHA with adherence given to all pxns, pt. Very anxious and restless, much redirection required.   Max A squat pivot t/f performed from  EOB to BS chair. Pt. Left repositioned appropriately with all needs within reach.  Therapeutic Positioning    OT interventions performed during the course of today's session in an effort to prevent and/or reduce acquired pressure injuries:   Therapeutic positioning was provided at the conclusion of session to offload all bony prominences for the prevention and/or reduction of pressure injuries      [facility] 6 Click ADL:      Patient Education:  Patient provided with verbal education education regarding fall prevention, safety awareness, and pressure ulcer prevention.  Additional teaching is warranted.      Patient left up in chair with all lines intact and call button in reach.    GOALS:   Multidisciplinary Problems       Occupational Therapy Goals          Problem: Occupational Therapy    Goal Priority Disciplines Outcome Interventions   Occupational Therapy Goal     OT, PT/OT Progressing    Description: LTG: Pt will perform basic ADLs and ADL t/fs with min A using LRAD by d/c.    STG: to be met by 2/1  1. Pt will follow >80% of simple one step commands  2. Pt will perform grooming EOB with min A.   3. Pt will perform functional grasp/reach/release of items >80% of trials in prep for increased participation in ADL tasks.   4. Pt will perform sit<>stand with mod A in prep for ADL t/fs.                        Time Tracking:     OT Date of Treatment: 01/06/25  OT Start Time: 0852  OT Stop Time: 0910  OT Total Time (min): 18 min    Billable Minutes:Therapeutic Activity 1    OT/PENELOPE: PENELOPE     Number of PENELOPE visits since last OT visit: 1 1/6/2025

## 2025-01-06 NOTE — PLAN OF CARE
Problem: Adult Inpatient Plan of Care  Goal: Plan of Care Review  Outcome: Not Progressing  Goal: Patient-Specific Goal (Individualized)  Outcome: Not Progressing  Goal: Absence of Hospital-Acquired Illness or Injury  Outcome: Not Progressing  Goal: Optimal Comfort and Wellbeing  Outcome: Not Progressing  Goal: Readiness for Transition of Care  Outcome: Not Progressing     Problem: Wound  Goal: Optimal Coping  Outcome: Not Progressing  Goal: Optimal Functional Ability  Outcome: Not Progressing  Goal: Absence of Infection Signs and Symptoms  Outcome: Not Progressing  Goal: Improved Oral Intake  Outcome: Not Progressing  Goal: Optimal Pain Control and Function  Outcome: Not Progressing  Goal: Skin Health and Integrity  Outcome: Not Progressing  Goal: Optimal Wound Healing  Outcome: Not Progressing     Problem: Fall Injury Risk  Goal: Absence of Fall and Fall-Related Injury  Outcome: Not Progressing     Problem: Skin Injury Risk Increased  Goal: Skin Health and Integrity  Outcome: Not Progressing     Problem: Coping Ineffective  Goal: Effective Coping  Outcome: Not Progressing

## 2025-01-06 NOTE — PROGRESS NOTES
[facility] *    Cardiology  Progress Note    Patient Name: Kaleb Ibarra  MRN: [id number]  Admission Date: 1/2/2025  Hospital Length of Stay: 4 days  Code Status: Full Code   Attending Provider: Tommie Guaman MD   Consulting Provider: ANSON Robbins  Primary Care Physician: No, Primary Doctor  Principal Problem:<principal problem not specified>    Patient information was obtained from patient, past medical records, and ER records.     Subjective:     Chief Complaint/Reason for Consult: Tachycardia with Hx of PAF    HPI: Mr Ibarra is a 89 y/o male who is known to [facility], Dr. Siddiqui (Last Seen in 2020). The patient sustained a fall striking his head while on blood thinners for Stroke Risk Reduction in the Setting of AF. He presented at a Level 2 Trauma Activation and was worked up and found to have: Grade 4 Liver Laceration with Active Extravasation, L 8th Rib Fracture, L Clavicular Fracture and L Scapula Fracture. He is a resident of a local NH. He was admitted to the Trauma ICU. He did undergo a Celiac Artery Angiogram which was unremarkable. He was found to have Tachycardia and an EKG confirmed A.Fib with RVR. [facility] was consulted for AF.    1.6.25: NAD. Sitting in Bedside Chair. Failed MBS. AF 50s-70s. Some Episodes of HR in the 50s. Asymptomatic.     PMH: Tobacco Use, Elevated Calcium Score, Prostate Cancer, Dementia, Dysphagia, Cataracts  PSH: Prostate Surgery   Family History: Reviewed and Unremarkable for Heart Disease  Social History: Denies Illicit Drug, ETOH and Tobacco Use; Former Smoker, 1/2 PPD for 10 + Years; Quit in 1961    Previous Cardiac Diagnostics: None    Review of patient's allergies indicates:  No Known Allergies  No current facility-administered medications on file prior to encounter.     Current Outpatient Medications on File Prior to Encounter   Medication Sig    acetaminophen (TYLENOL) 650 MG TbSR Take 650 mg by mouth every 8 (eight) hours as needed (Pain).    apixaban  (ELIQUIS) 2.5 mg Tab Take by mouth 2 (two) times daily.    citalopram (CELEXA) 10 MG tablet Take 10 mg by mouth once daily.    loperamide (IMODIUM A-D) 2 mg Tab Take 2 mg by mouth every 6 (six) hours as needed (Diarrhea).    multivitamin with minerals tablet Take 1 tablet by mouth once daily.    ondansetron (ZOFRAN) 4 MG tablet Take 4 mg by mouth every 8 (eight) hours as needed for Nausea.    prednisoLONE-moxiflo-nepafenac 1-0.5-0.1 % DrpS Place 1 drop into both eyes 4 (four) times daily.     Review of Systems   Constitutional:  Positive for fatigue.   Respiratory:  Negative for shortness of breath.    Cardiovascular:  Negative for chest pain, palpitations and leg swelling.   All other systems reviewed and are negative.    Objective:     Vital Signs (Most Recent):  Temp: 97.7 °F (36.5 °C) (01/06/25 0800)  Pulse: 62 (01/06/25 0828)  Resp: 16 (01/06/25 0828)  BP: 115/66 (01/06/25 0900)  SpO2: 97 % (01/06/25 0828) Vital Signs (24h Range):  Temp:  [97.4 °F (36.3 °C)-101.6 °F (38.7 °C)] 97.7 °F (36.5 °C)  Pulse:  [] 62  Resp:  [10-34] 16  SpO2:  [86 %-100 %] 97 %  BP: ()/() 115/66   Weight: 68 kg (150 lb)  Body mass index is 22.81 kg/m².  SpO2: 97 %       Intake/Output Summary (Last 24 hours) at 1/6/2025 0930  Last data filed at 1/6/2025 0609  Gross per 24 hour   Intake 1342.97 ml   Output 300 ml   Net 1042.97 ml     Lines/Drains/Airways       Drain  Duration             Male External Urinary Catheter 01/02/25 2015 Small 3 days              Peripheral Intravenous Line  Duration                  Sheath 01/02/25 Right proximal;anterior 4 days         Peripheral IV - Single Lumen 01/04/25 1520 20 G Anterior;Distal;Right Upper Arm 1 day         Peripheral IV - Single Lumen 01/05/25 1714 20 G Anterior;Left Forearm <1 day                  Significant Labs:   Chemistries:   Recent Labs   Lab 01/03/25  0424 01/03/25  1907 01/04/25  0433 01/05/25  0239 01/06/25  0405    137 140 139 136   K 3.9 4.0 4.2 3.5  3.5    108 110 106 107   CO2 23 21* 23 22* 21*   BUN 13.8 10.7 10.9 9.1 12.0   CREATININE 0.74 0.65* 0.64* 0.65* 0.65*   CALCIUM 8.7* 8.3* 8.5* 8.7* 8.1*   BILITOT 0.4 0.4 0.5 0.7 0.7   ALKPHOS 104 86 81 96 70   ALT 11 10 9 12 11   AST 17 16 22 24 25   GLUCOSE 142* 111 104 71* 81        CBC/Anemia Labs: Coags:    Recent Labs   Lab 01/04/25  0433 01/05/25  0239 01/06/25  0405   WBC 15.05* 12.41* 11.57*   HGB 10.4* 10.8* 9.9*   HCT 30.8* 31.7* 31.0*   * 127* 95*   MCV 95.7* 92.7 100.6*   RDW 14.6 14.3 14.5    Recent Labs   Lab 01/02/25  1457   INR 1.2   APTT 31.2        Telemetry: PAF/CVR    Physical Exam  Constitutional:       General: He is not in acute distress.     Appearance: Normal appearance. He is ill-appearing.   HENT:      Head: Normocephalic.      Mouth/Throat:      Mouth: Mucous membranes are dry.   Cardiovascular:      Rate and Rhythm: Normal rate and regular rhythm.      Pulses: Normal pulses.      Heart sounds: No murmur heard.  Pulmonary:      Effort: Pulmonary effort is normal. No respiratory distress.      Breath sounds: Examination of the right-lower field reveals decreased breath sounds. Examination of the left-lower field reveals decreased breath sounds. Decreased breath sounds present.      Comments: NC O2  Abdominal:      Palpations: Abdomen is soft.   Musculoskeletal:         General: No swelling. Normal range of motion.   Skin:     General: Skin is warm.   Neurological:      General: No focal deficit present.      Comments: Some Confused Conversation   Psychiatric:         Mood and Affect: Mood normal.         Behavior: Behavior normal.       Home Medications:   No current facility-administered medications on file prior to encounter.     Current Outpatient Medications on File Prior to Encounter   Medication Sig Dispense Refill    acetaminophen (TYLENOL) 650 MG TbSR Take 650 mg by mouth every 8 (eight) hours as needed (Pain).      apixaban (ELIQUIS) 2.5 mg Tab Take by mouth 2 (two)  times daily.      citalopram (CELEXA) 10 MG tablet Take 10 mg by mouth once daily.      loperamide (IMODIUM A-D) 2 mg Tab Take 2 mg by mouth every 6 (six) hours as needed (Diarrhea).      multivitamin with minerals tablet Take 1 tablet by mouth once daily.      ondansetron (ZOFRAN) 4 MG tablet Take 4 mg by mouth every 8 (eight) hours as needed for Nausea.      prednisoLONE-moxiflo-nepafenac 1-0.5-0.1 % DrpS Place 1 drop into both eyes 4 (four) times daily.       Current Schedule Inpatient Medications:   albuterol-ipratropium  3 mL Nebulization Q4H    atropine        citalopram  10 mg Oral Daily    mupirocin   Nasal BID    piperacillin-tazobactam (Zosyn) IV (PEDS and ADULTS) (extended infusion is not appropriate)  4.5 g Intravenous Q8H     Continuous Infusions:   lactated ringers   Intravenous Continuous 125 mL/hr at 01/06/25 0609 Rate Verify at 01/06/25 0609     Assessment:   PAF with RVR - Now In and Out of SR/PAF with CVR - Mostly CVR    - CHADsVASc - 3 Points - 3.2% Stroke Risk per Year   Ground Level Fall Striking Head - Presenting at Level II Trauma  Grade 4 Liver Laceration with Active Extravasation, L 8th Rib Fracture, L Clavicular Fracture and L Scapula Fracture  No Hx of GIB    Plan:   Ok to Hold AC given Polytrauma - Please resume as soon as OK with Primary Team   No Rate Lowering Medications given Borderline BP  2 Week MCT for Diagnosis of Syncope - Results on F/U with Cardiology (Dr. Siddiqui)  We will be available as needed     Anthony Marinelli, ANP  Cardiology  [facility]
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
## 2025-01-07 PROBLEM — S42.102A CLOSED FRACTURE OF LEFT SCAPULA: Status: ACTIVE | Noted: 2025-01-07

## 2025-01-07 PROBLEM — S22.32XA CLOSED FRACTURE OF ONE RIB OF LEFT SIDE: Status: ACTIVE | Noted: 2025-01-07

## 2025-01-07 PROBLEM — S42.032A TRAUMATIC CLOSED FRACTURE OF DISTAL CLAVICLE WITH MINIMAL DISPLACEMENT, LEFT, INITIAL ENCOUNTER: Status: ACTIVE | Noted: 2025-01-07

## 2025-01-07 LAB
ALBUMIN SERPL-MCNC: 2 G/DL (ref 3.4–4.8)
ALBUMIN/GLOB SERPL: 0.6 RATIO (ref 1.1–2)
ALP SERPL-CCNC: 66 UNIT/L (ref 40–150)
ALT SERPL-CCNC: 11 UNIT/L (ref 0–55)
ANION GAP SERPL CALC-SCNC: 10 MEQ/L
AST SERPL-CCNC: 25 UNIT/L (ref 5–34)
BASOPHILS # BLD AUTO: 0.04 X10(3)/MCL
BASOPHILS NFR BLD AUTO: 0.5 %
BILIRUB SERPL-MCNC: 0.6 MG/DL
BUN SERPL-MCNC: 10.1 MG/DL (ref 8.4–25.7)
CALCIUM SERPL-MCNC: 7.7 MG/DL (ref 8.8–10)
CHLORIDE SERPL-SCNC: 106 MMOL/L (ref 98–111)
CO2 SERPL-SCNC: 23 MMOL/L (ref 23–31)
CREAT SERPL-MCNC: 0.62 MG/DL (ref 0.72–1.25)
CREAT/UREA NIT SERPL: 16
EOSINOPHIL # BLD AUTO: 0.13 X10(3)/MCL (ref 0–0.9)
EOSINOPHIL NFR BLD AUTO: 1.5 %
ERYTHROCYTE [DISTWIDTH] IN BLOOD BY AUTOMATED COUNT: 14.6 % (ref 11.5–17)
GFR SERPLBLD CREATININE-BSD FMLA CKD-EPI: >60 ML/MIN/1.73/M2
GLOBULIN SER-MCNC: 3.2 GM/DL (ref 2.4–3.5)
GLUCOSE SERPL-MCNC: 75 MG/DL (ref 75–121)
HCT VFR BLD AUTO: 30.1 % (ref 42–52)
HGB BLD-MCNC: 10 G/DL (ref 14–18)
IMM GRANULOCYTES # BLD AUTO: 0.08 X10(3)/MCL (ref 0–0.04)
IMM GRANULOCYTES NFR BLD AUTO: 1 %
LYMPHOCYTES # BLD AUTO: 0.88 X10(3)/MCL (ref 0.6–4.6)
LYMPHOCYTES NFR BLD AUTO: 10.5 %
MAGNESIUM SERPL-MCNC: 1.5 MG/DL (ref 1.6–2.6)
MCH RBC QN AUTO: 31.7 PG (ref 27–31)
MCHC RBC AUTO-ENTMCNC: 33.2 G/DL (ref 33–36)
MCV RBC AUTO: 95.6 FL (ref 80–94)
MONOCYTES # BLD AUTO: 0.65 X10(3)/MCL (ref 0.1–1.3)
MONOCYTES NFR BLD AUTO: 7.7 %
NEUTROPHILS # BLD AUTO: 6.64 X10(3)/MCL (ref 2.1–9.2)
NEUTROPHILS NFR BLD AUTO: 78.8 %
NRBC BLD AUTO-RTO: 0 %
OHS QRS DURATION: 102 MS
OHS QTC CALCULATION: 373 MS
PLATELET # BLD AUTO: 114 X10(3)/MCL (ref 130–400)
PLATELETS.RETICULATED NFR BLD AUTO: 3.9 % (ref 0.9–11.2)
PMV BLD AUTO: 10.5 FL (ref 7.4–10.4)
POTASSIUM SERPL-SCNC: 3 MMOL/L (ref 3.5–5.1)
PROT SERPL-MCNC: 5.2 GM/DL (ref 5.8–7.6)
RBC # BLD AUTO: 3.15 X10(6)/MCL (ref 4.7–6.1)
SODIUM SERPL-SCNC: 139 MMOL/L (ref 136–145)
WBC # BLD AUTO: 8.42 X10(3)/MCL (ref 4.5–11.5)

## 2025-01-07 PROCEDURE — 25000003 PHARM REV CODE 250: Performed by: NURSE PRACTITIONER

## 2025-01-07 PROCEDURE — 85025 COMPLETE CBC W/AUTO DIFF WBC: CPT | Performed by: NURSE PRACTITIONER

## 2025-01-07 PROCEDURE — 99900035 HC TECH TIME PER 15 MIN (STAT)

## 2025-01-07 PROCEDURE — 99233 SBSQ HOSP IP/OBS HIGH 50: CPT | Mod: ,,, | Performed by: SURGERY

## 2025-01-07 PROCEDURE — 25000003 PHARM REV CODE 250

## 2025-01-07 PROCEDURE — 80053 COMPREHEN METABOLIC PANEL: CPT

## 2025-01-07 PROCEDURE — 94760 N-INVAS EAR/PLS OXIMETRY 1: CPT

## 2025-01-07 PROCEDURE — 99900031 HC PATIENT EDUCATION (STAT)

## 2025-01-07 PROCEDURE — 36415 COLL VENOUS BLD VENIPUNCTURE: CPT | Performed by: NURSE PRACTITIONER

## 2025-01-07 PROCEDURE — 93010 ELECTROCARDIOGRAM REPORT: CPT | Mod: ,,, | Performed by: INTERNAL MEDICINE

## 2025-01-07 PROCEDURE — 94640 AIRWAY INHALATION TREATMENT: CPT

## 2025-01-07 PROCEDURE — 25000242 PHARM REV CODE 250 ALT 637 W/ HCPCS: Performed by: NURSE PRACTITIONER

## 2025-01-07 PROCEDURE — 83735 ASSAY OF MAGNESIUM: CPT | Performed by: NURSE PRACTITIONER

## 2025-01-07 PROCEDURE — 93005 ELECTROCARDIOGRAM TRACING: CPT

## 2025-01-07 PROCEDURE — 63600175 PHARM REV CODE 636 W HCPCS: Performed by: NURSE PRACTITIONER

## 2025-01-07 PROCEDURE — 11000001 HC ACUTE MED/SURG PRIVATE ROOM

## 2025-01-07 PROCEDURE — 94761 N-INVAS EAR/PLS OXIMETRY MLT: CPT

## 2025-01-07 PROCEDURE — 27000221 HC OXYGEN, UP TO 24 HOURS

## 2025-01-07 PROCEDURE — 63600175 PHARM REV CODE 636 W HCPCS

## 2025-01-07 RX ORDER — MORPHINE SULFATE 4 MG/ML
2 INJECTION, SOLUTION INTRAMUSCULAR; INTRAVENOUS EVERY 4 HOURS PRN
Status: DISCONTINUED | OUTPATIENT
Start: 2025-01-07 | End: 2025-01-08 | Stop reason: HOSPADM

## 2025-01-07 RX ORDER — MAGNESIUM SULFATE HEPTAHYDRATE 40 MG/ML
4 INJECTION, SOLUTION INTRAVENOUS ONCE
Status: COMPLETED | OUTPATIENT
Start: 2025-01-07 | End: 2025-01-07

## 2025-01-07 RX ORDER — BISACODYL 10 MG/1
10 SUPPOSITORY RECTAL DAILY PRN
Status: DISCONTINUED | OUTPATIENT
Start: 2025-01-07 | End: 2025-01-08 | Stop reason: HOSPADM

## 2025-01-07 RX ORDER — POTASSIUM CHLORIDE 7.45 MG/ML
20 INJECTION INTRAVENOUS 3 TIMES DAILY
Status: COMPLETED | OUTPATIENT
Start: 2025-01-07 | End: 2025-01-07

## 2025-01-07 RX ORDER — FAMOTIDINE 10 MG/ML
20 INJECTION INTRAVENOUS 2 TIMES DAILY
Status: DISCONTINUED | OUTPATIENT
Start: 2025-01-07 | End: 2025-01-08 | Stop reason: HOSPADM

## 2025-01-07 RX ADMIN — MUPIROCIN: 20 OINTMENT TOPICAL at 07:01

## 2025-01-07 RX ADMIN — POTASSIUM CHLORIDE 20 MEQ: 7.46 INJECTION, SOLUTION INTRAVENOUS at 09:01

## 2025-01-07 RX ADMIN — SODIUM CHLORIDE, POTASSIUM CHLORIDE, SODIUM LACTATE AND CALCIUM CHLORIDE: 600; 310; 30; 20 INJECTION, SOLUTION INTRAVENOUS at 12:01

## 2025-01-07 RX ADMIN — FAMOTIDINE 20 MG: 10 INJECTION, SOLUTION INTRAVENOUS at 08:01

## 2025-01-07 RX ADMIN — POTASSIUM CHLORIDE 20 MEQ: 7.46 INJECTION, SOLUTION INTRAVENOUS at 02:01

## 2025-01-07 RX ADMIN — PIPERACILLIN SODIUM AND TAZOBACTAM SODIUM 4.5 G: 4; .5 INJECTION, POWDER, LYOPHILIZED, FOR SOLUTION INTRAVENOUS at 12:01

## 2025-01-07 RX ADMIN — PIPERACILLIN SODIUM AND TAZOBACTAM SODIUM 4.5 G: 4; .5 INJECTION, POWDER, LYOPHILIZED, FOR SOLUTION INTRAVENOUS at 08:01

## 2025-01-07 RX ADMIN — MAGNESIUM SULFATE HEPTAHYDRATE 4 G: 40 INJECTION, SOLUTION INTRAVENOUS at 08:01

## 2025-01-07 RX ADMIN — PIPERACILLIN SODIUM AND TAZOBACTAM SODIUM 4.5 G: 4; .5 INJECTION, POWDER, LYOPHILIZED, FOR SOLUTION INTRAVENOUS at 05:01

## 2025-01-07 RX ADMIN — IPRATROPIUM BROMIDE AND ALBUTEROL SULFATE 3 ML: 2.5; .5 SOLUTION RESPIRATORY (INHALATION) at 04:01

## 2025-01-07 RX ADMIN — IPRATROPIUM BROMIDE AND ALBUTEROL SULFATE 3 ML: 2.5; .5 SOLUTION RESPIRATORY (INHALATION) at 01:01

## 2025-01-07 RX ADMIN — POTASSIUM CHLORIDE 20 MEQ: 7.46 INJECTION, SOLUTION INTRAVENOUS at 08:01

## 2025-01-07 RX ADMIN — IPRATROPIUM BROMIDE AND ALBUTEROL SULFATE 3 ML: 2.5; .5 SOLUTION RESPIRATORY (INHALATION) at 08:01

## 2025-01-07 NOTE — PT/OT/SLP PROGRESS
Physical Therapy      Patient Name:  Kaleb Ibarra   MRN:  [id number]    Patient not seen today secondary to palliative in room consulting w/pt and family. Will follow-up tomorrow.

## 2025-01-07 NOTE — PT/OT/SLP PROGRESS
[facility] Speech Language Pathology Department    Patient Name:  Kaleb Ibarra   MRN:  [id number]    Pt unable to participate in dysphagia tx due to lethargy and confusion.  Continue to recommend comfort care with PO intake as tolerated for pleasure/quality of life.

## 2025-01-07 NOTE — PLAN OF CARE
updates me that he is ready for arrangments to be made for pt to return to [facility], pt will change to any meds to be PO .  Palliative/Carolin talked with family today, pt is DNR and no peg planned. Daughter would like hospice.   Spoke with Dahlia Pereira at [facility] who tells me they use [facility], we discussed plan to dc. Dahlia confirms arrangements cannot be made today but we can plan for return in the am.   Spoke with Daughter Sarah Ibarra  who confirms DNR, no peg and desire for return to [facility] with [facility]. She had talked with Dahlia and was aware of the provider that they used. She confirms I can generate the referral and understands our plan to dc in the am. She is agreeable with this and is making arrangement to come to [facility].   Referral generated to [facility] , spoke with Kenia regarding referral and plan to dc in the am back to [facility]   and [facility] will finish dc tomorrow am   I spoke with TIFFANY and Dahlia. At [facility], they confirms they can take in the am as long as pt has a pureed diet ordered.   Updates emailed to them at [email or web address]  Pt in will call status for [facility] ambulance

## 2025-01-07 NOTE — PT/OT/SLP PROGRESS
Occupational Therapy      Patient Name:  Kaleb Ibarra   MRN:  [id number]    Attempted OT session. Patient and family discussing goals of care with palliative in room. Will follow up as able.    1/7/2025

## 2025-01-07 NOTE — PROGRESS NOTES
"Advance Care Planning     Date: 01/07/2025    Notified patient's daughter Sarah Ibarra, states that she is a teacher and is currently in class and requested for the palliative care team to return call between 12-13 today. States that she did speak to her sister and has a couple questions regarding goals of care. Informed would call her around the time requested to discuss further.     1230: Notified patient's daughter, Sarah Ibarra via telephone. Introduced self and role within the hospital setting. Completed follow up palliative care assessment with focus on goals of care.     Reviewed patient's current condition and review discussion completed by Racquel King Palliative Care NP on 01/06/2025. Patient's daughter Sarah Ibarra.     Reviewed current code status, to which patient's daughter states that she spoke with the family and they are in agreement that given his current deconditioning, comorbities, and age, that performing chest compression, shocking, medication, and placing him on mechanical ventilation would likely not be in his best interest and have requested for patient's code status to be DNR. She did requrest to speak to cardiology again regarding "a least invasive pacemaker". Informed would notify the patient's nurse of request.     Code Status  In light of the patients advanced and life limiting illness,I engaged the the family in a voluntary conversation about the patient's preferences for care  at the very end of life. The patient wishes to have a natural, peaceful death.  Along those lines, the patient does not wish to have CPR or other invasive treatments performed when his heart and/or breathing stops. I communicated to the family that a DNR order would be placed in his medical record to reflect this preference.    Reviewed patient's currently remains unsafe for PO intake due to the risk of ongoing aspiration pneumonia. Patient's daughter reports that the family have elected NOT to pursue placement " of a feeding tube. Re-discussed that the goals based on elections would be comfort and quality of life versus quantity, to which she agreed.      Hospice  I did explain the role for hospice care at this stage of the patient's illness, including its ability to help the patient live with the best quality of life possible.  We will be making a hospice referral.     Today a voluntary meeting took place: other (conference room) Via telephone    Patient Participation: Patient is unable to participate     Attendees (Name and  Relationship to patient):  Patient's Daughter    Staff attendees (Name and  Role): Carolin RN-CHPN    ACP Conversation (General): Understanding of current condition reviewed goals of care     Code Status: DNR; status confirmed/order placed in chart    ACP Documents: Other Documents (specify): Discussed LaPOST and provided blank copy to patient's daughter to review and encouraged completion of document.     Goals of care: The family endorses that what is most important right now is to focus on symptom/pain control, improvement in condition but with limits to invasive therapies, and comfort and QOL     Accordingly, we have decided that the best plan to meet the patient's goals includes no further escalation in treatment and electing hospice services upon discharge back to nursing facility      Recommendations/  Follow-up tasks: The patient and health care agent were provided the following recommendations encouraged completion of LaPOST prior to discharge. Code STATUS now DNR, family agreeable to hospice referral to be sent. Daughter requesting to speak to cardiology.          Notified medical and palliative care team on discussion with patient's daughter and goals of care developed.

## 2025-01-07 NOTE — PROGRESS NOTES
Trauma Surgery   Progress Note  Patient Name: Kaleb Ibarra                   : 1934     MRN: [id number]   Date of Admission: 2025  Code Status: Full Code  Date of Exam: 2025  HD#5  POD#* Surgery not performed *  Attending Provider: Rufino Espinoza Jr., *    Subjective:   Interval history:  AF HDS. NAEO.  No acute complaints today. Reports adequate sleep overnight.  Working with PT/OT - recommending moderate intensity therapy.  NPO due to failed swallow evaluation.  Declined pacemaker placement yesterday with Cardiology.  Discussion with Palliative Care ongoing.       Home Meds:   Current Outpatient Medications   Medication Instructions    acetaminophen (TYLENOL) 650 mg, Every 8 hours PRN    apixaban (ELIQUIS) 2.5 mg Tab 2 times daily    citalopram (CELEXA) 10 mg, Daily    loperamide (IMODIUM A-D) 2 mg, Every 6 hours PRN    multivitamin with minerals tablet 1 tablet, Daily    ondansetron (ZOFRAN) 4 mg, Every 8 hours PRN    prednisoLONE-moxiflo-nepafenac 1-0.5-0.1 % DrpS 1 drop, Both Eyes, 4 times daily      Scheduled Meds:   albuterol-ipratropium  3 mL Nebulization Q4H    famotidine (PF)  20 mg Intravenous BID    magnesium sulfate IVPB  4 g Intravenous Once    mupirocin   Nasal BID    piperacillin-tazobactam (Zosyn) IV (PEDS and ADULTS) (extended infusion is not appropriate)  4.5 g Intravenous Q8H    potassium chloride in water  20 mEq Intravenous TID     Continuous Infusions:   lactated ringers   Intravenous Continuous 125 mL/hr at 25 0026 New Bag at 25 0026     PRN Meds:  Current Facility-Administered Medications:     bisacodyL, 10 mg, Rectal, Daily PRN    diphenhydrAMINE, 25 mg, Intravenous, Q6H PRN    LIDOcaine (PF) 10 mg/ml (1%), 1 mL, Intradermal, Once PRN    metoprolol, 5 mg, Intravenous, Q5 Min PRN    morphine, 2 mg, Intravenous, Q4H PRN    ondansetron, 8 mg, Oral, Q8H PRN    sodium chloride 0.9%, 10 mL, Intravenous, PRN     Objective:     VITAL SIGNS: 24 HR MIN & MAX LAST  "  Temp  Min: 97.7 °F (36.5 °C)  Max: 98.6 °F (37 °C)  98.2 °F (36.8 °C)   BP  Min: 76/66  Max: 135/96  (!) 100/59    Pulse  Min: 42  Max: 91  73    Resp  Min: 11  Max: 24  18    SpO2  Min: 89 %  Max: 100 %  96 %      HT: 5' 8" (172.7 cm)  WT: 68 kg (150 lb)  BMI: 22.8     Intake/output:  Intake/Output - Last 3 Shifts         01/05 0700 01/06 0659 01/06 0700 01/07 0659 01/07 0700 01/08 0659    I.V. (mL/kg) 1204.8 (17.7) 1479.7 (21.8)     IV Piggyback 138.2 0     Total Intake(mL/kg) 1343 (19.7) 1479.7 (21.8)     Urine (mL/kg/hr) 300 (0.2) 750 (0.5)     Other 0      Stool  0     Total Output 300 750     Net +1043 +729.7            Urine Occurrence 2 x 1 x     Stool Occurrence  1 x             Intake/Output Summary (Last 24 hours) at 1/7/2025 0700  Last data filed at 1/7/2025 0551  Gross per 24 hour   Intake 1479.69 ml   Output 750 ml   Net 729.69 ml         Lines/drains/airway:       Peripheral IV - Single Lumen 01/02/25 1424 18 G Anterior;Right Forearm (Active)   Site Assessment Clean;Dry;Intact 01/04/25 0400   Line Securement Device Secured with sutureless device 01/03/25 0750   Extremity Assessment Distal to IV No abnormal discoloration;No redness;No swelling 01/04/25 0400   Line Status Capped;Flushed;Saline locked 01/04/25 0400   Dressing Status Clean;Dry;Intact 01/04/25 0400   Dressing Intervention Integrity maintained 01/04/25 0400   Number of days: 1            Peripheral IV - Single Lumen 01/03/25 1745 20 G Posterior;Right Forearm (Active)   Site Assessment Clean;Intact;Dry 01/04/25 0400   Extremity Assessment Distal to IV No abnormal discoloration;No redness;No swelling 01/04/25 0400   Line Status Flushed;Infusing 01/04/25 0400   Dressing Status Clean;Dry;Intact 01/04/25 0400   Dressing Intervention Integrity maintained 01/04/25 0400   Number of days: 0            Sheath 01/02/25 Right proximal;anterior (Active)   Insertion Site no hematoma 01/04/25 0400   Drainage Characteristics/Odor Sanguineous 01/03/25 " "0800   Drainage Amount Scant 01/03/25 0800   Number of days: 2       Male External Urinary Catheter 01/02/25 2015 Small (Active)   Securement Method secured to lower ABD w/ adhesive device 01/04/25 0400   Skin no redness;no breakdown 01/04/25 0400   Tolerance no signs/symptoms of discomfort 01/04/25 0400   Output (mL) 200 mL 01/04/25 0545   Catheter Change Date 01/03/25 01/04/25 0400   Catheter Change Time 2000 01/04/25 0400   Number of days: 1       Physical examination:  Gen: NAD, AAOx3, answering questions appropriately  HEENT: NC  CV: RR  Resp: NWOB  Abd: S/NT/ND  Msk: moving all extremities spontaneously and purposefully  Neuro: CN II-XII grossly intact, GCS 15  Skin/wounds: L hand abrasions    Labs:  Renal:  Recent Labs     01/05/25 0239 01/06/25 0405 01/07/25  0539   BUN 9.1 12.0 10.1   CREATININE 0.65* 0.65* 0.62*     No results for input(s): "LACTIC" in the last 72 hours.  FEN/GI:  Recent Labs     01/05/25 0239 01/06/25 0405 01/07/25  0539    136 139   K 3.5 3.5 3.0*    107 106   CO2 22* 21* 23   CALCIUM 8.7* 8.1* 7.7*   MG  --   --  1.50*   ALBUMIN 2.4* 2.0* 2.0*   BILITOT 0.7 0.7 0.6   AST 24 25 25   ALKPHOS 96 70 66   ALT 12 11 11     Heme:  Recent Labs     01/05/25 0239 01/06/25 0405 01/07/25  0539   HGB 10.8* 9.9* 10.0*   HCT 31.7* 31.0* 30.1*   * 95* 114*     ID:  Recent Labs     01/05/25 0239 01/06/25 0405 01/07/25  0539   WBC 12.41* 11.57* 8.42     CBG:  Recent Labs     01/05/25 0239 01/06/25 0405 01/07/25  0539   GLUCOSE 71* 81 75      Recent Labs     01/05/25  0519   POCTGLUCOSE 82      Cardiovascular:  No results for input(s): "TROPONINI", "CKTOTAL", "CKMB", "BNP" in the last 168 hours.  I have reviewed all pertinent lab results within the past 24 hours.    Imaging:  X-Ray Chest 1 View   Final Result      Little interval change.         Electronically signed by: Larry Marlow   Date:    01/06/2025   Time:    06:02      X-Ray Chest 1 View   Final Result    "   Progression of congestive failure.         Electronically signed by: Derrick Tarango   Date:    01/05/2025   Time:    21:13      X-Ray Chest 1 View   Final Result      Interval for development or significant worsening of bilateral infiltrates and right-sided pleural fluid.         Electronically signed by: Evelio Mace MD   Date:    01/05/2025   Time:    13:44      Fl Modified Barium Swallow Speech   Final Result      CT Head Without Contrast   Final Result      1.  No acute intracranial findings identified.      2.  Chronic microangiopathic ischemia and atrophy..         Electronically signed by: Derrick Tarango   Date:    01/04/2025   Time:    12:17      X-Ray Chest 1 View   Final Result      Atelectatic changes in the left retrocardiac region.      Otherwise no active pulmonary disease and no significant change as compared with the previous exam         Electronically signed by: Gokul Turner   Date:    01/03/2025   Time:    07:54      CT Chest Abdomen Pelvis With IV Contrast (XPD) NO Oral Contrast   Final Result   Addendum (preliminary) 1 of 1      There are multiple compression deformities seen in the thoracic and lumbar    spine of unknown chronicity.  Correlation with direct physical examination    is recommended.  If the physical exam is ambiguous MRI correlation may be    beneficial if acute fracture is suspected.         Electronically signed by: Shankar Lama   Date:    01/02/2025   Time:    18:44      Final      Liver laceration along the inferior margin of the liver with its perihepatic blood seen and what appears to be an area of active extravasation of contrast along the inferior margin of the liver.  This would place this as a grade 4 injury due to active hemorrhage although the laceration size is small.      Acute appearing fracture of the left distal clavicle      Comminuted fracture of the body of the scapula on the left side      Left 8th rib fracture posteriorly      Cholelithiasis       Findings discussed with the trauma team provider Veda at time of dictation         Electronically signed by: Shankar Lama   Date:    01/02/2025   Time:    15:51      CT Cervical Spine Without Contrast   Final Result      No acute abnormality identified.         Electronically signed by: Lashay Henry   Date:    01/02/2025   Time:    15:48      CT Head Without Contrast   Final Result      1. No acute intracranial abnormality.   2. Chronic microvascular ischemic changes.         Electronically signed by: Lashay Henry   Date:    01/02/2025   Time:    15:37      X-Ray Chest 1 View   Final Result      No acute pulmonary process identified.         Electronically signed by: Larry Marlow   Date:    01/02/2025   Time:    16:07      X-Ray Pelvis Routine AP   Final Result      Suboptimal assessment, but no definitive acute osseous findings.         Electronically signed by: Larry Marlow   Date:    01/02/2025   Time:    16:09      X-Ray Chest 1 View    (Results Pending)      I have reviewed all pertinent imaging results/findings within the past 24 hours.    Micro/Path/Other:  Microbiology Results (last 7 days)       Procedure Component Value Units Date/Time    Blood Culture [[id number]]  (Normal) Collected: 01/05/25 2050    Order Status: Completed Specimen: Blood Updated: 01/06/25 2201     Blood Culture No Growth At 24 Hours    Blood Culture [[id number]]  (Normal) Collected: 01/05/25 2052    Order Status: Completed Specimen: Blood Updated: 01/06/25 2201     Blood Culture No Growth At 24 Hours           Pathology Results  (Last 7 days)      None             Problems list:  Active Problem List with Overview Notes    Diagnosis Date Noted    Closed fracture of left scapula 01/07/2025    Traumatic closed fracture of distal clavicle with minimal displacement, left, initial encounter 01/07/2025    Closed fracture of one rib of left side 01/07/2025    Fall 01/07/2025      Active Diagnoses:    Diagnosis Date Noted POA     PRINCIPAL PROBLEM:  Closed fracture of left scapula [S42.102A] 01/07/2025 Yes    Traumatic closed fracture of distal clavicle with minimal displacement, left, initial encounter [S42.032A] 01/07/2025 Yes    Closed fracture of one rib of left side [S22.32XA] 01/07/2025 Yes    Fall [W19.XXXA] 01/07/2025 Yes      Problems Resolved During this Admission:       Assessment & Plan:   Patient is an approximately 65 year old male who presents following fall on thinners with head strike.      Small liver laceration with active extravasation, G4  - s/p mesenteric angiography for suspected liver hemorrhage   - Chronic findings per Vascular     Left 8th rib fracture  - Pain control  - Frequent IS, flutter valve     Left clavicle fracture  Left scapula fracture  - NWB ROMAT LUE Sling for comfort     Dysphagia  - SLP consult  - Failed swallow study 1/5  - NPO for now    Vtach runs, congestive failure changes on CXR  - Cardiology following  - Patient/family declined planned pacemaker placement 1/6    GOC  - Palliative care consult    Fall  - Consults: Interventional vascular, Orthopedics, Cardiology, Palliative Care  - MMPC  - Bowel regimen  - Anti-emetics: PRN  - Diet: NPO  - VTE ppx: SCDs               - Hold Lovenox given injury  - daily labs   - PT/OT - moderate intensity therapy  - Frequent IS, flutter valve      Willis Goldman MD  General Surgery PGY-1  [facility]       You can access the FollowMyHealth Patient Portal offered by [facility] by registering at the following website: [email or web address]. By joining [facility]’s FollowMyHealth portal, you will also be able to view your health information using other applications (apps) compatible with our system.

## 2025-01-07 NOTE — PROGRESS NOTES
[facility] *    Cardiology  Progress Note    Patient Name: Kaleb Ibarra  MRN: [id number]  Admission Date: 1/2/2025  Hospital Length of Stay: 5 days  Code Status: Full Code   Attending Provider: Rufino Espinoza Jr., *   Consulting Provider: ANSON Robbins  Primary Care Physician: Pretty, Primary Doctor  Principal Problem:Closed fracture of left scapula    Patient information was obtained from patient, past medical records, and ER records.     Subjective:     Chief Complaint/Reason for Consult: Tachycardia with Hx of PAF    HPI: Mr Ibarra is a 89 y/o male who is known to [facility], Dr. Siddiqui (Last Seen in 2020). The patient sustained a fall striking his head while on blood thinners for Stroke Risk Reduction in the Setting of AF. He presented at a Level 2 Trauma Activation and was worked up and found to have: Grade 4 Liver Laceration with Active Extravasation, L 8th Rib Fracture, L Clavicular Fracture and L Scapula Fracture. He is a resident of a local NH. He was admitted to the Trauma ICU. He did undergo a Celiac Artery Angiogram which was unremarkable. He was found to have Tachycardia and an EKG confirmed A.Fib with RVR. [facility] was consulted for AF.    1.6.25: NAD. Sitting in Bedside Chair. Failed MBS. AF 50s-70s. Some Episodes of HR in the 50s. Asymptomatic.   1.7.25: NAD. SR 60s/In and Out of PAF. NO Hypotensive Episodes.     PMH: Tobacco Use, Elevated Calcium Score, Prostate Cancer, Dementia, Dysphagia, Cataracts  PSH: Prostate Surgery   Family History: Reviewed and Unremarkable for Heart Disease  Social History: Denies Illicit Drug, ETOH and Tobacco Use; Former Smoker, 1/2 PPD for 10 + Years; Quit in 1961    Previous Cardiac Diagnostics: None    Review of patient's allergies indicates:  No Known Allergies  No current facility-administered medications on file prior to encounter.     Current Outpatient Medications on File Prior to Encounter   Medication Sig    acetaminophen (TYLENOL) 650 MG TbSR Take 650  mg by mouth every 8 (eight) hours as needed (Pain).    apixaban (ELIQUIS) 2.5 mg Tab Take by mouth 2 (two) times daily.    citalopram (CELEXA) 10 MG tablet Take 10 mg by mouth once daily.    loperamide (IMODIUM A-D) 2 mg Tab Take 2 mg by mouth every 6 (six) hours as needed (Diarrhea).    multivitamin with minerals tablet Take 1 tablet by mouth once daily.    ondansetron (ZOFRAN) 4 MG tablet Take 4 mg by mouth every 8 (eight) hours as needed for Nausea.    prednisoLONE-moxiflo-nepafenac 1-0.5-0.1 % DrpS Place 1 drop into both eyes 4 (four) times daily.     Review of Systems   Constitutional:  Positive for fatigue.   Respiratory:  Negative for cough and shortness of breath.    Cardiovascular:  Negative for chest pain and palpitations.   All other systems reviewed and are negative.    Objective:     Vital Signs (Most Recent):  Temp: 98.2 °F (36.8 °C) (01/07/25 0400)  Pulse: 96 (01/07/25 0700)  Resp: 20 (01/07/25 0700)  BP: 129/68 (01/07/25 0700)  SpO2: 100 % (01/07/25 0700) Vital Signs (24h Range):  Temp:  [97.7 °F (36.5 °C)-98.6 °F (37 °C)] 98.2 °F (36.8 °C)  Pulse:  [42-96] 96  Resp:  [11-24] 20  SpO2:  [89 %-100 %] 100 %  BP: ()/(44-96) 129/68   Weight: 68 kg (150 lb)  Body mass index is 22.81 kg/m².  SpO2: 100 %       Intake/Output Summary (Last 24 hours) at 1/7/2025 0787  Last data filed at 1/7/2025 0551  Gross per 24 hour   Intake 1479.69 ml   Output 750 ml   Net 729.69 ml     Lines/Drains/Airways       Drain  Duration             Male External Urinary Catheter 01/02/25 2015 Small 4 days              Peripheral Intravenous Line  Duration                  Sheath 01/02/25 Right proximal;anterior 5 days         Peripheral IV - Single Lumen 01/04/25 1520 20 G Anterior;Distal;Right Upper Arm 2 days         Peripheral IV - Single Lumen 01/05/25 1714 20 G Anterior;Left Forearm 1 day                  Significant Labs:   Chemistries:   Recent Labs   Lab 01/03/25  1907 01/04/25  0433 01/05/25  0239 01/06/25  0401  01/07/25  0539    140 139 136 139   K 4.0 4.2 3.5 3.5 3.0*    110 106 107 106   CO2 21* 23 22* 21* 23   BUN 10.7 10.9 9.1 12.0 10.1   CREATININE 0.65* 0.64* 0.65* 0.65* 0.62*   CALCIUM 8.3* 8.5* 8.7* 8.1* 7.7*   BILITOT 0.4 0.5 0.7 0.7 0.6   ALKPHOS 86 81 96 70 66   ALT 10 9 12 11 11   AST 16 22 24 25 25   GLUCOSE 111 104 71* 81 75   MG  --   --   --   --  1.50*        CBC/Anemia Labs: Coags:    Recent Labs   Lab 01/05/25  0239 01/06/25  0405 01/07/25  0539   WBC 12.41* 11.57* 8.42   HGB 10.8* 9.9* 10.0*   HCT 31.7* 31.0* 30.1*   * 95* 114*   MCV 92.7 100.6* 95.6*   RDW 14.3 14.5 14.6    Recent Labs   Lab 01/02/25  1457   INR 1.2   APTT 31.2        Telemetry: SR    Physical Exam  Constitutional:       General: He is not in acute distress.     Appearance: Normal appearance. He is ill-appearing.   HENT:      Head: Normocephalic.      Mouth/Throat:      Mouth: Mucous membranes are dry.   Eyes:      Extraocular Movements: Extraocular movements intact.   Cardiovascular:      Rate and Rhythm: Normal rate and regular rhythm.      Pulses: Normal pulses.      Heart sounds: No murmur heard.  Pulmonary:      Effort: Pulmonary effort is normal. No respiratory distress.      Breath sounds: Examination of the right-lower field reveals decreased breath sounds. Examination of the left-lower field reveals decreased breath sounds. Decreased breath sounds present.      Comments: NC O2  Abdominal:      Palpations: Abdomen is soft.   Musculoskeletal:         General: No swelling. Normal range of motion.   Skin:     General: Skin is warm and dry.   Neurological:      General: No focal deficit present.      Comments: Some Confused Conversation   Psychiatric:         Mood and Affect: Mood normal.         Behavior: Behavior normal.         Judgment: Judgment normal.       Home Medications:   No current facility-administered medications on file prior to encounter.     Current Outpatient Medications on File Prior to  Encounter   Medication Sig Dispense Refill    acetaminophen (TYLENOL) 650 MG TbSR Take 650 mg by mouth every 8 (eight) hours as needed (Pain).      apixaban (ELIQUIS) 2.5 mg Tab Take by mouth 2 (two) times daily.      citalopram (CELEXA) 10 MG tablet Take 10 mg by mouth once daily.      loperamide (IMODIUM A-D) 2 mg Tab Take 2 mg by mouth every 6 (six) hours as needed (Diarrhea).      multivitamin with minerals tablet Take 1 tablet by mouth once daily.      ondansetron (ZOFRAN) 4 MG tablet Take 4 mg by mouth every 8 (eight) hours as needed for Nausea.      prednisoLONE-moxiflo-nepafenac 1-0.5-0.1 % DrpS Place 1 drop into both eyes 4 (four) times daily.       Current Schedule Inpatient Medications:   albuterol-ipratropium  3 mL Nebulization Q4H    famotidine (PF)  20 mg Intravenous BID    magnesium sulfate IVPB  4 g Intravenous Once    mupirocin   Nasal BID    piperacillin-tazobactam (Zosyn) IV (PEDS and ADULTS) (extended infusion is not appropriate)  4.5 g Intravenous Q8H    potassium chloride in water  20 mEq Intravenous TID     Continuous Infusions:   lactated ringers   Intravenous Continuous 125 mL/hr at 01/07/25 0026 New Bag at 01/07/25 0026     Assessment:   PAF with RVR - Now In and Out of SR/PAF with CVR - Mostly CVR    - CHADsVASc - 3 Points - 3.2% Stroke Risk per Year   Ground Level Fall Striking Head - Presenting at Level II Trauma  Grade 4 Liver Laceration with Active Extravasation, L 8th Rib Fracture, L Clavicular Fracture and L Scapula Fracture  No Hx of GIB    Plan:   Ok to Hold AC given Polytrauma - Please resume as soon as OK with Primary Team   No Rate Lowering Medications given Borderline BP  Keep K > 4.0 and Mg > 2.0  Replete Lytes   PT was scheduled for Leadless PPM Implant on 1.6.25, however, family rescinded decision to proceed with implant.   Palliative Care was consulted and decisions are being determined  Will Continue to Follow     ANSON Robbins  Cardiology  [facility]      I agree with the findings of the complexity of problems addressed and take responsibility for the plan's risks and complications. I approved the plan documented by Anthony Marinelli NP.  Sign off

## 2025-01-08 VITALS
RESPIRATION RATE: 16 BRPM | WEIGHT: 150 LBS | HEART RATE: 82 BPM | TEMPERATURE: 99 F | OXYGEN SATURATION: 90 % | HEIGHT: 68 IN | DIASTOLIC BLOOD PRESSURE: 68 MMHG | SYSTOLIC BLOOD PRESSURE: 121 MMHG | BODY MASS INDEX: 22.73 KG/M2

## 2025-01-08 LAB
ALBUMIN SERPL-MCNC: 2 G/DL (ref 3.4–4.8)
ALBUMIN/GLOB SERPL: 0.5 RATIO (ref 1.1–2)
ALP SERPL-CCNC: 68 UNIT/L (ref 40–150)
ALT SERPL-CCNC: 14 UNIT/L (ref 0–55)
ANION GAP SERPL CALC-SCNC: 9 MEQ/L
AST SERPL-CCNC: 22 UNIT/L (ref 5–34)
BILIRUB SERPL-MCNC: 0.6 MG/DL
BUN SERPL-MCNC: 7.2 MG/DL (ref 8.4–25.7)
CALCIUM SERPL-MCNC: 7.9 MG/DL (ref 8.8–10)
CHLORIDE SERPL-SCNC: 103 MMOL/L (ref 98–111)
CO2 SERPL-SCNC: 23 MMOL/L (ref 23–31)
CREAT SERPL-MCNC: 0.61 MG/DL (ref 0.72–1.25)
CREAT/UREA NIT SERPL: 12
ERYTHROCYTE [DISTWIDTH] IN BLOOD BY AUTOMATED COUNT: 14.1 % (ref 11.5–17)
GFR SERPLBLD CREATININE-BSD FMLA CKD-EPI: >60 ML/MIN/1.73/M2
GLOBULIN SER-MCNC: 3.9 GM/DL (ref 2.4–3.5)
GLUCOSE SERPL-MCNC: 96 MG/DL (ref 75–121)
HCT VFR BLD AUTO: 30.3 % (ref 42–52)
HGB BLD-MCNC: 10 G/DL (ref 14–18)
MCH RBC QN AUTO: 31.5 PG (ref 27–31)
MCHC RBC AUTO-ENTMCNC: 33 G/DL (ref 33–36)
MCV RBC AUTO: 95.6 FL (ref 80–94)
NRBC BLD AUTO-RTO: 0 %
PLATELET # BLD AUTO: 133 X10(3)/MCL (ref 130–400)
PMV BLD AUTO: 11.1 FL (ref 7.4–10.4)
POTASSIUM SERPL-SCNC: 3.5 MMOL/L (ref 3.5–5.1)
PROT SERPL-MCNC: 5.9 GM/DL (ref 5.8–7.6)
RBC # BLD AUTO: 3.17 X10(6)/MCL (ref 4.7–6.1)
SODIUM SERPL-SCNC: 135 MMOL/L (ref 136–145)
WBC # BLD AUTO: 10.48 X10(3)/MCL (ref 4.5–11.5)

## 2025-01-08 PROCEDURE — 63600175 PHARM REV CODE 636 W HCPCS: Performed by: NURSE PRACTITIONER

## 2025-01-08 PROCEDURE — 94761 N-INVAS EAR/PLS OXIMETRY MLT: CPT

## 2025-01-08 PROCEDURE — 94760 N-INVAS EAR/PLS OXIMETRY 1: CPT

## 2025-01-08 PROCEDURE — 27000221 HC OXYGEN, UP TO 24 HOURS

## 2025-01-08 PROCEDURE — 36415 COLL VENOUS BLD VENIPUNCTURE: CPT

## 2025-01-08 PROCEDURE — 80053 COMPREHEN METABOLIC PANEL: CPT

## 2025-01-08 PROCEDURE — 94640 AIRWAY INHALATION TREATMENT: CPT

## 2025-01-08 PROCEDURE — 99900035 HC TECH TIME PER 15 MIN (STAT)

## 2025-01-08 PROCEDURE — 63600175 PHARM REV CODE 636 W HCPCS

## 2025-01-08 PROCEDURE — 94799 UNLISTED PULMONARY SVC/PX: CPT

## 2025-01-08 PROCEDURE — 25000242 PHARM REV CODE 250 ALT 637 W/ HCPCS: Performed by: NURSE PRACTITIONER

## 2025-01-08 PROCEDURE — 25000003 PHARM REV CODE 250: Performed by: SURGERY

## 2025-01-08 PROCEDURE — 25000003 PHARM REV CODE 250

## 2025-01-08 PROCEDURE — 99238 HOSP IP/OBS DSCHRG MGMT 30/<: CPT | Mod: ,,, | Performed by: SURGERY

## 2025-01-08 PROCEDURE — 85027 COMPLETE CBC AUTOMATED: CPT

## 2025-01-08 PROCEDURE — 25000003 PHARM REV CODE 250: Performed by: NURSE PRACTITIONER

## 2025-01-08 PROCEDURE — 99900031 HC PATIENT EDUCATION (STAT)

## 2025-01-08 RX ORDER — OXYCODONE HYDROCHLORIDE 5 MG/1
5 TABLET ORAL EVERY 6 HOURS PRN
Qty: 28 TABLET
Start: 2025-01-08 | End: 2025-01-15

## 2025-01-08 RX ORDER — POTASSIUM CHLORIDE 14.9 MG/ML
40 INJECTION INTRAVENOUS ONCE
Status: DISCONTINUED | OUTPATIENT
Start: 2025-01-08 | End: 2025-01-08

## 2025-01-08 RX ORDER — ENOXAPARIN SODIUM 100 MG/ML
40 INJECTION SUBCUTANEOUS EVERY 12 HOURS
Status: DISCONTINUED | OUTPATIENT
Start: 2025-01-08 | End: 2025-01-08 | Stop reason: HOSPADM

## 2025-01-08 RX ORDER — AMOXICILLIN AND CLAVULANATE POTASSIUM 875; 125 MG/1; MG/1
1 TABLET, FILM COATED ORAL 2 TIMES DAILY
Start: 2025-01-08 | End: 2025-01-10 | Stop reason: HOSPADM

## 2025-01-08 RX ADMIN — IPRATROPIUM BROMIDE AND ALBUTEROL SULFATE 3 ML: 2.5; .5 SOLUTION RESPIRATORY (INHALATION) at 01:01

## 2025-01-08 RX ADMIN — MORPHINE SULFATE 2 MG: 4 INJECTION INTRAVENOUS at 08:01

## 2025-01-08 RX ADMIN — FAMOTIDINE 20 MG: 10 INJECTION, SOLUTION INTRAVENOUS at 08:01

## 2025-01-08 RX ADMIN — IPRATROPIUM BROMIDE AND ALBUTEROL SULFATE 3 ML: 2.5; .5 SOLUTION RESPIRATORY (INHALATION) at 12:01

## 2025-01-08 RX ADMIN — PIPERACILLIN SODIUM AND TAZOBACTAM SODIUM 4.5 G: 4; .5 INJECTION, POWDER, LYOPHILIZED, FOR SOLUTION INTRAVENOUS at 05:01

## 2025-01-08 RX ADMIN — METOPROLOL TARTRATE 5 MG: 1 INJECTION, SOLUTION INTRAVENOUS at 05:01

## 2025-01-08 RX ADMIN — ENOXAPARIN SODIUM 40 MG: 40 INJECTION SUBCUTANEOUS at 08:01

## 2025-01-08 RX ADMIN — IPRATROPIUM BROMIDE AND ALBUTEROL SULFATE 3 ML: 2.5; .5 SOLUTION RESPIRATORY (INHALATION) at 04:01

## 2025-01-08 RX ADMIN — IPRATROPIUM BROMIDE AND ALBUTEROL SULFATE 3 ML: 2.5; .5 SOLUTION RESPIRATORY (INHALATION) at 08:01

## 2025-01-08 NOTE — PLAN OF CARE
0837 Contacted [facility] to confirm if referral was received. Per hospice RN, agency has made contact with patient's daughter Sarah. Sarah does not want patient admitted to hospice until she is in town. Hospice meeting is scheduled for Friday 1/10/25 at 10am.    Contacted [facility] and spoke with Raquel NOYOLA. Raquel is aware of hospice admission delay. [facility] is requesting dc paperwork. [facility] notified and will send requested paperwork. Plan to return to [facility] today.

## 2025-01-08 NOTE — DISCHARGE SUMMARY
[facility] - [facility] [address] [facility]  Trauma Surgery  Discharge Summary      Patient Name: Kaleb Ibarra  MRN: [id number]  Admission Date: 1/2/2025  Hospital Length of Stay: 6 days  Discharge Date and Time:  01/08/2025 11:35 AM  Attending Physician: Rufino Espinoza Jr., *   Discharging Provider: Kristine Goldman MD  Primary Care Provider: Pretty, Primary Doctor     HPI: Patient is an approximately 65 year old male who presents following fall on thinners with head strike. Patient is currently on Eliquis, last dose 3 days ago, holding for upcoming cataract surgery. Patient reporting upper back pain. No other complaints reported.     Procedure(s) (LRB):  [id number] (N/A)     Hospital Course: Patient presented 1/2 following fall on blood thinners with head strike. Workup significant for left scapula/left clavicle fracture (Orthopedics consult, non-operative management, sling to LUE and non-weight bearing until follow up in 2 weeks) and left 8th rib fractures (conservative management with pain control, incentive spirometry). Patient to return home to nursing home with consideration of hospice placement later this week. His pain is well controlled. Working with PT/OT, recommending moderate intensity therapy. Tolerating a pureed diet and voiding spontaneously. He is medically stable for discharge back to nursing home.    Consults:   Consults (From admission, onward)          Status Ordering Provider     Inpatient consult to Social Work/Case Management  Once        Provider:  (Not yet assigned)    Acknowledged KRISTINE GOLDMAN     Inpatient consult to Social Work/Case Management  Once        Provider:  (Not yet assigned)    Acknowledged ARAM CAMPBELL     Inpatient consult to Palliative Care  Once        Provider:  Amber Alanis FNP    Completed RUFINO ESPINOZA JR     Inpatient consult to Cardiology  Once        Provider:  Wil German MD    Completed [facility]       "      Significant Diagnostic Studies:   Recent Labs     01/06/25  0405 01/07/25  0539 01/08/25  0637   WBC 11.57* 8.42 10.48   HGB 9.9* 10.0* 10.0*   HCT 31.0* 30.1* 30.3*   PLT 95* 114* 133     Recent Labs     01/06/25  0405 01/07/25  0539 01/08/25  0444    139 135*   K 3.5 3.0* 3.5    106 103   CO2 21* 23 23   BUN 12.0 10.1 7.2*   CREATININE 0.65* 0.62* 0.61*   CALCIUM 8.1* 7.7* 7.9*   MG  --  1.50*  --    ALBUMIN 2.0* 2.0* 2.0*   BILITOT 0.7 0.6 0.6   AST 25 25 22   ALKPHOS 70 66 68   ALT 11 11 14     No results for input(s): "POCTGLUCOSE" in the last 72 hours.     Imaging Results              CT Chest Abdomen Pelvis With IV Contrast (XPD) NO Oral Contrast (Edited Result - FINAL)  Result time 01/02/25 18:44:38      Addendum (preliminary) 1 of 1 by Jenny Lama MD (01/02/25 18:44:38)      There are multiple compression deformities seen in the thoracic and lumbar spine of unknown chronicity.  Correlation with direct physical examination is recommended.  If the physical exam is ambiguous MRI correlation may be beneficial if acute fracture is suspected.      Electronically signed by: Shankar Lama  Date:    01/02/2025  Time:    18:44                 Final result by Jenny Lama MD (01/02/25 15:51:56)                   Impression:      Liver laceration along the inferior margin of the liver with its perihepatic blood seen and what appears to be an area of active extravasation of contrast along the inferior margin of the liver.  This would place this as a grade 4 injury due to active hemorrhage although the laceration size is small.    Acute appearing fracture of the left distal clavicle    Comminuted fracture of the body of the scapula on the left side    Left 8th rib fracture posteriorly    Cholelithiasis    Findings discussed with the trauma team provider Veda at time of dictation      Electronically signed by: Shankar Lama  Date:    01/02/2025  Time:    15:51               " Narrative:    EXAMINATION:  CT CHEST ABDOMEN PELVIS WITH IV CONTRAST (XPD)    CLINICAL HISTORY:  Trauma;    TECHNIQUE:  Low dose axial images, sagittal and coronal reformations were obtained from the thoracic inlet to the pubic symphysis following the IV contrast administration. Automatic exposure control is utilized to reduce patient radiation exposure.    COMPARISON:  None    FINDINGS:  The lungs are adequately aerated.  No pneumothorax is seen.  No pulmonary contusion is seen.  No pleural effusion is seen.  No infiltrate is seen.    The thoracic aorta is normal in caliber.  No dissection or aneurysm is seen.  No retrosternal hematoma is seen.    The abdominal aorta appears grossly unremarkable.  No dissection or posttraumatic changes are seen.    The heart appears normal.    There is perihepatic  blood seen along the inferior margin of the liver.  There is a blush of contrast seen in segment 6 of the liver on images number 98 through 102.  Findings are consistent with possible active bleed..  Due to the active blush of contrast displaces the injury and a grade 4 liver laceration.  The area of hemorrhage seems to be from a branch of the right portal vein.    There are multiple gallstones in the gallbladder...    The spleen appears normal.  No splenic laceration is seen.  The pancreas appears grossly unremarkable.  No pancreatic mass or lesion is seen.  No inflammation is seen.    No adrenal abnormality is seen.  No adrenal nodule is seen.    The kidneys are well perfused.  No hydronephrosis is seen.  No hydroureter is seen.  No retroperitoneal hematoma is seen.    Visualized portions of the bowel shows no acute abnormality.  No colitis is seen.  No diverticulitis is seen.  No colonic mass is seen.    No free air is seen.  No free fluid is seen.    Urinary bladder appears unremarkable.    There is an acute appearing fracture of the very distal left clavicle.  It is nondisplaced.  It is best seen on coronal image 49  and axial image 11 series 9.  There is a subacute appearing fracture of the head of the clavicle on the left side.  There is a comminuted fracture of the scapula on the left side.  There is a fracture of the left 8th rib posteriorly.  No sternal fracture is seen.  No thoracic spine fracture is seen.  No lumbar spine fracture is seen.  There is old pelvic fracture of the iliac wing                                       CT Cervical Spine Without Contrast (Final result)  Result time 01/02/25 15:48:11      Final result by Lashay Henry MD (01/02/25 15:48:11)                   Impression:      No acute abnormality identified.      Electronically signed by: Lashay Henry  Date:    01/02/2025  Time:    15:48               Narrative:    EXAMINATION:  CT CERVICAL SPINE WITHOUT CONTRAST    CLINICAL HISTORY:  Trauma;    TECHNIQUE:  Noncontrast CT images of the cervical spine. Axial, coronal, and sagittal reformatted images were obtained. Dose length product is hree 14 mGycm. Automatic exposure control, adjustment of mA/kV or iterative reconstruction technique was used to limit radiation dose.    COMPARISON:  None    FINDINGS:  The cervical spine is visualized through the level T1.    There is no acute fracture identified.  There are multilevel degenerative changes with disc height loss, marginal osteophyte formation and facet arthropathy.  There is no paraspinal hematoma.                                       CT Head Without Contrast (Final result)  Result time 01/02/25 15:37:35      Final result by Lashay Henry MD (01/02/25 15:37:35)                   Impression:      1. No acute intracranial abnormality.  2. Chronic microvascular ischemic changes.      Electronically signed by: Lashay Henry  Date:    01/02/2025  Time:    15:37               Narrative:    EXAMINATION:  CT HEAD WITHOUT CONTRAST    CLINICAL HISTORY:  Trauma;    TECHNIQUE:  Axial scans were obtained from skull base to the vertex.    Coronal  and sagittal reconstructions obtained from the axial data.    Automatic exposure control was utilized to limit radiation dose.    Contrast: None    Radiation Dose:    Total DLP: 1171 mGy*cm    COMPARISON:  None    FINDINGS:  There is no acute intracranial hemorrhage or edema. The gray-white matter differentiation is preserved.  Scattered hypodensities in the subcortical and periventricular white matter likely represent chronic microvascular ischemic changes.    There is no mass effect or midline shift.  There is diffuse parenchymal volume loss.  The basal cisterns are patent. There is no abnormal extra-axial fluid collection.  The carotid and vertebral artery calcifications are noted.    There is soft tissue swelling in the posterior scalp.  The calvarium and skull base are intact. The visualized paranasal sinuses and the mastoid air cells are clear.                                       X-Ray Chest 1 View (Final result)  Result time 01/02/25 16:07:51      Final result by Larry Marlow MD (01/02/25 16:07:51)                   Impression:      No acute pulmonary process identified.      Electronically signed by: Larry Marlow  Date:    01/02/2025  Time:    16:07               Narrative:    EXAMINATION:  XR CHEST 1 VIEW    CLINICAL HISTORY:  r/o bleeding or hemorrhage;    TECHNIQUE:  Frontal view(s) of the chest.    COMPARISON:  No relevant comparison studies available at the time of dictation.    FINDINGS:  Normal cardiac silhouette.  The lungs are well-inflated.  Minimal left lower lung atelectasis.  No significant pleural fluid.  No pneumothorax appreciated.  Minimally displaced distal left clavicle fracture.                                       X-Ray Pelvis Routine AP (Final result)  Result time 01/02/25 16:09:41      Final result by Larry Marlow MD (01/02/25 16:09:41)                   Impression:      Suboptimal assessment, but no definitive acute osseous findings.      Electronically signed by: Larry  [place]  Date:    01/02/2025  Time:    16:09               Narrative:    EXAMINATION:  XR PELVIS ROUTINE AP    CLINICAL HISTORY:  r/o bleeding or hemorrhage;    TECHNIQUE:  AP view of the pelvis.    COMPARISON:  None    FINDINGS:  Prior internal fixation of the left femur.  Suboptimal evaluation due to positioning and decreased overall bone mineralization.  No definitive acute fracture.  Hips are aligned.                                        Pending Diagnostic Studies:       None          Final Active Diagnoses:    Diagnosis Date Noted POA    PRINCIPAL PROBLEM:  Closed fracture of left scapula [S42.102A] 01/07/2025 Yes    Traumatic closed fracture of distal clavicle with minimal displacement, left, initial encounter [S42.032A] 01/07/2025 Yes    Closed fracture of one rib of left side [S22.32XA] 01/07/2025 Yes    Fall [W19.XXXA] 01/07/2025 Yes      Problems Resolved During this Admission:      Discharged Condition: good    Disposition: Hospice/Medical Facility    Follow Up:   Follow-up Information       Sierra Siddiqui MD Follow up in 1 week(s).    Specialties: Cardiovascular Disease, Cardiology  Why: 2 Week MCT for Diagnosis of Syncope, Results on F/U Appt  Contact information:  [address] [place] [address]  [phone]               [facility] Follow up.    Specialty: Trauma Surgery  Why: As needed  Contact information:  [address] [place] [address] [address] [place] [address] [phone]             Kraig Malcolm, DO Follow up.    Specialty: Orthopedic Surgery  Why: Call to make appointment for follow up in 2 weeks  Contact information:  [address] [facility]  [address]  [facility] [address]  [phone]                           Patient Instructions:      Notify your health care provider if you experience any of the following:  temperature >100.4     Notify your health care provider if you experience any of the following:  persistent nausea and vomiting or diarrhea     Notify your  health care provider if you experience any of the following:  severe uncontrolled pain     No dressing needed     Activity as tolerated     Weight bearing restrictions (specify):   Order Comments: Non weight bearing left arm. Sling at all times until orthopedics follow up.     Medications:  Reconciled Home Medications:      Medication List        START taking these medications      amoxicillin-clavulanate 875-125mg 875-125 mg per tablet  Commonly known as: AUGMENTIN  Take 1 tablet by mouth 2 (two) times daily. for 4 days     oxyCODONE 5 MG immediate release tablet  Commonly known as: ROXICODONE  Take 1 tablet (5 mg total) by mouth every 6 (six) hours as needed for Pain.            CONTINUE taking these medications      acetaminophen 650 MG Tbsr  Commonly known as: TYLENOL  Take 650 mg by mouth every 8 (eight) hours as needed (Pain).     citalopram 10 MG tablet  Commonly known as: CeleXA  Take 10 mg by mouth once daily.     ELIQUIS 2.5 mg Tab  Generic drug: apixaban  Take by mouth 2 (two) times daily.     loperamide 2 mg Tab  Commonly known as: IMODIUM A-D  Take 2 mg by mouth every 6 (six) hours as needed (Diarrhea).     multivitamin with minerals tablet  Take 1 tablet by mouth once daily.     ondansetron 4 MG tablet  Commonly known as: ZOFRAN  Take 4 mg by mouth every 8 (eight) hours as needed for Nausea.     prednisoLONE-moxiflo-nepafenac 1-0.5-0.1 % Drps  Place 1 drop into both eyes 4 (four) times daily.              Willis Goldman MD  General Surgery  [facility] - [address] [place] [address] ICU

## 2025-01-08 NOTE — PROGRESS NOTES
"   Trauma Surgery   Progress Note  Patient Name: Kaleb Ibarra                   : 1934     MRN: [id number]   Date of Admission: 2025  Code Status: DNR  Date of Exam: 2025  HD#6  POD#* Surgery not performed *  Attending Provider: Rufino Espinoza Jr., *    Subjective:   Interval history:  AF HDS. NAEO.  No acute complaints today.   Working with PT/OT - recommending moderate intensity therapy.  Patient/family electing for hospice care on discharge.   CM for placement.       Home Meds:   Current Outpatient Medications   Medication Instructions    acetaminophen (TYLENOL) 650 mg, Every 8 hours PRN    apixaban (ELIQUIS) 2.5 mg Tab 2 times daily    citalopram (CELEXA) 10 mg, Daily    loperamide (IMODIUM A-D) 2 mg, Every 6 hours PRN    multivitamin with minerals tablet 1 tablet, Daily    ondansetron (ZOFRAN) 4 mg, Every 8 hours PRN    prednisoLONE-moxiflo-nepafenac 1-0.5-0.1 % DrpS 1 drop, Both Eyes, 4 times daily      Scheduled Meds:   albuterol-ipratropium  3 mL Nebulization Q4H    famotidine (PF)  20 mg Intravenous BID    piperacillin-tazobactam (Zosyn) IV (PEDS and ADULTS) (extended infusion is not appropriate)  4.5 g Intravenous Q8H     Continuous Infusions:   lactated ringers   Intravenous Continuous 125 mL/hr at 25 0547 Rate Verify at 25 0547     PRN Meds:  Current Facility-Administered Medications:     bisacodyL, 10 mg, Rectal, Daily PRN    diphenhydrAMINE, 25 mg, Intravenous, Q6H PRN    LIDOcaine (PF) 10 mg/ml (1%), 1 mL, Intradermal, Once PRN    morphine, 2 mg, Intravenous, Q4H PRN    ondansetron, 8 mg, Oral, Q8H PRN    sodium chloride 0.9%, 10 mL, Intravenous, PRN     Objective:     VITAL SIGNS: 24 HR MIN & MAX LAST   Temp  Min: 97.7 °F (36.5 °C)  Max: 98.2 °F (36.8 °C)  98.2 °F (36.8 °C)   BP  Min: 117/77  Max: 149/73  131/82    Pulse  Min: 60  Max: 93  85    Resp  Min: 15  Max: 24  (!) 24    SpO2  Min: 85 %  Max: 99 %  (!) 85 %      HT: 5' 8" (172.7 cm)  WT: 68 kg (150 lb)  BMI: " 22.8     Intake/output:  Intake/Output - Last 3 Shifts         01/06 0700  01/07 0659 01/07 0700 01/08 0659 01/08 0700 01/09 0659    I.V. (mL/kg) 3040.6 (44.7) 2380 (35)     IV Piggyback 128.1 788.8     Total Intake(mL/kg) 3168.6 (46.6) 3168.8 (46.6)     Urine (mL/kg/hr) 750 (0.5) 1450 (0.9)     Other       Stool 0 0     Total Output 750 1450     Net +2418.6 +1718.8            Urine Occurrence 1 x 1 x     Stool Occurrence 1 x 1 x             Intake/Output Summary (Last 24 hours) at 1/8/2025 0703  Last data filed at 1/8/2025 0547  Gross per 24 hour   Intake 3168.79 ml   Output 1450 ml   Net 1718.79 ml         Lines/drains/airway:       Peripheral IV - Single Lumen 01/02/25 1424 18 G Anterior;Right Forearm (Active)   Site Assessment Clean;Dry;Intact 01/04/25 0400   Line Securement Device Secured with sutureless device 01/03/25 0750   Extremity Assessment Distal to IV No abnormal discoloration;No redness;No swelling 01/04/25 0400   Line Status Capped;Flushed;Saline locked 01/04/25 0400   Dressing Status Clean;Dry;Intact 01/04/25 0400   Dressing Intervention Integrity maintained 01/04/25 0400   Number of days: 1            Peripheral IV - Single Lumen 01/03/25 1745 20 G Posterior;Right Forearm (Active)   Site Assessment Clean;Intact;Dry 01/04/25 0400   Extremity Assessment Distal to IV No abnormal discoloration;No redness;No swelling 01/04/25 0400   Line Status Flushed;Infusing 01/04/25 0400   Dressing Status Clean;Dry;Intact 01/04/25 0400   Dressing Intervention Integrity maintained 01/04/25 0400   Number of days: 0            Sheath 01/02/25 Right proximal;anterior (Active)   Insertion Site no hematoma 01/04/25 0400   Drainage Characteristics/Odor Sanguineous 01/03/25 0800   Drainage Amount Scant 01/03/25 0800   Number of days: 2       Male External Urinary Catheter 01/02/25 2015 Small (Active)   Securement Method secured to lower ABD w/ adhesive device 01/04/25 0400   Skin no redness;no breakdown 01/04/25 0400  "  Tolerance no signs/symptoms of discomfort 01/04/25 0400   Output (mL) 200 mL 01/04/25 0545   Catheter Change Date 01/03/25 01/04/25 0400   Catheter Change Time 2000 01/04/25 0400   Number of days: 1       Physical examination:  Gen: NAD, answering questions appropriately  HEENT: NCAT  CV: RR  Resp: NWOB  Abd: S/NT/ND  Msk: moving all extremities spontaneously and purposefully  Neuro: CN II-XII grossly intact  Skin/wounds: L hand abrasions    Labs:  Renal:  Recent Labs     01/06/25 0405 01/07/25  0539 01/08/25  0444   BUN 12.0 10.1 7.2*   CREATININE 0.65* 0.62* 0.61*     No results for input(s): "LACTIC" in the last 72 hours.  FEN/GI:  Recent Labs     01/06/25 0405 01/07/25  0539 01/08/25  0444    139 135*   K 3.5 3.0* 3.5    106 103   CO2 21* 23 23   CALCIUM 8.1* 7.7* 7.9*   MG  --  1.50*  --    ALBUMIN 2.0* 2.0* 2.0*   BILITOT 0.7 0.6 0.6   AST 25 25 22   ALKPHOS 70 66 68   ALT 11 11 14     Heme:  Recent Labs     01/06/25 0405 01/07/25  0539   HGB 9.9* 10.0*   HCT 31.0* 30.1*   PLT 95* 114*     ID:  Recent Labs     01/06/25 0405 01/07/25  0539   WBC 11.57* 8.42     CBG:  Recent Labs     01/06/25 0405 01/07/25  0539 01/08/25  0444   GLUCOSE 81 75 96      No results for input(s): "POCTGLUCOSE" in the last 72 hours.     Cardiovascular:  No results for input(s): "TROPONINI", "CKTOTAL", "CKMB", "BNP" in the last 168 hours.  I have reviewed all pertinent lab results within the past 24 hours.    Imaging:  X-Ray Chest 1 View   Final Result      No significant interval change.         Electronically signed by: Derrick Tarango   Date:    01/07/2025   Time:    07:44      X-Ray Chest 1 View   Final Result      Little interval change.         Electronically signed by: Larry Marlow   Date:    01/06/2025   Time:    06:02      X-Ray Chest 1 View   Final Result      Progression of congestive failure.         Electronically signed by: Derrick Tarango   Date:    01/05/2025   Time:    21:13      X-Ray Chest 1 View "   Final Result      Interval for development or significant worsening of bilateral infiltrates and right-sided pleural fluid.         Electronically signed by: Evelio Mace MD   Date:    01/05/2025   Time:    13:44      Fl Modified Barium Swallow Speech   Final Result      CT Head Without Contrast   Final Result      1.  No acute intracranial findings identified.      2.  Chronic microangiopathic ischemia and atrophy..         Electronically signed by: Derrick Tarango   Date:    01/04/2025   Time:    12:17      X-Ray Chest 1 View   Final Result      Atelectatic changes in the left retrocardiac region.      Otherwise no active pulmonary disease and no significant change as compared with the previous exam         Electronically signed by: Gokul Turner   Date:    01/03/2025   Time:    07:54      CT Chest Abdomen Pelvis With IV Contrast (XPD) NO Oral Contrast   Final Result   Addendum (preliminary) 1 of 1      There are multiple compression deformities seen in the thoracic and lumbar    spine of unknown chronicity.  Correlation with direct physical examination    is recommended.  If the physical exam is ambiguous MRI correlation may be    beneficial if acute fracture is suspected.         Electronically signed by: Shankar Lama   Date:    01/02/2025   Time:    18:44      Final      Liver laceration along the inferior margin of the liver with its perihepatic blood seen and what appears to be an area of active extravasation of contrast along the inferior margin of the liver.  This would place this as a grade 4 injury due to active hemorrhage although the laceration size is small.      Acute appearing fracture of the left distal clavicle      Comminuted fracture of the body of the scapula on the left side      Left 8th rib fracture posteriorly      Cholelithiasis      Findings discussed with the trauma team provider Veda at time of dictation         Electronically signed by: Shankar Lama   Date:    01/02/2025    Time:    15:51      CT Cervical Spine Without Contrast   Final Result      No acute abnormality identified.         Electronically signed by: Lashay Henry   Date:    01/02/2025   Time:    15:48      CT Head Without Contrast   Final Result      1. No acute intracranial abnormality.   2. Chronic microvascular ischemic changes.         Electronically signed by: Lashay Henry   Date:    01/02/2025   Time:    15:37      X-Ray Chest 1 View   Final Result      No acute pulmonary process identified.         Electronically signed by: Larry Marlow   Date:    01/02/2025   Time:    16:07      X-Ray Pelvis Routine AP   Final Result      Suboptimal assessment, but no definitive acute osseous findings.         Electronically signed by: Larry Marlow   Date:    01/02/2025   Time:    16:09         I have reviewed all pertinent imaging results/findings within the past 24 hours.    Micro/Path/Other:  Microbiology Results (last 7 days)       Procedure Component Value Units Date/Time    Blood Culture [[id number]]  (Normal) Collected: 01/05/25 2050    Order Status: Completed Specimen: Blood Updated: 01/07/25 2201     Blood Culture No Growth At 48 Hours    Blood Culture [[id number]]  (Normal) Collected: 01/05/25 2052    Order Status: Completed Specimen: Blood Updated: 01/07/25 2201     Blood Culture No Growth At 48 Hours           Pathology Results  (Last 7 days)      None             Problems list:  Active Problem List with Overview Notes    Diagnosis Date Noted    Closed fracture of left scapula 01/07/2025    Traumatic closed fracture of distal clavicle with minimal displacement, left, initial encounter 01/07/2025    Closed fracture of one rib of left side 01/07/2025    Fall 01/07/2025      Active Diagnoses:    Diagnosis Date Noted POA    PRINCIPAL PROBLEM:  Closed fracture of left scapula [S42.102A] 01/07/2025 Yes    Traumatic closed fracture of distal clavicle with minimal displacement, left, initial encounter [S42.032A]  01/07/2025 Yes    Closed fracture of one rib of left side [S22.32XA] 01/07/2025 Yes    Fall [W19.XXXA] 01/07/2025 Yes      Problems Resolved During this Admission:       Assessment & Plan:   Patient is an approximately 65 year old male who presents following fall on thinners with head strike.      Small liver laceration with active extravasation, G4  - s/p mesenteric angiography for suspected liver hemorrhage   - Chronic findings per Vascular     Left 8th rib fracture  - Pain control  - Frequent IS, flutter valve     Left clavicle fracture  Left scapula fracture  - NWB ROMAT LUE Sling for comfort   - 2 week follow up     Dysphagia  - SLP consult  - Failed swallow study 1/5  - Resumed diet, pureed      PAF with RVR  - Cardiology following  - Patient/family declined planned pacemaker placement 1/6  - Hold rate lowing medications for soft BP  - Lytes goal    GOC  - Palliative care consult  - DNR status  - Hospice care    Fall  - Consults: Interventional vascular, Orthopedics, Cardiology, Palliative Care  - [facility]  - Bowel regimen  - Anti-emetics: PRN  - Diet: NPO  - VTE ppx: SCDs, Lovenox  - daily labs   - PT/OT - moderate intensity therapy  - Frequent IS, flutter valve  - CM for placement      Willis Goldman MD  General Surgery PGY-1  [facility]

## 2025-01-08 NOTE — PLAN OF CARE
01/08/25 1146   Final Note   Assessment Type Final Discharge Note   Anticipated Discharge Disposition [facility]   Post-Acute Status   Post-Acute Authorization Placement   Post-Acute Placement Status Set-up Complete/Auth obtained   Discharge Delays None known at this time     Patient will return to [facility] today. Left message for Sarah Ibarra to inform her of dc today. Report has been called. [facility] will transfer patient [facility].

## 2025-01-08 NOTE — PLAN OF CARE
Discharge information sent to Kezia Naqvi via [facility] 2nd attempt. Awaiting Dahlia Pereira to confirm

## 2025-01-08 NOTE — PLAN OF CARE
Discharge information sent to Kezia Naqvi via [facility]; Discharge packet prepared and given to RN   Patient denies hitting head when falling, denies any syncope, numbness, tingling, weakness, dizziness, headache, lightheadedness, chest pain, palpitations or seizures prior to mechanical fall. Patient denies any fevers, chills, shortness of breath, abdominal pain, nausea, vomiting, or diarrhea. Discussed case with ED provider. Review of Systems   Constitutional: Negative for appetite change, diaphoresis and fever. HENT: Negative for congestion, rhinorrhea and sore throat. Eyes: Negative for visual disturbance. Respiratory: Negative for cough, chest tightness, shortness of breath and wheezing. Cardiovascular: Negative for chest pain and palpitations. Gastrointestinal: Negative for abdominal pain, diarrhea, nausea and vomiting. Genitourinary: Negative for dysuria, frequency and urgency. Musculoskeletal: Positive for arthralgias (Right knee) and gait problem. Skin: Negative for color change and rash. Neurological: Negative for dizziness, tremors, seizures, syncope, facial asymmetry, weakness, light-headedness, numbness and headaches. Psychiatric/Behavioral: Negative for agitation. Objective:   No intake or output data in the 24 hours ending 12/02/20 0008   Vitals:   Vitals:    12/01/20 2358   BP: (!) 150/87   Pulse: 92   Resp: 18   Temp:    SpO2: 96%     Physical Exam:   Physical Exam  Vitals signs and nursing note reviewed. Constitutional:       General: He is awake. He is not in acute distress. Appearance: Normal appearance. He is morbidly obese. He is not ill-appearing, toxic-appearing or diaphoretic. HENT:      Head: Atraumatic. No raccoon eyes or Daniels's sign. Right Ear: External ear normal.      Left Ear: External ear normal.      Nose: Nose normal. No rhinorrhea. Mouth/Throat:      Mouth: Mucous membranes are moist.   Eyes:      General: No scleral icterus. Extraocular Movements: Extraocular movements intact.       Conjunctiva/sclera: Conjunctivae normal. Pupils: Pupils are equal, round, and reactive to light. Neck:      Musculoskeletal: Neck supple. Cardiovascular:      Rate and Rhythm: Normal rate and regular rhythm. Pulses: Normal pulses. Heart sounds: Heart sounds are distant (Secondary to morbid obesity). No murmur. No systolic murmur. No diastolic murmur. No gallop. Pulmonary:      Effort: Pulmonary effort is normal. No tachypnea, accessory muscle usage, prolonged expiration or respiratory distress. Breath sounds: Decreased breath sounds (Secondary to morbid obesity) present. No wheezing, rhonchi or rales. Abdominal:      General: Abdomen is protuberant. There is no distension. Palpations: Abdomen is soft. Tenderness: There is no abdominal tenderness. There is no guarding or rebound. Negative signs include Romano's sign. Musculoskeletal:      Right hip: He exhibits decreased range of motion (Secondary to morbid obesity) and tenderness. Right knee: He exhibits decreased range of motion. He exhibits no swelling, no effusion, no deformity and no laceration. Tenderness found. No medial joint line and no lateral joint line tenderness noted. Right lower leg: No edema. Left lower leg: No edema. Comments: Musculoskeletal exam limited secondary to extreme morbid obesity and weight of extremities. Skin:     General: Skin is warm and dry. Capillary Refill: Capillary refill takes less than 2 seconds. Neurological:      Mental Status: He is alert and oriented to person, place, and time. Mental status is at baseline. Cranial Nerves: No dysarthria or facial asymmetry. Motor: Weakness (Right lower extremity secondary to pain) present. No tremor or seizure activity. Gait: Gait abnormal (Inability to walk in ED). Psychiatric:         Attention and Perception: Attention normal.         Mood and Affect: Mood normal. Mood is not anxious or depressed.          Speech: Speech normal. Speech is not slurred. Behavior: Behavior normal. Behavior is cooperative. Past Medical History:      Past Medical History:   Diagnosis Date    Abscess     ADHD (attention deficit hyperactivity disorder)     Chronic back pain     Morbid obesity ([address]     Scoliosis     6th grade     PSHX:  has a past surgical history that includes Foot surgery. Allergies: Allergies   Allergen Reactions    Cinnamon Shortness Of Breath       FAM HX: family history includes Arthritis in his maternal grandfather; Asthma in his brother; Diabetes in his father and maternal grandfather; Heart Attack in his paternal grandmother; High Blood Pressure in his maternal grandmother; Hypertension in his mother; No Known Problems in his paternal grandfather; Obesity in his father and mother; Stroke in his paternal grandmother.   Soc HX:   Social History     Socioeconomic History    Marital status: Single     Spouse name: None    Number of children: None    Years of education: None    Highest education level: None   Occupational History    None   Social Needs    Financial resource strain: None    Food insecurity     Worry: None     Inability: None    Transportation needs     Medical: None     Non-medical: None   Tobacco Use    Smoking status: Never Smoker    Smokeless tobacco: Current User     Types: Snuff   Substance and Sexual Activity    Alcohol use: Yes     Comment: occ    Drug use: No    Sexual activity: Yes     Partners: Female   Lifestyle    Physical activity     Days per week: None     Minutes per session: None    Stress: None   Relationships    Social connections     Talks on phone: None     Gets together: None     Attends Jain service: None     Active member of club or organization: None     Attends meetings of clubs or organizations: None     Relationship status: None    Intimate partner violence     Fear of current or ex partner: None     Emotionally abused: None     Physically abused: None     Forced

## 2025-01-08 NOTE — PLAN OF CARE
Phoned Dahlia Pereira with [facility] to confirm that patient can return to facility on today considering that he is a resident there. (Per BRINA Bah from Jessica at [facility]). No answer from Dahlia christina to return call.

## 2025-01-08 NOTE — NURSING
[facility] transported pt to [facility]. Report given to PEDRO Garcia. Discharge packet given and patient transported with O2 tank.

## 2025-01-09 ENCOUNTER — HOSPITAL ENCOUNTER (EMERGENCY)
Facility: HOSPITAL | Age: OVER 89
Discharge: ANOTHER HOSPITAL | End: 2025-01-10
Attending: EMERGENCY MEDICINE
Payer: MEDICARE

## 2025-01-09 DIAGNOSIS — J18.9 MULTIFOCAL PNEUMONIA: ICD-10-CM

## 2025-01-09 DIAGNOSIS — R09.02 HYPOXIA: Primary | ICD-10-CM

## 2025-01-09 DIAGNOSIS — J90 PLEURAL EFFUSION: ICD-10-CM

## 2025-01-09 DIAGNOSIS — S22.42XA CLOSED FRACTURE OF MULTIPLE RIBS OF LEFT SIDE, INITIAL ENCOUNTER: ICD-10-CM

## 2025-01-09 PROCEDURE — 93010 ELECTROCARDIOGRAM REPORT: CPT | Mod: ,,, | Performed by: STUDENT IN AN ORGANIZED HEALTH CARE EDUCATION/TRAINING PROGRAM

## 2025-01-09 PROCEDURE — 93005 ELECTROCARDIOGRAM TRACING: CPT

## 2025-01-09 PROCEDURE — 96367 TX/PROPH/DG ADDL SEQ IV INF: CPT | Mod: 59

## 2025-01-09 PROCEDURE — 96365 THER/PROPH/DIAG IV INF INIT: CPT

## 2025-01-09 PROCEDURE — 96366 THER/PROPH/DIAG IV INF ADDON: CPT

## 2025-01-09 PROCEDURE — 99285 EMERGENCY DEPT VISIT HI MDM: CPT | Mod: 25

## 2025-01-10 VITALS
TEMPERATURE: 98 F | DIASTOLIC BLOOD PRESSURE: 84 MMHG | HEART RATE: 69 BPM | SYSTOLIC BLOOD PRESSURE: 132 MMHG | RESPIRATION RATE: 15 BRPM | WEIGHT: 130.19 LBS | OXYGEN SATURATION: 96 % | BODY MASS INDEX: 19.8 KG/M2

## 2025-01-10 PROBLEM — R06.02 SOB (SHORTNESS OF BREATH): Status: ACTIVE | Noted: 2025-01-10

## 2025-01-10 LAB
ALBUMIN SERPL-MCNC: 2.1 G/DL (ref 3.4–4.8)
ALBUMIN/GLOB SERPL: 0.5 RATIO (ref 1.1–2)
ALP SERPL-CCNC: 65 UNIT/L (ref 40–150)
ALT SERPL-CCNC: 12 UNIT/L (ref 0–55)
ANION GAP SERPL CALC-SCNC: 8 MEQ/L
APTT PPP: 35.3 SECONDS (ref 23.2–33.7)
AST SERPL-CCNC: 18 UNIT/L (ref 5–34)
BACTERIA BLD CULT: NORMAL
BACTERIA BLD CULT: NORMAL
BASOPHILS # BLD AUTO: 0.04 X10(3)/MCL
BASOPHILS NFR BLD AUTO: 0.6 %
BILIRUB SERPL-MCNC: 0.6 MG/DL
BUN SERPL-MCNC: 6 MG/DL (ref 8.4–25.7)
CALCIUM SERPL-MCNC: 8.2 MG/DL (ref 8.8–10)
CHLORIDE SERPL-SCNC: 102 MMOL/L (ref 98–111)
CO2 SERPL-SCNC: 28 MMOL/L (ref 23–31)
CREAT SERPL-MCNC: 0.61 MG/DL (ref 0.72–1.25)
CREAT/UREA NIT SERPL: 10
D DIMER PPP IA.FEU-MCNC: 1.96 UG/ML FEU (ref 0–0.5)
EOSINOPHIL # BLD AUTO: 0.22 X10(3)/MCL (ref 0–0.9)
EOSINOPHIL NFR BLD AUTO: 3.5 %
ERYTHROCYTE [DISTWIDTH] IN BLOOD BY AUTOMATED COUNT: 14.3 % (ref 11.5–17)
GFR SERPLBLD CREATININE-BSD FMLA CKD-EPI: >60 ML/MIN/1.73/M2
GLOBULIN SER-MCNC: 3.9 GM/DL (ref 2.4–3.5)
GLUCOSE SERPL-MCNC: 89 MG/DL (ref 75–121)
HCT VFR BLD AUTO: 29.2 % (ref 42–52)
HGB BLD-MCNC: 10 G/DL (ref 14–18)
IMM GRANULOCYTES # BLD AUTO: 0.08 X10(3)/MCL (ref 0–0.04)
IMM GRANULOCYTES NFR BLD AUTO: 1.3 %
INR PPP: 1.5
LYMPHOCYTES # BLD AUTO: 1.42 X10(3)/MCL (ref 0.6–4.6)
LYMPHOCYTES NFR BLD AUTO: 22.3 %
MCH RBC QN AUTO: 32.4 PG (ref 27–31)
MCHC RBC AUTO-ENTMCNC: 34.2 G/DL (ref 33–36)
MCV RBC AUTO: 94.5 FL (ref 80–94)
MONOCYTES # BLD AUTO: 0.99 X10(3)/MCL (ref 0.1–1.3)
MONOCYTES NFR BLD AUTO: 15.5 %
MRSA PCR SCRN (OHS): NOT DETECTED
NEUTROPHILS # BLD AUTO: 3.62 X10(3)/MCL (ref 2.1–9.2)
NEUTROPHILS NFR BLD AUTO: 56.8 %
NRBC BLD AUTO-RTO: 0 %
OHS QRS DURATION: 94 MS
OHS QTC CALCULATION: 434 MS
PLATELET # BLD AUTO: 146 X10(3)/MCL (ref 130–400)
PMV BLD AUTO: 10.3 FL (ref 7.4–10.4)
POTASSIUM SERPL-SCNC: 3.4 MMOL/L (ref 3.5–5.1)
PROT SERPL-MCNC: 6 GM/DL (ref 5.8–7.6)
PROTHROMBIN TIME: 18.3 SECONDS (ref 12.5–14.5)
RBC # BLD AUTO: 3.09 X10(6)/MCL (ref 4.7–6.1)
SODIUM SERPL-SCNC: 138 MMOL/L (ref 136–145)
TROPONIN I SERPL-MCNC: <0.01 NG/ML (ref 0–0.04)
WBC # BLD AUTO: 6.37 X10(3)/MCL (ref 4.5–11.5)

## 2025-01-10 PROCEDURE — 99284 EMERGENCY DEPT VISIT MOD MDM: CPT | Mod: GC,,, | Performed by: SURGERY

## 2025-01-10 PROCEDURE — 85730 THROMBOPLASTIN TIME PARTIAL: CPT | Performed by: EMERGENCY MEDICINE

## 2025-01-10 PROCEDURE — 80053 COMPREHEN METABOLIC PANEL: CPT | Performed by: EMERGENCY MEDICINE

## 2025-01-10 PROCEDURE — 87641 MR-STAPH DNA AMP PROBE: CPT | Performed by: EMERGENCY MEDICINE

## 2025-01-10 PROCEDURE — 25000003 PHARM REV CODE 250: Performed by: INTERNAL MEDICINE

## 2025-01-10 PROCEDURE — 63600175 PHARM REV CODE 636 W HCPCS: Performed by: EMERGENCY MEDICINE

## 2025-01-10 PROCEDURE — 25000003 PHARM REV CODE 250: Performed by: EMERGENCY MEDICINE

## 2025-01-10 PROCEDURE — 85025 COMPLETE CBC W/AUTO DIFF WBC: CPT | Performed by: EMERGENCY MEDICINE

## 2025-01-10 PROCEDURE — 25500020 PHARM REV CODE 255: Performed by: EMERGENCY MEDICINE

## 2025-01-10 PROCEDURE — 84484 ASSAY OF TROPONIN QUANT: CPT | Performed by: EMERGENCY MEDICINE

## 2025-01-10 PROCEDURE — 85610 PROTHROMBIN TIME: CPT | Performed by: EMERGENCY MEDICINE

## 2025-01-10 PROCEDURE — 85379 FIBRIN DEGRADATION QUANT: CPT | Performed by: EMERGENCY MEDICINE

## 2025-01-10 RX ORDER — IPRATROPIUM BROMIDE AND ALBUTEROL SULFATE 2.5; .5 MG/3ML; MG/3ML
3 SOLUTION RESPIRATORY (INHALATION) EVERY 8 HOURS
Status: DISCONTINUED | OUTPATIENT
Start: 2025-01-10 | End: 2025-01-10 | Stop reason: HOSPADM

## 2025-01-10 RX ORDER — FUROSEMIDE 10 MG/ML
40 INJECTION INTRAMUSCULAR; INTRAVENOUS
Status: COMPLETED | OUTPATIENT
Start: 2025-01-10 | End: 2025-01-10

## 2025-01-10 RX ORDER — LEVOFLOXACIN 250 MG/1
750 TABLET ORAL DAILY
Status: DISCONTINUED | OUTPATIENT
Start: 2025-01-10 | End: 2025-01-10

## 2025-01-10 RX ORDER — CEFDINIR 300 MG/1
300 CAPSULE ORAL EVERY 12 HOURS
Qty: 14 CAPSULE | Refills: 0 | Status: SHIPPED | OUTPATIENT
Start: 2025-01-10 | End: 2025-01-17

## 2025-01-10 RX ORDER — IPRATROPIUM BROMIDE AND ALBUTEROL SULFATE 2.5; .5 MG/3ML; MG/3ML
3 SOLUTION RESPIRATORY (INHALATION) EVERY 8 HOURS
Qty: 75 ML | Refills: 0 | Status: SHIPPED | OUTPATIENT
Start: 2025-01-10 | End: 2026-01-10

## 2025-01-10 RX ORDER — CEFDINIR 300 MG/1
300 CAPSULE ORAL EVERY 12 HOURS
Status: DISCONTINUED | OUTPATIENT
Start: 2025-01-10 | End: 2025-01-10 | Stop reason: HOSPADM

## 2025-01-10 RX ADMIN — SODIUM CHLORIDE 1000 ML: 9 INJECTION, SOLUTION INTRAVENOUS at 12:01

## 2025-01-10 RX ADMIN — IOHEXOL 100 ML: 350 INJECTION, SOLUTION INTRAVENOUS at 03:01

## 2025-01-10 RX ADMIN — AZITHROMYCIN MONOHYDRATE 500 MG: 500 INJECTION, POWDER, LYOPHILIZED, FOR SOLUTION INTRAVENOUS at 05:01

## 2025-01-10 RX ADMIN — VANCOMYCIN HYDROCHLORIDE 1500 MG: 1.5 INJECTION, POWDER, LYOPHILIZED, FOR SOLUTION INTRAVENOUS at 06:01

## 2025-01-10 RX ADMIN — CEFDINIR 300 MG: 300 CAPSULE ORAL at 11:01

## 2025-01-10 RX ADMIN — FUROSEMIDE 40 MG: 10 INJECTION, SOLUTION INTRAMUSCULAR; INTRAVENOUS at 04:01

## 2025-01-10 RX ADMIN — PIPERACILLIN SODIUM AND TAZOBACTAM SODIUM 4.5 G: 4; .5 INJECTION, POWDER, LYOPHILIZED, FOR SOLUTION INTRAVENOUS at 04:01

## 2025-01-10 NOTE — ED NOTES
Called report to Kezia Naqvi, spoke to nurse Hall. Updated on plan of care, including coordinating hospice to follow up with daughter once pt has returned. Isabel reports will set up transport and call back with ETA.

## 2025-01-10 NOTE — ED NOTES
[facility] arrives to transport pt. Discharge summary provided to paramedics. Pt NAD, vs stable. Pt cleaned and placed in new brief, sling, and gown prior to discharge.

## 2025-01-10 NOTE — SUBJECTIVE & OBJECTIVE
No current facility-administered medications on file prior to encounter.     Current Outpatient Medications on File Prior to Encounter   Medication Sig    apixaban (ELIQUIS) 2.5 mg Tab Take 1 tablet (2.5 mg total) by mouth 2 (two) times daily.    citalopram (CELEXA) 10 MG tablet Take 10 mg by mouth once daily.    loperamide (IMODIUM A-D) 2 mg Tab Take 2 mg by mouth every 6 (six) hours as needed (Diarrhea).    multivitamin Tab Take 1 tablet by mouth once daily.    ondansetron (ZOFRAN) 4 MG tablet Take 4 mg by mouth every 8 (eight) hours as needed for Nausea.    oxyCODONE (ROXICODONE) 5 MG immediate release tablet Take 1 tablet (5 mg total) by mouth every 6 (six) hours as needed for Pain.    prednisoLONE-moxiflo-nepafenac 1-0.5-0.1 % DrpS Place 1 drop into both eyes 4 (four) times daily.    [DISCONTINUED] acetaminophen (TYLENOL) 325 MG tablet Take 325 mg by mouth every 6 (six) hours as needed for Pain.    [DISCONTINUED] acetaminophen (TYLENOL) 650 MG TbSR Take 650 mg by mouth every 8 (eight) hours as needed (Pain).    [DISCONTINUED] amoxicillin-clavulanate 875-125mg (AUGMENTIN) 875-125 mg per tablet Take 1 tablet by mouth 2 (two) times daily. for 4 days    [DISCONTINUED] apixaban (ELIQUIS) 2.5 mg Tab Take by mouth 2 (two) times daily.    [DISCONTINUED] apixaban (ELIQUIS) 5 mg Tab Take 5 mg by mouth 2 (two) times daily.    [DISCONTINUED] citalopram (CELEXA) 10 MG tablet Take 10 mg by mouth once daily.    [DISCONTINUED] multivitamin with minerals tablet Take 1 tablet by mouth once daily.    [DISCONTINUED] multivitamin with minerals tablet Take 1 tablet by mouth once daily.       Review of patient's allergies indicates:  No Known Allergies    Past Medical History:   Diagnosis Date    Cancer     prostate    Compression fracture of body of thoracic vertebra     Dementia without behavioral disturbance     Dysphagia     Paroxysmal atrial fibrillation     Rhabdomyolysis 02/08/2023    Unspecified cataract     Uses roller  walker      Past Surgical History:   Procedure Laterality Date    FRACTURE SURGERY      INTRAMEDULLARY RODDING OF FEMUR Left 07/28/2024    Procedure: INSERTION, INTRAMEDULLARY HANNA, FEMUR, LEFT;  Surgeon: Fabricio Alcazar MD;  Location: [facility];  Service: Orthopedics;  Laterality: Left;  Supine, hana table    synthes long tfna    last case    JOINT REPLACEMENT      OPEN REDUCTION AND INTERNAL FIXATION (ORIF) OF FRACTURE OF DISTAL RADIUS Left 04/18/2024    Procedure: ORIF, FRACTURE, RADIUS, DISTAL;  Surgeon: Kraig Malcolm DO;  Location: [facility] OR;  Service: Orthopedics;  Laterality: Left;  supine hand table c arm skeletal dynamics.    OPEN REDUCTION AND INTERNAL FIXATION (ORIF) OF FRACTURE OF PROXIMAL HUMERUS  04/18/2024    Procedure: ORIF, FRACTURE, HUMERUS, PROXIMAL;  Surgeon: Kraig Malcolm DO;  Location: [facility] OR;  Service: Orthopedics;;    PROSTATE SURGERY N/A     REMOVAL,ORTHOPEDIC HARDWARE,UPPER EXTREMITY Left 07/31/2024    Procedure: REMOVAL,ORTHOPEDIC HARDWARE,UPPER EXTREMITY, EXTENSOR TENDON REPAIR LEFT FOREARM;  Surgeon: Kraig Malcolm DO;  Location: [facility];  Service: Orthopedics;  Laterality: Left;    SPINE SURGERY       Family History       Problem Relation (Age of Onset)    Diabetes Brother          Tobacco Use    Smoking status: Never     Passive exposure: Never    Smokeless tobacco: Never   Substance and Sexual Activity    Alcohol use: Not Currently     Alcohol/week: 2.0 standard drinks of alcohol     Comment: socially    Drug use: Never    Sexual activity: Not Currently     Review of Systems   Constitutional:  Negative for chills and fever.   HENT:  Negative for ear pain and trouble swallowing.    Eyes:  Negative for pain and redness.   Respiratory:  Positive for cough and shortness of breath. Negative for chest tightness.    Cardiovascular:  Negative for chest pain, palpitations and leg swelling.   Gastrointestinal:  Negative for abdominal distention, abdominal pain, nausea and vomiting.    Genitourinary:  Negative for difficulty urinating.   Musculoskeletal:  Negative for back pain and neck pain.   Skin:  Negative for color change, pallor and wound.   Neurological:  Negative for dizziness, syncope, speech difficulty, weakness, light-headedness, numbness and headaches.   Psychiatric/Behavioral:  Negative for agitation and suicidal ideas.    All other systems reviewed and are negative.    Objective:     Vital Signs (Most Recent):  Temp: 97.9 °F (36.6 °C) (01/09/25 2331)  Pulse: 77 (01/10/25 0804)  Resp: 16 (01/10/25 0804)  BP: 101/70 (01/10/25 0804)  SpO2: (!) 94 % (01/10/25 0804) Vital Signs (24h Range):  Temp:  [97.9 °F (36.6 °C)] 97.9 °F (36.6 °C)  Pulse:  [59-77] 77  Resp:  [15-22] 16  SpO2:  [87 %-99 %] 94 %  BP: (100-142)/(70-79) 101/70     Weight: 59.1 kg (130 lb 3.2 oz)  Body mass index is 19.8 kg/m².     Physical Exam  Constitutional:       Appearance: Normal appearance.   HENT:      Head: Normocephalic and atraumatic.      Nose: Nose normal.   Eyes:      Pupils: Pupils are equal, round, and reactive to light.   Cardiovascular:      Rate and Rhythm: Normal rate.      Pulses: Normal pulses.      Comments: Normal peripheral pulses  Pulmonary:      Effort: Pulmonary effort is normal. No respiratory distress.   Chest:      Chest wall: No tenderness.   Abdominal:      General: Abdomen is flat. Bowel sounds are normal. There is no distension.      Palpations: Abdomen is soft.      Tenderness: There is no abdominal tenderness.   Musculoskeletal:         General: No swelling, tenderness, deformity or signs of injury.      Cervical back: Normal range of motion and neck supple. No tenderness.   Skin:     General: Skin is warm and dry.      Capillary Refill: Capillary refill takes less than 2 seconds.      Findings: No lesion.   Neurological:      General: No focal deficit present.      Mental Status: He is alert and oriented to person, place, and time. Mental status is at baseline.   Psychiatric:          Mood and Affect: Mood normal.         Behavior: Behavior normal.         Thought Content: Thought content normal.         Judgment: Judgment normal.            I have reviewed all pertinent lab results within the past 24 hours.    Significant Diagnostics:  I have reviewed all pertinent imaging results/findings within the past 24 hours.

## 2025-01-10 NOTE — ED NOTES
Called back Kezia velasquez, spoke to Isabel who states she got in touch with YANET SORIANO approximately 1 hour.

## 2025-01-10 NOTE — PROGRESS NOTES
CM consult for hospice. Spoke with Dr. LIBIA Lai who reported patients LAILA Smith previously communicating with [facility] on an informational basis but now interested in setting patient up with their services at [facility]. Dr. Lai said pt ready to dc once hospice is coordinated and he will be dc pt on 7 days of oral antibiotics. Referral sent to [facility] and informed them of above. Awaiting response.

## 2025-01-10 NOTE — CONSULTS
[facility] - Emergency Dept  Trauma  Consult Note    Patient Name: Kaleb Ibarra  MRN: [id number]  Code Status: Prior  Admission Date: 1/9/2025  Hospital Length of Stay: 0 days  Attending Physician: Pretty att. providers found  Primary Care Provider: Pretty, Primary Doctor    Patient information was obtained from patient, EMS personnel, nursing home, past medical records, ER records, and primary team.     Consults  Subjective:     Principal Problem: SOB (shortness of breath)    History of Present Illness: Patient known to us from recent admission after a fall.  Was discharged with the nursing home with plan for hospice today.  The patient has said back in for increasing shortness of breath and increasing oxygen requirements.  Discussed the case with trauma attending, emergency department attending, Hospital Medicine.  They have kindly reached out of the family and discussed the plan of care.  The family requests transferred back to the nursing home with resumption of plans to institute hospice care today.  They have entered the discharge orders.  We are agreeable with this plan of care.    No current facility-administered medications on file prior to encounter.     Current Outpatient Medications on File Prior to Encounter   Medication Sig    apixaban (ELIQUIS) 2.5 mg Tab Take 1 tablet (2.5 mg total) by mouth 2 (two) times daily.    citalopram (CELEXA) 10 MG tablet Take 10 mg by mouth once daily.    loperamide (IMODIUM A-D) 2 mg Tab Take 2 mg by mouth every 6 (six) hours as needed (Diarrhea).    multivitamin Tab Take 1 tablet by mouth once daily.    ondansetron (ZOFRAN) 4 MG tablet Take 4 mg by mouth every 8 (eight) hours as needed for Nausea.    oxyCODONE (ROXICODONE) 5 MG immediate release tablet Take 1 tablet (5 mg total) by mouth every 6 (six) hours as needed for Pain.    prednisoLONE-moxiflo-nepafenac 1-0.5-0.1 % DrpS Place 1 drop into both eyes 4 (four) times daily.    [DISCONTINUED] acetaminophen  (TYLENOL) 325 MG tablet Take 325 mg by mouth every 6 (six) hours as needed for Pain.    [DISCONTINUED] acetaminophen (TYLENOL) 650 MG TbSR Take 650 mg by mouth every 8 (eight) hours as needed (Pain).    [DISCONTINUED] amoxicillin-clavulanate 875-125mg (AUGMENTIN) 875-125 mg per tablet Take 1 tablet by mouth 2 (two) times daily. for 4 days    [DISCONTINUED] apixaban (ELIQUIS) 2.5 mg Tab Take by mouth 2 (two) times daily.    [DISCONTINUED] apixaban (ELIQUIS) 5 mg Tab Take 5 mg by mouth 2 (two) times daily.    [DISCONTINUED] citalopram (CELEXA) 10 MG tablet Take 10 mg by mouth once daily.    [DISCONTINUED] multivitamin with minerals tablet Take 1 tablet by mouth once daily.    [DISCONTINUED] multivitamin with minerals tablet Take 1 tablet by mouth once daily.       Review of patient's allergies indicates:  No Known Allergies    Past Medical History:   Diagnosis Date    Cancer     prostate    Compression fracture of body of thoracic vertebra     Dementia without behavioral disturbance     Dysphagia     Paroxysmal atrial fibrillation     Rhabdomyolysis 02/08/2023    Unspecified cataract     Uses roller walker      Past Surgical History:   Procedure Laterality Date    FRACTURE SURGERY      INTRAMEDULLARY RODDING OF FEMUR Left 07/28/2024    Procedure: INSERTION, INTRAMEDULLARY HANNA, FEMUR, LEFT;  Surgeon: Fabricio Alcazar MD;  Location: [facility];  Service: Orthopedics;  Laterality: Left;  Supine, hana table    synthes long tfna    last case    JOINT REPLACEMENT      OPEN REDUCTION AND INTERNAL FIXATION (ORIF) OF FRACTURE OF DISTAL RADIUS Left 04/18/2024    Procedure: ORIF, FRACTURE, RADIUS, DISTAL;  Surgeon: Kraig Malcolm DO;  Location: [facility] OR;  Service: Orthopedics;  Laterality: Left;  supine hand table c arm skeletal dynamics.    OPEN REDUCTION AND INTERNAL FIXATION (ORIF) OF FRACTURE OF PROXIMAL HUMERUS  04/18/2024    Procedure: ORIF, FRACTURE, HUMERUS, PROXIMAL;  Surgeon: Kraig Malcolm DO;  Location: [facility] OR;   Service: Orthopedics;;    PROSTATE SURGERY N/A     REMOVAL,ORTHOPEDIC HARDWARE,UPPER EXTREMITY Left 07/31/2024    Procedure: REMOVAL,ORTHOPEDIC HARDWARE,UPPER EXTREMITY, EXTENSOR TENDON REPAIR LEFT FOREARM;  Surgeon: Kraig Malcolm DO;  Location: [facility];  Service: Orthopedics;  Laterality: Left;    SPINE SURGERY       Family History       Problem Relation (Age of Onset)    Diabetes Brother          Tobacco Use    Smoking status: Never     Passive exposure: Never    Smokeless tobacco: Never   Substance and Sexual Activity    Alcohol use: Not Currently     Alcohol/week: 2.0 standard drinks of alcohol     Comment: socially    Drug use: Never    Sexual activity: Not Currently     Review of Systems   Constitutional:  Negative for chills and fever.   HENT:  Negative for ear pain and trouble swallowing.    Eyes:  Negative for pain and redness.   Respiratory:  Positive for cough and shortness of breath. Negative for chest tightness.    Cardiovascular:  Negative for chest pain, palpitations and leg swelling.   Gastrointestinal:  Negative for abdominal distention, abdominal pain, nausea and vomiting.   Genitourinary:  Negative for difficulty urinating.   Musculoskeletal:  Negative for back pain and neck pain.   Skin:  Negative for color change, pallor and wound.   Neurological:  Negative for dizziness, syncope, speech difficulty, weakness, light-headedness, numbness and headaches.   Psychiatric/Behavioral:  Negative for agitation and suicidal ideas.    All other systems reviewed and are negative.    Objective:     Vital Signs (Most Recent):  Temp: 97.9 °F (36.6 °C) (01/09/25 2331)  Pulse: 77 (01/10/25 0804)  Resp: 16 (01/10/25 0804)  BP: 101/70 (01/10/25 0804)  SpO2: (!) 94 % (01/10/25 0804) Vital Signs (24h Range):  Temp:  [97.9 °F (36.6 °C)] 97.9 °F (36.6 °C)  Pulse:  [59-77] 77  Resp:  [15-22] 16  SpO2:  [87 %-99 %] 94 %  BP: (100-142)/(70-79) 101/70     Weight: 59.1 kg (130 lb 3.2 oz)  Body mass index is 19.8 kg/m².      Physical Exam  Constitutional:       Appearance: Normal appearance.   HENT:      Head: Normocephalic and atraumatic.      Nose: Nose normal.   Eyes:      Pupils: Pupils are equal, round, and reactive to light.   Cardiovascular:      Rate and Rhythm: Normal rate.      Pulses: Normal pulses.      Comments: Normal peripheral pulses  Pulmonary:      Effort: Pulmonary effort is normal. No respiratory distress.   Chest:      Chest wall: No tenderness.   Abdominal:      General: Abdomen is flat. Bowel sounds are normal. There is no distension.      Palpations: Abdomen is soft.      Tenderness: There is no abdominal tenderness.   Musculoskeletal:         General: No swelling, tenderness, deformity or signs of injury.      Cervical back: Normal range of motion and neck supple. No tenderness.   Skin:     General: Skin is warm and dry.      Capillary Refill: Capillary refill takes less than 2 seconds.      Findings: No lesion.   Neurological:      General: No focal deficit present.      Mental Status: He is alert and oriented to person, place, and time. Mental status is at baseline.   Psychiatric:         Mood and Affect: Mood normal.         Behavior: Behavior normal.         Thought Content: Thought content normal.         Judgment: Judgment normal.            I have reviewed all pertinent lab results within the past 24 hours.    Significant Diagnostics:  I have reviewed all pertinent imaging results/findings within the past 24 hours.    Assessment/Plan:     * SOB (shortness of breath)  As per PHI. Agree with discharge with plans for hospice today.       VTE Risk Mitigation (From admission, onward)      None            Thank you for your consult. I will sign off. Please contact us if you have any additional questions.    Santhosh Busby, LEANDRA  General Surgery  [facility] - Emergency Dept

## 2025-01-10 NOTE — ED PROVIDER NOTES
Encounter Date: 1/9/2025    SCRIBE #1 NOTE: I, Audi Diaz, am scribing for, and in the presence of,  Ronn Romero MD. I have scribed the following portions of the note - Other sections scribed: HPI,ROS,PE.       History     Chief Complaint   Patient presents with    Hypoxia     Arrives aasi unit [facility] from [facility] w reports of low o2 sat on normal 2L nc home o2 - initial sat 86% - pt denies complaints      89 y/o male with PMHx of Afib, dementia, and prostate cancer presents to ED via EMS from [facility] with hypoxia. Per triage note, NH reports low SpO2 at 86% on his normal 2L O2 through nasal cannula. Pt was discharged from here on 1/8 following a fall. Per nursing home records, pt is on augmentin and oxycodone 5mg.     Pt in ED reports mild chest pain and denies SOB.     History and ROS limited due to Hx of dementia.     The history is provided by medical records and the nursing home. History limited by: dementia.     Review of patient's allergies indicates:  No Known Allergies  Past Medical History:   Diagnosis Date    Cancer     prostate    Compression fracture of body of thoracic vertebra     Dementia without behavioral disturbance     Dysphagia     Paroxysmal atrial fibrillation     Rhabdomyolysis 02/08/2023    Unspecified cataract     Uses roller walker      Past Surgical History:   Procedure Laterality Date    FRACTURE SURGERY      INTRAMEDULLARY RODDING OF FEMUR Left 07/28/2024    Procedure: INSERTION, INTRAMEDULLARY HANNA, FEMUR, LEFT;  Surgeon: Fabricio Alcazar MD;  Location: [facility] OR;  Service: Orthopedics;  Laterality: Left;  Supine, hana table    synthes long tfna    last case    JOINT REPLACEMENT      OPEN REDUCTION AND INTERNAL FIXATION (ORIF) OF FRACTURE OF DISTAL RADIUS Left 04/18/2024    Procedure: ORIF, FRACTURE, RADIUS, DISTAL;  Surgeon: Kraig Malcolm DO;  Location: [facility] OR;  Service: Orthopedics;  Laterality: Left;  supine hand table c arm skeletal dynamics.    OPEN  REDUCTION AND INTERNAL FIXATION (ORIF) OF FRACTURE OF PROXIMAL HUMERUS  04/18/2024    Procedure: ORIF, FRACTURE, HUMERUS, PROXIMAL;  Surgeon: Kraig Malcolm DO;  Location: [facility] OR;  Service: Orthopedics;;    PROSTATE SURGERY N/A     REMOVAL,ORTHOPEDIC HARDWARE,UPPER EXTREMITY Left 07/31/2024    Procedure: REMOVAL,ORTHOPEDIC HARDWARE,UPPER EXTREMITY, EXTENSOR TENDON REPAIR LEFT FOREARM;  Surgeon: Kraig Malcolm DO;  Location: [facility] OR;  Service: Orthopedics;  Laterality: Left;    SPINE SURGERY       Family History   Problem Relation Name Age of Onset    Diabetes Brother David Ibarra      Social History     Tobacco Use    Smoking status: Never     Passive exposure: Never    Smokeless tobacco: Never   Substance Use Topics    Alcohol use: Not Currently     Alcohol/week: 2.0 standard drinks of alcohol     Comment: socially    Drug use: Never     Review of Systems   Constitutional:  Negative for chills and fever.   Respiratory:  Negative for cough and shortness of breath.    Cardiovascular:  Positive for chest pain (mild).   Gastrointestinal:  Negative for abdominal pain, nausea and vomiting.   Musculoskeletal:  Negative for myalgias.   Neurological:  Negative for syncope.   All other systems reviewed and are negative.      Physical Exam     Initial Vitals [01/09/25 2331]   BP Pulse Resp Temp SpO2   106/79 70 (!) 22 97.9 °F (36.6 °C) 97 %      MAP       --         Physical Exam    Constitutional: He appears well-developed. No distress.   Thin male.    HENT:   Head: Normocephalic.   Old bruise to left forehead.   Dry lips and dry tongue.    Cardiovascular:  Normal rate, regular rhythm and normal heart sounds.           Pulmonary/Chest: No respiratory distress. He has no wheezes. He has no rhonchi. He exhibits no tenderness.   Abdominal: Abdomen is soft. He exhibits no distension. There is no abdominal tenderness. There is no rebound and no guarding.   Musculoskeletal:      Comments: Left arm in a sling.      Neurological:  He is alert. He has normal strength.   Oriented to person.   Disoriented to place and time.    Skin: Skin is warm and dry.   Psychiatric: He has a normal mood and affect.         ED Course   Critical Care    Date/Time: 1/29/2025 6:10 AM    Performed by: Ronn Romero MD  Authorized by: Ronn Romero MD  Direct patient critical care time: 14 minutes  Additional history critical care time: 6 minutes  Ordering / reviewing critical care time: 5 minutes  Documentation critical care time: 9 minutes  Consulting other physicians critical care time: 3 minutes  Total critical care time (exclusive of procedural time) : 37 minutes  Critical care time was exclusive of separately billable procedures and treating other patients.  Critical care was necessary to treat or prevent imminent or life-threatening deterioration of the following conditions: respiratory failure.        Labs Reviewed   COMPREHENSIVE METABOLIC PANEL - Abnormal       Result Value    Sodium 138      Potassium 3.4 (*)     Chloride 102      CO2 28      Glucose 89      Blood Urea Nitrogen 6.0 (*)     Creatinine 0.61 (*)     Calcium 8.2 (*)     Protein Total 6.0      Albumin 2.1 (*)     Globulin 3.9 (*)     Albumin/Globulin Ratio 0.5 (*)     Bilirubin Total 0.6      ALP 65      ALT 12      AST 18      eGFR >60      Anion Gap 8.0      BUN/Creatinine Ratio 10     APTT - Abnormal    PTT 35.3 (*)    PROTIME-INR - Abnormal    PT 18.3 (*)     INR 1.5 (*)    CBC WITH DIFFERENTIAL - Abnormal    WBC 6.37      RBC 3.09 (*)     Hgb 10.0 (*)     Hct 29.2 (*)     MCV 94.5 (*)     MCH 32.4 (*)     MCHC 34.2      RDW 14.3      Platelet 146      MPV 10.3      Neut % 56.8      Lymph % 22.3      Mono % 15.5      Eos % 3.5      Basophil % 0.6      Imm Grans % 1.3      Neut # 3.62      Lymph # 1.42      Mono # 0.99      Eos # 0.22      Baso # 0.04      Imm Gran # 0.08 (*)     NRBC% 0.0     D DIMER, QUANTITATIVE - Abnormal    D-Dimer 1.96 (*)    TROPONIN I - Normal     Troponin-I <0.010     MRSA PCR - Normal    MRSA PCR Screen Not Detected      Narrative:     The Xpert MRSA Assay utilizes automated real-time polymerase chain reaction (PCR) to detect MRSA DNA.  A positive test result does not necessarily indicate the presence of viable organism.  It is however, presumptive for the presence of MRSA.   CBC W/ AUTO DIFFERENTIAL    Narrative:     The following orders were created for panel order CBC auto differential.  Procedure                               Abnormality         Status                     ---------                               -----------         ------                     CBC with Differential[[id number]]       Abnormal            Final result                 Please view results for these tests on the individual orders.        ECG Results              EKG 12-lead (Final result)        Collection Time Result Time QRS Duration OHS QTC Calculation    01/09/25 23:40:00 01/10/25 17:34:00 94 434                     Final result by Interface, Lab In [facility] (01/10/25 17:34:08)                   Narrative:    Test Reason : R09.02,    Vent. Rate :  77 BPM     Atrial Rate :    BPM     P-R Int :    ms          QRS Dur :  94 ms      QT Int : 384 ms       P-R-T Axes :    -31   0 degrees    QTcB Int : 434 ms    Sinus rhythm with 1st degree A-V block  Left axis deviation  Low voltage QRS  Septal infarct ,age undetermined  Abnormal ECG  Confirmed by Ralph Phillips ([id number]) on 1/10/2025 5:33:53 PM    Referred By: AAAREFERRAL SELF           Confirmed By: Ralph Phillips                                     EKG 12-lead (Final result)  Result time 01/16/25 14:40:46      Final result by Unknown User (01/16/25 14:40:46)                                      Imaging Results              CTA Chest Non-Coronary (PE Studies) (Final result)  Result time 01/10/25 08:07:27      Final result by Derrick Tarango MD (01/10/25 08:07:27)                   Impression:    Impression:    1. No filling defects  are seen in the pulmonary arteries to suggest pulmonary embolus.    2. There is extensive edema of the thoracoabdominal subcutaneous fat associated with pleural effusions consistent with anasarca edema. Correlate with clinical and laboratory parameters as regards further evaluation and follow up.    3.  Interval development of large bilateral pleural effusions with associated atelectasis of the adjacent lung segments. Small areas of consolidation with air bronchograms are seen in both lung bases and in the right upper lobe. These findings are consistent with multifocal pneumonia with areas of consolidation. Correlate with clinical and laboratory parameters as regards further evaluation and follow up to full resolution as indicated.    4. There are multiple left sided rib fractures involving the 3rd to7th posterior ribs as seen on series 9 images 37- 36.    5. An acute appearing complete overriding fracture with comminution of the left scapular body is seen on series 9 image 27 and series 5 image 72.  Additional fracture medial left clavicle.    6. No CT evidence of pulmonary embolism. Details and other findings as discussed above.    No significant discrepancy with overnight report.      Electronically signed by: Derrick Tarango  Date:    01/10/2025  Time:    08:07               Narrative:      Technique: CT Scan of the chest was performed with intravenous contrast with direct axial images as well as sagittal and coronal reconstruction images pulmonary embolus protocol.    Dosage Information: Automated Exposure Control was utilized 429.92 mGy.cm.    Comparison: Comparison is with study dated January 2, 2025    Clinical History: Pulmonary embolism (PE) suspected, positive D-dimer.    Findings:    Soft Tissues: There is extensive edema of the thoracoabdominal subcutaneous fat associated with pleural effusions consistent with anasarca edema.    Heart: Coronary artery calcification is seen.    Aorta: No aortic dissection  or aneurysm is seen. Mild aortic calcification is seen in the thoracic aorta.    Pulmonary Arteries: No filling defects are seen in the pulmonary arteries to suggest pulmonary embolus.    Lungs: Pronounced streaky linear opacity is seen predominantly in the dependent portions at the lung bases consistent with nonspecific dependent changes scarring and subsegmental atelectasis. Compared to the prior study, there is interval development of large bilateral pleural effusions with associated atelectasis of the adjacent lung segments. Small areas of consolidation with air bronchograms are seen in both lung bases and in the right upper lobe. These findings are consistent with multifocal pneumonia with areas of consolidation.    Pleura: No pneumothorax is identified.    Bony Structures: There is moderate to severe osteopenia of the visualized bones with mulitlevel decrease in vertebral body heights most severe at T6 which appears status post vertebroplasty. A left humeral cyndie is noted in place. An acute appearing complete overriding fracture with comminution of the left scapular body is seen on series 9 image 27 and series 5 image 72.  There is similar fracture medial left clavicle.    Spine: Mild spondylolytic changes are seen in the thoracic spine. There is exaggeration of the thoracic kyphosis.    Ribs: The right ribs appear unremarkable. There are multiple left sided rib fractures involving the 3rd to 7th posterior ribs as seen on series 9 images 37- 36.    Abdomen: The visualized upper abdominal organs appear unremarkable.                        Preliminary result by Enzo Sunshine MD (01/10/25 04:19:56)                   Impression:    1. No filling defects are seen in the pulmonary arteries to suggest pulmonary embolus.  2. There is extensive edema of the thoracoabdominal subcutaneous fat associated with pleural effusions consistent with anasarca edema. Correlate with clinical and laboratory parameters as regards  further evaluation and follow up.  3. Compared to the prior study, there is interval development of large bilateral pleural effusions with associated atelectasis of the adjacent lung segments. Small areas of consolidation with air bronchograms are seen in both lung bases and in the right upper lobe. These findings are consistent with multifocal pneumonia with areas of consolidation. Correlate with clinical and laboratory parameters as regards further evaluation and follow up to full resolution as indicated.  4. There are multiple left sided rib fractures involving the 3rd to7th posterior ribs as seen on series 9 images 37- 36.  5. An acute appearing complete overriding fracture with comminution of the left scapular body is seen on series 9 image 27 and series 5 image 72. Correlate with clinical and laboratory parameters as regards further evaluation and follow up.  6. No CT evidence of pulmonary embolism. Details and other findings as discussed above.               Narrative:    START OF REPORT:  Technique: CT Scan of the chest was performed with intravenous contrast with direct axial images as well as sagittal and coronal reconstruction images pulmonary embolus protocol.    Dosage Information: Automated Exposure Control was utilized 429.92 mGy.cm.    Comparison: Comparison is with study dated 2022-11-22 16:16:40.    Clinical History: Pulmonary embolism (PE) suspected, positive D-dimer.    Findings:  Soft Tissues: There is extensive edema of the thoracoabdominal subcutaneous fat associated with pleural effusions consistent with anasarca edema.  Lines and Tubes: None.  Neck: The visualized soft tissues of the neck appear unremarkable.  Mediastinum: The mediastinal structures are within normal limits.  Heart: The heart size is within normal limits. Coronary artery calcification is seen.  Aorta: No aortic dissection or aneurysm is seen. Mild aortic calcification is seen in the thoracic aorta.  Pulmonary Arteries: No  filling defects are seen in the pulmonary arteries to suggest pulmonary embolus.  Lungs: Pronounced streaky linear opacity is seen predominantly in the dependent portions at the lung bases consistent with nonspecific dependent changes scarring and subsegmental atelectasis. Compared to the prior study, there is interval development of large bilateral pleural effusions with associated atelectasis of the adjacent lung segments. Small areas of consolidation with air bronchograms are seen in both lung bases and in the right upper lobe. These findings are consistent with multifocal pneumonia with areas of consolidation.  Pleura: No pneumothorax is identified.  Bony Structures: There is moderate to severe osteopenia of the visualized bones with mulitlevel decrease in vertebral body heights most severe at T6 which appears status post vertebroplasty. A left humeral prosthesis is noted in place. An acute appearing complete overriding fracture with comminution of the left scapular body is seen on series 9 image 27 and series 5 image 72.  Spine: Mild spondylolytic changes are seen in the thoracic spine. There is exaggeration of the thoracic kyphosis.  Ribs: The right ribs appear unremarkable. There are multiple left sided rib fractures involving the 3rd to7th posterior ribs as seen on series 9 images 37- 36.  Abdomen: The visualized upper abdominal organs appear unremarkable.                                         X-Ray Chest AP Portable (Final result)  Result time 01/10/25 09:45:07      Final result by Lashay Henry MD (01/10/25 09:45:07)                   Impression:      Stable exam without significant interval change.      Electronically signed by: Lashay Henry  Date:    01/10/2025  Time:    09:45               Narrative:    EXAMINATION:  XR CHEST AP PORTABLE    CLINICAL HISTORY:  Hypoxemia    COMPARISON:  Chest x-ray dated 01/07/2025    FINDINGS:  The heart is normal in size.  There are similar scattered  bilateral interstitial airspace opacities with small pleural effusions.  There is no definite visible pneumothorax.                                       Medications   sodium chloride 0.9% bolus 1,000 mL 1,000 mL (0 mLs Intravenous Stopped 1/10/25 0124)   iohexoL (OMNIPAQUE 350) injection 100 mL (100 mLs Intravenous Given 1/10/25 0333)   furosemide injection 40 mg (40 mg Intravenous Given 1/10/25 0446)   piperacillin-tazobactam (ZOSYN) 4.5 g in D5W 100 mL IVPB (MB+) (0 g Intravenous Stopped 1/10/25 0523)   azithromycin (ZITHROMAX) 500 mg in 0.9% NaCl 250 mL IVPB (admixture device) (0 mg Intravenous Stopped 1/10/25 0701)   vancomycin 1,500 mg in D5W 250 mL IVPB (admixture device) (0 mg Intravenous Stopped 1/10/25 0804)     Medical Decision Making  The differential diagnosis includes, but is not limited to, PNA, PE, medication side effect, bronchitis, and MI.     Amount and/or Complexity of Data Reviewed  Labs: ordered. Decision-making details documented in ED Course.  Radiology: ordered. Decision-making details documented in ED Course.    Risk  Prescription drug management.  Decision regarding hospitalization.            Scribe Attestation:   Scribe #1: I performed the above scribed service and the documentation accurately describes the services I performed. I attest to the accuracy of the note.    Attending Attestation:           Physician Attestation for Scribe:  Physician Attestation Statement for Scribe #1: I, Ronn Romero MD, reviewed documentation, as scribed by Audi Diaz in my presence, and it is both accurate and complete.             ED Course as of 01/29/25 0611   Fri Ben 10, 2025   0001 Patient was discharged from the hospital yesterday from the Trauma Service after he was admitted after a fall.  Blood thinners has been held due to an upcoming cataract surgery.  He was diagnosed with clavicle/scapula fracture with nonoperative management sling left upper extremity.  Left 8th rib fracture  conservative management. [LF]   0001 Admitted from 1/2-1/8 [LF]   0429 I discussed the case with Shanta of the Trauma Service.  They will come and evaluate the patient [LF]      ED Course User Index  [LF] Ronn Romero MD                           Clinical Impression:  Final diagnoses:  [R09.02] Hypoxia (Primary)  [J90] Pleural effusion  [J18.9] Multifocal pneumonia  [S22.42XA] Closed fracture of multiple ribs of left side, initial encounter          ED Disposition Condition    Admit Stable                Ronn Romero MD  01/10/25 0449       Ronn Romero MD  01/29/25 0611

## 2025-01-10 NOTE — DISCHARGE SUMMARY
Expand All Collapse All    [facility]  Hospital Medicine Consultation Note             HISTORY OF PRESENT ILLNESS:   The Hospital Medicine team was consulted for medical management; the patient is a 89-year-old  male with a past medical history of Afib, dementia, and prostate cancer who was a recent trauma secondary to a fall requiring an ICU stay here at [facility]; he did have  rib and clavicle fractures who presents today via EMS with hypoxemia; SaO2 levels were 86% on 2 liters supplemental oxygen. The patient was seen and evaluated in ED# 21; case was discussed with the nurse caring for this patient. Currently SaO2 levels are 92-94% on supplemental oxygen and vital signs are stable. Patient's family has been contacted and they desire for patient to be transferred back to the NH ([facility]) for Hospice Care.  PAST MEDICAL HISTORY:           Past Medical History:   Diagnosis Date    Cancer       prostate    Compression fracture of body of thoracic vertebra      Dementia without behavioral disturbance      Dysphagia      Paroxysmal atrial fibrillation      Rhabdomyolysis 02/08/2023    Unspecified cataract      Uses roller walker           PAST SURGICAL HISTORY:            Past Surgical History:   Procedure Laterality Date    FRACTURE SURGERY        INTRAMEDULLARY RODDING OF FEMUR Left 07/28/2024     Procedure: INSERTION, INTRAMEDULLARY HANNA, FEMUR, LEFT;  Surgeon: Fabricio Alczaar MD;  Location: [facility];  Service: Orthopedics;  Laterality: Left;  Supine, hana table     synthes long tfna     last case    JOINT REPLACEMENT        OPEN REDUCTION AND INTERNAL FIXATION (ORIF) OF FRACTURE OF DISTAL RADIUS Left 04/18/2024     Procedure: ORIF, FRACTURE, RADIUS, DISTAL;  Surgeon: Kraig Malcolm DO;  Location: [facility] OR;  Service: Orthopedics;  Laterality: Left;  supine hand table c arm skeletal dynamics.    OPEN REDUCTION AND INTERNAL FIXATION  (ORIF) OF FRACTURE OF PROXIMAL HUMERUS   04/18/2024     Procedure: ORIF, FRACTURE, HUMERUS, PROXIMAL;  Surgeon: Kraig Malcolm DO;  Location: [facility] OR;  Service: Orthopedics;;    PROSTATE SURGERY N/A      REMOVAL,ORTHOPEDIC HARDWARE,UPPER EXTREMITY Left 07/31/2024     Procedure: REMOVAL,ORTHOPEDIC HARDWARE,UPPER EXTREMITY, EXTENSOR TENDON REPAIR LEFT FOREARM;  Surgeon: Kraig Malcolm DO;  Location: [facility] OR;  Service: Orthopedics;  Laterality: Left;    SPINE SURGERY             ALLERGIES:   Patient has no known allergies.     FAMILY HISTORY:   Reviewed and negative     SOCIAL HISTORY:      Social History            Tobacco Use    Smoking status: Never       Passive exposure: Never    Smokeless tobacco: Never   Substance Use Topics    Alcohol use: Not Currently       Alcohol/week: 2.0 standard drinks of alcohol       Comment: socially    NH resident at [facility].     HOME MEDICATIONS:              Prior to Admission medications    Medication Sig Start Date End Date Taking? Authorizing Provider   albuterol-ipratropium (DUO-NEB) 2.5 mg-0.5 mg/3 mL nebulizer solution Take 3 mLs by nebulization every 8 (eight) hours. Rescue 1/10/25 1/10/26   Quique Lai MD   apixaban (ELIQUIS) 2.5 mg Tab Take 1 tablet (2.5 mg total) by mouth 2 (two) times daily. 8/1/24     Tirso Shirley MD   cefdinir (OMNICEF) 300 MG capsule Take 1 capsule (300 mg total) by mouth every 12 (twelve) hours. for 7 days 1/10/25 1/17/25   Quique Lai MD   citalopram (CELEXA) 10 MG tablet Take 10 mg by mouth once daily.       Provider, Historical   loperamide (IMODIUM A-D) 2 mg Tab Take 2 mg by mouth every 6 (six) hours as needed (Diarrhea).       Provider, Historical   multivitamin Tab Take 1 tablet by mouth once daily. 8/2/24     Tirso Shirley MD   ondansetron (ZOFRAN) 4 MG tablet Take 4 mg by mouth every 8 (eight) hours as needed for Nausea.       Provider, Historical   oxyCODONE (ROXICODONE) 5 MG immediate release tablet  Take 1 tablet (5 mg total) by mouth every 6 (six) hours as needed for Pain. 1/8/25 1/15/25   Willis Goldman MD   prednisoLONE-moxiflo-nepafenac 1-0.5-0.1 % DrpS Place 1 drop into both eyes 4 (four) times daily.       Provider, Historical   acetaminophen (TYLENOL) 325 MG tablet Take 325 mg by mouth every 6 (six) hours as needed for Pain.   1/10/25   Provider, Historical   acetaminophen (TYLENOL) 650 MG TbSR Take 650 mg by mouth every 8 (eight) hours as needed (Pain).   1/10/25   Provider, Historical   amoxicillin-clavulanate 875-125mg (AUGMENTIN) 875-125 mg per tablet Take 1 tablet by mouth 2 (two) times daily. for 4 days 1/8/25 1/10/25   Willis Goldman MD   apixaban (ELIQUIS) 2.5 mg Tab Take by mouth 2 (two) times daily.   1/10/25   Provider, Historical   apixaban (ELIQUIS) 5 mg Tab Take 5 mg by mouth 2 (two) times daily.   1/10/25   Provider, Historical   citalopram (CELEXA) 10 MG tablet Take 10 mg by mouth once daily.   1/10/25   Provider, Historical   multivitamin with minerals tablet Take 1 tablet by mouth once daily.   1/10/25   Provider, Historical   multivitamin with minerals tablet Take 1 tablet by mouth once daily.   1/10/25   Provider, Historical         REVIEW OF SYSTEMS:   Except as documented, all other systems reviewed and negative      PHYSICAL EXAM:      VITAL SIGNS: 24 HRS MIN & MAX LAST   Temp  Min: 97.9 °F (36.6 °C)  Max: 97.9 °F (36.6 °C) 97.9 °F (36.6 °C)   BP  Min: 100/71  Max: 142/70 133/88   Pulse  Min: 59  Max: 77  63   Resp  Min: 15  Max: 22 17   SpO2  Min: 87 %  Max: 99 % 95 %         Vitals Reviewed  General appearance: Elderly emaciated  male lying on ED stretcher, in no apparent distress.  HENT: Atraumatic head. Moist mucous membranes of oral cavity.  Eyes: Normal extraocular movements.   Neck: Supple.   Lungs: Clear to auscultation bilaterally. No wheezing present.   Heart: Regular irregular rate and rhythm. S1 and S2 present with no murmurs/gallop/rub. No JVD present.    Abdomen: Soft, non-distended, non-tender. No rebound tenderness/guarding. Bowel sounds are normal.   Extremities: No cyanosis, clubbing, or edema.  Skin: No Rash.   Neuro: Motor and sensory exams grossly intact. Good tone. Muscle strength 5/5 in all 4 extremities  Psych/mental status: Appropriate mood and affect. Responds appropriately to questions.      LABS AND IMAGING:            Recent Labs   Lab 01/08/25  0637 01/09/25  0220 01/10/25  0022   WBC 10.48 8.01 6.37   RBC 3.17* 3.27* 3.09*   HGB 10.0* 10.5* 10.0*   HCT 30.3* 31.6* 29.2*   MCV 95.6* 96.6* 94.5*   MCH 31.5* 32.1* 32.4*   MCHC 33.0 33.2 34.2   RDW 14.1 13.9 14.3    139 146   MPV 11.1* 11.2* 10.3                  Recent Labs   Lab 01/05/25  1727 01/06/25  0405 01/07/25  0539 01/08/25  0444 01/09/25  0220 01/10/25  0022   NA  --    < > 139 135* 139 138   K  --    < > 3.0* 3.5 3.3* 3.4*   CL  --    < > 106 103 101 102   CO2  --    < > 23 23 27 28   BUN  --    < > 10.1 7.2* 5.8* 6.0*   CREATININE  --    < > 0.62* 0.61* 0.61* 0.61*   CALCIUM  --    < > 7.7* 7.9* 7.9* 8.2*   PH 7.470*  --   --   --   --   --    MG  --   --  1.50*  --   --   --    ALBUMIN  --    < > 2.0* 2.0* 2.3* 2.1*   ALKPHOS  --    < > 66 68 64 65   ALT  --    < > 11 14 16 12   AST  --    < > 25 22 22 18   BILITOT  --    < > 0.6 0.6 0.7 0.6    < > = values in this interval not displayed.         Microbiology Results (last 7 days)         ** No results found for the last 168 hours. **                   ASSESSMENT & PLAN:   Impression:  Acute hypoxemic respiratory failure requiring supplemental oxygen via NC  Bilateral infiltrates with moderate bilateral pleural effusions (R>L) effusions; CAP  Recent trauma secondary to fall (left 3rd-7th rib fxs, left clavicle fx, small liver laceration- dc'd on 1/8/25 to NH)   PAF  Hypokalemia- K+ 3.4  Anemia of chronic disease - H/H stable at 10.0/29.2     Plan:  CM consult to facilitate transfer back to the NH  Zosyn, Vancomycin, Azithromycin  IVPB  DC to NH with Hospice  Cefdinir po  Potassium replaced in ED        VTE Prophylaxis: Already on SCD     Thank you for the consult, will follow along, if you have any questions, please do not hesitate to reach out to us, our team is available 24/7 to assist you     __________________________________________________________________________  INPATIENT LIST OF MEDICATIONS     Current Medications      Current Facility-Administered Medications:     albuterol-ipratropium 2.5 mg-0.5 mg/3 mL nebulizer solution 3 mL, 3 mL, Nebulization, Q8H, Quique Lai MD    cefdinir capsule 300 mg, 300 mg, Oral, Q12H, Quique Lai MD, 300 mg at 01/10/25 1117     Current Outpatient Medications:     albuterol-ipratropium (DUO-NEB) 2.5 mg-0.5 mg/3 mL nebulizer solution, Take 3 mLs by nebulization every 8 (eight) hours. Rescue, Disp: 75 mL, Rfl: 0    apixaban (ELIQUIS) 2.5 mg Tab, Take 1 tablet (2.5 mg total) by mouth 2 (two) times daily., Disp: 60 tablet, Rfl: 3    cefdinir (OMNICEF) 300 MG capsule, Take 1 capsule (300 mg total) by mouth every 12 (twelve) hours. for 7 days, Disp: 14 capsule, Rfl: 0    citalopram (CELEXA) 10 MG tablet, Take 10 mg by mouth once daily., Disp: , Rfl:     loperamide (IMODIUM A-D) 2 mg Tab, Take 2 mg by mouth every 6 (six) hours as needed (Diarrhea)., Disp: , Rfl:     multivitamin Tab, Take 1 tablet by mouth once daily., Disp: 30 tablet, Rfl: 1    ondansetron (ZOFRAN) 4 MG tablet, Take 4 mg by mouth every 8 (eight) hours as needed for Nausea., Disp: , Rfl:     oxyCODONE (ROXICODONE) 5 MG immediate release tablet, Take 1 tablet (5 mg total) by mouth every 6 (six) hours as needed for Pain., Disp: 28 tablet, Rfl:     prednisoLONE-moxiflo-nepafenac 1-0.5-0.1 % DrpS, Place 1 drop into both eyes 4 (four) times daily., Disp: , Rfl:            Scheduled Meds:   albuterol-ipratropium  3 mL Nebulization Q8H    cefdinir  300 mg Oral Q12H      Continuous Infusions:  PRN Meds:.     RADIOLOGY                                                                                                     X-Ray Chest AP Portable  Narrative: EXAMINATION:  XR CHEST AP PORTABLE     CLINICAL HISTORY:  Hypoxemia     COMPARISON:  Chest x-ray dated 01/07/2025     FINDINGS:  The heart is normal in size.  There are similar scattered bilateral interstitial airspace opacities with small pleural effusions.  There is no definite visible pneumothorax.  Impression: Stable exam without significant interval change.     Electronically signed by:Lashay Henry  Date:                                                  01/10/2025  Time:                                                  09:45  CTA Chest Non-Coronary (PE Studies)  Narrative: Technique: CT Scan of the chest was performed with intravenous contrast with direct axial images as well as sagittal and coronal reconstruction images pulmonary embolus protocol.     Dosage Information: Automated Exposure Control was utilized 429.92 mGy.cm.     Comparison: Comparison is with study dated January 2, 2025     Clinical History: Pulmonary embolism (PE) suspected, positive D-dimer.     Findings:     Soft Tissues: There is extensive edema of the thoracoabdominal subcutaneous fat associated with pleural effusions consistent with anasarca edema.     Heart: Coronary artery calcification is seen.     Aorta: No aortic dissection or aneurysm is seen. Mild aortic calcification is seen in the thoracic aorta.     Pulmonary Arteries: No filling defects are seen in the pulmonary arteries to suggest pulmonary embolus.     Lungs: Pronounced streaky linear opacity is seen predominantly in the dependent portions at the lung bases consistent with nonspecific dependent changes scarring and subsegmental atelectasis. Compared to the prior study, there is interval development of large bilateral pleural effusions with associated atelectasis of the adjacent lung segments. Small areas of consolidation with air bronchograms are  seen in both lung bases and in the right upper lobe. These findings are consistent with multifocal pneumonia with areas of consolidation.     Pleura: No pneumothorax is identified.     Bony Structures: There is moderate to severe osteopenia of the visualized bones with mulitlevel decrease in vertebral body heights most severe at T6 which appears status post vertebroplasty. A left humeral cyndie is noted in place. An acute appearing complete overriding fracture with comminution of the left scapular body is seen on series 9 image 27 and series 5 image 72.  There is similar fracture medial left clavicle.     Spine: Mild spondylolytic changes are seen in the thoracic spine. There is exaggeration of the thoracic kyphosis.     Ribs: The right ribs appear unremarkable. There are multiple left sided rib fractures involving the 3rd to 7th posterior ribs as seen on series 9 images 37- 36.     Abdomen: The visualized upper abdominal organs appear unremarkable.  Impression: Impression:     1. No filling defects are seen in the pulmonary arteries to suggest pulmonary embolus.     2. There is extensive edema of the thoracoabdominal subcutaneous fat associated with pleural effusions consistent with anasarca edema. Correlate with clinical and laboratory parameters as regards further evaluation and follow up.     3.  Interval development of large bilateral pleural effusions with associated atelectasis of the adjacent lung segments. Small areas of consolidation with air bronchograms are seen in both lung bases and in the right upper lobe. These findings are consistent with multifocal pneumonia with areas of consolidation. Correlate with clinical and laboratory parameters as regards further evaluation and follow up to full resolution as indicated.     4. There are multiple left sided rib fractures involving the 3rd to7th posterior ribs as seen on series 9 images 37- 36.     5. An acute appearing complete overriding fracture with  comminution of the left scapular body is seen on series 9 image 27 and series 5 image 72.  Additional fracture medial left clavicle.     6. No CT evidence of pulmonary embolism. Details and other findings as discussed above.     No significant discrepancy with overnight report.     Electronically signed by:Derrick Tarango  Date:                                                  01/10/2025  Time:                                                  08:07              I,Niurka Busby , NP have reviewed and discussed the case with Dr. Crawley  Please see the following addendum for further assessment and plan from there attending MD.  01/10/2025     ______________________________________________________________________________     MD Addendum:     I, Dr Keller assumed care of this patient today   For the patient encounter, I performed the substantive portion of the visit, I reviewed the NPPA documentation, treatment plan, and medical decision making.  I had face to face time with this patient         A. History:  Patient lives in a NH and has a recent discharge after a fall.   Noted to have mild hypoxia. His family has been trying to set up hospice at the NH  Today he is awake and denies any chest pain, cough, fever or chills  CXR does show marilynn infiltrates  O2 sats stable on 2 liter supplemental oxygen     B. Physical exam:  Heart RRR  Lungs clear   Abdomen soft and non tender   Neuro: No FND    He is oriented to self and time. Following verbal commands      C. Medical decision making:  Patients labs and vitals reviewed  Discussed with his Daughter Ms Smith who is the POA  She is indeed talking to [facility].   Informed about patient condition and tx plan  She agrees with medical management with PO antibiotics and transition to hospic ecare in the NH  CM was informed and hospice will be set up     Patient will be discharged after NH hospice has been set up      Thank you for the consult, will be happy to  follow alongside you        All diagnosis and differential diagnosis have been reviewed; assessment and plan has been documented; I have personally reviewed the labs and test results that are presently available; I have reviewed the patients medication list; I have reviewed the consulting providers response and recommendations. I have reviewed or attempted to review medical records based upon their availability.    All of the patient and family questions have been addressed and answered. Patient's is agreeable to the above stated plan. I will continue to monitor closely and make adjustments to medical management as needed.     Niurka Busby, ANSON   [facility] Medicine Team  01/10/2025                    Dc to NH with hospice care     Dc 31 minutes

## 2025-01-10 NOTE — PROGRESS NOTES
Spoke with Silvia with [facility] who reported patients daughter wanted to meet in person to discuss hospice services. She reportedly wont be available to meet until later today or tomorrow. Relayed such to Dr. Lai who reported he will be discharging patient and hospice can follow up with patient at [facility]. Updated Silvia with [facility].

## 2025-01-10 NOTE — PLAN OF CARE
01/10/25 0923   Discharge Assessment   Assessment Type Discharge Planning Assessment   Confirmed/corrected address, phone number and insurance Yes   Confirmed Demographics Correct on Facesheet   Source of Information health record   [facility] facility resident   Facility Arrived From: [facility]   Do you expect to return to your current living situation? Yes   Do you have help at home or someone to help you manage your care at home? Yes   Who are your caregiver(s) and their phone number(s)? facility resident. daughter/ POA Sarah Ibarra [phone]   Prior to hospitilization cognitive status: Unable to Assess   Current cognitive status: Unable to Assess   Home Accessibility wheelchair accessible   Home Layout Able to live on 1st floor   Do you have prescription coverage? Yes   Coverage Medicare   Discharge Plan A Hospice/home   DME Needed Upon Discharge  none   OTHER   Name(s) of [facility] resident

## 2025-01-10 NOTE — ED NOTES
DR. MOJICA REQUESTING INFORMATION ON WHICH HOSPICE AGENCY PT IS UNDER. SPOKE WITH WOO AT [facility]. NARRATING RN INFORMED THAT PT IS NOT ON HOSPICE AND THERE ARE NO WRITTEN NOTES IN NH CHART ABOUT BEGINNING HOSPICE. MD NOTIFIED AT THIS TIME. NO NEW ORDERS

## 2025-01-10 NOTE — CONSULTS
[facility] Hospital Medicine Consultation Note        Patient Name: Kaleb Ibarra  MRN: [id number]  Patient Class: Emergency   Admission Date: 1/9/2025 11:34 PM  Length of Stay: 0  Attending Physician: [unfilled]   Primary Care Provider: [facility]  Face-to-Face encounter date: 01/10/2025 g  Consulting Physician: [facility] Medicine - Dr. Crawley  Reason for Consult: medical management   Chief Complaint: Hypoxia (Arrives aasi unit 54 from [facility] w reports of low o2 sat on normal 2L nc home o2 - initial sat 86% - pt denies complaints )      Screening for Social Drivers for health:  Patient screened for food insecurity, housing instability, transportation needs, utility difficulties, and interpersonal safety (select all that apply as identified as concern)  []Housing or Food  []Transportation Needs  []Utility Difficulties  []Interpersonal safety  [x]None          Patient information was obtained from patient, patient's family, past medical records.    HISTORY OF PRESENT ILLNESS:   The Hospital Medicine team was consulted for medical management; the patient is a 89-year-old  male with a past medical history of Afib, dementia, and prostate cancer who was a recent trauma secondary to a fall requiring an ICU stay here at [facility]; he did have  rib and clavicle fractures who presents today via EMS with hypoxemia; SaO2 levels were 86% on 2 liters supplemental oxygen. The patient was seen and evaluated in ED# 21; case was discussed with the nurse caring for this patient. Currently SaO2 levels are 92-94% on supplemental oxygen and vital signs are stable. Patient's family has been contacted and they desire for patient to be transferred back to the NH ([facility]) for Hospice Care.  PAST MEDICAL HISTORY:     Past Medical History:   Diagnosis Date    Cancer     prostate    Compression fracture of body of thoracic vertebra      Dementia without behavioral disturbance     Dysphagia     Paroxysmal atrial fibrillation     Rhabdomyolysis 02/08/2023    Unspecified cataract     Uses roller walker        PAST SURGICAL HISTORY:     Past Surgical History:   Procedure Laterality Date    FRACTURE SURGERY      INTRAMEDULLARY RODDING OF FEMUR Left 07/28/2024    Procedure: INSERTION, INTRAMEDULLARY HANNA, FEMUR, LEFT;  Surgeon: Fabricio Alcazar MD;  Location: [facility];  Service: Orthopedics;  Laterality: Left;  Supine, hana table    synthes long tfna    last case    JOINT REPLACEMENT      OPEN REDUCTION AND INTERNAL FIXATION (ORIF) OF FRACTURE OF DISTAL RADIUS Left 04/18/2024    Procedure: ORIF, FRACTURE, RADIUS, DISTAL;  Surgeon: Kraig Malcolm DO;  Location: [facility] OR;  Service: Orthopedics;  Laterality: Left;  supine hand table c arm skeletal dynamics.    OPEN REDUCTION AND INTERNAL FIXATION (ORIF) OF FRACTURE OF PROXIMAL HUMERUS  04/18/2024    Procedure: ORIF, FRACTURE, HUMERUS, PROXIMAL;  Surgeon: Kraig Malcolm DO;  Location: [facility] OR;  Service: Orthopedics;;    PROSTATE SURGERY N/A     REMOVAL,ORTHOPEDIC HARDWARE,UPPER EXTREMITY Left 07/31/2024    Procedure: REMOVAL,ORTHOPEDIC HARDWARE,UPPER EXTREMITY, EXTENSOR TENDON REPAIR LEFT FOREARM;  Surgeon: Kraig Malcolm DO;  Location: [facility];  Service: Orthopedics;  Laterality: Left;    SPINE SURGERY         ALLERGIES:   Patient has no known allergies.    FAMILY HISTORY:   Reviewed and negative    SOCIAL HISTORY:     Social History     Tobacco Use    Smoking status: Never     Passive exposure: Never    Smokeless tobacco: Never   Substance Use Topics    Alcohol use: Not Currently     Alcohol/week: 2.0 standard drinks of alcohol     Comment: socially    NH resident at [facility].    HOME MEDICATIONS:     Prior to Admission medications    Medication Sig Start Date End Date Taking? Authorizing Provider   albuterol-ipratropium (DUO-NEB) 2.5 mg-0.5 mg/3 mL nebulizer solution Take 3 mLs by nebulization every  8 (eight) hours. Rescue 1/10/25 1/10/26  Quique Lai MD   apixaban (ELIQUIS) 2.5 mg Tab Take 1 tablet (2.5 mg total) by mouth 2 (two) times daily. 8/1/24   Tirso Shirley MD   cefdinir (OMNICEF) 300 MG capsule Take 1 capsule (300 mg total) by mouth every 12 (twelve) hours. for 7 days 1/10/25 1/17/25  Quique Lai MD   citalopram (CELEXA) 10 MG tablet Take 10 mg by mouth once daily.    Provider, Historical   loperamide (IMODIUM A-D) 2 mg Tab Take 2 mg by mouth every 6 (six) hours as needed (Diarrhea).    Provider, Historical   multivitamin Tab Take 1 tablet by mouth once daily. 8/2/24   Tirso Shirley MD   ondansetron (ZOFRAN) 4 MG tablet Take 4 mg by mouth every 8 (eight) hours as needed for Nausea.    Provider, Historical   oxyCODONE (ROXICODONE) 5 MG immediate release tablet Take 1 tablet (5 mg total) by mouth every 6 (six) hours as needed for Pain. 1/8/25 1/15/25  Willis Goldman MD   prednisoLONE-moxiflo-nepafenac 1-0.5-0.1 % DrpS Place 1 drop into both eyes 4 (four) times daily.    Provider, Historical   acetaminophen (TYLENOL) 325 MG tablet Take 325 mg by mouth every 6 (six) hours as needed for Pain.  1/10/25  Provider, Historical   acetaminophen (TYLENOL) 650 MG TbSR Take 650 mg by mouth every 8 (eight) hours as needed (Pain).  1/10/25  Provider, Historical   amoxicillin-clavulanate 875-125mg (AUGMENTIN) 875-125 mg per tablet Take 1 tablet by mouth 2 (two) times daily. for 4 days 1/8/25 1/10/25  Willis Goldman MD   apixaban (ELIQUIS) 2.5 mg Tab Take by mouth 2 (two) times daily.  1/10/25  Provider, Historical   apixaban (ELIQUIS) 5 mg Tab Take 5 mg by mouth 2 (two) times daily.  1/10/25  Provider, Historical   citalopram (CELEXA) 10 MG tablet Take 10 mg by mouth once daily.  1/10/25  Provider, Historical   multivitamin with minerals tablet Take 1 tablet by mouth once daily.  1/10/25  Provider, Historical   multivitamin with minerals tablet Take 1 tablet by mouth once daily.   1/10/25  Provider, Historical       REVIEW OF SYSTEMS:   Except as documented, all other systems reviewed and negative     PHYSICAL EXAM:     VITAL SIGNS: 24 HRS MIN & MAX LAST   Temp  Min: 97.9 °F (36.6 °C)  Max: 97.9 °F (36.6 °C) 97.9 °F (36.6 °C)   BP  Min: 100/71  Max: 142/70 133/88   Pulse  Min: 59  Max: 77  63   Resp  Min: 15  Max: 22 17   SpO2  Min: 87 %  Max: 99 % 95 %       Vitals Reviewed  General appearance: Elderly emaciated  male lying on ED stretcher, in no apparent distress.  HENT: Atraumatic head. Moist mucous membranes of oral cavity.  Eyes: Normal extraocular movements.   Neck: Supple.   Lungs: Clear to auscultation bilaterally. No wheezing present.   Heart: Regular irregular rate and rhythm. S1 and S2 present with no murmurs/gallop/rub. No JVD present.   Abdomen: Soft, non-distended, non-tender. No rebound tenderness/guarding. Bowel sounds are normal.   Extremities: No cyanosis, clubbing, or edema.  Skin: No Rash.   Neuro: Motor and sensory exams grossly intact. Good tone. Muscle strength 5/5 in all 4 extremities  Psych/mental status: Appropriate mood and affect. Responds appropriately to questions.     LABS AND IMAGING:     Recent Labs   Lab 01/08/25  0637 01/09/25  0220 01/10/25  0022   WBC 10.48 8.01 6.37   RBC 3.17* 3.27* 3.09*   HGB 10.0* 10.5* 10.0*   HCT 30.3* 31.6* 29.2*   MCV 95.6* 96.6* 94.5*   MCH 31.5* 32.1* 32.4*   MCHC 33.0 33.2 34.2   RDW 14.1 13.9 14.3    139 146   MPV 11.1* 11.2* 10.3       Recent Labs   Lab 01/05/25  1727 01/06/25  0405 01/07/25  0539 01/08/25  0444 01/09/25  0220 01/10/25  0022   NA  --    < > 139 135* 139 138   K  --    < > 3.0* 3.5 3.3* 3.4*   CL  --    < > 106 103 101 102   CO2  --    < > 23 23 27 28   BUN  --    < > 10.1 7.2* 5.8* 6.0*   CREATININE  --    < > 0.62* 0.61* 0.61* 0.61*   CALCIUM  --    < > 7.7* 7.9* 7.9* 8.2*   PH 7.470*  --   --   --   --   --    MG  --   --  1.50*  --   --   --    ALBUMIN  --    < > 2.0* 2.0* 2.3* 2.1*    ALKPHOS  --    < > 66 68 64 65   ALT  --    < > 11 14 16 12   AST  --    < > 25 22 22 18   BILITOT  --    < > 0.6 0.6 0.7 0.6    < > = values in this interval not displayed.        Microbiology Results (last 7 days)       ** No results found for the last 168 hours. **               ASSESSMENT & PLAN:   Impression:  Acute hypoxemic respiratory failure requiring supplemental oxygen via NC  Bilateral infiltrates with moderate bilateral pleural effusions (R>L) effusions; CAP  Recent trauma secondary to fall (left 3rd-7th rib fxs, left clavicle fx, small liver laceration- dc'd on 1/8/25 to NH)   PAF  Hypokalemia- K+ 3.4  Anemia of chronic disease - H/H stable at 10.0/29.2    Plan:  CM consult to facilitate transfer back to the NH  Zosyn, Vancomycin, Azithromycin IVPB  DC to NH with Hospice  Cefdinir po  Potassium replaced in ED      VTE Prophylaxis: Already on SCD    Thank you for the consult, will follow along, if you have any questions, please do not hesitate to reach out to us, our team is available 24/7 to assist you    __________________________________________________________________________  INPATIENT LIST OF MEDICATIONS     Current Facility-Administered Medications:     albuterol-ipratropium 2.5 mg-0.5 mg/3 mL nebulizer solution 3 mL, 3 mL, Nebulization, Q8H, Quique Lai MD    cefdinir capsule 300 mg, 300 mg, Oral, Q12H, Quique Lai MD, 300 mg at 01/10/25 1117    Current Outpatient Medications:     albuterol-ipratropium (DUO-NEB) 2.5 mg-0.5 mg/3 mL nebulizer solution, Take 3 mLs by nebulization every 8 (eight) hours. Rescue, Disp: 75 mL, Rfl: 0    apixaban (ELIQUIS) 2.5 mg Tab, Take 1 tablet (2.5 mg total) by mouth 2 (two) times daily., Disp: 60 tablet, Rfl: 3    cefdinir (OMNICEF) 300 MG capsule, Take 1 capsule (300 mg total) by mouth every 12 (twelve) hours. for 7 days, Disp: 14 capsule, Rfl: 0    citalopram (CELEXA) 10 MG tablet, Take 10 mg by mouth once daily., Disp: , Rfl:      loperamide (IMODIUM A-D) 2 mg Tab, Take 2 mg by mouth every 6 (six) hours as needed (Diarrhea)., Disp: , Rfl:     multivitamin Tab, Take 1 tablet by mouth once daily., Disp: 30 tablet, Rfl: 1    ondansetron (ZOFRAN) 4 MG tablet, Take 4 mg by mouth every 8 (eight) hours as needed for Nausea., Disp: , Rfl:     oxyCODONE (ROXICODONE) 5 MG immediate release tablet, Take 1 tablet (5 mg total) by mouth every 6 (six) hours as needed for Pain., Disp: 28 tablet, Rfl:     prednisoLONE-moxiflo-nepafenac 1-0.5-0.1 % DrpS, Place 1 drop into both eyes 4 (four) times daily., Disp: , Rfl:       Scheduled Meds:   albuterol-ipratropium  3 mL Nebulization Q8H    cefdinir  300 mg Oral Q12H     Continuous Infusions:  PRN Meds:.    RADIOLOGY                                                                                                    X-Ray Chest AP Portable  Narrative: EXAMINATION:  XR CHEST AP PORTABLE    CLINICAL HISTORY:  Hypoxemia    COMPARISON:  Chest x-ray dated 01/07/2025    FINDINGS:  The heart is normal in size.  There are similar scattered bilateral interstitial airspace opacities with small pleural effusions.  There is no definite visible pneumothorax.  Impression: Stable exam without significant interval change.    Electronically signed by: Lashay Henry  Date:    01/10/2025  Time:    09:45  CTA Chest Non-Coronary (PE Studies)  Narrative: Technique: CT Scan of the chest was performed with intravenous contrast with direct axial images as well as sagittal and coronal reconstruction images pulmonary embolus protocol.    Dosage Information: Automated Exposure Control was utilized 429.92 mGy.cm.    Comparison: Comparison is with study dated January 2, 2025    Clinical History: Pulmonary embolism (PE) suspected, positive D-dimer.    Findings:    Soft Tissues: There is extensive edema of the thoracoabdominal subcutaneous fat associated with pleural effusions consistent with anasarca edema.    Heart: Coronary artery  calcification is seen.    Aorta: No aortic dissection or aneurysm is seen. Mild aortic calcification is seen in the thoracic aorta.    Pulmonary Arteries: No filling defects are seen in the pulmonary arteries to suggest pulmonary embolus.    Lungs: Pronounced streaky linear opacity is seen predominantly in the dependent portions at the lung bases consistent with nonspecific dependent changes scarring and subsegmental atelectasis. Compared to the prior study, there is interval development of large bilateral pleural effusions with associated atelectasis of the adjacent lung segments. Small areas of consolidation with air bronchograms are seen in both lung bases and in the right upper lobe. These findings are consistent with multifocal pneumonia with areas of consolidation.    Pleura: No pneumothorax is identified.    Bony Structures: There is moderate to severe osteopenia of the visualized bones with mulitlevel decrease in vertebral body heights most severe at T6 which appears status post vertebroplasty. A left humeral cyndie is noted in place. An acute appearing complete overriding fracture with comminution of the left scapular body is seen on series 9 image 27 and series 5 image 72.  There is similar fracture medial left clavicle.    Spine: Mild spondylolytic changes are seen in the thoracic spine. There is exaggeration of the thoracic kyphosis.    Ribs: The right ribs appear unremarkable. There are multiple left sided rib fractures involving the 3rd to 7th posterior ribs as seen on series 9 images 37- 36.    Abdomen: The visualized upper abdominal organs appear unremarkable.  Impression: Impression:    1. No filling defects are seen in the pulmonary arteries to suggest pulmonary embolus.    2. There is extensive edema of the thoracoabdominal subcutaneous fat associated with pleural effusions consistent with anasarca edema. Correlate with clinical and laboratory parameters as regards further evaluation and follow  up.    3.  Interval development of large bilateral pleural effusions with associated atelectasis of the adjacent lung segments. Small areas of consolidation with air bronchograms are seen in both lung bases and in the right upper lobe. These findings are consistent with multifocal pneumonia with areas of consolidation. Correlate with clinical and laboratory parameters as regards further evaluation and follow up to full resolution as indicated.    4. There are multiple left sided rib fractures involving the 3rd to7th posterior ribs as seen on series 9 images 37- 36.    5. An acute appearing complete overriding fracture with comminution of the left scapular body is seen on series 9 image 27 and series 5 image 72.  Additional fracture medial left clavicle.    6. No CT evidence of pulmonary embolism. Details and other findings as discussed above.    No significant discrepancy with overnight report.    Electronically signed by: Derrick Tarango  Date:    01/10/2025  Time:    08:07          I,Niurka Busby NP have reviewed and discussed the case with Dr. Crawley  Please see the following addendum for further assessment and plan from there attending MD.  01/10/2025    ______________________________________________________________________________    MD Addendum:    I, Dr Keller assumed care of this patient today   For the patient encounter, I performed the substantive portion of the visit, I reviewed the NPPA documentation, treatment plan, and medical decision making.  I had face to face time with this patient       A. History:  Patient lives in a NH and has a recent discharge after a fall.   Noted to have mild hypoxia. His family has been trying to set up hospice at the NH  Today he is awake and denies any chest pain, cough, fever or chills  CXR does show marilynn infiltrates  O2 sats stable on 2 liter supplemental oxygen    B. Physical exam:  Heart RRR  Lungs clear   Abdomen soft and non tender   Neuro: No FND    He  is oriented to self and time. Following verbal commands     C. Medical decision making:  Patients labs and vitals reviewed  Discussed with his Daughter Ms Smith who is the POA  She is indeed talking to [facility].   Informed about patient condition and tx plan  She agrees with medical management with PO antibiotics and transition to hospic ecare in the NH  CM was informed and hospice will be set up    Patient will be discharged after NH hospice has been set up     Thank you for the consult, will be happy to follow alongside you      All diagnosis and differential diagnosis have been reviewed; assessment and plan has been documented; I have personally reviewed the labs and test results that are presently available; I have reviewed the patients medication list; I have reviewed the consulting providers response and recommendations. I have reviewed or attempted to review medical records based upon their availability.    All of the patient and family questions have been addressed and answered. Patient's is agreeable to the above stated plan. I will continue to monitor closely and make adjustments to medical management as needed.    Niurka Busby, ANP   [facility] Medicine Team  01/10/2025

## 2025-01-10 NOTE — HPI
Patient known to us from recent admission after a fall.  Was discharged with the nursing home with plan for hospice today.  The patient has said back in for increasing shortness of breath and increasing oxygen requirements.  Discussed the case with trauma attending, emergency department attending, Hospital Medicine.  They have kindly reached out of the family and discussed the plan of care.  The family requests transferred back to the nursing home with resumption of plans to institute hospice care today.  They have entered the discharge orders.  We are agreeable with this plan of care.

## 2025-01-29 ENCOUNTER — TELEPHONE (OUTPATIENT)
Facility: CLINIC | Age: OVER 89
End: 2025-01-29
Payer: MEDICARE

## 2025-01-29 NOTE — TELEPHONE ENCOUNTER
Attempted to contact pt daughter to check on pt and to see if they need help making a fu appt with  and . no answer LVM

## 2025-02-04 DIAGNOSIS — H26.9 CATARACT, UNSPECIFIED CATARACT TYPE, UNSPECIFIED LATERALITY: Primary | ICD-10-CM

## 2025-02-05 ENCOUNTER — LAB REQUISITION (OUTPATIENT)
Dept: LAB | Facility: HOSPITAL | Age: OVER 89
End: 2025-02-05
Payer: MEDICARE

## 2025-02-05 ENCOUNTER — TELEPHONE (OUTPATIENT)
Facility: CLINIC | Age: OVER 89
End: 2025-02-05
Payer: MEDICARE

## 2025-02-05 DIAGNOSIS — D64.9 ANEMIA, UNSPECIFIED: ICD-10-CM

## 2025-02-05 LAB
ALBUMIN SERPL-MCNC: 2.7 G/DL (ref 3.4–4.8)
ALBUMIN/GLOB SERPL: 0.7 RATIO (ref 1.1–2)
ALP SERPL-CCNC: 109 UNIT/L (ref 40–150)
ALT SERPL-CCNC: 10 UNIT/L (ref 0–55)
ANION GAP SERPL CALC-SCNC: 8 MEQ/L
AST SERPL-CCNC: 17 UNIT/L (ref 5–34)
BASOPHILS # BLD AUTO: 0.05 X10(3)/MCL
BASOPHILS NFR BLD AUTO: 1 %
BILIRUB SERPL-MCNC: 0.5 MG/DL
BUN SERPL-MCNC: 21.6 MG/DL (ref 8.4–25.7)
CALCIUM SERPL-MCNC: 8.9 MG/DL (ref 8.8–10)
CHLORIDE SERPL-SCNC: 107 MMOL/L (ref 98–111)
CHOLEST SERPL-MCNC: 110 MG/DL
CHOLEST/HDLC SERPL: 3 {RATIO} (ref 0–5)
CO2 SERPL-SCNC: 28 MMOL/L (ref 23–31)
CREAT SERPL-MCNC: 0.66 MG/DL (ref 0.72–1.25)
CREAT/UREA NIT SERPL: 33
EOSINOPHIL # BLD AUTO: 0.18 X10(3)/MCL (ref 0–0.9)
EOSINOPHIL NFR BLD AUTO: 3.5 %
ERYTHROCYTE [DISTWIDTH] IN BLOOD BY AUTOMATED COUNT: 14 % (ref 11.5–17)
GFR SERPLBLD CREATININE-BSD FMLA CKD-EPI: >60 ML/MIN/1.73/M2
GLOBULIN SER-MCNC: 3.9 GM/DL (ref 2.4–3.5)
GLUCOSE SERPL-MCNC: 67 MG/DL (ref 75–121)
HCT VFR BLD AUTO: 36.9 % (ref 42–52)
HDLC SERPL-MCNC: 38 MG/DL (ref 35–60)
HGB BLD-MCNC: 11.9 G/DL (ref 14–18)
IMM GRANULOCYTES # BLD AUTO: 0.02 X10(3)/MCL (ref 0–0.04)
IMM GRANULOCYTES NFR BLD AUTO: 0.4 %
LDLC SERPL CALC-MCNC: 61 MG/DL (ref 50–140)
LYMPHOCYTES # BLD AUTO: 1.6 X10(3)/MCL (ref 0.6–4.6)
LYMPHOCYTES NFR BLD AUTO: 31.1 %
MCH RBC QN AUTO: 31.3 PG (ref 27–31)
MCHC RBC AUTO-ENTMCNC: 32.2 G/DL (ref 33–36)
MCV RBC AUTO: 97.1 FL (ref 80–94)
MONOCYTES # BLD AUTO: 0.85 X10(3)/MCL (ref 0.1–1.3)
MONOCYTES NFR BLD AUTO: 16.5 %
NEUTROPHILS # BLD AUTO: 2.45 X10(3)/MCL (ref 2.1–9.2)
NEUTROPHILS NFR BLD AUTO: 47.5 %
NRBC BLD AUTO-RTO: 0 %
PLATELET # BLD AUTO: 156 X10(3)/MCL (ref 130–400)
PMV BLD AUTO: 12.3 FL (ref 7.4–10.4)
POTASSIUM SERPL-SCNC: 4 MMOL/L (ref 3.5–5.1)
PROT SERPL-MCNC: 6.6 GM/DL (ref 5.8–7.6)
PSA SERPL-MCNC: <0.1 NG/ML
RBC # BLD AUTO: 3.8 X10(6)/MCL (ref 4.7–6.1)
SODIUM SERPL-SCNC: 143 MMOL/L (ref 136–145)
TRIGL SERPL-MCNC: 55 MG/DL (ref 34–140)
TSH SERPL-ACNC: 0.37 UIU/ML (ref 0.35–4.94)
VLDLC SERPL CALC-MCNC: 11 MG/DL
WBC # BLD AUTO: 5.15 X10(3)/MCL (ref 4.5–11.5)

## 2025-02-05 PROCEDURE — 85025 COMPLETE CBC W/AUTO DIFF WBC: CPT | Performed by: FAMILY MEDICINE

## 2025-02-05 PROCEDURE — 84153 ASSAY OF PSA TOTAL: CPT | Performed by: FAMILY MEDICINE

## 2025-02-05 PROCEDURE — 84443 ASSAY THYROID STIM HORMONE: CPT | Performed by: FAMILY MEDICINE

## 2025-02-05 PROCEDURE — 80053 COMPREHEN METABOLIC PANEL: CPT | Performed by: FAMILY MEDICINE

## 2025-02-05 PROCEDURE — 80061 LIPID PANEL: CPT | Performed by: FAMILY MEDICINE

## 2025-02-05 NOTE — TELEPHONE ENCOUNTER
Called and spoke sammy Lopez from [facility] she stated pt is doing well and their NP has been following the pt since he arrived

## 2025-02-21 ENCOUNTER — HOSPITAL ENCOUNTER (EMERGENCY)
Facility: HOSPITAL | Age: OVER 89
End: 2025-02-22
Attending: EMERGENCY MEDICINE
Payer: MEDICARE

## 2025-02-21 DIAGNOSIS — R07.89 CHEST WALL PAIN: Primary | ICD-10-CM

## 2025-02-21 DIAGNOSIS — S22.42XD CLOSED FRACTURE OF MULTIPLE RIBS OF LEFT SIDE WITH ROUTINE HEALING, SUBSEQUENT ENCOUNTER: ICD-10-CM
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
## 2025-02-21 PROCEDURE — 99285 EMERGENCY DEPT VISIT HI MDM: CPT

## 2025-02-22 VITALS
TEMPERATURE: 97 F | RESPIRATION RATE: 19 BRPM | OXYGEN SATURATION: 99 % | BODY MASS INDEX: 20 KG/M2 | HEIGHT: 68 IN | DIASTOLIC BLOOD PRESSURE: 72 MMHG | HEART RATE: 75 BPM | SYSTOLIC BLOOD PRESSURE: 117 MMHG | WEIGHT: 132 LBS

## 2025-02-22 LAB
ALBUMIN SERPL-MCNC: 2.7 G/DL (ref 3.4–4.8)
ALBUMIN/GLOB SERPL: 0.6 RATIO (ref 1.1–2)
ALP SERPL-CCNC: 115 UNIT/L (ref 40–150)
ALT SERPL-CCNC: 10 UNIT/L (ref 0–55)
ANION GAP SERPL CALC-SCNC: 6 MEQ/L
APTT PPP: 33.9 SECONDS (ref 23.2–33.7)
AST SERPL-CCNC: 16 UNIT/L (ref 5–34)
BASOPHILS # BLD AUTO: 0.03 X10(3)/MCL
BASOPHILS NFR BLD AUTO: 0.4 %
BILIRUB SERPL-MCNC: 0.3 MG/DL
BUN SERPL-MCNC: 14.8 MG/DL (ref 8.4–25.7)
CALCIUM SERPL-MCNC: 9 MG/DL (ref 8.8–10)
CHLORIDE SERPL-SCNC: 102 MMOL/L (ref 98–111)
CO2 SERPL-SCNC: 29 MMOL/L (ref 23–31)
CREAT SERPL-MCNC: 0.75 MG/DL (ref 0.72–1.25)
CREAT/UREA NIT SERPL: 20
EOSINOPHIL # BLD AUTO: 0.03 X10(3)/MCL (ref 0–0.9)
EOSINOPHIL NFR BLD AUTO: 0.4 %
ERYTHROCYTE [DISTWIDTH] IN BLOOD BY AUTOMATED COUNT: 13.8 % (ref 11.5–17)
GFR SERPLBLD CREATININE-BSD FMLA CKD-EPI: >60 ML/MIN/1.73/M2
GLOBULIN SER-MCNC: 4.6 GM/DL (ref 2.4–3.5)
GLUCOSE SERPL-MCNC: 117 MG/DL (ref 75–121)
HCT VFR BLD AUTO: 38.8 % (ref 42–52)
HGB BLD-MCNC: 13 G/DL (ref 14–18)
IMM GRANULOCYTES # BLD AUTO: 0.04 X10(3)/MCL (ref 0–0.04)
IMM GRANULOCYTES NFR BLD AUTO: 0.6 %
INR PPP: 1.2
LYMPHOCYTES # BLD AUTO: 0.87 X10(3)/MCL (ref 0.6–4.6)
LYMPHOCYTES NFR BLD AUTO: 12.7 %
MCH RBC QN AUTO: 31.4 PG (ref 27–31)
MCHC RBC AUTO-ENTMCNC: 33.5 G/DL (ref 33–36)
MCV RBC AUTO: 93.7 FL (ref 80–94)
MONOCYTES # BLD AUTO: 0.7 X10(3)/MCL (ref 0.1–1.3)
MONOCYTES NFR BLD AUTO: 10.2 %
NEUTROPHILS # BLD AUTO: 5.16 X10(3)/MCL (ref 2.1–9.2)
NEUTROPHILS NFR BLD AUTO: 75.7 %
NRBC BLD AUTO-RTO: 0 %
PLATELET # BLD AUTO: 141 X10(3)/MCL (ref 130–400)
PLATELETS.RETICULATED NFR BLD AUTO: 4.3 % (ref 0.9–11.2)
PMV BLD AUTO: 11 FL (ref 7.4–10.4)
POTASSIUM SERPL-SCNC: 4.2 MMOL/L (ref 3.5–5.1)
PROT SERPL-MCNC: 7.3 GM/DL (ref 5.8–7.6)
PROTHROMBIN TIME: 15 SECONDS (ref 12.5–14.5)
RBC # BLD AUTO: 4.14 X10(6)/MCL (ref 4.7–6.1)
SODIUM SERPL-SCNC: 137 MMOL/L (ref 136–145)
WBC # BLD AUTO: 6.83 X10(3)/MCL (ref 4.5–11.5)

## 2025-02-22 PROCEDURE — 85730 THROMBOPLASTIN TIME PARTIAL: CPT | Performed by: EMERGENCY MEDICINE

## 2025-02-22 PROCEDURE — 85025 COMPLETE CBC W/AUTO DIFF WBC: CPT | Performed by: EMERGENCY MEDICINE

## 2025-02-22 PROCEDURE — 80053 COMPREHEN METABOLIC PANEL: CPT | Performed by: EMERGENCY MEDICINE

## 2025-02-22 PROCEDURE — 25500020 PHARM REV CODE 255: Performed by: EMERGENCY MEDICINE

## 2025-02-22 PROCEDURE — 85610 PROTHROMBIN TIME: CPT | Performed by: EMERGENCY MEDICINE

## 2025-02-22 RX ADMIN — IOHEXOL 100 ML: 350 INJECTION, SOLUTION INTRAVENOUS at 01:02

## 2025-02-22 NOTE — ED PROVIDER NOTES
Encounter Date: 2/21/2025    SCRIBE #1 NOTE: I, Kinga Alexander, am scribing for, and in the presence of,  Ronn Romero MD. I have scribed the following portions of the note - Other sections scribed: HPI, ROS, PE.       History     Chief Complaint   Patient presents with    Fall     Pt here from [facility] via [facility] after falling slipping off bed, landing on L side. Small lac to L hand, c/o pain to L side. Denies hitting head, -BT, GCS 14 baseline. Pt on hospice, DNR. 3L NC at [facility].     Patient is a 89-year-old male with a history of a previous CVA, A-fib, multiple myeloma, thoracic compression fracture, and dementia presenting to the ED from [facility] following a fall. The pt reports having a mechanical fall while getting out of bed PTA. He denies hitting his head or LOC. He is reporting left-sided rib pain and skin tears to the left hand. He denies any abdominal pain or neck pain.    The history is provided by the patient and medical records. No  was used.     Review of patient's allergies indicates:  No Known Allergies  Past Medical History:   Diagnosis Date    Cancer     prostate    Compression fracture of body of thoracic vertebra     Dementia without behavioral disturbance     Dysphagia     Paroxysmal atrial fibrillation     Rhabdomyolysis 02/08/2023    Unspecified cataract     Uses roller walker      Past Surgical History:   Procedure Laterality Date    ABDOMINAL AORTOGRAPHY N/A 1/2/2025    Procedure: AORTOGRAM, ABDOMINAL;  Surgeon: Barbie Clark MD;  Location: [facility] CATH LAB;  Service: Peripheral Vascular;  Laterality: N/A;    FRACTURE SURGERY      INTRAMEDULLARY RODDING OF FEMUR Left 07/28/2024    Procedure: INSERTION, INTRAMEDULLARY HANNA, FEMUR, LEFT;  Surgeon: Fabricio Alcazar MD;  Location: [facility] OR;  Service: Orthopedics;  Laterality: Left;  Supine, hana table    synthes long tfna    last case    JOINT REPLACEMENT      OPEN REDUCTION AND INTERNAL FIXATION  (ORIF) OF FRACTURE OF DISTAL RADIUS Left 04/18/2024    Procedure: ORIF, FRACTURE, RADIUS, DISTAL;  Surgeon: Kraig Malcolm DO;  Location: [facility] OR;  Service: Orthopedics;  Laterality: Left;  supine hand table c arm skeletal dynamics.    OPEN REDUCTION AND INTERNAL FIXATION (ORIF) OF FRACTURE OF PROXIMAL HUMERUS  04/18/2024    Procedure: ORIF, FRACTURE, HUMERUS, PROXIMAL;  Surgeon: Kraig Malcolm DO;  Location: [facility] OR;  Service: Orthopedics;;    PROSTATE SURGERY N/A     REMOVAL,ORTHOPEDIC HARDWARE,UPPER EXTREMITY Left 07/31/2024    Procedure: REMOVAL,ORTHOPEDIC HARDWARE,UPPER EXTREMITY, EXTENSOR TENDON REPAIR LEFT FOREARM;  Surgeon: Kraig Malcolm DO;  Location: [facility] OR;  Service: Orthopedics;  Laterality: Left;    SPINE SURGERY       Family History   Problem Relation Name Age of Onset    Diabetes Brother David Ibarra      Social History[1]  Review of Systems   Constitutional:  Negative for chills and fever.   Respiratory:  Negative for cough and shortness of breath.    Cardiovascular:  Negative for chest pain.   Gastrointestinal:  Negative for abdominal pain, nausea and vomiting.   Musculoskeletal:  Negative for myalgias.        Left-sided rib pain.   Skin:  Positive for wound (skin tears to the left hand).   Neurological:  Negative for syncope and headaches.   All other systems reviewed and are negative.      Physical Exam     Initial Vitals [02/21/25 2237]   BP Pulse Resp Temp SpO2   (!) 145/91 81 18 97.2 °F (36.2 °C) 95 %      MAP       --         Physical Exam    Constitutional: He appears well-developed and well-nourished. No distress.   HENT:   Head: Normocephalic and atraumatic.   Cardiovascular:  Normal rate.           Pulmonary/Chest: No respiratory distress. He has no rhonchi. He exhibits no tenderness.   Wheezing on the left.   Abdominal: Abdomen is soft. He exhibits no distension. There is no abdominal tenderness. There is no rebound and no guarding.   Musculoskeletal:         General: Normal range of  motion.      Comments: Left lower rib tenderness to palpation.  Skin tears to the base of the left index finger and the left 5th finger.     Neurological: He is alert and oriented to person, place, and time. He has normal strength.   Skin: Skin is warm and dry.   Psychiatric: He has a normal mood and affect.         ED Course   Procedures  Labs Reviewed   COMPREHENSIVE METABOLIC PANEL - Abnormal       Result Value    Sodium 137      Potassium 4.2      Chloride 102      CO2 29      Glucose 117      Blood Urea Nitrogen 14.8      Creatinine 0.75      Calcium 9.0      Protein Total 7.3      Albumin 2.7 (*)     Globulin 4.6 (*)     Albumin/Globulin Ratio 0.6 (*)     Bilirubin Total 0.3            ALT 10      AST 16      eGFR >60      Anion Gap 6.0      BUN/Creatinine Ratio 20     APTT - Abnormal    PTT 33.9 (*)    PROTIME-INR - Abnormal    PT 15.0 (*)     INR 1.2      Narrative:     Protimes are used to monitor anticoagulant agents such as warfarin. PT INR values are based on the current patient normal mean and the BINDU value for the specific instrument reagent used.  **Routine theraputic target values for the INR are 2.0-3.0**   CBC WITH DIFFERENTIAL - Abnormal    WBC 6.83      RBC 4.14 (*)     Hgb 13.0 (*)     Hct 38.8 (*)     MCV 93.7      MCH 31.4 (*)     MCHC 33.5      RDW 13.8      Platelet 141      MPV 11.0 (*)     IPF 4.3      Neut % 75.7      Lymph % 12.7      Mono % 10.2      Eos % 0.4      Basophil % 0.4      Imm Grans % 0.6      Neut # 5.16      Lymph # 0.87      Mono # 0.70      Eos # 0.03      Baso # 0.03      Imm Gran # 0.04      NRBC% 0.0     CBC W/ AUTO DIFFERENTIAL    Narrative:     The following orders were created for panel order CBC Auto Differential.  Procedure                               Abnormality         Status                     ---------                               -----------         ------                     CBC with Differential[[id number]]       Abnormal            Final result                  Please view results for these tests on the individual orders.          Imaging Results              CT Chest Abdomen Pelvis With IV Contrast (XPD) NO Oral Contrast (Preliminary result)  Result time 02/22/25 01:19:57      Preliminary result by Ralph Miranda MD (02/22/25 01:19:57)                   Narrative:    START OF REPORT:  Technique: CT Scan of the chest abdomen and pelvis was performed with intravenous contrast with axial as well as sagittal and, coronal images.    Dosage Information: Automated Exposure Control was utilized 479.11 mGy.cm.    Comparison: Comparison is with study dated 2025-01-02 15:10:21.    Clinical History: Fall, lt side rib pain.    Findings:  Soft Tissues: Unremarkable.  Neck: The visualized soft tissues of the neck appear unremarkable.  Mediastinum: The mediastinal structures are within normal limits.  Heart: The heart size is within normal limits. Coronary artery calcification is seen.  Aorta: Unremarkable appearing aorta. No aortic dissection or aneurysm is seen.  Pulmonary Arteries: Unremarkable. No filling defects are seen in the pulmonary arteries to suggest pulmonary embolus.  Lungs: Moderate scattered streaky linear opacity is seen predominantly in the dependent portions at the left lung base consistent with dependent changes and scarring and subsegmental atelectasis. Infiltrates or contusion in the area of atelectasis in the left lung base is not entirely excluded.  Bony Structures: A left humeral prosthetic device is noted in place.  Ribs: Compared to the prior study, there is interval development of multiple left rib fracture deformiies with sclerotic borders involving the left 2nd to 7th lateral ribs with inward deformity of the fractured rib arches and with associated pleural thickening.  Abdomen:  Abdominal Wall: No abdominal wall pathology is seen.  Liver: The liver appears unremarkable.  Biliary System: No intrahepatic or extrahepatic biliary duct dilatation  is seen.  Gallbladder: Multiple small gallstones are seen in the gallbladder which otherwise appears unremarkable.  Pancreas: The pancreas appears unremarkable.  Spleen: A 6 mm cyst is seen in the lower edge of the spleen which otherwise appears unremarkable.  Trauma: No specific evidence of splenic trauma is seen.  Adrenals: The adrenal glands appear unremarkable.  Kidneys: The right kidney appears unremarkable with no stones cysts masses or hydronephrosis. A single cyst measuring 1.8 cm is seen on Image 62, Series 16 lower pole of the left kidney. The left kidney otherwise appears unremarkable with no stones masses or hydronephrosis identified.  Aorta: No aortic aneurysm or dissection is seen. There is mild calcification of the abdominal aorta and its branches.  IVC: Unremarkable.  Bowel:  Esophagus: The visualized esophagus appears unremarkable.  Stomach: The stomach appears unremarkable.  Duodenum: Unremarkable appearing duodenum.  Small Bowel: The small bowel appears unremarkable.  Colon: Nondistended. There is moderate stool in the colon which could reflect an element of constipation.  Appendix: The appendix appears unremarkable and is seen on Image 144, Series 2 through Image 146, Series 2.  Peritoneum: No intraperitoneal free air or ascites is seen.    Pelvis:  Bladder: The bladder appears unremarkable.    Bony structures: There is stable moderate osteopenia of the visualized bones.  Dorsal Spine: There is mild spondylosis of the visualized dorsal spine. Stable appearing multilevel mild to moderate compression deformities are seen involving the thoracic and lumbar vertebrae.  Bony Pelvis: There is mild to moderate degenerative change of the hips. Stable chronic appearing fractures of the left iliac wing, left superior pubic ramus and left inferior pubic ramus are seen. A left hip prosthetic device is noted in place and stable.      Impression:  1. Moderate scattered streaky linear opacity is seen  predominantly in the dependent portions at the left lung base consistent with dependent changes and scarring and subsegmental atelectasis. Infiltrates or contusion in the area of atelectasis in the left lung base is not entirely excluded. Correlate clinically as regards further evaluation and follow up.  2. Compared to the prior study, there is interval development of multiple left rib fracture deformiies with sclerotic borders involving the left 2nd to 7th lateral ribs with inward deformity of the fractured rib arches and with associated pleural thickening. Correlate clinically as regards further evaluation and follow up'.  3. No acute traumatic intrathoracic pathology identified. No acute traumatic intraabdominal or pelvic solid organ or bowel pathology identified. Details and other findings as discussed above.                                         CT Cervical Spine Without Contrast (Preliminary result)  Result time 02/22/25 01:05:03      Preliminary result by Ralph Miranda MD (02/22/25 01:05:03)                   Narrative:    START OF REPORT:  Technique: CT of the cervical spine was performed without intravenous contrast with axial as well as sagittal and coronal images.    Comparison: Comparison is with study dated 2025-01-02 15:08:25.    Dosage Information: Automated Exposure Control was utilized 1226.31 mGy.cm.    Clinical history: Fall, lt side rib pain.    Findings:  Artifact: None.  Lung apices: Nonspecific scarring but otherwise unremarkable.  Spine:  Spinal canal: The spinal canal appears unremarkable.  Mineralization: Moderate osteopenia is seen in the visualized bony structures.  Rotation: No significant rotation is seen.  Vertebral Fusion: No vertebral fusion is identified.  Listhesis: No significant listhesis is identified.  Lordosis: The cervical lordosis is maintained.  Intervertebral disc spaces: The intervertebral discs are preserved throughout.  Osteophytes: Mild multilevel endplate  osteophytes are seen.  Endplate Sclerosis: No significant endplate sclerosis is seen.  Uncovertebral degenerative changes: No significant uncovertebral degenerative changes are seen.  Facet degenerative changes: Mild multilevel facet degenerative changes are seen.  Calcifications: None.  Fractures: No acute cervical spine fracture dislocation or subluxation is seen.  Orthopedic Hardware: None.    Miscellaneous:  Mastoid air cells: The visualized mastoid air cells appear clear.  Soft Tissues: Unremarkable.      Impression:  1. No acute cervical spine fracture dislocation or subluxation is seen.  2. Degenerative changes and other details as above.                                         CT Head Without Contrast (Preliminary result)  Result time 02/22/25 01:03:34      Preliminary result by Ralph Miranda MD (02/22/25 01:03:34)                   Narrative:    START OF REPORT:  Technique: CT of the head was performed without intravenous contrast with axial as well as coronal and sagittal images.    Comparison: None.    Dosage Information: Automated Exposure Control was utilized 1226.31 mGy.cm.    Clinical history: None.    Findings:  Hemorrhage: No acute intracranial hemorrhage is seen.  CSF spaces: The ventricles, sulci and basal cisterns all appear moderately prominent consistent with global cerebral atrophy.  Brain parenchyma: There is preservation of the grey white junction throughout. No acute infarct is identified. Mild microvascular change is seen in portions of the periventricular and deep white matter tracts.  Cerebellum: Unremarkable.  Vascular: Subtle atheromatous calcification of the intracranial arteries is seen.  Sella and skull base: The sella appears to be within normal limits for age.  Cerebellopontine angles: Within normal limits.  Herniation: None.  Intracranial calcifications: Incidental note is made of bilateral choroid plexus calcification. Incidental note is made of some pineal region calcification.  Incidental note is made of some calcification of the falx.  Calvarium: No acute linear or depressed skull fracture is seen.    Maxillofacial Structures:  Paranasal sinuses: The visualized paranasal sinuses appear clear with no significant mucoperiosteal thickening or air fluid levels identified.  Orbits: The orbits appear unremarkable.  Zygomatic arches: The zygomatic arches are intact and unremarkable.  Temporal bones and mastoids: The temporal bones and mastoids appear unremarkable.  TMJ: The mandibular condyles appear normally placed with respect to the mandibular fossa.      Impression:  1. No acute intracranial process identified. Details and other findings as noted above.                                         Medications   iohexoL (OMNIPAQUE 350) injection 100 mL (100 mLs Intravenous Given 2/22/25 0101)     Medical Decision Making  Differential diagnosis include but are not limited to: fall, rib contusion, rib fracture, and hand fracture.    No bruising over the torso or ribs but it does have some tenderness in the left lower ribs.  No tenderness in the abdomen.  Denied hitting his head or neck there does have dementia so those scans were obtained.  Some superficial skin tears in the left hand will be bandage.  No bony tenderness or deformity he has full range of motion.    Amount and/or Complexity of Data Reviewed  Labs: ordered. Decision-making details documented in ED Course.  Radiology: ordered and independent interpretation performed. Decision-making details documented in ED Course.    Risk  Prescription drug management.            Scribe Attestation:   Scribe #1: I performed the above scribed service and the documentation accurately describes the services I performed. I attest to the accuracy of the note.    Attending Attestation:           Physician Attestation for Scribe:  Physician Attestation Statement for Scribe #1: I, Ronn Romero MD, reviewed documentation, as scribed by Kinga Alexander in my  presence, and it is both accurate and complete.             ED Course as of 02/22/25 0243   Sat Feb 22, 2025   0230 Left-sided rib fractures were visualized on previous CTA on 01/10/2025 [LF]   0238 I called the patient's daughter Sarah Ibarra listed as emergency contact.  He is currently on hospice care has a orders here that show he gets morphine as needed.  Knows about the recent rib fractures.  No new injuries identified today discussed possible pulmonary contusion but he is in no respiratory distress at this time.  He will go back home to the nursing home she is relieved and happy to hear that. [LF]      ED Course User Index  [LF] Ronn Romero MD                           Clinical Impression:  Final diagnoses:  [R07.89] Chest wall pain (Primary)  [S22.42XD] Closed fracture of multiple ribs of left side with routine healing, subsequent encounter  [W19.XXXA] Fall, initial encounter          ED Disposition Condition    Discharge Stable          ED Prescriptions    None       Follow-up Information       Follow up With Specialties Details Why Contact Info    Primary care provider  Schedule an appointment as soon as possible for a visit  If your symptoms worsen or change please return to the emergency department for re-evaluation Call your primary care provider to schedule a follow-up appointment within a week               [1]   Social History  Tobacco Use    Smoking status: Never     Passive exposure: Never    Smokeless tobacco: Never   Substance Use Topics    Alcohol use: Not Currently     Alcohol/week: 2.0 standard drinks of alcohol     Comment: socially    Drug use: Never        Ronn Romero MD  02/22/25 0243

## 2025-03-20 ENCOUNTER — OFFICE VISIT (OUTPATIENT)
Dept: OPHTHALMOLOGY | Facility: CLINIC | Age: OVER 89
End: 2025-03-20
Payer: MEDICARE

## 2025-03-20 VITALS — BODY MASS INDEX: 20.01 KG/M2 | HEIGHT: 68 IN | WEIGHT: 132.06 LBS

## 2025-03-20 DIAGNOSIS — H25.13 CATARACTA BRUNESCENS OF BOTH EYES: ICD-10-CM

## 2025-03-20 PROCEDURE — 99213 OFFICE O/P EST LOW 20 MIN: CPT | Mod: PBBFAC,PN | Performed by: STUDENT IN AN ORGANIZED HEALTH CARE EDUCATION/TRAINING PROGRAM

## 2025-03-20 RX ORDER — OXYCODONE HYDROCHLORIDE 5 MG/1
5 TABLET ORAL EVERY 4 HOURS PRN
COMMUNITY

## 2025-03-20 RX ORDER — LORAZEPAM 1 MG/1
1 TABLET ORAL EVERY 4 HOURS PRN
COMMUNITY

## 2025-03-20 RX ORDER — HYOSCYAMINE SULFATE 0.125 MG
125 TABLET ORAL EVERY 6 HOURS PRN
COMMUNITY

## 2025-03-20 NOTE — PROGRESS NOTES
manual a scan:  OD: 26.10mm, 26.09mm  OS: 25.52mm, 25.30mm    B scan OU 3/20/25  OD: retina flat, no masses  OS: retina flat, no masses      Unable to get any testing due to patient positioning in wheelchair and poor neck mobility    A/P;       Visually significant age-related cataract, OU  - brunescent cataract OU  - Patient with visual symptoms of glare, halos around light, difficult reading and driving. Symptoms not correctable with glasses  - Patient with visually significant cataract and desires surgical removal. Thoroughly discussed cataract surgery today, risks/benefits/alternatives discussed and informed consent obtained, signed, and placed in the chart.  - MRX done today; BCVA: CF // LP - unable to reach phoropter  - IOP today: good  - Pt interested in cataract surgery and qualifies for surgery  - Trauma: unknown  - Guttae: unable to assess  - Phaco/iridodonesis: unable to assess  - Trypan blue: yes ou  - Flomax use: Likely given hx of Prostatectomy  - Dilation: 7mm ou  - Claustrophobia no   - Anticoagulant/antiplatelet use: ok to continue any blood thinners for surgery  - Semaglutide: no  - Preop workup: TBD by anesthesia  - Cooperative with exam: Pt able to lie flat for 30 minutes, will plan to do under local  - Comorbidities: dementia, Htn, HLD, hx of Prostate Ca, hx of Multiple myeloma in remission, stroke. Will ask pcp in nursing home and recommend general anesthesia clearance if possible to obtain, patient seems frail. Once clearance obtained, return  - Medical clearance: needed by PCP  - Lens to be used: tbd, unable to reach autorefractor for K's, manual a scan:  OD: 26.10mm, 26.09mm  OS: 25.52mm, 25.30mm  - Patient unable to reach autorefractor or IOLmaster for K's given poor neck mobility/wheelchair  - Bscan flat OU  - PCA's think Daughter is decision maker, patient lives in NH currently. Requested PCP clearance for general anesthesia, given brunescence may need to consider extracap with   Lucas, would be very tough Phaco.     2. XT Left  - likely due to chronic decreased vision OS  - EOM grossly full    If cleared by PCP, must get consent from daughter (decision maker?) and consider lucas general anesthesia [facility]

## 2025-03-25 ENCOUNTER — TELEPHONE (OUTPATIENT)
Dept: OPHTHALMOLOGY | Facility: CLINIC | Age: OVER 89
End: 2025-03-25
Payer: MEDICARE

## 2025-03-25 NOTE — TELEPHONE ENCOUNTER
03/25/25  I contacted the [facility] where the patient is currently a resident and spoke with a nurse and asked if they had received anything from us regarding patient needing clearance to under go General Anesthesia for cataract surgery and she stated that had not received anything and asked for me to refax it. Will fax clearance request TERESA Cabrera, COA

## 2025-03-25 NOTE — TELEPHONE ENCOUNTER
03/25/25  Patient's daughter called around 11 AM wanting to confirm her fathers surgery for Friday 03/28. I informed the daughter that we do not operate on Fridays and that he has not been scheduled yet due to needing clearance from his PCP in order to under go General Anesthesia. She then stated that she was currently boarding a flight on her way here from [place] for this surgery. Patient's daughter stated she had been conversing back and forth with Vladimir(surgical coordinator at [facility]) about his surgery and that Vladimir gave her the date of the 28th. I informed her that Vladimir is not affiliated with [facility] and has no control over our surgery schedule so I was not sure how she got a surgery date from him(Patient voiced understanding that Vladimir has no affiliation with [facility] and that he is a surgical coordinator for the private practice of [facility]). She then told me that Vladimir assured her that he set up the surgery with us and I told her that her dad had an appointment with us on 03/20 for a new patient cataract evaluation for us to examine and establish him as a patient then we could move forward with surgery scheduling process. I informed her that I would call Vladimir at [facility] and clarify what exactly what was going on, patient's daughter said she would call me when she landed in [place] and was very upset because she said she really wanted to be here for his surgery. I called Vladimir and he said that they referred the patient to us due to the patient not being able to tolerate conscious sedation and had an appointment coming up but never gave her the date of 03/28. I told Vladimir that we saw him on 03/20 for cataract evaluation and asked if he could fax over his records of phone calls between he and the patient's daughter. Vladimir faxed his documentation of phone call records between [facility] staff and Vladimir and it is in patients chart under media. I am currently waiting for the patient's daughter to land and call  me back TERESA Cabrera, COA

## 2025-03-25 NOTE — TELEPHONE ENCOUNTER
03/25/25  Patient's daughter called back and I told her that after reading the notes and documentation from Vladimir at [facility], that the chart stated clearly that the patient had an appointment with us that was only for an exam. Patient's daughter stated that she did not make up this date of the 28th and I restated the facts from the chart. Patient's daughter asked what she needed to do and I informed her she needed to call NH and see if they cleared him for surgery and if so to fax it to us and if they needed anything else from us I would be happy to fax it to them. She then asked if her father was cleared could we get his surgery done this week, I told her our surgery schedule is booked and there was nothing more I could do until we obtain and clearance for General Anesthesia for her father, patient voiced understanding and said she would get back with me TERESA Cabrera, COA

## 2025-03-31 ENCOUNTER — LAB REQUISITION (OUTPATIENT)
Dept: LAB | Facility: HOSPITAL | Age: OVER 89
End: 2025-03-31
Payer: MEDICARE

## 2025-03-31 DIAGNOSIS — E56.9 VITAMIN DEFICIENCY, UNSPECIFIED: ICD-10-CM

## 2025-03-31 DIAGNOSIS — D64.9 ANEMIA, UNSPECIFIED: ICD-10-CM

## 2025-03-31 DIAGNOSIS — C61 MALIGNANT NEOPLASM OF PROSTATE: ICD-10-CM

## 2025-03-31 DIAGNOSIS — I48.91 UNSPECIFIED ATRIAL FIBRILLATION: ICD-10-CM

## 2025-03-31 PROBLEM — H25.13: Status: ACTIVE | Noted: 2025-03-31

## 2025-03-31 LAB
ALBUMIN SERPL-MCNC: 2.3 G/DL (ref 3.4–4.8)
ALBUMIN/GLOB SERPL: 0.6 RATIO (ref 1.1–2)
ALP SERPL-CCNC: 107 UNIT/L (ref 40–150)
ALT SERPL-CCNC: 6 UNIT/L (ref 0–55)
ANION GAP SERPL CALC-SCNC: 7 MEQ/L
AST SERPL-CCNC: 11 UNIT/L (ref 11–45)
BASOPHILS # BLD AUTO: 0.03 X10(3)/MCL
BASOPHILS NFR BLD AUTO: 0.6 %
BILIRUB SERPL-MCNC: 0.3 MG/DL
BUN SERPL-MCNC: 15.3 MG/DL (ref 8.4–25.7)
CALCIUM SERPL-MCNC: 8.3 MG/DL (ref 8.8–10)
CHLORIDE SERPL-SCNC: 106 MMOL/L (ref 98–111)
CHOLEST SERPL-MCNC: 100 MG/DL
CHOLEST/HDLC SERPL: 3 {RATIO} (ref 0–5)
CO2 SERPL-SCNC: 29 MMOL/L (ref 23–31)
CREAT SERPL-MCNC: 0.68 MG/DL (ref 0.72–1.25)
CREAT/UREA NIT SERPL: 23
EOSINOPHIL # BLD AUTO: 0.14 X10(3)/MCL (ref 0–0.9)
EOSINOPHIL NFR BLD AUTO: 2.6 %
ERYTHROCYTE [DISTWIDTH] IN BLOOD BY AUTOMATED COUNT: 14.6 % (ref 11.5–17)
GFR SERPLBLD CREATININE-BSD FMLA CKD-EPI: >60 ML/MIN/1.73/M2
GLOBULIN SER-MCNC: 3.7 GM/DL (ref 2.4–3.5)
GLUCOSE SERPL-MCNC: 73 MG/DL (ref 75–121)
HCT VFR BLD AUTO: 35.3 % (ref 42–52)
HDLC SERPL-MCNC: 40 MG/DL (ref 35–60)
HGB BLD-MCNC: 11.7 G/DL (ref 14–18)
IMM GRANULOCYTES # BLD AUTO: 0.02 X10(3)/MCL (ref 0–0.04)
IMM GRANULOCYTES NFR BLD AUTO: 0.4 %
INR PPP: 1.3 (ref 2–3)
LDLC SERPL CALC-MCNC: 54 MG/DL (ref 50–140)
LYMPHOCYTES # BLD AUTO: 1.91 X10(3)/MCL (ref 0.6–4.6)
LYMPHOCYTES NFR BLD AUTO: 35.9 %
MCH RBC QN AUTO: 31.7 PG (ref 27–31)
MCHC RBC AUTO-ENTMCNC: 33.1 G/DL (ref 33–36)
MCV RBC AUTO: 95.7 FL (ref 80–94)
MONOCYTES # BLD AUTO: 0.74 X10(3)/MCL (ref 0.1–1.3)
MONOCYTES NFR BLD AUTO: 13.9 %
NEUTROPHILS # BLD AUTO: 2.48 X10(3)/MCL (ref 2.1–9.2)
NEUTROPHILS NFR BLD AUTO: 46.6 %
NRBC BLD AUTO-RTO: 0 %
PLATELET # BLD AUTO: 142 X10(3)/MCL (ref 130–400)
PMV BLD AUTO: 11.8 FL (ref 7.4–10.4)
POTASSIUM SERPL-SCNC: 4.1 MMOL/L (ref 3.5–5.1)
PROT SERPL-MCNC: 6 GM/DL (ref 5.8–7.6)
PROTHROMBIN TIME: 16 SECONDS (ref 11.7–14.5)
PSA SERPL-MCNC: <0.1 NG/ML
RBC # BLD AUTO: 3.69 X10(6)/MCL (ref 4.7–6.1)
SODIUM SERPL-SCNC: 142 MMOL/L (ref 136–145)
TRIGL SERPL-MCNC: 29 MG/DL (ref 34–140)
TSH SERPL-ACNC: 0.45 UIU/ML (ref 0.35–4.94)
VLDLC SERPL CALC-MCNC: 6 MG/DL
WBC # BLD AUTO: 5.32 X10(3)/MCL (ref 4.5–11.5)

## 2025-03-31 PROCEDURE — 80061 LIPID PANEL: CPT | Performed by: NURSE PRACTITIONER

## 2025-03-31 PROCEDURE — 85025 COMPLETE CBC W/AUTO DIFF WBC: CPT | Performed by: NURSE PRACTITIONER

## 2025-03-31 PROCEDURE — 80053 COMPREHEN METABOLIC PANEL: CPT | Performed by: NURSE PRACTITIONER

## 2025-03-31 PROCEDURE — 85610 PROTHROMBIN TIME: CPT | Performed by: NURSE PRACTITIONER

## 2025-03-31 PROCEDURE — 84443 ASSAY THYROID STIM HORMONE: CPT | Performed by: NURSE PRACTITIONER

## 2025-03-31 PROCEDURE — 84153 ASSAY OF PSA TOTAL: CPT | Performed by: NURSE PRACTITIONER

## 2025-04-02 ENCOUNTER — TELEPHONE (OUTPATIENT)
Dept: OPHTHALMOLOGY | Facility: CLINIC | Age: OVER 89
End: 2025-04-02
Payer: MEDICARE

## 2025-04-02 DIAGNOSIS — I48.91 ATRIAL FIBRILLATION, UNSPECIFIED TYPE: Primary | ICD-10-CM

## 2025-04-02 NOTE — TELEPHONE ENCOUNTER
Daughter Sarah called inquiring as to if our clinic had received the clearance for her fathers cataract surgery, after review explained that we did receive a copy of a chest x-ray for his PCP. Informed her this information would be reviewed with Dr. Ann and I would call her back if this information was adequate for surgery clearance. Discussed patient history with Dr. Ann, patient has documented history of A-Fib, CVA and is currently on Eliquis. Dr. Ann recommended referral to cardiology due to cardiac history and need for general anesthesia. Called daughter Sarah, informed her that due to patient cardiac history Dr. Ann is ordering a referral to cardiology to clear patient for general anesthesia. Daughter verbalizes understanding, but did voice that she does not believe her father had a CVA, states she would have been told something like this, explained this is documented in his last ED visit, unknown where the original diagnosis came from. Sarah agreed to the referral to cardiology at [facility] for assessment needed for general anesthesia. Cardiology referral placed, call placed to [facility] with above information.

## 2025-04-03 ENCOUNTER — OFFICE VISIT (OUTPATIENT)
Dept: ORTHOPEDICS | Facility: CLINIC | Age: OVER 89
End: 2025-04-03
Payer: MEDICARE

## 2025-04-03 ENCOUNTER — HOSPITAL ENCOUNTER (OUTPATIENT)
Dept: RADIOLOGY | Facility: CLINIC | Age: OVER 89
Discharge: HOME | End: 2025-04-03
Attending: ORTHOPAEDIC SURGERY
Payer: MEDICARE

## 2025-04-03 VITALS
WEIGHT: 132.06 LBS | DIASTOLIC BLOOD PRESSURE: 70 MMHG | SYSTOLIC BLOOD PRESSURE: 105 MMHG | BODY MASS INDEX: 20.01 KG/M2 | HEIGHT: 68 IN | HEART RATE: 82 BPM

## 2025-04-03 DIAGNOSIS — S72.142D CLOSED INTERTROCHANTERIC FRACTURE OF LEFT FEMUR WITH ROUTINE HEALING, SUBSEQUENT ENCOUNTER: Primary | ICD-10-CM

## 2025-04-03 DIAGNOSIS — S72.142D CLOSED INTERTROCHANTERIC FRACTURE OF LEFT FEMUR WITH ROUTINE HEALING, SUBSEQUENT ENCOUNTER: ICD-10-CM

## 2025-04-03 PROCEDURE — 99213 OFFICE O/P EST LOW 20 MIN: CPT | Mod: ,,, | Performed by: ORTHOPAEDIC SURGERY

## 2025-04-03 PROCEDURE — 73502 X-RAY EXAM HIP UNI 2-3 VIEWS: CPT | Mod: LT,,, | Performed by: ORTHOPAEDIC SURGERY

## 2025-04-03 NOTE — PROGRESS NOTES
"Subjective:       Patient ID: Kaleb Ibarra is a 89 y.o. male.    Chief Complaint   Patient presents with    Left Hip - Follow-up     8 months f/u LEFT IT FEMUR FX, SX 7/28/24, presents in wheelchair, denies pain, reports improvement, nwb, no other complaints,         Patient is here today for follow-up evaluation 8 months status post intramedullary nailing of left intertrochanteric femur fracture.  He is doing well.  He has no complaints of any pain in his left hip.  He states that he is able to ambulate though he does get around slowly with a walker.  He is currently in a wheelchair which he uses for long distances.    Follow-up  Pertinent negatives include no abdominal pain, chest pain, chills, congestion, coughing, fever, nausea, neck pain, numbness or vomiting.       Review of Systems   Constitutional: Negative for chills, fever and malaise/fatigue.   HENT:  Negative for congestion and hearing loss.    Eyes:  Negative for visual disturbance.   Cardiovascular:  Negative for chest pain and syncope.   Respiratory:  Negative for cough and shortness of breath.    Hematologic/Lymphatic: Does not bruise/bleed easily.   Skin:  Negative for color change and suspicious lesions.   Musculoskeletal:  Negative for falls and neck pain.   Gastrointestinal:  Negative for abdominal pain, nausea and vomiting.   Genitourinary:  Negative for dysuria and hematuria.   Neurological:  Negative for numbness and sensory change.   Psychiatric/Behavioral:  Negative for altered mental status. The patient is not nervous/anxious.         Medications Ordered Prior to Encounter[1]       Objective:      /70   Pulse 82   Ht 5' 8" (1.727 m)   Wt 59.9 kg (132 lb 0.9 oz)   BMI 20.08 kg/m²   Physical Exam  Constitutional:       General: He is not in acute distress.     Appearance: Normal appearance. He is not ill-appearing.   HENT:      Head: Normocephalic and atraumatic.      Nose: No congestion.   Eyes:      Extraocular Movements: " Extraocular movements intact.   Cardiovascular:      Rate and Rhythm: Normal rate and regular rhythm.      Pulses: Normal pulses.   Pulmonary:      Effort: Pulmonary effort is normal.      Breath sounds: Normal breath sounds.   Abdominal:      General: There is no distension.      Palpations: Abdomen is soft.      Tenderness: There is no abdominal tenderness.   Musculoskeletal:      Comments: Left lower extremity:  Surgical incisions are all well healed.  Tolerates passive internal and external rotation of the left hip.  Able to perform active flexion and extension of the hip knee ankle and digits.  No calf swelling or tenderness, no signs of DVT.   Skin:     General: Skin is warm and dry.   Neurological:      Mental Status: He is alert and oriented to person, place, and time. Mental status is at baseline.   Psychiatric:         Mood and Affect: Mood normal.         Behavior: Behavior normal.         Thought Content: Thought content normal.         Judgment: Judgment normal.        Body mass index is 20.08 kg/m².    Radiology:   Left femur two views:  Hardware intact.  Alignment maintained.  Fracture is completely consolidated.  No evidence of AVN or collapse      Assessment:         1. Closed intertrochanteric fracture of left femur with routine healing, subsequent encounter  X-Ray Hip 2 or 3 views Left with Pelvis when performed              Plan:       .  His intertrochanteric fracture is healed.  He can continue full activities without restrictions to the left lower extremity.  He requires no further follow-up here in the office.  Return on an as-needed basis only.  All questions and concerns were addressed and he understands and agrees with our plan.      This note/OR report was created with the assistance of  voice recognition software or phone  dictation.  There may be transcription errors as a result of using this technology however minimal. Effort has been made to assure accuracy of transcription but any  obvious errors or omissions should be clarified with the author of the document.       Fabricio Alcazar MD  Orthopedic Trauma  [facility]      No follow-ups on file.    Closed intertrochanteric fracture of left femur with routine healing, subsequent encounter  -     X-Ray Hip 2 or 3 views Left with Pelvis when performed; Future; Expected date: 04/03/2025              Orders Placed This Encounter   Procedures    X-Ray Hip 2 or 3 views Left with Pelvis when performed     Standing Status:   Future     Number of Occurrences:   1     Expected Date:   4/3/2025     Expiration Date:   4/2/2026     May the Radiologist modify the order per protocol to meet the clinical needs of the patient?:   Yes     Release to patient:   Immediate       Future Appointments   Date Time Provider Department Center   4/3/2025 10:15 AM Fabricio Alcazar MD [facility]   4/10/2025  9:00 AM [facility] [place]                        [1]   Current Outpatient Medications on File Prior to Visit   Medication Sig Dispense Refill    albuterol-ipratropium (DUO-NEB) 2.5 mg-0.5 mg/3 mL nebulizer solution Take 3 mLs by nebulization every 8 (eight) hours. Rescue 75 mL 0    apixaban (ELIQUIS) 2.5 mg Tab Take 1 tablet (2.5 mg total) by mouth 2 (two) times daily. 60 tablet 3    bisacodyl (FLEET) 10 mg/30 mL Enem Place 10 mg rectally once.      citalopram (CELEXA) 10 MG tablet Take 10 mg by mouth once daily.      hyoscyamine (ANASPAZ,LEVSIN) 0.125 mg Tab Take 125 mcg by mouth every 6 (six) hours as needed.      loperamide (IMODIUM A-D) 2 mg Tab Take 2 mg by mouth every 6 (six) hours as needed (Diarrhea).      LORazepam (ATIVAN) 1 MG tablet Take 1 mg by mouth every 4 (four) hours as needed for Anxiety.      multivitamin Tab Take 1 tablet by mouth once daily. 30 tablet 1    ondansetron (ZOFRAN) 4 MG tablet Take 4 mg by mouth every 8 (eight) hours as needed for Nausea.      oxyCODONE (ROXICODONE) 5 MG immediate  release tablet Take 5 mg by mouth every 4 (four) hours as needed for Pain.      prednisoLONE-moxiflo-nepafenac 1-0.5-0.1 % DrpS Place 1 drop into both eyes 4 (four) times daily.       No current facility-administered medications on file prior to visit.

## 2025-04-09 ENCOUNTER — TELEPHONE (OUTPATIENT)
Dept: OPHTHALMOLOGY | Facility: CLINIC | Age: OVER 89
End: 2025-04-09
Payer: MEDICARE

## 2025-04-09 DIAGNOSIS — I48.91 ATRIAL FIBRILLATION, UNSPECIFIED TYPE: Primary | ICD-10-CM

## 2025-04-09 DIAGNOSIS — H25.13 CATARACTA BRUNESCENS OF BOTH EYES: ICD-10-CM

## 2025-04-09 NOTE — TELEPHONE ENCOUNTER
04/09/25  Patient's daughter Sarah called again, stating she called the cardiology department at [facility] and they informed her they had no appointment until June, so she requested the referral and clearance paperwork be sent to [facility] by end of day today. I kindly informed her that I have to get the doctor to re do the referral and that we are in the middle of clinic so I cannot promise it would be today but it will get done as soon as we can. TERESA Cabrera, COA

## 2025-04-09 NOTE — TELEPHONE ENCOUNTER
04/09/25  Patients daughter Sarah called regarding cardiology referral/clearance for general anesthesia. She called [facility] and they said we would have to fax over a clearance request. I informed her that we sent a referral to [facility] and that it was sent on 04/02/25 and so now we have to wait for them to schedule her dad. She was kind of bummed that it is taking this long but I assured her we are doing all we can and that we are just waiting on Cardio to see him. She said she would call the cardiology department and make sure they have the referral(I told her they received it) and see if she can get an appointment sooner. TERESA Cabrera, COA

## 2025-04-10 ENCOUNTER — HOSPITAL ENCOUNTER (OUTPATIENT)
Dept: RADIOLOGY | Facility: CLINIC | Age: OVER 89
Discharge: HOME | End: 2025-04-10
Attending: ORTHOPAEDIC SURGERY
Payer: MEDICARE

## 2025-04-10 ENCOUNTER — TELEPHONE (OUTPATIENT)
Dept: OPHTHALMOLOGY | Facility: CLINIC | Age: OVER 89
End: 2025-04-10
Payer: MEDICARE

## 2025-04-10 ENCOUNTER — OFFICE VISIT (OUTPATIENT)
Dept: ORTHOPEDICS | Facility: CLINIC | Age: OVER 89
End: 2025-04-10
Payer: MEDICARE

## 2025-04-10 VITALS
SYSTOLIC BLOOD PRESSURE: 118 MMHG | WEIGHT: 132.06 LBS | BODY MASS INDEX: 20.01 KG/M2 | HEIGHT: 68 IN | HEART RATE: 73 BPM | DIASTOLIC BLOOD PRESSURE: 68 MMHG

## 2025-04-10 DIAGNOSIS — S42.112D CLOSED DISPLACED FRACTURE OF BODY OF LEFT SCAPULA WITH ROUTINE HEALING, SUBSEQUENT ENCOUNTER: Primary | ICD-10-CM

## 2025-04-10 DIAGNOSIS — S42.032A TRAUMATIC CLOSED FRACTURE OF DISTAL CLAVICLE WITH MINIMAL DISPLACEMENT, LEFT, INITIAL ENCOUNTER: ICD-10-CM

## 2025-04-10 DIAGNOSIS — S42.112D CLOSED DISPLACED FRACTURE OF BODY OF LEFT SCAPULA WITH ROUTINE HEALING, SUBSEQUENT ENCOUNTER: ICD-10-CM

## 2025-04-10 PROCEDURE — 73010 X-RAY EXAM OF SHOULDER BLADE: CPT | Mod: LT,,, | Performed by: ORTHOPAEDIC SURGERY

## 2025-04-10 PROCEDURE — 99213 OFFICE O/P EST LOW 20 MIN: CPT | Mod: ,,, | Performed by: ORTHOPAEDIC SURGERY

## 2025-04-10 NOTE — TELEPHONE ENCOUNTER
04/10/25  I called patients daughter Sarah to let her know that we got the new referral sent over to [facility] along with surgery clearance request paperwork. I asked that if she gave them a call and received appointment date and time that she call me and let me know so that I can document it for referral TERESA Cabrera, COA

## 2025-04-10 NOTE — TELEPHONE ENCOUNTER
04/10/25  Patients daughter Sarah called again, informing me of cardiology appointment 04/22/2025 @ 10:00 AM for surgery clearance. Sarah asked if we could put him on surgery schedule before he was cleared and I informed her that no we cannot fill a spot for someone who may not get clearance and she insisted that her dad does not have AFIB or H/O of stroke, I then informed her we have to go by what is documented by the DR and follow up to make sure it is safe for her father to under go this surgery. Sarah understood after some lengthy explanation and said she would F/U once he was seen at [facility] TERESA Cabrera, COA

## 2025-04-10 NOTE — PROGRESS NOTES
"  Subjective:       Patient ID: Kaleb Ibarra is a 89 y.o. male.  Chief Complaint   Patient presents with    Left Shoulder - Injury     3 months f/u CONSULT 01/02/2025 Nondisplaced left distal clavicle fracture and left scapula fracture, denies any pain, reports doing good,         HPI    Patient presents for three-month follow up of a nondisplaced left distal clavicle fracture and left scapula fracture.  He states it also no shoulder pain.  He does have little overhead range of motion.  He does have an old proximal humerus August 18, 2024. He underwent IMN of his femur on 07/28/2024. He has been cleared from his hip with Dr. Alcazar. He does have some stiffness with overhead range of motion of the shoulder. Unable to get past 90 degrees.     ROS:  Constitutional: Denies fever chills  Eyes: No change in vision  ENT: No ringing or current infections  CV: No chest pain  Resp: No labored breathing  MSK: Pain evident at site of injury located in HPI,   Integ: No signs of abrasions or lacerations  Neuro: No numbness or tingling  Lymphatic: No swelling outside the area of injury     Medications Ordered Prior to Encounter[1]       Objective:      /68   Pulse 73   Ht 5' 8" (1.727 m)   Wt 59.9 kg (132 lb 0.9 oz)   BMI 20.08 kg/m²   Physical Exam  General the patient is alert and oriented x3 no acute distress nontoxic-appearing appropriate affect.    Constitutional: Vital signs are examined and stable.  Resp: No signs of labored breathing      LUE: --Skin: incisions well healed and matured No signs of new abrasions or lacerations, no scars           -MSK: STR 5/5 AIN/PIN/Median/Radial/Ulnar motor; ROM  0-90           -Neuro:  Sensation intact to light touch C5-T1 dermatomes           -Lymphatic: No signs of lymphadenopathy, No signs of swelling,           -CV:Capillary refill is less than 2 seconds. Radial and ulnar pulses 2/4. Compartments Soft and compressible                      Body mass index is 20.08 " "kg/m².  Ideal body weight: 68.4 kg (150 lb 12.7 oz)  No results found for: "HGBA1C"  Hgb   Date Value Ref Range Status   03/31/2025 11.7 (L) 14.0 - 18.0 g/dL Final   02/22/2025 13.0 (L) 14.0 - 18.0 g/dL Final     Hct   Date Value Ref Range Status   03/31/2025 35.3 (L) 42.0 - 52.0 % Final   02/22/2025 38.8 (L) 42.0 - 52.0 % Final     No results found for: "IRON"  No components found for: "FROLATE"  No results found for: "[id number]"  WBC   Date Value Ref Range Status   03/31/2025 5.32 4.50 - 11.50 x10(3)/mcL Final   02/22/2025 6.83 4.50 - 11.50 x10(3)/mcL Final       Radiology: 3 view x ray scapula: image captures the proximal humerus and entirety of his humeral fixation. Fracture has gone on to full union. Left clavicle fracture without displacement appears to have fully healed without displacement. Left scapula fracture appears to have marielos on to full union.         Assessment:         1. Closed displaced fracture of body of left scapula with routine healing, subsequent encounter  X-Ray Scapula Left      2. Traumatic closed fracture of distal clavicle with minimal displacement, left, initial encounter  X-Ray Scapula Left              Plan:         Follow up if symptoms worsen or fail to improve.    Kaleb was seen today for injury.    Diagnoses and all orders for this visit:    Closed displaced fracture of body of left scapula with routine healing, subsequent encounter  -     X-Ray Scapula Left; Future    Traumatic closed fracture of distal clavicle with minimal displacement, left, initial encounter  -     X-Ray Scapula Left; Future        -WBAT LUE, ROMAT. Bridge plate removed wrist in the past. States no complaints with the wrist. Shoulder is stiff with overhead movement. He uses a walker for short distances only but otherwise uses wheelchair due to very poor eyesight. Caregiver with him today states it does not limit him much. He is not interested in further surgery. Recommend physical therapy for ROM exercises " daily. This was written in his orders back to the nursing home.   -We will see him back as needed should he have any concerns in the future.  -ED precautions given    Kraig Malcolm DO personally performed the services described in this documentation, including but not limited to patient's history, physical examination, and assessment and plan of care. All medical record entries made by Bridget Bennett PA-C were performed at his direction and in his presence. The medical record was reviewed and is accurate and complete.      Kraig Malcolm DO  [facility]   Orthopedic Trauma            No future appointments.               [1]   Current Outpatient Medications on File Prior to Visit   Medication Sig Dispense Refill    albuterol-ipratropium (DUO-NEB) 2.5 mg-0.5 mg/3 mL nebulizer solution Take 3 mLs by nebulization every 8 (eight) hours. Rescue 75 mL 0    apixaban (ELIQUIS) 2.5 mg Tab Take 1 tablet (2.5 mg total) by mouth 2 (two) times daily. 60 tablet 3    bisacodyl (FLEET) 10 mg/30 mL Enem Place 10 mg rectally once.      citalopram (CELEXA) 10 MG tablet Take 10 mg by mouth once daily.      hyoscyamine (ANASPAZ,LEVSIN) 0.125 mg Tab Take 125 mcg by mouth every 6 (six) hours as needed.      loperamide (IMODIUM A-D) 2 mg Tab Take 2 mg by mouth every 6 (six) hours as needed (Diarrhea).      LORazepam (ATIVAN) 1 MG tablet Take 1 mg by mouth every 4 (four) hours as needed for Anxiety.      multivitamin Tab Take 1 tablet by mouth once daily. 30 tablet 1    ondansetron (ZOFRAN) 4 MG tablet Take 4 mg by mouth every 8 (eight) hours as needed for Nausea.      oxyCODONE (ROXICODONE) 5 MG immediate release tablet Take 5 mg by mouth every 4 (four) hours as needed for Pain.      prednisoLONE-moxiflo-nepafenac 1-0.5-0.1 % DrpS Place 1 drop into both eyes 4 (four) times daily.       No current facility-administered medications on file prior to visit.

## 2025-04-20 NOTE — ED PROVIDER NOTES
Emergency Department Note      History of Present Illness     Chief Complaint   Drug Overdose      HPI   Mook White is a 34 year old male with a history of an opioid use disorder and methamphetamine use who was brought in by EMS for evaluation of a drug overdose. He was found in his minivan in the back of his house. Upon EMS arrival, he was unresponsive, apneic and had pinpoint pupils. His blood sugar was 232 and blood pressure was 120/80. His oxygen saturation was 30% at room air so EMS bagged him for 6-7 minutes before Mook started spontaneous respiration. He was given 4 mg of Narcan and 4 mg of Zofran en route. He admitted to snorting Fentanyl to EMS. Here in the Emergency Department, he reports that he was smoking fentanyl and he might have unintentionally overdone it. He admits to methamphetamine use as well however denies any methamphetamine tonight. He denies any past or recent IV drug use.         Independent Historian   EMS as detailed above.    Review of External Notes   None     Past Medical History     Medical History and Problem List   Opioid use disorder     Medications   The patient is not currently taking any regular medications.    Physical Exam     Patient Vitals for the past 24 hrs:   BP Temp Temp src Pulse Resp SpO2   04/20/25 0245 103/61 -- -- 68 11 94 %   04/20/25 0230 106/63 -- -- 70 11 96 %   04/20/25 0212 -- -- -- 73 12 95 %   04/20/25 0200 106/63 -- -- 71 12 95 %   04/20/25 0145 108/65 -- -- 70 16 96 %   04/20/25 0142 -- 98  F (36.7  C) Temporal 74 10 97 %   04/20/25 0131 116/64 -- -- 74 -- --     Physical Exam  General: Alert, No distress. Nontoxic appearance  Head: No signs of trauma.   Mouth/Throat: Oropharynx moist.   Eyes: Conjunctivae are normal. Pinpoint pupils    Neck: Normal range of motion.    CV: Appears well perfused.  Resp:No respiratory distress.   MSK: Normal range of motion. No obvious deformity.   Neuro: The patient is alert and interactive. [place]. Speech normal. GCS  Encounter Date: 1/2/2025    SCRIBE #1 NOTE: I, Julio C De Souza, am scribing for, and in the presence of,  Mikel Powell IV, MD. I have scribed the entire note.       History     Chief Complaint   Patient presents with    Fall     Fall on thinners. Trauma activation.     65 year old male presents to the ED via EMS following a fall. EMS reports that the pt trip and fell PTA. Pt reportedly hit his head on a food tray. Pt is on Eliquis, but has not taken it the past 3 days due to an upcoming procedure. EMS reports the pt is experiencing pain underneath his left shoulder. Pt denies any LOC. EMS reports the pt is GCS 14 at baseline.     The history is provided by the patient. No  was used.     Review of patient's allergies indicates:  No Known Allergies  Past Medical History:   Diagnosis Date    Dementia without behavioral disturbance     Dysphagia     Paroxysmal atrial fibrillation     Unspecified cataract      History reviewed. No pertinent surgical history.  No family history on file.  Social History     Tobacco Use    Smoking status: Never    Smokeless tobacco: Never   Substance Use Topics    Drug use: Never     Review of Systems   Constitutional:  Negative for chills and fever.   HENT:  Negative for congestion, rhinorrhea and sore throat.    Eyes:  Negative for visual disturbance.   Respiratory:  Negative for cough and shortness of breath.    Cardiovascular:  Negative for chest pain.   Gastrointestinal:  Negative for abdominal pain, nausea and vomiting.   Genitourinary:  Negative for dysuria and hematuria.   Musculoskeletal:  Negative for joint swelling.        Pain underneath the left shoulder.    Skin:  Negative for rash.   Neurological:  Negative for syncope and weakness.   Psychiatric/Behavioral:  Negative for confusion.    All other systems reviewed and are negative.      Physical Exam     Initial Vitals   BP Pulse Resp Temp SpO2   01/02/25 1445 01/02/25 1445 01/02/25 1445 01/02/25 1445 01/02/25  1424   110/89 87 18 98.1 °F (36.7 °C) (!) 94 %      MAP       --                Physical Exam    Nursing note and vitals reviewed.  Constitutional: Airway: Normal. Breathing: Normal. Circulation: Normal. Florentinosas is not diaphoretic. Pulses:Radial palpable. No distress. Cervical collar in place.   HENT:   Left forehead hematoma with overlying abrasion. Left posterior occipital scalp with overlying abrasion.   Eyes: Pupils: Normal pupils. EOM are normal. Pupils are equal, round, and reactive to light.   Pupils 3 mm - 2 mm bilaterally    Neck: Neck supple.   Normal range of motion.  Cardiovascular:  Normal rate and regular rhythm.           Pulses:       Radial pulses are 2+ on the right side and 2+ on the left side.        Dorsalis pedis pulses are 2+ on the right side and 2+ on the left side.   Pulmonary/Chest: Breath sounds normal. No respiratory distress.   Abdominal: Abdomen is soft. [place] exhibits no distension. There is no abdominal tenderness. The pelvis is stable.   Musculoskeletal:         General: No edema. Normal range of motion.      Cervical back: Normal range of motion and neck supple. No deformity or bony tenderness. Normal.      Thoracic back: Normal. No deformity or bony tenderness.      Lumbar back: Normal. No deformity or bony tenderness.      Comments: Abrasion to the dorsum of the left hand. Thoracic midline tenderness. No cervical or lumbar midline tenderness.      Neurological: [place] is alert. GCS score is 15. GCS eye subscore is 4. GCS verbal subscore is 4. GCS motor subscore is 6.   Skin: Skin is warm. No rash noted.         ED Course   Critical Care    Date/Time: 1/2/2025 6:06 PM    Performed by: Mikel Powell IV, MD  Authorized by: Mikel Powell IV, MD  Direct patient critical care time: 9 minutes  Ordering / reviewing critical care time: 10 minutes  Documentation critical care time: 10 minutes  Consulting other physicians critical care time: 4 minutes  Total critical care time  15  Skin: No lesion or sign of trauma noted.   Psych: Sleepy but rousable. behavior is normal. Denies suicidal thoughts   Diagnostics     EKG   ECG taken at 0137, ECG read at 0138  Normal sinus rhythm  Nonspecific intraventricular conduction delay   Rate 71 bpm. DC interval 156 ms. QRS duration 120 ms. QT/QTc 432/469 ms. P-R-T axes 44 25 11.      ED Course      ED Course   ED Course as of 04/20/25 0331   Sun Apr 20, 2025   0138 I obtained the history and examined the patient as above.        Medical Decision Making / Diagnosis     YEIMI White is a 34 year old male who presents to the Emergency Department for evaluation after an ingestion of fentanyl.   EKG obtained and shows normal normal QT.      Neurologically, the patient is slightly sleepy but rousable    Narcan was given by EMS with excellent response.       Given no suicidal plan/intent, will plan on allowing to sober and then discharge with prescription for Narcan.      At this time the patient has not needed additional doses of Narcan but will be observed in the emergency department for several more hours.  He will be signed out to my partner pending 6 hours of observation in the emergency department without further Narcan doses needed.      Disposition   Care of the patient was transferred to my colleague pending sobriety.     Diagnosis     ICD-10-CM    1. Opiate overdose, accidental or unintentional, initial encounter (H)  T40.601A            Scribe Disclosure:  Mark GUILLEN, am serving as a scribe at 1:56 AM on 4/20/2025 to document services personally performed by Lisha Burdick MD based on my observations and the provider's statements to me.        Lisha Burdick MD  04/20/25 0425     (exclusive of procedural time) : 33 minutes  Critical care time was exclusive of separately billable procedures and treating other patients.  Critical care was necessary to treat or prevent imminent or life-threatening deterioration of the following conditions: trauma.  Critical care was time spent personally by me on the following activities: discussions with consultants, development of treatment plan with patient or surrogate, blood draw for specimens, interpretation of cardiac output measurements, evaluation of patient's response to treatment, obtaining history from patient or surrogate, ordering and performing treatments and interventions, ordering and review of laboratory studies, ordering and review of radiographic studies, pulse oximetry, re-evaluation of patient's condition, review of old charts and examination of patient.        Labs Reviewed   COMPREHENSIVE METABOLIC PANEL - Abnormal       Result Value    Sodium 138      Potassium 4.6      Chloride 103      CO2 25      Glucose 129 (*)     Blood Urea Nitrogen 16.7      Creatinine 0.78      Calcium 8.8      Protein Total 7.4      Albumin 2.7 (*)     Globulin 4.7 (*)     Albumin/Globulin Ratio 0.6 (*)     Bilirubin Total 0.2            ALT 12      AST 25      eGFR >60      Anion Gap 10.0      BUN/Creatinine Ratio 21     PROTIME-INR - Abnormal    PT 15.1 (*)     INR 1.2     LACTIC ACID, PLASMA - Abnormal    Lactic Acid Level 3.6 (*)    CBC WITH DIFFERENTIAL - Abnormal    WBC 10.19      RBC 4.04      Hgb 13.0 (*)     Hct 38.5 (*)     MCV 95.3 (*)     MCH 32.2 (*)     MCHC 33.8      RDW 14.3      Platelet 149      MPV 11.3 (*)     Neut % 82.5      Lymph % 7.2      Mono % 8.7      Eos % 0.5      Basophil % 0.3      Lymph # 0.73      Neut # 8.41      Mono # 0.89      Eos # 0.05      Baso # 0.03      IG# 0.08 (*)     IG% 0.8      NRBC% 0.0     LACTIC ACID, PLASMA - Abnormal    Lactic Acid Level 2.6 (*)    APTT - Normal    PTT 31.2     ALCOHOL,MEDICAL  (ETHANOL) - Normal    Ethanol Level <10.0     CBC W/ AUTO DIFFERENTIAL    Narrative:     The following orders were created for panel order CBC auto differential.  Procedure                               Abnormality         Status                     ---------                               -----------         ------                     CBC with Differential[[id number]]       Abnormal            Final result                 Please view results for these tests on the individual orders.   TYPE & SCREEN    Group & Rh O POS      Indirect [place] GEL NEG      Specimen Outdate 01/05/2025 23:59     ABORH RETYPE    ABORH Retype O POS            Imaging Results              CT Chest Abdomen Pelvis With IV Contrast (XPD) NO Oral Contrast (Edited Result - FINAL)  Result time 01/02/25 18:44:38      Addendum (preliminary) 1 of 1 by Jenny Lama MD (01/02/25 18:44:38)      There are multiple compression deformities seen in the thoracic and lumbar spine of unknown chronicity.  Correlation with direct physical examination is recommended.  If the physical exam is ambiguous MRI correlation may be beneficial if acute fracture is suspected.      Electronically signed by: Shankar Lama  Date:    01/02/2025  Time:    18:44                 Final result by Jenny Lama MD (01/02/25 15:51:56)                   Impression:      Liver laceration along the inferior margin of the liver with its perihepatic blood seen and what appears to be an area of active extravasation of contrast along the inferior margin of the liver.  This would place this as a grade 4 injury due to active hemorrhage although the laceration size is small.    Acute appearing fracture of the left distal clavicle    Comminuted fracture of the body of the scapula on the left side    Left 8th rib fracture posteriorly    Cholelithiasis    Findings discussed with the trauma team provider Veda at time of dictation      Electronically signed by: Shankar  [facility]  Date:    01/02/2025  Time:    15:51               Narrative:    EXAMINATION:  CT CHEST ABDOMEN PELVIS WITH IV CONTRAST (XPD)    CLINICAL HISTORY:  Trauma;    TECHNIQUE:  Low dose axial images, sagittal and coronal reformations were obtained from the thoracic inlet to the pubic symphysis following the IV contrast administration. Automatic exposure control is utilized to reduce patient radiation exposure.    COMPARISON:  None    FINDINGS:  The lungs are adequately aerated.  No pneumothorax is seen.  No pulmonary contusion is seen.  No pleural effusion is seen.  No infiltrate is seen.    The thoracic aorta is normal in caliber.  No dissection or aneurysm is seen.  No retrosternal hematoma is seen.    The abdominal aorta appears grossly unremarkable.  No dissection or posttraumatic changes are seen.    The heart appears normal.    There is perihepatic  blood seen along the inferior margin of the liver.  There is a blush of contrast seen in segment 6 of the liver on images number 98 through 102.  Findings are consistent with possible active bleed..  Due to the active blush of contrast displaces the injury and a grade 4 liver laceration.  The area of hemorrhage seems to be from a branch of the right portal vein.    There are multiple gallstones in the gallbladder...    The spleen appears normal.  No splenic laceration is seen.  The pancreas appears grossly unremarkable.  No pancreatic mass or lesion is seen.  No inflammation is seen.    No adrenal abnormality is seen.  No adrenal nodule is seen.    The kidneys are well perfused.  No hydronephrosis is seen.  No hydroureter is seen.  No retroperitoneal hematoma is seen.    Visualized portions of the bowel shows no acute abnormality.  No colitis is seen.  No diverticulitis is seen.  No colonic mass is seen.    No free air is seen.  No free fluid is seen.    Urinary bladder appears unremarkable.    There is an acute appearing fracture of the very distal left  clavicle.  It is nondisplaced.  It is best seen on coronal image 49 and axial image 11 series 9.  There is a subacute appearing fracture of the head of the clavicle on the left side.  There is a comminuted fracture of the scapula on the left side.  There is a fracture of the left 8th rib posteriorly.  No sternal fracture is seen.  No thoracic spine fracture is seen.  No lumbar spine fracture is seen.  There is old pelvic fracture of the iliac wing                                       CT Cervical Spine Without Contrast (Final result)  Result time 01/02/25 15:48:11      Final result by Lashay Henry MD (01/02/25 15:48:11)                   Impression:      No acute abnormality identified.      Electronically signed by: Lashay Henry  Date:    01/02/2025  Time:    15:48               Narrative:    EXAMINATION:  CT CERVICAL SPINE WITHOUT CONTRAST    CLINICAL HISTORY:  Trauma;    TECHNIQUE:  Noncontrast CT images of the cervical spine. Axial, coronal, and sagittal reformatted images were obtained. Dose length product is hree 14 mGycm. Automatic exposure control, adjustment of mA/kV or iterative reconstruction technique was used to limit radiation dose.    COMPARISON:  None    FINDINGS:  The cervical spine is visualized through the level T1.    There is no acute fracture identified.  There are multilevel degenerative changes with disc height loss, marginal osteophyte formation and facet arthropathy.  There is no paraspinal hematoma.                                       CT Head Without Contrast (Final result)  Result time 01/02/25 15:37:35      Final result by Lashay Henry MD (01/02/25 15:37:35)                   Impression:      1. No acute intracranial abnormality.  2. Chronic microvascular ischemic changes.      Electronically signed by: Lashay Henry  Date:    01/02/2025  Time:    15:37               Narrative:    EXAMINATION:  CT HEAD WITHOUT CONTRAST    CLINICAL  HISTORY:  Trauma;    TECHNIQUE:  Axial scans were obtained from skull base to the vertex.    Coronal and sagittal reconstructions obtained from the axial data.    Automatic exposure control was utilized to limit radiation dose.    Contrast: None    Radiation Dose:    Total DLP: 1171 mGy*cm    COMPARISON:  None    FINDINGS:  There is no acute intracranial hemorrhage or edema. The gray-white matter differentiation is preserved.  Scattered hypodensities in the subcortical and periventricular white matter likely represent chronic microvascular ischemic changes.    There is no mass effect or midline shift.  There is diffuse parenchymal volume loss.  The basal cisterns are patent. There is no abnormal extra-axial fluid collection.  The carotid and vertebral artery calcifications are noted.    There is soft tissue swelling in the posterior scalp.  The calvarium and skull base are intact. The visualized paranasal sinuses and the mastoid air cells are clear.                                       X-Ray Chest 1 View (Final result)  Result time 01/02/25 16:07:51      Final result by Larry Marlow MD (01/02/25 16:07:51)                   Impression:      No acute pulmonary process identified.      Electronically signed by: Larry Marlow  Date:    01/02/2025  Time:    16:07               Narrative:    EXAMINATION:  XR CHEST 1 VIEW    CLINICAL HISTORY:  r/o bleeding or hemorrhage;    TECHNIQUE:  Frontal view(s) of the chest.    COMPARISON:  No relevant comparison studies available at the time of dictation.    FINDINGS:  Normal cardiac silhouette.  The lungs are well-inflated.  Minimal left lower lung atelectasis.  No significant pleural fluid.  No pneumothorax appreciated.  Minimally displaced distal left clavicle fracture.                                       X-Ray Pelvis Routine AP (Final result)  Result time 01/02/25 16:09:41      Final result by Larry Marlow MD (01/02/25 16:09:41)                   Impression:       Suboptimal assessment, but no definitive acute osseous findings.      Electronically signed by: Larry Marlow  Date:    01/02/2025  Time:    16:09               Narrative:    EXAMINATION:  XR PELVIS ROUTINE AP    CLINICAL HISTORY:  r/o bleeding or hemorrhage;    TECHNIQUE:  AP view of the pelvis.    COMPARISON:  None    FINDINGS:  Prior internal fixation of the left femur.  Suboptimal evaluation due to positioning and decreased overall bone mineralization.  No definitive acute fracture.  Hips are aligned.                                       Medications   LIDOcaine (PF) 10 mg/ml (1%) injection 10 mg (has no administration in time range)   sodium chloride 0.9% flush 10 mL (has no administration in time range)   ondansetron disintegrating tablet 8 mg (has no administration in time range)   melatonin tablet 6 mg (has no administration in time range)   acetaminophen tablet 650 mg (has no administration in time range)   morphine injection 2 mg (has no administration in time range)   oxyCODONE immediate release tablet 10 mg (has no administration in time range)   oxyCODONE immediate release tablet 5 mg (has no administration in time range)   methocarbamoL tablet 500 mg (500 mg Oral Not Given 1/2/25 1700)   acetaminophen tablet 650 mg (650 mg Oral Not Given 1/2/25 1800)   lactated ringers infusion (has no administration in time range)   fentaNYL injection (50 mcg Intravenous Given 1/2/25 1842)   hydrALAZINE injection (10 mg Intravenous Given 1/2/25 1857)   heparin (porcine) injection (3,000 Units Intravenous Given 1/2/25 1859)   Tdap vaccine injection 0.5 mL (0.5 mLs Intramuscular Given 1/2/25 1527)   fentaNYL injection 50 mcg (50 mcg Intravenous Given 1/2/25 1455)   ceFAZolin injection 1 g (1,000 mg Intravenous Given 1/2/25 1455)   iohexoL (OMNIPAQUE 350) injection 100 mL (100 mLs Intravenous Given 1/2/25 1530)   0.9% NaCl infusion (0 mLs Intravenous Stopped 1/2/25 1658)     Medical Decision Making  65-year-old on  Eliquis presenting after a fall from standing  Exam as above on arrival  Patient with liver laceration with active extravasation clavicle fracture, scapular fracture  Trauma has discussed the patient with IR will admit possible IR intervention    Differential diagnosis (including but not limited to):   abrasion, contusion, fracture, traumatic ICH, TBI, concussion, spinal injury, fracture, pneumothorax, hemothorax, intrathoracic injury, intraabdominal injury, hemorrhage, laceration       Problems Addressed:  Closed displaced fracture of left clavicle, unspecified part of clavicle, initial encounter: acute illness or injury that poses a threat to life or bodily functions  Closed fracture of left scapula, unspecified part of scapula, initial encounter: acute illness or injury that poses a threat to life or bodily functions  Fall, initial encounter: acute illness or injury that poses a threat to life or bodily functions  Liver laceration: acute illness or injury that poses a threat to life or bodily functions    Amount and/or Complexity of Data Reviewed  Independent Historian: EMS     Details: EMS reports that the pt trip and fell PTA. EMS reports the pt is GCS 14 at baseline.  Labs: ordered.  Radiology: ordered and independent interpretation performed.  Discussion of management or test interpretation with external provider(s): Discussed with truama - will admit     Risk  Prescription drug management.  Decision regarding hospitalization.    Critical Care  Total time providing critical care: 33 minutes            Scribe Attestation:   Scribe #1: I performed the above scribed service and the documentation accurately describes the services I performed. I attest to the accuracy of the note.    Attending Attestation:           Physician Attestation for Scribe:  Physician Attestation Statement for Scribe #1: I, Mikel Powell IV, MD, reviewed documentation, as scribed by Julio C De Souza in my presence, and it is both accurate and  complete.             ED Course as of 01/02/25 1905   Thu Jan 02, 2025   1615 Trauma will admit   [AC]      ED Course User Index  [AC] Mikel Powell IV, MD                           Clinical Impression:  Final diagnoses:  [S36.113A] Liver laceration  [W19.XXXA] Fall, initial encounter (Primary)  [S42.102A] Closed fracture of left scapula, unspecified part of scapula, initial encounter  [S42.002A] Closed displaced fracture of left clavicle, unspecified part of clavicle, initial encounter          ED Disposition Condition    Admit                     Mikel Powell IV, MD  01/02/25 1905

## 2025-04-23 ENCOUNTER — TELEPHONE (OUTPATIENT)
Dept: OPHTHALMOLOGY | Facility: CLINIC | Age: OVER 89
End: 2025-04-23
Payer: MEDICARE

## 2025-04-23 NOTE — TELEPHONE ENCOUNTER
04/23/25  Patients daughter called asking if we received her fathers surgery clearance and a surgery date. I assured her we received it and that we could schedule the patient for 05/08 and patients daughter Sarah stated that was fine and is going to make airline arrangements to fly in for surgery. I told her that he will have POD1, POW1 and POM1. She asked if we knew to contact the NH for arrival time of surgery and asked that we call her and the NH and I told her that would be fine. I also called and notified NH that he has been scheduled for surgery and that we will call day before and surgery will call them day before as well with time of arrival for surgery, let them know that patient's daughter is also aware of these plans too TERESA Cabrera, COA

## 2025-04-24 DIAGNOSIS — H25.812 COMBINED FORMS OF AGE-RELATED CATARACT OF LEFT EYE: ICD-10-CM

## 2025-04-24 DIAGNOSIS — H25.13 CATARACTA BRUNESCENS OF BOTH EYES: Primary | ICD-10-CM

## 2025-04-24 RX ORDER — CYCLOPENTOLATE HYDROCHLORIDE 10 MG/ML
1 SOLUTION/ DROPS OPHTHALMIC
OUTPATIENT
Start: 2025-04-24

## 2025-04-24 RX ORDER — TETRACAINE HYDROCHLORIDE 5 MG/ML
1 SOLUTION OPHTHALMIC
OUTPATIENT
Start: 2025-04-24

## 2025-04-24 RX ORDER — TROPICAMIDE 10 MG/ML
1 SOLUTION/ DROPS OPHTHALMIC
OUTPATIENT
Start: 2025-04-24

## 2025-04-24 RX ORDER — PHENYLEPHRINE HYDROCHLORIDE 25 MG/ML
1 SOLUTION/ DROPS OPHTHALMIC
OUTPATIENT
Start: 2025-04-24

## 2025-04-24 RX ORDER — SODIUM CHLORIDE 0.9 % (FLUSH) 0.9 %
10 SYRINGE (ML) INJECTION
OUTPATIENT
Start: 2025-04-24

## 2025-05-02 ENCOUNTER — DOCUMENTATION ONLY (OUTPATIENT)
Dept: OPHTHALMOLOGY | Facility: CLINIC | Age: OVER 89
End: 2025-05-02
Payer: MEDICARE

## 2025-05-02 NOTE — PROGRESS NOTES
Patient unable to position to obtain IOL calcs or refraction. IOL chosen based on manual A-scan and K of 42. Plan for Bid Nerdeon CCA0T0 +18.50D aiming for -0.87D.

## 2025-05-07 ENCOUNTER — ANESTHESIA EVENT (OUTPATIENT)
Dept: SURGERY | Facility: HOSPITAL | Age: OVER 89
End: 2025-05-07

## 2025-05-07 ENCOUNTER — PATIENT MESSAGE (OUTPATIENT)
Dept: SURGERY | Facility: HOSPITAL | Age: OVER 89
End: 2025-05-07
Payer: MEDICARE

## 2025-05-07 ENCOUNTER — TELEPHONE (OUTPATIENT)
Dept: OPHTHALMOLOGY | Facility: CLINIC | Age: OVER 89
End: 2025-05-07
Payer: MEDICARE

## 2025-05-07 NOTE — ANESTHESIA PREPROCEDURE EVALUATION
05/07/2025  Kaleb Ibarra is a 89 y.o., male for  EXTRACTION, CATARACT, WITH IOL INSERTION (Left)     PMH of Prostate CA, Dementia, nondisplaced left distal clavicle fracture and left scapula fracture Jan 2025; Femur fracture July 2024, known AAA with last scan 2019 repeat US in May 2025 without view of aorta.  GA is required for Cataract surgery due to patients baseline cognition    Patient SHOULD NOT be scheduled for any General Anesthetic that is not EMERGENT in nature                   Echocardiography Findings    Left Ventricle The left ventricle is normal in size. Mildly increased wall thickness. Normal wall motion. There is normal systolic function. Ejection fraction by visual approximation is 55%. There is indeterminate diastolic function.   Right Ventricle Normal right ventricular cavity size. Systolic function is normal.   Left Atrium Left atrium is mildly dilated.   Right Atrium Normal right atrial size.   Aortic Valve There is mild aortic valve sclerosis. There is no stenosis. Aortic valve peak velocity is 1.17 m/s. Mean gradient is 3 mmHg. There is no significant regurgitation.   Mitral Valve The mitral valve is structurally normal. There is normal leaflet mobility. The mean pressure gradient across the mitral valve is 3 mmHg at a heart rate of  bpm.   Tricuspid Valve The tricuspid valve is structurally normal. There is normal leaflet mobility. There is physiologically normal regurgitation.   Pulmonic Valve Not well visualized due to poor acoustic window.   IVC/SVC Normal venous pressure at 3 mmHg.   Pericardium and Other Findings There is no pericardial effusion.   Pulmonary Artery The estimated pulmonary artery systolic pressure is 8 mmHg.   LVAD RV/LV ratio is 0.65.   LVAD RV/LV ratio is 0.65.     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 11/23/2022    Compression fracture of body of thoracic vertebra 11/23/2022    Memory impairment 11/23/2022    Generalized weakness 11/23/2022    Closed fracture of left iliac wing 02/08/2023    Dementia with behavioral disturbance 02/10/2023    Left wrist fracture, closed, initial encounter 04/18/2024    Closed fracture of left proximal humerus 04/18/2024    Closed left hip fracture 07/28/2024    Closed fracture of left scapula 01/07/2025    Traumatic closed fracture of distal clavicle with minimal displacement, left, initial encounter 01/07/2025    Closed fracture of one rib of left side 01/07/2025    Fall 01/07/2025    SOB (shortness of breath) 01/10/2025    Cataracta brunescens of both eyes 03/31/2025     Resolved Ambulatory Problems     Diagnosis Date Noted    Rhabdomyolysis 02/08/2023     Past Medical History:   Diagnosis Date    Anemia, unspecified     Cancer     Dementia without behavioral disturbance     Depression     Dysphagia     Paroxysmal atrial fibrillation     Unspecified cataract     Uses roller walker          Past Surgical History:   Procedure Laterality Date    ABDOMINAL AORTOGRAPHY N/A 1/2/2025    Procedure: AORTOGRAM, ABDOMINAL;  Surgeon: Barbie Clark MD;  Location: [facility] CATH LAB;  Service: Peripheral Vascular;  Laterality: N/A;    FRACTURE SURGERY      INTRAMEDULLARY RODDING OF FEMUR Left 07/28/2024    Procedure: INSERTION, INTRAMEDULLARY HANNA, FEMUR, LEFT;  Surgeon: Fabricio Alcazar MD;  Location: [facility] OR;  Service: Orthopedics;  Laterality: Left;  Supine, hana table    synthes long tfna    last case    JOINT REPLACEMENT      OPEN REDUCTION AND INTERNAL FIXATION (ORIF) OF FRACTURE OF DISTAL RADIUS Left 04/18/2024     Procedure: ORIF, FRACTURE, RADIUS, DISTAL;  Surgeon: Kraig Malcolm DO;  Location: [facility] OR;  Service: Orthopedics;  Laterality: Left;  supine hand table c arm skeletal dynamics.    OPEN REDUCTION AND INTERNAL FIXATION (ORIF) OF FRACTURE OF PROXIMAL HUMERUS  04/18/2024    Procedure: ORIF, FRACTURE, HUMERUS, PROXIMAL;  Surgeon: Kraig Malcolm DO;  Location: [facility] OR;  Service: Orthopedics;;    PROSTATE SURGERY N/A     REMOVAL,ORTHOPEDIC HARDWARE,UPPER EXTREMITY Left 07/31/2024    Procedure: REMOVAL,ORTHOPEDIC HARDWARE,UPPER EXTREMITY, EXTENSOR TENDON REPAIR LEFT FOREARM;  Surgeon: Kraig Malcolm DO;  Location: [facility] OR;  Service: Orthopedics;  Laterality: Left;    SPINE SURGERY             Lab Results   Component Value Date    WBC 5.32 03/31/2025    HGB 11.7 (L) 03/31/2025    HCT 35.3 (L) 03/31/2025     03/31/2025    CHOL 100 03/31/2025    TRIG 29 (L) 03/31/2025    HDL 40 03/31/2025    ALT 6 03/31/2025    AST 11 03/31/2025     03/31/2025    K 4.1 03/31/2025     03/31/2025    CREATININE 0.68 (L) 03/31/2025    BUN 15.3 03/31/2025    CO2 29 03/31/2025    TSH 0.445 03/31/2025    PSA <0.10 03/31/2025    INR 1.3 (L) 03/31/2025           Pre-op Assessment          Review of Systems         Anesthesia Plan  Type of Anesthesia, risks & benefits discussed:    Anesthesia Type: MAC  Intra-op Monitoring Plan: Standard ASA Monitors  Induction:  IV  Informed Consent: Informed consent signed with the Patient and all parties understand the risks and agree with anesthesia plan.  All questions answered. Patient consented to blood products? No  ASA Score: 4  Day of Surgery Review of History & Physical: H&P Update referred to the surgeon/provider.    Ready For Surgery From Anesthesia Perspective.     .

## 2025-05-08 ENCOUNTER — ANESTHESIA (OUTPATIENT)
Dept: SURGERY | Facility: HOSPITAL | Age: OVER 89
End: 2025-05-08

## 2025-05-08 NOTE — TELEPHONE ENCOUNTER
Per Anesthesia and Dr. Zavala, patient is too high risk for elective surgery with GA and is not to be rescheduled. Patient was cancelled day of due to needing abdominal ultrasound and anesthesia then put in their notes that the patient cannot have surgery under GA with no emergent issue TERESA Cabrera, COA

## 2025-05-11 ENCOUNTER — DOCUMENTATION ONLY (OUTPATIENT)
Dept: OPHTHALMOLOGY | Facility: CLINIC | Age: OVER 89
End: 2025-05-11
Payer: MEDICARE

## 2025-05-11 NOTE — PROGRESS NOTES
Patient deemed unsafe to have elective procedures under general anesthesia with cardiac history. Discussed with Dr. Zavala, patient should not be rescheduled for elective cataract surgery.

## 2025-08-07 ENCOUNTER — LAB REQUISITION (OUTPATIENT)
Dept: LAB | Facility: HOSPITAL | Age: OVER 89
End: 2025-08-07
Payer: MEDICARE

## 2025-08-07 DIAGNOSIS — D64.9 ANEMIA, UNSPECIFIED: ICD-10-CM

## 2025-08-07 LAB
ALBUMIN SERPL-MCNC: 2.4 G/DL (ref 3.4–4.8)
ALBUMIN/GLOB SERPL: 0.6 RATIO (ref 1.1–2)
ALP SERPL-CCNC: 90 UNIT/L (ref 40–150)
ALT SERPL-CCNC: 8 UNIT/L (ref 0–55)
ANION GAP SERPL CALC-SCNC: 6 MEQ/L
AST SERPL-CCNC: 11 UNIT/L (ref 11–45)
BASOPHILS # BLD AUTO: 0.04 X10(3)/MCL
BASOPHILS NFR BLD AUTO: 0.7 %
BILIRUB SERPL-MCNC: 0.3 MG/DL
BUN SERPL-MCNC: 20.4 MG/DL (ref 8.4–25.7)
CALCIUM SERPL-MCNC: 8.3 MG/DL (ref 8.8–10)
CHLORIDE SERPL-SCNC: 106 MMOL/L (ref 98–111)
CHOLEST SERPL-MCNC: 95 MG/DL
CHOLEST/HDLC SERPL: 3 {RATIO} (ref 0–5)
CO2 SERPL-SCNC: 32 MMOL/L (ref 23–31)
CREAT SERPL-MCNC: 0.73 MG/DL (ref 0.72–1.25)
CREAT/UREA NIT SERPL: 28
EOSINOPHIL # BLD AUTO: 0.16 X10(3)/MCL (ref 0–0.9)
EOSINOPHIL NFR BLD AUTO: 3 %
ERYTHROCYTE [DISTWIDTH] IN BLOOD BY AUTOMATED COUNT: 13.7 % (ref 11.5–17)
GFR SERPLBLD CREATININE-BSD FMLA CKD-EPI: >60 ML/MIN/1.73/M2
GLOBULIN SER-MCNC: 4 GM/DL (ref 2.4–3.5)
GLUCOSE SERPL-MCNC: 71 MG/DL (ref 75–121)
HCT VFR BLD AUTO: 36.4 % (ref 42–52)
HDLC SERPL-MCNC: 36 MG/DL (ref 35–60)
HGB BLD-MCNC: 11.5 G/DL (ref 14–18)
IMM GRANULOCYTES # BLD AUTO: 0.01 X10(3)/MCL (ref 0–0.04)
IMM GRANULOCYTES NFR BLD AUTO: 0.2 %
LDLC SERPL CALC-MCNC: 53 MG/DL (ref 50–140)
LYMPHOCYTES # BLD AUTO: 1.83 X10(3)/MCL (ref 0.6–4.6)
LYMPHOCYTES NFR BLD AUTO: 33.8 %
MCH RBC QN AUTO: 30.7 PG (ref 27–31)
MCHC RBC AUTO-ENTMCNC: 31.6 G/DL (ref 33–36)
MCV RBC AUTO: 97.3 FL (ref 80–94)
MONOCYTES # BLD AUTO: 0.78 X10(3)/MCL (ref 0.1–1.3)
MONOCYTES NFR BLD AUTO: 14.4 %
NEUTROPHILS # BLD AUTO: 2.6 X10(3)/MCL (ref 2.1–9.2)
NEUTROPHILS NFR BLD AUTO: 47.9 %
NRBC BLD AUTO-RTO: 0 %
PLATELET # BLD AUTO: 147 X10(3)/MCL (ref 130–400)
PMV BLD AUTO: 11.5 FL (ref 7.4–10.4)
POTASSIUM SERPL-SCNC: 4 MMOL/L (ref 3.5–5.1)
PROT SERPL-MCNC: 6.4 GM/DL (ref 5.8–7.6)
PSA SERPL-MCNC: <0.1 NG/ML
RBC # BLD AUTO: 3.74 X10(6)/MCL (ref 4.7–6.1)
SODIUM SERPL-SCNC: 144 MMOL/L (ref 136–145)
TRIGL SERPL-MCNC: 29 MG/DL (ref 34–140)
TSH SERPL-ACNC: 0.73 UIU/ML (ref 0.35–4.94)
VLDLC SERPL CALC-MCNC: 6 MG/DL
WBC # BLD AUTO: 5.42 X10(3)/MCL (ref 4.5–11.5)

## 2025-08-07 PROCEDURE — 84443 ASSAY THYROID STIM HORMONE: CPT | Performed by: FAMILY MEDICINE

## 2025-08-07 PROCEDURE — 80061 LIPID PANEL: CPT | Performed by: FAMILY MEDICINE

## 2025-08-07 PROCEDURE — 80053 COMPREHEN METABOLIC PANEL: CPT | Performed by: FAMILY MEDICINE

## 2025-08-07 PROCEDURE — 84153 ASSAY OF PSA TOTAL: CPT | Performed by: FAMILY MEDICINE

## 2025-08-07 PROCEDURE — 85025 COMPLETE CBC W/AUTO DIFF WBC: CPT | Performed by: FAMILY MEDICINE

## (undated) DEVICE — COVER EQUIPMENT 36X25

## (undated) DEVICE — DRILL GEMINUS 2.3X40MM

## (undated) DEVICE — BIT DRILL 3FLUTE 270X3.8MM

## (undated) DEVICE — GLOVE PROTEXIS HYDROGEL SZ9

## (undated) DEVICE — Device

## (undated) DEVICE — SUT ETHILON 2-0 FSLX 30 BLK

## (undated) DEVICE — DRESSING GAUZE XEROFORM 5X9

## (undated) DEVICE — ADHESIVE DERMABOND ADVANCED

## (undated) DEVICE — SLING ARM COMFT NAVY BLU LG

## (undated) DEVICE — SCREW BONE COMPR 10X3MM
Type: IMPLANTABLE DEVICE | Site: RADIUS | Status: NON-FUNCTIONAL
Removed: 2024-04-18

## (undated) DEVICE — SUT PROLENE 4-0 FS-2 BL MON

## (undated) DEVICE — CANNULA ADULT NASAL 7FT

## (undated) DEVICE — COVER TABLE HVY DTY 60X90IN

## (undated) DEVICE — SOL NACL IRR 1000ML BTL

## (undated) DEVICE — SET ANGIO ACIST CVI ANGIOTOUCH

## (undated) DEVICE — GLOVE PROTEXIS LTX MICRO 8

## (undated) DEVICE — GLOVE PROTEXIS BLUE LATEX 9

## (undated) DEVICE — ELECTRODE PATIENT RETURN DISP

## (undated) DEVICE — SUT ABSRB MONO 2-0 CT-2 27IN

## (undated) DEVICE — STAPLER SKIN PROXIMATE WIDE

## (undated) DEVICE — SHEATH INTRODUCER 7FR 11CM

## (undated) DEVICE — BLADE SURG CARBON STEEL #10

## (undated) DEVICE — SPONGE COTTON TRAY 4X4IN

## (undated) DEVICE — DRAPE HAND STERILE

## (undated) DEVICE — DRAPE STERI U-SHAPED 47X51IN

## (undated) DEVICE — SUT PDS VIL/BLU DUAL ORTHO

## (undated) DEVICE — GOWN SMARTGOWN 3XL XLONG

## (undated) DEVICE — GUIDEWIRE INQWIRE SE 3MM JTIP

## (undated) DEVICE — GLOVE SIGNATURE MICRO LTX 6.5

## (undated) DEVICE — BIT DRILL 4.2MM 3 FLUTD 145MM

## (undated) DEVICE — CATH ANGLED GLIDE CATH 4FR

## (undated) DEVICE — SCREW T10 SHAFT DRIVER N CANN

## (undated) DEVICE — BANDAGE ESMARK 4INX3YD

## (undated) DEVICE — SUT ETHILON 3-0 FS-1 30

## (undated) DEVICE — DRAPE IOBAN 2 STERI

## (undated) DEVICE — WIRE GUIDE 3.2MM 400MM
Type: IMPLANTABLE DEVICE | Site: FEMUR | Status: NON-FUNCTIONAL
Removed: 2024-07-28

## (undated) DEVICE — DEVICE MYNX GRIP 6/7F

## (undated) DEVICE — GOWN SMARTSLEEVE AAMI LVL4 XL

## (undated) DEVICE — SHEATH TOURGUIDE 6.5F 55CM

## (undated) DEVICE — CUFF ATS 2 PORT SNGL BLDR 18IN

## (undated) DEVICE — COVER SHOE FLUID RESISTANT XL

## (undated) DEVICE — BANDAGE COMPR VELCLOSE 4INX5YD

## (undated) DEVICE — SCREW BONE COMPR 14X3MM
Type: IMPLANTABLE DEVICE | Site: RADIUS | Status: NON-FUNCTIONAL
Removed: 2024-04-18

## (undated) DEVICE — SUT CTD VICRYL CT-1 27

## (undated) DEVICE — SUT VICRYL BR 1 GEN 27 CT-1

## (undated) DEVICE — DRESSING XEROFORM 5X9IN

## (undated) DEVICE — SPONGE GAUZE 4X4 12 PLY STRL

## (undated) DEVICE — PAD DEFIB CADENCE ADULT R2

## (undated) DEVICE — DRESSING XEROFORM NONADH 1X8IN

## (undated) DEVICE — GUIDEWIRE STD .035X260CM ANG

## (undated) DEVICE — CATH EMPULSE ANGLED 5FR PIGTAI

## (undated) DEVICE — DRESSING WND GZ 10X4X8X2IN

## (undated) DEVICE — APPLICATOR CHLORAPREP ORN 26ML

## (undated) DEVICE — GLOVE 6.5 PROTEXIS PI BLUE

## (undated) DEVICE — GLOVE PROTEXIS HYDROGEL SZ6.5

## (undated) DEVICE — KIT SURGICAL TURNOVER

## (undated) DEVICE — WIRE NITREX 014 X 300

## (undated) DEVICE — GLOVE SIGNATURE MICRO LTX 6

## (undated) DEVICE — DRAPE TOP 53X102IN

## (undated) DEVICE — GOWN SMARTSLEEVE XXL/XLONG

## (undated) DEVICE — SUT MCRYL PLUS 2-0 CT-1 36IN

## (undated) DEVICE — SPONGE LAP 18X18 PREWASHED

## (undated) DEVICE — DRESSING ISLAND TELFA 4X5IN

## (undated) DEVICE — CATH PROGREAT 130 CM 2.4FR

## (undated) DEVICE — IMPLANTABLE DEVICE
Type: IMPLANTABLE DEVICE | Site: FEMUR | Status: NON-FUNCTIONAL
Removed: 2024-07-28

## (undated) DEVICE — SLING ARM COMFT NAVY BLU MED

## (undated) DEVICE — SUT VICRYL 2-0 8-18 CP-2

## (undated) DEVICE — SUT ETHILON 3-0 PS2 18 BLK

## (undated) DEVICE — DRAPE INCISE IOBAN 2 13X13IN

## (undated) DEVICE — GLOVE 8 PROTEXIS PI ORTHO

## (undated) DEVICE — GLOVE SENSICARE PI GRN 6.5

## (undated) DEVICE — DRAPE C-ARMOR EQUIPMENT COVER

## (undated) DEVICE — TAPE SILK 3IN

## (undated) DEVICE — GLOVE PROTEXIS NEU-THERA SZ6

## (undated) DEVICE — PADDING WYTEX UNDRCST 4INX4YD

## (undated) DEVICE — BANDAGE ADHESIVE 8X4X16X2IN

## (undated) DEVICE — COVER FULLGUARD SHOE HIGH-TOP

## (undated) DEVICE — BIT DRILL QCK-CPLG 3.2MM

## (undated) DEVICE — GLOVE 9.0 PROTEXIS PI BLUE

## (undated) DEVICE — BANDAGE ACE NON LATEX 3IN

## (undated) DEVICE — CATH MIKAELSSON 5F 80CM

## (undated) DEVICE — INTRODUCER ANGIO PINNACLE 5X10

## (undated) DEVICE — ELECTRODE REM POLYHESIVE II

## (undated) DEVICE — KIT MINI STK MAX COAX 5FR 10CM

## (undated) DEVICE — CATH 5FR 80CM SOS OMNI 3